# Patient Record
Sex: MALE | Race: WHITE | NOT HISPANIC OR LATINO | Employment: OTHER | URBAN - NONMETROPOLITAN AREA
[De-identification: names, ages, dates, MRNs, and addresses within clinical notes are randomized per-mention and may not be internally consistent; named-entity substitution may affect disease eponyms.]

---

## 2017-01-13 ENCOUNTER — OFFICE VISIT (OUTPATIENT)
Dept: INTERNAL MEDICINE | Facility: CLINIC | Age: 72
End: 2017-01-13

## 2017-01-13 VITALS
DIASTOLIC BLOOD PRESSURE: 70 MMHG | SYSTOLIC BLOOD PRESSURE: 120 MMHG | HEART RATE: 67 BPM | HEIGHT: 69 IN | WEIGHT: 206 LBS | OXYGEN SATURATION: 98 % | BODY MASS INDEX: 30.51 KG/M2

## 2017-01-13 DIAGNOSIS — D84.9 IMMUNODEFICIENCY (HCC): Primary | ICD-10-CM

## 2017-01-13 DIAGNOSIS — N41.1 CHRONIC PROSTATITIS: ICD-10-CM

## 2017-01-13 DIAGNOSIS — G47.00 INSOMNIA, UNSPECIFIED TYPE: ICD-10-CM

## 2017-01-13 DIAGNOSIS — Z79.899 ENCOUNTER FOR LONG-TERM CURRENT USE OF MEDICATION: ICD-10-CM

## 2017-01-13 PROCEDURE — 99213 OFFICE O/P EST LOW 20 MIN: CPT | Performed by: FAMILY MEDICINE

## 2017-01-13 RX ORDER — NALTREXONE HYDROCHLORIDE 50 MG/1
50 TABLET, FILM COATED ORAL DAILY
Qty: 30 TABLET | Refills: 5 | Status: SHIPPED | OUTPATIENT
Start: 2017-01-13 | End: 2017-05-31

## 2017-01-13 RX ORDER — AMITRIPTYLINE HYDROCHLORIDE 25 MG/1
50 TABLET, FILM COATED ORAL NIGHTLY PRN
Qty: 180 TABLET | Refills: 3 | Status: SHIPPED | OUTPATIENT
Start: 2017-01-13 | End: 2017-02-09 | Stop reason: ALTCHOICE

## 2017-01-13 NOTE — MR AVS SNAPSHOT
Saad Meier   1/13/2017 3:30 PM   Office Visit    Provider:  Giles Weldon DO   Department:  [facility] PRIMARY CARE   Dept Phone:  [phone]                Your Full Care Plan              Today's Medication Changes          These changes are accurate as of: 1/13/17  3:52 PM.  If you have any questions, ask your nurse or doctor.               New Medication(s)Ordered:     naltrexone 50 MG tablet   Commonly known as:  DEPADE   Take 1 tablet by mouth Daily.   Started by:  Giles Weldon DO         Medication(s)that have changed:     amitriptyline 25 MG tablet   Commonly known as:  ELAVIL   Take 2 tablets by mouth At Night As Needed for sleep.   What changed:    - how much to take  - additional instructions   Changed by:  Giles Weldon DO         Stop taking medication(s)listed here:     sulfamethoxazole-trimethoprim 800-160 MG per tablet   Commonly known as:  BACTRIM DS,SEPTRA DS   Stopped by:  Giles Weldon DO                Where to Get Your Medications      These medications were sent to [address] - [phone]  - [phone] [address] [address]     Phone:  [phone]     amitriptyline 25 MG tablet    naltrexone 50 MG tablet                  Your Updated Medication List          This list is accurate as of: 1/13/17  3:52 PM.  Always use your most recent med list.                amitriptyline 25 MG tablet   Commonly known as:  ELAVIL   Take 2 tablets by mouth At Night As Needed for sleep.       aspirin 81 MG tablet       finasteride 5 MG tablet   Commonly known as:  PROSCAR   Take 1 tablet by mouth Daily.       melatonin 5 MG tablet tablet       naltrexone 50 MG tablet   Commonly known as:  DEPADE   Take 1 tablet by mouth Daily.       pantoprazole 20 MG EC tablet   Commonly known as:  PROTONIX   1 po in the am 30 minutes before breakfast.       tamsulosin 0.4 MG capsule 24 hr capsule      Commonly known as:  FLOMAX               You Were Diagnosed With        Codes Comments    Immunodeficiency    -  Primary ICD-10-CM: D84.9  ICD-9-CM: 279.3     Chronic prostatitis     ICD-10-CM: N41.1  ICD-9-CM: 601.1     Insomnia, unspecified type     ICD-10-CM: G47.00  ICD-9-CM: 780.52     Encounter for long-term current use of medication     ICD-10-CM: Z79.899  ICD-9-CM: V58.69       Instructions     None    Patient Instructions History      [employer] Signup     Our records indicate that you have an active [employer] account.    You can view your After Visit Summary by going to [employer] and logging in with your [employer] username and password.  If you don't have a [employer] username and password but a parent or guardian has access to your record, the parent or guardian should login with their own [employer] username and password and access your record to view the After Visit Summary.    If you have questions, you can email [email or web address] or call [phone] to talk to our [employer] staff.  Remember, [employer] is NOT to be used for urgent needs.  For medical emergencies, dial 911.               Other Info from Your Visit           Your Appointments     May 15, 2017 11:15 AM EDT   Follow Up with Atilio Neil MD   [facility] CARDIOLOGY (--)    [address]  [facility] [address] [phone]           Arrive 15 minutes prior to appointment.            Jul 03, 2017  1:00 PM EDT   Follow Up with Luis Hollins MD   [facility] UROLOGY (--)    [address]  [facility] [address] [phone]           Arrive 15 minutes prior to appointment.              Allergies     No Known Allergies      Reason for Visit     CHRONIC PROSTATITIS FOLLOW UP     Itching DRY, ITCHY SKIN - HANDS - MAYBE SOAP OR GLOVES - HAS USED OTC HYDROCORTISONE CREAM     Immunodeficiency LDN - DISCUSS REFILL       Vital Signs      "Blood Pressure Pulse Height Weight Oxygen Saturation Body Mass Index    120/70 67 69\" (175.3 cm) 206 lb (93.4 kg) 98% 30.42 kg/m2    Smoking Status                   Former Smoker           Problems and Diagnoses Noted     Inflammatory disease of prostate    Encounter for long-term (current) use of medications    Immunodeficiency    Difficulty falling or staying asleep      "

## 2017-01-13 NOTE — PROGRESS NOTES
Subjective   Saad Meier is a 71 y.o. male.     History of Present Illness   Yoshi had labs in November that were fine.  He would like to get a refill on the naltrexone that he has taken off and on for years.  He states its for his immunodeficiency.  He had 3+ leuk in his urine in November.  He's on avodart and flomax for chronic BPH.  He doesn't sleep too well.  He takes elavil 25mg at night.      The following portions of the patient's history were reviewed and updated as appropriate: allergies, current medications, past social history and problem list.    Review of Systems   Constitutional: Negative for appetite change, chills, fatigue, fever and unexpected weight change.   HENT: Negative for congestion, ear pain, hearing loss, nosebleeds, postnasal drip, rhinorrhea, sore throat, tinnitus and trouble swallowing.    Eyes: Negative for photophobia, discharge and visual disturbance.   Respiratory: Negative for cough, chest tightness, shortness of breath and wheezing.    Cardiovascular: Negative for chest pain, palpitations and leg swelling.   Gastrointestinal: Negative for abdominal distention, abdominal pain, blood in stool, constipation, diarrhea, nausea and vomiting.   Endocrine: Negative for cold intolerance, heat intolerance, polydipsia, polyphagia and polyuria.   Musculoskeletal: Negative for arthralgias, back pain, joint swelling, myalgias, neck pain and neck stiffness.   Skin: Negative for color change, pallor, rash and wound.   Allergic/Immunologic: Negative for environmental allergies, food allergies and immunocompromised state.   Neurological: Negative for dizziness, tremors, seizures, weakness, numbness and headaches.   Hematological: Negative for adenopathy. Does not bruise/bleed easily.   Psychiatric/Behavioral: Negative for agitation, behavioral problems, confusion, hallucinations, self-injury and suicidal ideas. The patient is not nervous/anxious.        Objective   Visit Vitals   • /70   •  "Pulse 67   • Ht 69\" (175.3 cm)   • Wt 206 lb (93.4 kg)   • SpO2 98%   • BMI 30.42 kg/m2     Physical Exam   Constitutional: He is oriented to person, place, and time. He appears well-developed and well-nourished. No distress.   HENT:   Head: Normocephalic and atraumatic.   Right Ear: External ear normal.   Left Ear: External ear normal.   Nose: Nose normal.   Mouth/Throat: Oropharynx is clear and moist.   Eyes: Conjunctivae and EOM are normal. Pupils are equal, round, and reactive to light. Right eye exhibits no discharge. Left eye exhibits no discharge. No scleral icterus.   Neck: Normal range of motion. Neck supple. No JVD present. No tracheal deviation present. No thyromegaly present.   Cardiovascular: Normal rate, regular rhythm, normal heart sounds and intact distal pulses.  Exam reveals no gallop and no friction rub.    No murmur heard.  Pulmonary/Chest: Effort normal and breath sounds normal. No stridor. No respiratory distress. He has no wheezes. He has no rales. He exhibits no tenderness.   Abdominal: Soft. Bowel sounds are normal. He exhibits no distension and no mass. There is no tenderness. There is no rebound and no guarding. No hernia.   Musculoskeletal: Normal range of motion. He exhibits no edema, tenderness or deformity.   Lymphadenopathy:     He has no cervical adenopathy.   Neurological: He is alert and oriented to person, place, and time. He has normal reflexes. He displays normal reflexes. No cranial nerve deficit. He exhibits normal muscle tone.   Skin: Skin is warm and dry. No rash noted. No erythema. No pallor.   Psychiatric: He has a normal mood and affect. His behavior is normal. Judgment normal.       Assessment/Plan   Problem List Items Addressed This Visit        Immune and Lymphatic    Immunodeficiency - Primary    Relevant Medications    naltrexone (DEPADE) 50 MG tablet       Genitourinary    Chronic prostatitis       Other    Insomnia    Relevant Medications    amitriptyline (ELAVIL) " 25 MG tablet    Encounter for long-term current use of medication        incerease the elavil to 50mg and fu in a month.

## 2017-01-23 ENCOUNTER — TELEPHONE (OUTPATIENT)
Dept: INTERNAL MEDICINE | Facility: CLINIC | Age: 72
End: 2017-01-23

## 2017-01-23 DIAGNOSIS — N15.9 KIDNEY INFECTION: Primary | ICD-10-CM

## 2017-01-23 LAB
BILIRUB BLD-MCNC: NEGATIVE MG/DL
CLARITY, POC: CLEAR
COLOR UR: YELLOW
GLUCOSE UR STRIP-MCNC: NEGATIVE MG/DL
KETONES UR QL: NEGATIVE
LEUKOCYTE EST, POC: ABNORMAL
NITRITE UR-MCNC: NEGATIVE MG/ML
PH UR: 5 [PH] (ref 5–8)
PROT UR STRIP-MCNC: NEGATIVE MG/DL
RBC # UR STRIP: NEGATIVE /UL
SP GR UR: 1.01 (ref 1–1.03)
UROBILINOGEN UR QL: NORMAL

## 2017-01-23 PROCEDURE — 81003 URINALYSIS AUTO W/O SCOPE: CPT | Performed by: FAMILY MEDICINE

## 2017-01-23 NOTE — TELEPHONE ENCOUNTER
----- Message from Gerard Coppola sent at 1/23/2017  8:32 AM EST -----  Contact: Patient  Patient called stating that he believes he has a possible kidney infection. He would like something called in or a telephone call about whether or not he should be seen. Stated that it hadn't been long ago when he was seen for the same issue.    Cell # [phone]

## 2017-01-24 DIAGNOSIS — N41.9 PROSTATITIS, UNSPECIFIED PROSTATITIS TYPE: Primary | ICD-10-CM

## 2017-01-24 DIAGNOSIS — N41.9 PROSTATITIS, UNSPECIFIED PROSTATITIS TYPE: ICD-10-CM

## 2017-01-24 RX ORDER — LEVOFLOXACIN 750 MG/1
750 TABLET ORAL DAILY
Qty: 14 TABLET | Refills: 0 | Status: SHIPPED | OUTPATIENT
Start: 2017-01-24 | End: 2017-01-24 | Stop reason: SDUPTHER

## 2017-01-24 RX ORDER — LEVOFLOXACIN 750 MG/1
750 TABLET ORAL DAILY
Qty: 14 TABLET | Refills: 0 | Status: SHIPPED | OUTPATIENT
Start: 2017-01-24 | End: 2017-02-09

## 2017-02-09 ENCOUNTER — OFFICE VISIT (OUTPATIENT)
Dept: INTERNAL MEDICINE | Facility: CLINIC | Age: 72
End: 2017-02-09

## 2017-02-09 VITALS
BODY MASS INDEX: 29.92 KG/M2 | OXYGEN SATURATION: 97 % | HEART RATE: 70 BPM | HEIGHT: 69 IN | SYSTOLIC BLOOD PRESSURE: 140 MMHG | DIASTOLIC BLOOD PRESSURE: 90 MMHG | WEIGHT: 202 LBS

## 2017-02-09 DIAGNOSIS — G47.00 INSOMNIA, UNSPECIFIED TYPE: ICD-10-CM

## 2017-02-09 PROCEDURE — 99213 OFFICE O/P EST LOW 20 MIN: CPT | Performed by: FAMILY MEDICINE

## 2017-02-09 RX ORDER — TRAZODONE HYDROCHLORIDE 50 MG/1
TABLET ORAL
Qty: 60 TABLET | Refills: 5 | Status: SHIPPED | OUTPATIENT
Start: 2017-02-09 | End: 2017-03-08

## 2017-02-09 RX ORDER — AMITRIPTYLINE HYDROCHLORIDE 25 MG/1
50 TABLET, FILM COATED ORAL NIGHTLY PRN
Qty: 180 TABLET | Refills: 3 | Status: CANCELLED | OUTPATIENT
Start: 2017-02-09

## 2017-02-09 NOTE — PROGRESS NOTES
"Subjective   Saad Meier is a 71 y.o. male.     History of Present Illness   Yoshi states that he's getting around 4 hours of sleep now with the 50mg of elavil.  He's never tried trazodone.  He's fine with trying to change to see if he sleeps any better.      The following portions of the patient's history were reviewed and updated as appropriate: allergies, current medications, past social history and problem list.    Review of Systems   Constitutional: Negative for appetite change, chills, fatigue, fever and unexpected weight change.   HENT: Negative for congestion, ear pain, hearing loss, nosebleeds, postnasal drip, rhinorrhea, sore throat, tinnitus and trouble swallowing.    Eyes: Negative for photophobia, discharge and visual disturbance.   Respiratory: Negative for cough, chest tightness, shortness of breath and wheezing.    Cardiovascular: Negative for chest pain, palpitations and leg swelling.   Gastrointestinal: Negative for abdominal distention, abdominal pain, blood in stool, constipation, diarrhea, nausea and vomiting.   Endocrine: Negative for cold intolerance, heat intolerance, polydipsia, polyphagia and polyuria.   Musculoskeletal: Negative for arthralgias, back pain, joint swelling, myalgias, neck pain and neck stiffness.   Skin: Negative for color change, pallor, rash and wound.   Allergic/Immunologic: Negative for environmental allergies, food allergies and immunocompromised state.   Neurological: Negative for dizziness, tremors, seizures, weakness, numbness and headaches.   Hematological: Negative for adenopathy. Does not bruise/bleed easily.   Psychiatric/Behavioral: Negative for agitation, behavioral problems, confusion, hallucinations, self-injury and suicidal ideas. The patient is not nervous/anxious.        Objective   Visit Vitals   • /90   • Pulse 70   • Ht 69\" (175.3 cm)   • Wt 202 lb (91.6 kg)   • SpO2 97%   • BMI 29.83 kg/m2     Physical Exam   Constitutional: He is oriented to " person, place, and time. He appears well-developed and well-nourished. No distress.   HENT:   Head: Normocephalic and atraumatic.   Right Ear: External ear normal.   Left Ear: External ear normal.   Nose: Nose normal.   Mouth/Throat: Oropharynx is clear and moist.   Eyes: Conjunctivae and EOM are normal. Pupils are equal, round, and reactive to light. Right eye exhibits no discharge. Left eye exhibits no discharge. No scleral icterus.   Neck: Normal range of motion. Neck supple. No JVD present. No tracheal deviation present. No thyromegaly present.   Cardiovascular: Normal rate, regular rhythm, normal heart sounds and intact distal pulses.  Exam reveals no gallop and no friction rub.    No murmur heard.  Pulmonary/Chest: Effort normal and breath sounds normal. No stridor. No respiratory distress. He has no wheezes. He has no rales. He exhibits no tenderness.   Abdominal: Soft. Bowel sounds are normal. He exhibits no distension and no mass. There is no tenderness. There is no rebound and no guarding. No hernia.   Musculoskeletal: Normal range of motion. He exhibits no edema, tenderness or deformity.   Lymphadenopathy:     He has no cervical adenopathy.   Neurological: He is alert and oriented to person, place, and time. He has normal reflexes. He displays normal reflexes. No cranial nerve deficit. He exhibits normal muscle tone.   Skin: Skin is warm and dry. No rash noted. No erythema. No pallor.   Psychiatric: He has a normal mood and affect. His behavior is normal. Judgment normal.       Assessment/Plan   Problem List Items Addressed This Visit        Other    Insomnia    Relevant Medications    traZODone (DESYREL) 50 MG tablet           d.c elavil and jeffrey in a month to see how he's sleeping with trazodone.

## 2017-03-06 PROBLEM — I25.10 CAD (CORONARY ARTERY DISEASE): Status: ACTIVE | Noted: 2017-03-06

## 2017-03-06 PROBLEM — I47.1 AV NODAL RE-ENTRY TACHYCARDIA: Status: ACTIVE | Noted: 2017-03-06

## 2017-03-06 PROBLEM — I47.19 AV NODAL RE-ENTRY TACHYCARDIA: Status: ACTIVE | Noted: 2017-03-06

## 2017-03-08 ENCOUNTER — OFFICE VISIT (OUTPATIENT)
Dept: INTERNAL MEDICINE | Facility: CLINIC | Age: 72
End: 2017-03-08

## 2017-03-08 VITALS
SYSTOLIC BLOOD PRESSURE: 120 MMHG | HEART RATE: 62 BPM | BODY MASS INDEX: 29.47 KG/M2 | WEIGHT: 199 LBS | OXYGEN SATURATION: 97 % | HEIGHT: 69 IN | DIASTOLIC BLOOD PRESSURE: 90 MMHG

## 2017-03-08 DIAGNOSIS — G47.00 INSOMNIA, UNSPECIFIED TYPE: Primary | ICD-10-CM

## 2017-03-08 PROCEDURE — 99213 OFFICE O/P EST LOW 20 MIN: CPT | Performed by: FAMILY MEDICINE

## 2017-03-08 RX ORDER — DOXEPIN HYDROCHLORIDE 25 MG/1
25 CAPSULE ORAL NIGHTLY
Qty: 30 CAPSULE | Refills: 11 | Status: SHIPPED | OUTPATIENT
Start: 2017-03-08 | End: 2017-06-07

## 2017-03-08 RX ORDER — AMITRIPTYLINE HYDROCHLORIDE 25 MG/1
50 TABLET, FILM COATED ORAL NIGHTLY
Qty: 60 TABLET | Refills: 2 | Status: SHIPPED | OUTPATIENT
Start: 2017-03-08 | End: 2017-06-07 | Stop reason: SDUPTHER

## 2017-03-08 NOTE — PROGRESS NOTES
Subjective   Saad Meier is a 71 y.o. male.     History of Present Illness   Yoshi was changed from 50 mg of elavil, to 50 mg of trazodone for insomnia.  He is not really seeing any better quality of sleep with that medication.    He tried to take 2 a few times, and didn't really see any help.  He has not tried doxepin.  He is curious if we can try the doxepin, and switch back to elavil if the doxepin doesn't help.         The following portions of the patient's history were reviewed and updated as appropriate: allergies, current medications, past social history and problem list.    Review of Systems   Constitutional: Negative for appetite change, chills, fatigue, fever and unexpected weight change.   HENT: Negative for congestion, ear pain, hearing loss, nosebleeds, postnasal drip, rhinorrhea, sore throat, tinnitus and trouble swallowing.    Eyes: Negative for photophobia, discharge and visual disturbance.   Respiratory: Negative for cough, chest tightness, shortness of breath and wheezing.    Cardiovascular: Negative for chest pain, palpitations and leg swelling.   Gastrointestinal: Negative for abdominal distention, abdominal pain, blood in stool, constipation, diarrhea, nausea and vomiting.   Endocrine: Negative for cold intolerance, heat intolerance, polydipsia, polyphagia and polyuria.   Musculoskeletal: Negative for arthralgias, back pain, joint swelling, myalgias, neck pain and neck stiffness.   Skin: Negative for color change, pallor, rash and wound.   Allergic/Immunologic: Negative for environmental allergies, food allergies and immunocompromised state.   Neurological: Negative for dizziness, tremors, seizures, weakness, numbness and headaches.   Hematological: Negative for adenopathy. Does not bruise/bleed easily.   Psychiatric/Behavioral: Negative for agitation, behavioral problems, confusion, hallucinations, self-injury and suicidal ideas. The patient is not nervous/anxious.        Objective   Visit  "Vitals   • /90   • Pulse 62   • Ht 69\" (175.3 cm)   • Wt 199 lb (90.3 kg)   • SpO2 97%   • BMI 29.39 kg/m2     Physical Exam   Constitutional: He is oriented to person, place, and time. He appears well-developed and well-nourished. No distress.   HENT:   Head: Normocephalic and atraumatic.   Right Ear: External ear normal.   Left Ear: External ear normal.   Nose: Nose normal.   Mouth/Throat: Oropharynx is clear and moist.   Eyes: Conjunctivae and EOM are normal. Pupils are equal, round, and reactive to light. Right eye exhibits no discharge. Left eye exhibits no discharge. No scleral icterus.   Neck: Normal range of motion. Neck supple. No JVD present. No tracheal deviation present. No thyromegaly present.   Cardiovascular: Normal rate, regular rhythm, normal heart sounds and intact distal pulses.  Exam reveals no gallop and no friction rub.    No murmur heard.  Pulmonary/Chest: Effort normal and breath sounds normal. No stridor. No respiratory distress. He has no wheezes. He has no rales. He exhibits no tenderness.   Abdominal: Soft. Bowel sounds are normal. He exhibits no distension and no mass. There is no tenderness. There is no rebound and no guarding. No hernia.   Musculoskeletal: Normal range of motion. He exhibits no edema, tenderness or deformity.   Lymphadenopathy:     He has no cervical adenopathy.   Neurological: He is alert and oriented to person, place, and time. He has normal reflexes. He displays normal reflexes. No cranial nerve deficit. He exhibits normal muscle tone.   Skin: Skin is warm and dry. No rash noted. No erythema. No pallor.   Psychiatric: He has a normal mood and affect. His behavior is normal. Judgment normal.       Assessment/Plan   Problem List Items Addressed This Visit        Other    Insomnia - Primary    Relevant Medications    doxepin (SINEquan) 25 MG capsule    amitriptyline (ELAVIL) 25 MG tablet           doxepin first, only use the elavil if needed if doxepin doesn't " work.  FU in a month.

## 2017-03-13 DIAGNOSIS — N40.1 BPH (BENIGN PROSTATIC HYPERTROPHY) WITH URINARY OBSTRUCTION: Primary | ICD-10-CM

## 2017-03-13 DIAGNOSIS — N13.8 BPH (BENIGN PROSTATIC HYPERTROPHY) WITH URINARY OBSTRUCTION: Primary | ICD-10-CM

## 2017-03-13 RX ORDER — TAMSULOSIN HYDROCHLORIDE 0.4 MG/1
1 CAPSULE ORAL
Qty: 30 CAPSULE | Refills: 11 | Status: SHIPPED | OUTPATIENT
Start: 2017-03-13 | End: 2017-07-03 | Stop reason: SDUPTHER

## 2017-04-12 ENCOUNTER — OFFICE VISIT (OUTPATIENT)
Dept: INTERNAL MEDICINE | Facility: CLINIC | Age: 72
End: 2017-04-12

## 2017-04-12 VITALS
BODY MASS INDEX: 29.47 KG/M2 | TEMPERATURE: 98.5 F | HEIGHT: 69 IN | DIASTOLIC BLOOD PRESSURE: 70 MMHG | SYSTOLIC BLOOD PRESSURE: 110 MMHG | OXYGEN SATURATION: 93 % | WEIGHT: 199 LBS | HEART RATE: 63 BPM

## 2017-04-12 DIAGNOSIS — G47.00 INSOMNIA, UNSPECIFIED TYPE: ICD-10-CM

## 2017-04-12 DIAGNOSIS — R12 HEARTBURN: Primary | ICD-10-CM

## 2017-04-12 DIAGNOSIS — R60.9 PERIPHERAL EDEMA: ICD-10-CM

## 2017-04-12 PROBLEM — R60.0 PERIPHERAL EDEMA: Status: ACTIVE | Noted: 2017-04-12

## 2017-04-12 PROCEDURE — 99213 OFFICE O/P EST LOW 20 MIN: CPT | Performed by: FAMILY MEDICINE

## 2017-04-12 RX ORDER — PANTOPRAZOLE SODIUM 20 MG/1
TABLET, DELAYED RELEASE ORAL
Qty: 90 TABLET | Refills: 3 | Status: SHIPPED | OUTPATIENT
Start: 2017-04-12 | End: 2018-01-31 | Stop reason: SDUPTHER

## 2017-04-12 RX ORDER — ZOLPIDEM TARTRATE 5 MG/1
5 TABLET ORAL NIGHTLY PRN
Qty: 30 TABLET | Refills: 0 | Status: SHIPPED | OUTPATIENT
Start: 2017-04-12 | End: 2017-06-07

## 2017-04-12 RX ORDER — HYDROCHLOROTHIAZIDE 25 MG/1
25 TABLET ORAL DAILY PRN
Qty: 30 TABLET | Refills: 11 | Status: SHIPPED | OUTPATIENT
Start: 2017-04-12 | End: 2017-05-31 | Stop reason: ALTCHOICE

## 2017-04-12 NOTE — PROGRESS NOTES
Subjective   Saad Meier is a 71 y.o. male.     History of Present Illness   Last month, it was noted that Saad had tried Elavil and trazodone past for his insomnia.  He had not tried doxepin.  A prescription for doxepin was written, with backup plans of Elavil given to him for as needed help and sleeping.  He states the elavil helped more and he went back to that here the last week.  VSS. NAD.    He was only getting 2-4 hours of sleep with that doxepin.  He needs a refill of the heartburn medication.    We will try ambien for a month and see how he does on it.  5mg.    The following portions of the patient's history were reviewed and updated as appropriate: allergies, current medications, past social history and problem list.    Review of Systems   Constitutional: Negative for appetite change, chills, fatigue, fever and unexpected weight change.   HENT: Negative for congestion, ear pain, hearing loss, nosebleeds, postnasal drip, rhinorrhea, sore throat, tinnitus and trouble swallowing.    Eyes: Negative for photophobia, discharge and visual disturbance.   Respiratory: Negative for cough, chest tightness, shortness of breath and wheezing.    Cardiovascular: Negative for chest pain, palpitations and leg swelling.   Gastrointestinal: Negative for abdominal distention, abdominal pain, blood in stool, constipation, diarrhea, nausea and vomiting.   Endocrine: Negative for cold intolerance, heat intolerance, polydipsia, polyphagia and polyuria.   Musculoskeletal: Negative for arthralgias, back pain, joint swelling, myalgias, neck pain and neck stiffness.   Skin: Negative for color change, pallor, rash and wound.   Allergic/Immunologic: Negative for environmental allergies, food allergies and immunocompromised state.   Neurological: Negative for dizziness, tremors, seizures, weakness, numbness and headaches.   Hematological: Negative for adenopathy. Does not bruise/bleed easily.   Psychiatric/Behavioral: Negative  "for agitation, behavioral problems, confusion, hallucinations, self-injury and suicidal ideas. The patient is not nervous/anxious.        Objective   /70  Pulse 63  Temp 98.5 °F (36.9 °C)  Ht 69\" (175.3 cm)  Wt 199 lb (90.3 kg)  SpO2 93% Comment: ROOM AIR - CONGESTED  BMI 29.39 kg/m2  Physical Exam   Constitutional: He is oriented to person, place, and time. He appears well-developed and well-nourished. No distress.   HENT:   Head: Normocephalic and atraumatic.   Right Ear: External ear normal.   Left Ear: External ear normal.   Nose: Nose normal.   Mouth/Throat: Oropharynx is clear and moist.   Eyes: Conjunctivae and EOM are normal. Pupils are equal, round, and reactive to light. Right eye exhibits no discharge. Left eye exhibits no discharge. No scleral icterus.   Neck: Normal range of motion. Neck supple. No JVD present. No tracheal deviation present. No thyromegaly present.   Cardiovascular: Normal rate, regular rhythm, normal heart sounds and intact distal pulses.  Exam reveals no gallop and no friction rub.    No murmur heard.  Pulmonary/Chest: Effort normal and breath sounds normal. No stridor. No respiratory distress. He has no wheezes. He has no rales. He exhibits no tenderness.   Abdominal: Soft. Bowel sounds are normal. He exhibits no distension and no mass. There is no tenderness. There is no rebound and no guarding. No hernia.   Musculoskeletal: Normal range of motion. He exhibits edema. He exhibits no tenderness or deformity.   Lymphadenopathy:     He has no cervical adenopathy.   Neurological: He is alert and oriented to person, place, and time. He has normal reflexes. He displays normal reflexes. No cranial nerve deficit. He exhibits normal muscle tone.   Skin: Skin is warm and dry. No rash noted. No erythema. No pallor.   Psychiatric: He has a normal mood and affect. His behavior is normal. Judgment normal.       Assessment/Plan   Problem List Items Addressed This Visit        Digestive "    Heartburn - Primary    Relevant Medications    pantoprazole (PROTONIX) 20 MG EC tablet       Other    Insomnia    Relevant Medications    zolpidem (AMBIEN) 5 MG tablet    Peripheral edema    Relevant Medications    hydrochlorothiazide (HYDRODIURIL) 25 MG tablet           fu in a month to see how hctz and ambien are doing.

## 2017-04-24 ENCOUNTER — OFFICE VISIT (OUTPATIENT)
Dept: INTERNAL MEDICINE | Facility: CLINIC | Age: 72
End: 2017-04-24

## 2017-04-24 VITALS
SYSTOLIC BLOOD PRESSURE: 128 MMHG | DIASTOLIC BLOOD PRESSURE: 80 MMHG | HEART RATE: 60 BPM | WEIGHT: 196 LBS | BODY MASS INDEX: 29.03 KG/M2 | OXYGEN SATURATION: 98 % | TEMPERATURE: 98.3 F | HEIGHT: 69 IN

## 2017-04-24 DIAGNOSIS — R05.8 PRODUCTIVE COUGH: Primary | ICD-10-CM

## 2017-04-24 PROCEDURE — 99213 OFFICE O/P EST LOW 20 MIN: CPT | Performed by: FAMILY MEDICINE

## 2017-04-24 RX ORDER — DOXYCYCLINE HYCLATE 100 MG/1
100 CAPSULE ORAL 2 TIMES DAILY
Qty: 20 CAPSULE | Refills: 0 | Status: SHIPPED | OUTPATIENT
Start: 2017-04-24 | End: 2017-05-31

## 2017-04-26 NOTE — PROGRESS NOTES
Subjective   Saad Meier is a 71 y.o. male.     Cough   This is a new problem. The current episode started 1 to 4 weeks ago. The problem has been unchanged. The problem occurs constantly. The cough is productive of purulent sputum. Associated symptoms include rhinorrhea and shortness of breath (with exertion). Pertinent negatives include no chills or fever. There is no history of asthma or COPD.        The following portions of the patient's history were reviewed and updated as appropriate: allergies, current medications, past family history, past medical history, past social history, past surgical history and problem list.    Review of Systems   Constitutional: Positive for activity change. Negative for chills and fever.   HENT: Positive for rhinorrhea.    Respiratory: Positive for cough and shortness of breath (with exertion).        Objective   Physical Exam   Constitutional: He is oriented to person, place, and time. Vital signs are normal. He appears well-developed and well-nourished. He is active.  Non-toxic appearance. He does not have a sickly appearance. He appears ill.   HENT:   Head: Normocephalic and atraumatic.   Right Ear: Hearing normal.   Left Ear: Hearing normal.   Nose: Rhinorrhea present.   Mouth/Throat: Mucous membranes are not dry.   Eyes: EOM and lids are normal. Pupils are equal, round, and reactive to light.   Cardiovascular: Normal rate, regular rhythm and normal heart sounds.    Pulmonary/Chest: Effort normal and breath sounds normal.   Occasional cough   Neurological: He is alert and oriented to person, place, and time. No cranial nerve deficit. Gait normal.   Skin: Skin is warm. He is not diaphoretic. No cyanosis. Nails show no clubbing.   Psychiatric: He has a normal mood and affect. His speech is normal and behavior is normal. Judgment and thought content normal.   Nursing note and vitals reviewed.      Assessment/Plan   Saad was seen today for cough and nasal  congestion.    Diagnoses and all orders for this visit:    Productive cough  -     doxycycline (VIBRAMYCIN) 100 MG capsule; Take 1 capsule by mouth 2 (Two) Times a Day.    covering for atypical pneumonia, no azithromycin due to some studies showing association with ACS. Follow up later this week if not improving.

## 2017-05-26 ENCOUNTER — APPOINTMENT (OUTPATIENT)
Dept: LAB | Facility: HOSPITAL | Age: 72
End: 2017-05-26

## 2017-05-26 ENCOUNTER — HOSPITAL ENCOUNTER (OUTPATIENT)
Dept: CT IMAGING | Facility: HOSPITAL | Age: 72
Discharge: HOME | End: 2017-05-26
Admitting: INTERNAL MEDICINE

## 2017-05-26 ENCOUNTER — TRANSCRIBE ORDERS (OUTPATIENT)
Dept: ADMINISTRATIVE | Facility: HOSPITAL | Age: 72
End: 2017-05-26

## 2017-05-26 DIAGNOSIS — C18.9 MALIGNANT NEOPLASM OF COLON, UNSPECIFIED SITE: Primary | ICD-10-CM

## 2017-05-26 LAB
CREAT BLD-MCNC: 0.9 MG/DL (ref 0.6–1.3)
GFR SERPL CREATININE-BSD FRML MDRD: 83 ML/MIN/1.73

## 2017-05-26 PROCEDURE — 36415 COLL VENOUS BLD VENIPUNCTURE: CPT | Performed by: INTERNAL MEDICINE

## 2017-05-26 PROCEDURE — 0 DIATRIZOATE MEGLUMINE & SODIUM PER 1 ML: Performed by: INTERNAL MEDICINE

## 2017-05-26 PROCEDURE — 82565 ASSAY OF CREATININE: CPT | Performed by: INTERNAL MEDICINE

## 2017-05-26 PROCEDURE — 74177 CT ABD & PELVIS W/CONTRAST: CPT

## 2017-05-26 PROCEDURE — 71260 CT THORAX DX C+: CPT

## 2017-05-26 PROCEDURE — 0 IOPAMIDOL 61 % SOLUTION: Performed by: INTERNAL MEDICINE

## 2017-05-26 RX ADMIN — DIATRIZOATE MEGLUMINE AND DIATRIZOATE SODIUM 30 ML: 660; 100 LIQUID ORAL; RECTAL at 12:45

## 2017-05-26 RX ADMIN — IOPAMIDOL 100 ML: 612 INJECTION, SOLUTION INTRAVENOUS at 12:45

## 2017-05-31 ENCOUNTER — OFFICE VISIT (OUTPATIENT)
Dept: CARDIOLOGY | Facility: CLINIC | Age: 72
End: 2017-05-31

## 2017-05-31 VITALS
HEIGHT: 69 IN | HEART RATE: 60 BPM | BODY MASS INDEX: 29.03 KG/M2 | SYSTOLIC BLOOD PRESSURE: 100 MMHG | DIASTOLIC BLOOD PRESSURE: 70 MMHG | WEIGHT: 196 LBS

## 2017-05-31 DIAGNOSIS — I49.5 SSS (SICK SINUS SYNDROME) (HCC): ICD-10-CM

## 2017-05-31 DIAGNOSIS — I47.1 AV NODAL RE-ENTRY TACHYCARDIA (HCC): Primary | ICD-10-CM

## 2017-05-31 DIAGNOSIS — R60.0 BILATERAL EDEMA OF LOWER EXTREMITY: ICD-10-CM

## 2017-05-31 PROCEDURE — 93288 INTERROG EVL PM/LDLS PM IP: CPT | Performed by: INTERNAL MEDICINE

## 2017-05-31 PROCEDURE — 99214 OFFICE O/P EST MOD 30 MIN: CPT | Performed by: INTERNAL MEDICINE

## 2017-05-31 RX ORDER — FUROSEMIDE 40 MG/1
40 TABLET ORAL DAILY
Qty: 30 TABLET | Refills: 2 | Status: SHIPPED | OUTPATIENT
Start: 2017-05-31 | End: 2017-05-31

## 2017-05-31 RX ORDER — FUROSEMIDE 40 MG/1
40 TABLET ORAL DAILY
Qty: 30 TABLET | Refills: 11 | Status: SHIPPED | OUTPATIENT
Start: 2017-05-31 | End: 2017-07-19 | Stop reason: SDUPTHER

## 2017-06-05 ENCOUNTER — RESULTS ENCOUNTER (OUTPATIENT)
Dept: CARDIOLOGY | Facility: CLINIC | Age: 72
End: 2017-06-05

## 2017-06-05 DIAGNOSIS — R60.0 BILATERAL EDEMA OF LOWER EXTREMITY: ICD-10-CM

## 2017-06-07 ENCOUNTER — OFFICE VISIT (OUTPATIENT)
Dept: INTERNAL MEDICINE | Facility: CLINIC | Age: 72
End: 2017-06-07

## 2017-06-07 VITALS
WEIGHT: 194.4 LBS | DIASTOLIC BLOOD PRESSURE: 70 MMHG | HEART RATE: 64 BPM | HEIGHT: 69 IN | OXYGEN SATURATION: 94 % | SYSTOLIC BLOOD PRESSURE: 100 MMHG | BODY MASS INDEX: 28.79 KG/M2

## 2017-06-07 DIAGNOSIS — G47.00 INSOMNIA, UNSPECIFIED TYPE: Primary | ICD-10-CM

## 2017-06-07 PROCEDURE — 99213 OFFICE O/P EST LOW 20 MIN: CPT | Performed by: FAMILY MEDICINE

## 2017-06-07 RX ORDER — AMITRIPTYLINE HYDROCHLORIDE 100 MG/1
100 TABLET, FILM COATED ORAL NIGHTLY
Qty: 30 TABLET | Refills: 5 | Status: SHIPPED | OUTPATIENT
Start: 2017-06-07 | End: 2017-06-07 | Stop reason: SDUPTHER

## 2017-06-07 RX ORDER — AMITRIPTYLINE HYDROCHLORIDE 100 MG/1
100 TABLET, FILM COATED ORAL NIGHTLY
Qty: 90 TABLET | Refills: 3 | Status: SHIPPED | OUTPATIENT
Start: 2017-06-07 | End: 2017-06-22 | Stop reason: SDUPTHER

## 2017-06-07 NOTE — PROGRESS NOTES
"Subjective   Saad Meier is a 71 y.o. male.     History of Present Illness   Saad is taking 2 of the 25mg elavil each night, and he's sleeing about 2 hours at night.  He naps sometimes, but mostly just doesn't sleep.  He wasn't sleeping at all with the ambien.      The following portions of the patient's history were reviewed and updated as appropriate: allergies, current medications, past social history and problem list.    Review of Systems   Constitutional: Negative for appetite change, chills, fatigue, fever and unexpected weight change.   HENT: Negative for congestion, ear pain, hearing loss, nosebleeds, postnasal drip, rhinorrhea, sore throat, tinnitus and trouble swallowing.    Eyes: Negative for photophobia, discharge and visual disturbance.   Respiratory: Negative for cough, chest tightness, shortness of breath and wheezing.    Cardiovascular: Negative for chest pain, palpitations and leg swelling.   Gastrointestinal: Negative for abdominal distention, abdominal pain, blood in stool, constipation, diarrhea, nausea and vomiting.   Endocrine: Negative for cold intolerance, heat intolerance, polydipsia, polyphagia and polyuria.   Musculoskeletal: Negative for arthralgias, back pain, joint swelling, myalgias, neck pain and neck stiffness.   Skin: Negative for color change, pallor, rash and wound.   Allergic/Immunologic: Negative for environmental allergies, food allergies and immunocompromised state.   Neurological: Negative for dizziness, tremors, seizures, weakness, numbness and headaches.   Hematological: Negative for adenopathy. Does not bruise/bleed easily.   Psychiatric/Behavioral: Negative for agitation, behavioral problems, confusion, hallucinations, self-injury and suicidal ideas. The patient is not nervous/anxious.        Objective   /70  Pulse 64  Ht 69\" (175.3 cm)  Wt 194 lb 6.4 oz (88.2 kg)  SpO2 94%  BMI 28.71 kg/m2  Physical Exam   Constitutional: He is oriented to person, " place, and time. He appears well-developed and well-nourished. No distress.   HENT:   Head: Normocephalic and atraumatic.   Right Ear: External ear normal.   Left Ear: External ear normal.   Nose: Nose normal.   Mouth/Throat: Oropharynx is clear and moist.   Eyes: Conjunctivae and EOM are normal. Pupils are equal, round, and reactive to light. Right eye exhibits no discharge. Left eye exhibits no discharge. No scleral icterus.   Neck: Normal range of motion. Neck supple. No JVD present. No tracheal deviation present. No thyromegaly present.   Cardiovascular: Normal rate, regular rhythm, normal heart sounds and intact distal pulses.  Exam reveals no gallop and no friction rub.    No murmur heard.  Pulmonary/Chest: Effort normal and breath sounds normal. No stridor. No respiratory distress. He has no wheezes. He has no rales. He exhibits no tenderness.   Abdominal: Soft. Bowel sounds are normal. He exhibits no distension and no mass. There is no tenderness. There is no rebound and no guarding. No hernia.   Musculoskeletal: Normal range of motion. He exhibits no edema, tenderness or deformity.   Lymphadenopathy:     He has no cervical adenopathy.   Neurological: He is alert and oriented to person, place, and time. He has normal reflexes. He displays normal reflexes. No cranial nerve deficit. He exhibits normal muscle tone.   Skin: Skin is warm and dry. No rash noted. No erythema. No pallor.   Psychiatric: He has a normal mood and affect. His behavior is normal. Judgment normal.       Assessment/Plan   Problem List Items Addressed This Visit        Other    Insomnia - Primary    Relevant Medications    amitriptyline (ELAVIL) 100 MG tablet           fu in 6-8 weeks.  Sooner if needed.

## 2017-06-09 LAB
BUN SERPL-MCNC: 22 MG/DL (ref 9–23)
BUN/CREAT SERPL: 24.4 (ref 7–25)
CALCIUM SERPL-MCNC: 10 MG/DL (ref 8.7–10.4)
CHLORIDE SERPL-SCNC: 116 MMOL/L (ref 99–109)
CO2 SERPL-SCNC: 28 MMOL/L (ref 20–31)
CREAT SERPL-MCNC: 0.9 MG/DL (ref 0.6–1.3)
GLUCOSE SERPL-MCNC: 100 MG/DL (ref 70–100)
POTASSIUM SERPL-SCNC: 3.9 MMOL/L (ref 3.5–5.5)
SODIUM SERPL-SCNC: 141 MMOL/L (ref 132–146)

## 2017-06-20 ENCOUNTER — TELEPHONE (OUTPATIENT)
Dept: INTERNAL MEDICINE | Facility: CLINIC | Age: 72
End: 2017-06-20

## 2017-06-20 NOTE — TELEPHONE ENCOUNTER
PATIENT LEFT  STATING THAT HIS AMITRIPTYLINE IS $96.40 FOR 90 DAY SUPPLY FROM [employer]. WANTS TO KNOW IF THERE IS SOMETHING THAT BE DONE.

## 2017-06-22 DIAGNOSIS — G47.00 INSOMNIA, UNSPECIFIED TYPE: ICD-10-CM

## 2017-06-22 RX ORDER — AMITRIPTYLINE HYDROCHLORIDE 50 MG/1
100 TABLET, FILM COATED ORAL NIGHTLY
Qty: 180 TABLET | Refills: 3 | Status: SHIPPED | OUTPATIENT
Start: 2017-06-22 | End: 2017-08-07

## 2017-06-22 NOTE — TELEPHONE ENCOUNTER
I change the prescription from 100 mg tablets 250 mg tablets that he can take 2 of.  That may be cheaper.  We will see.

## 2017-07-03 ENCOUNTER — OFFICE VISIT (OUTPATIENT)
Dept: UROLOGY | Facility: CLINIC | Age: 72
End: 2017-07-03

## 2017-07-03 VITALS
BODY MASS INDEX: 30.04 KG/M2 | TEMPERATURE: 98.2 F | DIASTOLIC BLOOD PRESSURE: 72 MMHG | HEIGHT: 69 IN | OXYGEN SATURATION: 95 % | HEART RATE: 60 BPM | SYSTOLIC BLOOD PRESSURE: 118 MMHG | WEIGHT: 202.8 LBS

## 2017-07-03 DIAGNOSIS — N40.1 BENIGN NON-NODULAR PROSTATIC HYPERPLASIA WITH LOWER URINARY TRACT SYMPTOMS: ICD-10-CM

## 2017-07-03 DIAGNOSIS — N32.0 BLADDER OUTLET OBSTRUCTION: ICD-10-CM

## 2017-07-03 DIAGNOSIS — R60.0 LOCALIZED EDEMA: ICD-10-CM

## 2017-07-03 DIAGNOSIS — N40.0 ENLARGED PROSTATE: Primary | ICD-10-CM

## 2017-07-03 DIAGNOSIS — N40.1 BPH (BENIGN PROSTATIC HYPERTROPHY) WITH URINARY OBSTRUCTION: ICD-10-CM

## 2017-07-03 DIAGNOSIS — N13.8 BPH (BENIGN PROSTATIC HYPERTROPHY) WITH URINARY OBSTRUCTION: ICD-10-CM

## 2017-07-03 DIAGNOSIS — R35.1 NOCTURIA MORE THAN TWICE PER NIGHT: ICD-10-CM

## 2017-07-03 LAB
BILIRUB BLD-MCNC: NEGATIVE MG/DL
CLARITY, POC: CLEAR
COLOR UR: YELLOW
GLUCOSE UR STRIP-MCNC: NEGATIVE MG/DL
KETONES UR QL: NEGATIVE
LEUKOCYTE EST, POC: ABNORMAL
NITRITE UR-MCNC: NEGATIVE MG/ML
PH UR: 6.5 [PH] (ref 5–8)
PROT UR STRIP-MCNC: NEGATIVE MG/DL
PSA SERPL-MCNC: 0.54 NG/ML (ref 0.06–4)
RBC # UR STRIP: NEGATIVE /UL
SP GR UR: 1.02 (ref 1–1.03)
UROBILINOGEN UR QL: NORMAL

## 2017-07-03 PROCEDURE — 99213 OFFICE O/P EST LOW 20 MIN: CPT | Performed by: UROLOGY

## 2017-07-03 PROCEDURE — 81003 URINALYSIS AUTO W/O SCOPE: CPT | Performed by: UROLOGY

## 2017-07-03 RX ORDER — TAMSULOSIN HYDROCHLORIDE 0.4 MG/1
1 CAPSULE ORAL
Qty: 90 CAPSULE | Refills: 3 | Status: SHIPPED | OUTPATIENT
Start: 2017-07-03 | End: 2018-11-26

## 2017-07-03 RX ORDER — FINASTERIDE 5 MG/1
5 TABLET, FILM COATED ORAL
Qty: 90 TABLET | Refills: 3 | Status: SHIPPED | OUTPATIENT
Start: 2017-07-03 | End: 2018-10-09 | Stop reason: SDUPTHER

## 2017-07-03 NOTE — PROGRESS NOTES
Chief Complaint  Follow-up (yearly fup for enlarged prostate and bladder outlet obstruction.)        ELIECER Meier is a 71 y.o. male who returns today for annual checkup with a known enlarged prostate and symptoms of outlet obstruction.  Since taking Proscar and Flomax he has little trouble voiding during the day.  He still has nocturia associated with lower extremity edema and obstructive sleep apnea.  Voided urine has a few white cells and a culture is submitted although last year the urine appeared identical and had no growth on culture.    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   • CAD (coronary artery disease) 3/6/2017   • Cardiac arrhythmia    • Colon polyp    • History of blood transfusion    • History of colonoscopy 2015    Complete   • History of esophagogastroduodenoscopy 2015    Diagnostic   • Iron deficiency        Past Surgical History  Past Surgical History:   Procedure Laterality Date   • CARDIAC ABLATION  03/2012   • CARDIAC CATHETERIZATION  2004   • CARDIAC PACEMAKER PLACEMENT  2004   • COLON SURGERY  2012   • HEMORRHOIDECTOMY  1970   • HERNIA REPAIR      X 5       Medications  has a current medication list which includes the following prescription(s): amitriptyline, aspirin, finasteride, furosemide, pantoprazole, and tamsulosin.      Allergies  No Known Allergies    Social History  Social History     Social History Narrative       Family History  He has no family history of bladder or kidney cancer  He has no family history of kidney stones      AUA Symptom Score:      Review of Systems  Review of Systems    Physical Exam  Physical Exam   Constitutional: He is oriented to person, place, and time. He appears well-developed and well-nourished.   HENT:   Head: Normocephalic and atraumatic.   Neck: Normal range of motion.    Pulmonary/Chest: Effort normal. No respiratory distress.   Abdominal: Soft. He exhibits no distension and no mass. There is no tenderness. A hernia is present. Hernia confirmed positive in the right inguinal area. Hernia confirmed negative in the left inguinal area.   Genitourinary: Rectum normal, prostate normal, testes normal and penis normal.   Musculoskeletal: Normal range of motion.   Lymphadenopathy:     He has no cervical adenopathy. No inguinal adenopathy noted on the right or left side.   Neurological: He is alert and oriented to person, place, and time.   Skin: Skin is warm and dry.   Psychiatric: He has a normal mood and affect. His behavior is normal.   Vitals reviewed.      Labs Recent and today in the office:  Results for orders placed or performed in visit on 07/03/17   POC Urinalysis Dipstick, Automated   Result Value Ref Range    Color Yellow Yellow, Straw, Dark Yellow, Kathy    Clarity, UA Clear Clear    Glucose, UA Negative Negative, 1000 mg/dL (3+) mg/dL    Bilirubin Negative Negative    Ketones, UA Negative Negative    Specific Gravity  1.025 1.005 - 1.030    Blood, UA Negative Negative    pH, Urine 6.5 5.0 - 8.0    Protein, POC Negative Negative mg/dL    Urobilinogen, UA Normal Normal    Leukocytes Small (1+) (A) Negative    Nitrite, UA Negative Negative         Assessment & Plan  He has a benign digital rectal exam so a PSA is submitted and if normal he can return on an annual basis.  He needs to restrict his salt in his diet and check back with his family doctor for the lower extremity edema

## 2017-07-07 ENCOUNTER — TELEPHONE (OUTPATIENT)
Dept: CARDIOLOGY | Facility: CLINIC | Age: 72
End: 2017-07-07

## 2017-07-07 NOTE — TELEPHONE ENCOUNTER
Reviewed results of recent labs with patient.  He states that he still has some swelling in his legs so I advised that he may take one extra lasix today, then return to his normal dose over the weekend.  If this has not helped he will call me back on Monday to let me know.  Understanding verbalized.

## 2017-07-18 ENCOUNTER — TELEPHONE (OUTPATIENT)
Dept: CARDIOLOGY | Facility: CLINIC | Age: 72
End: 2017-07-18

## 2017-07-18 NOTE — TELEPHONE ENCOUNTER
The patient called stating that his edema is worsening and he is short of breath.  I advised that if he is amenable I will refer him to the [facility] as we do not have an appt available immediately in the office.  He agreed and I reviewed the date, time and location with him.  Understanding and agreement verbalized.

## 2017-07-19 ENCOUNTER — OFFICE VISIT (OUTPATIENT)
Dept: CARDIOLOGY | Facility: HOSPITAL | Age: 72
End: 2017-07-19

## 2017-07-19 VITALS
BODY MASS INDEX: 30.66 KG/M2 | RESPIRATION RATE: 20 BRPM | SYSTOLIC BLOOD PRESSURE: 141 MMHG | HEIGHT: 69 IN | TEMPERATURE: 97 F | WEIGHT: 207 LBS | DIASTOLIC BLOOD PRESSURE: 95 MMHG | HEART RATE: 62 BPM | OXYGEN SATURATION: 94 %

## 2017-07-19 DIAGNOSIS — E87.70 HYPERVOLEMIA, UNSPECIFIED HYPERVOLEMIA TYPE: Primary | ICD-10-CM

## 2017-07-19 DIAGNOSIS — I49.5 SSS (SICK SINUS SYNDROME) (HCC): ICD-10-CM

## 2017-07-19 DIAGNOSIS — R60.0 BILATERAL EDEMA OF LOWER EXTREMITY: ICD-10-CM

## 2017-07-19 LAB
ANION GAP SERPL CALCULATED.3IONS-SCNC: -2 MMOL/L (ref 3–11)
BNP SERPL-MCNC: 29 PG/ML (ref 0–100)
BUN BLD-MCNC: 26 MG/DL (ref 9–23)
BUN/CREAT SERPL: 26 (ref 7–25)
CALCIUM SPEC-SCNC: 9.5 MG/DL (ref 8.7–10.4)
CHLORIDE SERPL-SCNC: 109 MMOL/L (ref 99–109)
CO2 SERPL-SCNC: 28 MMOL/L (ref 20–31)
CREAT BLD-MCNC: 1 MG/DL (ref 0.6–1.3)
GFR SERPL CREATININE-BSD FRML MDRD: 73 ML/MIN/1.73
GLUCOSE BLD-MCNC: 100 MG/DL (ref 70–100)
POTASSIUM BLD-SCNC: 4.6 MMOL/L (ref 3.5–5.5)
SODIUM BLD-SCNC: 135 MMOL/L (ref 132–146)

## 2017-07-19 PROCEDURE — 99213 OFFICE O/P EST LOW 20 MIN: CPT | Performed by: NURSE PRACTITIONER

## 2017-07-19 PROCEDURE — 80048 BASIC METABOLIC PNL TOTAL CA: CPT | Performed by: NURSE PRACTITIONER

## 2017-07-19 PROCEDURE — 83880 ASSAY OF NATRIURETIC PEPTIDE: CPT | Performed by: NURSE PRACTITIONER

## 2017-07-19 RX ORDER — METOLAZONE 2.5 MG/1
2.5 TABLET ORAL DAILY
Qty: 3 TABLET | Refills: 0 | Status: SHIPPED | OUTPATIENT
Start: 2017-07-19 | End: 2017-08-02 | Stop reason: SDUPTHER

## 2017-07-19 RX ORDER — POTASSIUM CHLORIDE 20 MEQ/1
20 TABLET, EXTENDED RELEASE ORAL DAILY
Qty: 30 TABLET | Refills: 11 | Status: SHIPPED | OUTPATIENT
Start: 2017-07-19 | End: 2019-04-15 | Stop reason: SDUPTHER

## 2017-07-19 RX ORDER — FUROSEMIDE 40 MG/1
40 TABLET ORAL 2 TIMES DAILY
Qty: 60 TABLET | Refills: 11 | Status: SHIPPED | OUTPATIENT
Start: 2017-07-19 | End: 2018-03-08 | Stop reason: SDUPTHER

## 2017-07-19 NOTE — PATIENT INSTRUCTIONS
Please take booster pill  Today with lasix when you get home  Please take booster tomorrow morning and Friday morning with lasix dose and then take other lasix dose at lunchtime  Potassium every day in the morning for the remainder of this week

## 2017-07-19 NOTE — PROGRESS NOTES
Encounter Date:07/19/2017      Patient ID: Saad Meier is a 71 y.o. male.        Subjective:     Chief Complaint: Establish Care and Edema     History of Present Illness patient presents to the [facility] center today for ongoing evaluation of his edema. Patient notes that he was started on lasix by Dr Wall for significant pedal edema at the end of May and notes slight improvement in his edema. His lasix was recently increased to 40 mg    No problems updated.    Past Surgical History:   Procedure Laterality Date   • CARDIAC ABLATION  03/2012   • CARDIAC CATHETERIZATION  2004   • CARDIAC PACEMAKER PLACEMENT  2004   • COLON SURGERY  2012   • HEMORRHOIDECTOMY  1970   • HERNIA REPAIR      X 5       No Known Allergies      Current Outpatient Prescriptions:   •  amitriptyline (ELAVIL) 50 MG tablet, Take 2 tablets by mouth Every Night., Disp: 180 tablet, Rfl: 3  •  aspirin 81 MG tablet, Take  by mouth Daily., Disp: , Rfl:   •  finasteride (PROSCAR) 5 MG tablet, Take 1 tablet by mouth Daily., Disp: 90 tablet, Rfl: 3  •  furosemide (LASIX) 40 MG tablet, Take 1 tablet by mouth Daily. 40 mg po daily X 5 days and then decrease to prn daily (Patient taking differently: Take 80 mg by mouth Daily. 40 mg po daily X 5 days and then decrease to prn daily), Disp: 30 tablet, Rfl: 11  •  pantoprazole (PROTONIX) 20 MG EC tablet, 1 po in the am 30 minutes before breakfast., Disp: 90 tablet, Rfl: 3  •  tamsulosin (FLOMAX) 0.4 MG capsule 24 hr capsule, Take 1 capsule by mouth Daily., Disp: 90 capsule, Rfl: 3    The following portions of the chart were reviewed and updated as appropriate: Allergies, current medications, past family history, social history, past medical history.     Review of Systems   Constitution: Negative for chills, decreased appetite, diaphoresis, fever, weakness, malaise/fatigue, night sweats, weight gain and weight loss.   HENT: Negative for congestion, headaches, hearing loss, hoarse voice and nosebleeds.   "  Eyes: Negative for blurred vision, visual disturbance and visual halos.   Cardiovascular: Positive for dyspnea on exertion and leg swelling. Negative for chest pain, claudication, cyanosis, irregular heartbeat, near-syncope, orthopnea, palpitations, paroxysmal nocturnal dyspnea and syncope.   Respiratory: Positive for shortness of breath. Negative for cough, hemoptysis, sleep disturbances due to breathing, snoring, sputum production and wheezing.    Hematologic/Lymphatic: Negative for bleeding problem. Bruises/bleeds easily.   Skin: Negative for dry skin, itching and rash.   Musculoskeletal: Negative for arthritis, joint pain, joint swelling and myalgias.   Gastrointestinal: Negative for bloating, abdominal pain, constipation, diarrhea, flatus, heartburn, hematemesis, hematochezia, melena, nausea and vomiting.   Genitourinary: Negative for dysuria, frequency, hematuria, nocturia and urgency.   Neurological: Negative for excessive daytime sleepiness, dizziness, light-headedness and loss of balance.   Psychiatric/Behavioral: Negative for depression. The patient has insomnia. The patient is not nervous/anxious.    Allergic/Immunologic:        Seasonal allergies           Objective:     Vitals:    07/19/17 0953 07/19/17 0954 07/19/17 0955   BP: 126/72 128/79 141/95   BP Location: Right arm Left arm Left arm   Patient Position: Sitting Sitting Standing   Cuff Size: Adult     Pulse: 60  62   Resp: 20     Temp: 97 °F (36.1 °C)     TempSrc: Temporal Artery      SpO2: 94%     Weight: 207 lb (93.9 kg)     Height: 69\" (175.3 cm)           Physical Exam   Constitutional: He is oriented to person, place, and time. He appears well-developed and well-nourished. He is active and cooperative. No distress.   HENT:   Head: Normocephalic and atraumatic.   Mouth/Throat: Oropharynx is clear and moist.   Eyes: Conjunctivae and EOM are normal. Pupils are equal, round, and reactive to light.   Neck: Normal range of motion. Neck supple. No " JVD present. No tracheal deviation present. No thyromegaly present.   Cardiovascular: Normal rate, regular rhythm, normal heart sounds and intact distal pulses.    Pulmonary/Chest: Effort normal. He has rales.   Abdominal: Soft. Bowel sounds are normal. He exhibits no distension. There is no tenderness.   Musculoskeletal: Normal range of motion. He exhibits edema (3+ pitting edema noted bilaterally).   Neurological: He is alert and oriented to person, place, and time.   Skin: Skin is warm, dry and intact.   Psychiatric: He has a normal mood and affect. His behavior is normal.   Nursing note and vitals reviewed.      Lab and Diagnostic Review:      Results for orders placed or performed in visit on 07/19/17   Basic Metabolic Panel   Result Value Ref Range    Glucose 100 70 - 100 mg/dL    BUN 26 (H) 9 - 23 mg/dL    Creatinine 1.00 0.60 - 1.30 mg/dL    Sodium 135 132 - 146 mmol/L    Potassium 4.6 3.5 - 5.5 mmol/L    Chloride 109 99 - 109 mmol/L    CO2 28.0 20.0 - 31.0 mmol/L    Calcium 9.5 8.7 - 10.4 mg/dL    eGFR Non African Amer 73 >60 mL/min/1.73    BUN/Creatinine Ratio 26.0 (H) 7.0 - 25.0    Anion Gap -2.0 (L) 3.0 - 11.0 mmol/L   BNP   Result Value Ref Range    BNP 29.0 0.0 - 100.0 pg/mL       Assessment and Plan:         1. Hypervolemia, unspecified hypervolemia type  Patient to begin metolazone 2.5 mg qd for the next 3 days with lasix 40 mg bid  Begin kcl 20 meq while on metolazone for the next 3 days  - Basic Metabolic Panel  - BNP  Heart failure education today including signs and symptoms, the role of the heart failure center, daily weights, low sodium diet (less than 1500 mg per day), and daily physical activity. Reviewed HF Zones with patient and family.  Patient to continue current medications as previously ordered.   2. SSS (sick sinus syndrome  [facility]    It has been a pleasure to participate in the care of this patient.  Patient was instructed to call the [facility] with any questions,  concerns, or worsening symptoms.  * Please note that portions of this note were completed with a voice recognition program. Efforts were made to edit the dictation but occasionally words are transcribed.

## 2017-07-31 ENCOUNTER — TELEPHONE (OUTPATIENT)
Dept: CARDIOLOGY | Facility: HOSPITAL | Age: 72
End: 2017-07-31

## 2017-07-31 NOTE — TELEPHONE ENCOUNTER
----- Message from Harini Summers MA sent at 7/28/2017  4:00 PM EDT -----  Called Pt and Pt stated that his ankles are still swollen, he is breathing ok, his weight is 203lbs this morning and he is tired easily.      ----- Message -----     From: JUSTA Lyle     Sent: 7/28/2017   2:35 PM       To: Harini Summers MA     K,  Do you mind to call this patient and review signs and symptoms of fluid overload? Please ask about weight loss, shortness of breath, swelling in his feet and ankles. THanks  ----- Message -----     From: JUSTA Lyle     Sent: 7/24/2017       To: JUSTA Lyle    Metolazone 2.5 weds, thurs and Friday  Lasix 40 bid

## 2017-08-02 ENCOUNTER — OFFICE VISIT (OUTPATIENT)
Dept: CARDIOLOGY | Facility: HOSPITAL | Age: 72
End: 2017-08-02

## 2017-08-02 VITALS
DIASTOLIC BLOOD PRESSURE: 75 MMHG | HEIGHT: 69 IN | SYSTOLIC BLOOD PRESSURE: 113 MMHG | TEMPERATURE: 97.2 F | HEART RATE: 62 BPM | WEIGHT: 206 LBS | BODY MASS INDEX: 30.51 KG/M2 | OXYGEN SATURATION: 91 % | RESPIRATION RATE: 22 BRPM

## 2017-08-02 DIAGNOSIS — I25.10 CORONARY ARTERY DISEASE INVOLVING NATIVE CORONARY ARTERY OF NATIVE HEART WITHOUT ANGINA PECTORIS: ICD-10-CM

## 2017-08-02 DIAGNOSIS — E87.79 OTHER HYPERVOLEMIA: Primary | ICD-10-CM

## 2017-08-02 DIAGNOSIS — I49.5 SSS (SICK SINUS SYNDROME) (HCC): ICD-10-CM

## 2017-08-02 LAB
ANION GAP SERPL CALCULATED.3IONS-SCNC: 5 MMOL/L (ref 3–11)
BUN BLD-MCNC: 23 MG/DL (ref 9–23)
BUN/CREAT SERPL: 25.6 (ref 7–25)
CALCIUM SPEC-SCNC: 9.2 MG/DL (ref 8.7–10.4)
CHLORIDE SERPL-SCNC: 108 MMOL/L (ref 99–109)
CO2 SERPL-SCNC: 27 MMOL/L (ref 20–31)
CREAT BLD-MCNC: 0.9 MG/DL (ref 0.6–1.3)
GFR SERPL CREATININE-BSD FRML MDRD: 83 ML/MIN/1.73
GLUCOSE BLD-MCNC: 112 MG/DL (ref 70–100)
POTASSIUM BLD-SCNC: 3.9 MMOL/L (ref 3.5–5.5)
SODIUM BLD-SCNC: 140 MMOL/L (ref 132–146)

## 2017-08-02 PROCEDURE — 99214 OFFICE O/P EST MOD 30 MIN: CPT | Performed by: NURSE PRACTITIONER

## 2017-08-02 PROCEDURE — 80048 BASIC METABOLIC PNL TOTAL CA: CPT | Performed by: NURSE PRACTITIONER

## 2017-08-02 RX ORDER — METOLAZONE 2.5 MG/1
TABLET ORAL
Qty: 7 TABLET | Refills: 3 | Status: SHIPPED | OUTPATIENT
Start: 2017-08-02 | End: 2017-12-07

## 2017-08-02 NOTE — PROGRESS NOTES
Encounter Date:08/02/2017      Patient ID: Saad Meier is a 71 y.o. male.        Subjective:     Chief Complaint: Follow-up (c/o of swelling in legs)     History of Present Illness and presents [facility] today for ongoing evaluation of his pedal edema.  Patient notes that he took metolazone 3 days after last appointment and noted resolution of his pedal edema.  He notes however that his pedal edema has returned since stopping the metolazone.  He notes his ankles are somewhat smaller in size upon awakening plus well very quickly upon arising.  He notes he tries to elevate his feet and ankles when seated but does not always do so.  He also notes dyspnea on exertion that improves with rest.  He notes compliance with his medications.  He denies chest pain, chest pressure, palpitations, tachycardia, presyncope, syncope, orthopnea, PND, abdominal fullness, early satiety, claudication, or cough.    Patient Active Problem List   Diagnosis   • Abdominal pain   • Arthritis   • BPH (benign prostatic hyperplasia)   • Cancer of colon   • CCF (congestive cardiac failure)   • CN (constipation)   • D (diarrhea)   • Acid reflux   • Brash   • Infection of kidney   • Chronic nausea   • Juvenile rheumatic fever   • Hernia, inguinal, right   • Ventral hernia without obstruction or gangrene   • Insomnia   • SSS (sick sinus syndrome)   • Encounter for long-term current use of medication   • Chronic prostatitis   • Immunodeficiency   • CAD (coronary artery disease)   • AV gunnar re-entry tachycardia   • Heartburn   • Peripheral edema   • Productive cough   • Bilateral edema of lower extremity       Past Surgical History:   Procedure Laterality Date   • CARDIAC ABLATION  03/2012   • CARDIAC CATHETERIZATION  2004   • CARDIAC PACEMAKER PLACEMENT  2004   • COLON SURGERY  2012   • HEMORRHOIDECTOMY  1970   • HERNIA REPAIR      X 5       No Known Allergies      Current Outpatient Prescriptions:   •  amitriptyline (ELAVIL)  50 MG tablet, Take 2 tablets by mouth Every Night., Disp: 180 tablet, Rfl: 3  •  aspirin 81 MG tablet, Take  by mouth Daily., Disp: , Rfl:   •  finasteride (PROSCAR) 5 MG tablet, Take 1 tablet by mouth Daily., Disp: 90 tablet, Rfl: 3  •  furosemide (LASIX) 40 MG tablet, Take 1 tablet by mouth 2 (Two) Times a Day. 40 mg po daily X 5 days and then decrease to prn daily, Disp: 60 tablet, Rfl: 11  •  metOLazone (ZAROXOLYN) 2.5 MG tablet, Take one pill x 3days starting 8/2 and then begin once weekly, Disp: 7 tablet, Rfl: 3  •  pantoprazole (PROTONIX) 20 MG EC tablet, 1 po in the am 30 minutes before breakfast., Disp: 90 tablet, Rfl: 3  •  potassium chloride (K-DUR,KLOR-CON) 20 MEQ CR tablet, Take 1 tablet by mouth Daily., Disp: 30 tablet, Rfl: 11  •  tamsulosin (FLOMAX) 0.4 MG capsule 24 hr capsule, Take 1 capsule by mouth Daily., Disp: 90 capsule, Rfl: 3    The following portions of the chart were reviewed and updated as appropriate: Allergies, current medications, past family history, social history, past medical history.     Review of Systems   Constitution: Negative for chills, decreased appetite, diaphoresis, fever, weakness, malaise/fatigue, night sweats, weight gain and weight loss.   HENT: Positive for hearing loss. Negative for congestion, headaches, hoarse voice and nosebleeds.    Eyes: Negative for blurred vision, visual disturbance and visual halos.   Cardiovascular: Positive for dyspnea on exertion and leg swelling. Negative for chest pain, claudication, cyanosis, irregular heartbeat, near-syncope, orthopnea, palpitations, paroxysmal nocturnal dyspnea and syncope.   Respiratory: Positive for shortness of breath. Negative for cough, hemoptysis, sleep disturbances due to breathing, snoring, sputum production and wheezing.    Hematologic/Lymphatic: Negative for bleeding problem. Does not bruise/bleed easily.   Skin: Negative for dry skin, itching and rash.   Musculoskeletal: Positive for muscle cramps. Negative  "for arthritis, joint pain, joint swelling and myalgias.   Gastrointestinal: Negative for bloating, abdominal pain, constipation, diarrhea, flatus, heartburn, hematemesis, hematochezia, melena, nausea and vomiting.   Genitourinary: Negative for dysuria, frequency, hematuria, nocturia and urgency.   Neurological: Positive for excessive daytime sleepiness. Negative for dizziness, light-headedness and loss of balance.   Psychiatric/Behavioral: Negative for depression. The patient has insomnia. The patient is not nervous/anxious.    Allergic/Immunologic:        Seasonal allergies           Objective:     Vitals:    08/02/17 1019 08/02/17 1020 08/02/17 1021   BP: 127/81 126/81 113/75   BP Location: Right arm Left arm Left arm   Patient Position: Sitting Sitting Standing   Cuff Size: Adult     Pulse: 60  62   Resp: 22     Temp: 97.2 °F (36.2 °C)     TempSrc: Temporal Artery      SpO2: 91%     Weight: 206 lb (93.4 kg)     Height: 69\" (175.3 cm)           Physical Exam   Constitutional: He is oriented to person, place, and time. He appears well-developed and well-nourished. He is active and cooperative. No distress.   HENT:   Head: Normocephalic and atraumatic.   Mouth/Throat: Oropharynx is clear and moist.   Eyes: Conjunctivae and EOM are normal. Pupils are equal, round, and reactive to light.   Neck: Normal range of motion. Neck supple. No JVD present. No tracheal deviation present. No thyromegaly present.   Cardiovascular: Normal rate, regular rhythm, normal heart sounds and intact distal pulses.    Pulmonary/Chest: Effort normal and breath sounds normal.   Abdominal: Soft. Bowel sounds are normal. He exhibits no distension. There is no tenderness.   Musculoskeletal: Normal range of motion. He exhibits edema (2+ pitting edema noted bilaterally).   Neurological: He is alert and oriented to person, place, and time.   Skin: Skin is warm, dry and intact.   Psychiatric: He has a normal mood and affect. His behavior is normal. "   Nursing note and vitals reviewed.      Lab and Diagnostic Review:      Results for orders placed or performed in visit on 08/02/17   Basic Metabolic Panel   Result Value Ref Range    Glucose 112 (H) 70 - 100 mg/dL    BUN 23 9 - 23 mg/dL    Creatinine 0.90 0.60 - 1.30 mg/dL    Sodium 140 132 - 146 mmol/L    Potassium 3.9 3.5 - 5.5 mmol/L    Chloride 108 99 - 109 mmol/L    CO2 27.0 20.0 - 31.0 mmol/L    Calcium 9.2 8.7 - 10.4 mg/dL    eGFR Non African Amer 83 >60 mL/min/1.73    BUN/Creatinine Ratio 25.6 (H) 7.0 - 25.0    Anion Gap 5.0 3.0 - 11.0 mmol/L       Assessment and Plan:         1. Other hypervolemia  Patient to restart metolazone 2. 5mg for the next 3 days and then begin once weekly on Monday.  Long discussion with the patient regarding salt intake and fluid intake  Heart failure education today including signs and symptoms, the role of the heart failure center, daily weights, low sodium diet (less than 1500 mg per day), and daily physical activity. Reviewed HF Zones with patient and family.  Patient to continue current medications as previously ordered.   - Basic Metabolic Panel    2. Coronary artery disease involving native coronary artery of native heart without angina pectoris  Without angina  Continue asa    3. SSS (sick sinus syndrome)  [facility], Followed by DR Wall    It has been a pleasure to participate in the care of this patient.  Patient was instructed to call the [facility] with any questions, concerns, or worsening symptoms.      * Please note that portions of this note were completed with a voice recognition program. Efforts were made to edit the dictation but occasionally words are transcribed.

## 2017-08-02 NOTE — PATIENT INSTRUCTIONS
Please restart metolazone ( booster pill) today with afternoon lasix and then take a dose on Thursday and Friday as well.  Please take booster pill on Monday and then take it on Monday every week

## 2017-08-07 ENCOUNTER — OFFICE VISIT (OUTPATIENT)
Dept: INTERNAL MEDICINE | Facility: CLINIC | Age: 72
End: 2017-08-07

## 2017-08-07 VITALS
HEIGHT: 69 IN | DIASTOLIC BLOOD PRESSURE: 70 MMHG | BODY MASS INDEX: 30.07 KG/M2 | OXYGEN SATURATION: 97 % | HEART RATE: 60 BPM | SYSTOLIC BLOOD PRESSURE: 120 MMHG | WEIGHT: 203 LBS

## 2017-08-07 DIAGNOSIS — G47.00 INSOMNIA, UNSPECIFIED TYPE: Primary | ICD-10-CM

## 2017-08-07 PROCEDURE — 99213 OFFICE O/P EST LOW 20 MIN: CPT | Performed by: FAMILY MEDICINE

## 2017-08-07 NOTE — PROGRESS NOTES
"Subjective   Saad Meier is a 71 y.o. male.     History of Present Illness   Yoshi had tried the elavil, and it made his feet swell, and also it stopped working after 4-5 days.  He' stried ambien, doxepin, elavil, trazodone all with no benefit with sleeping.  He is being given trial of belsomra to try today.        The following portions of the patient's history were reviewed and updated as appropriate: allergies, current medications, past social history and problem list.    Review of Systems   Constitutional: Negative for appetite change, chills, fatigue, fever and unexpected weight change.   HENT: Negative for congestion, ear pain, hearing loss, nosebleeds, postnasal drip, rhinorrhea, sore throat, tinnitus and trouble swallowing.    Eyes: Negative for photophobia, discharge and visual disturbance.   Respiratory: Negative for cough, chest tightness, shortness of breath and wheezing.    Cardiovascular: Negative for chest pain, palpitations and leg swelling.   Gastrointestinal: Negative for abdominal distention, abdominal pain, blood in stool, constipation, diarrhea, nausea and vomiting.   Endocrine: Negative for cold intolerance, heat intolerance, polydipsia, polyphagia and polyuria.   Musculoskeletal: Negative for arthralgias, back pain, joint swelling, myalgias, neck pain and neck stiffness.   Skin: Negative for color change, pallor, rash and wound.   Allergic/Immunologic: Negative for environmental allergies, food allergies and immunocompromised state.   Neurological: Negative for dizziness, tremors, seizures, weakness, numbness and headaches.   Hematological: Negative for adenopathy. Does not bruise/bleed easily.   Psychiatric/Behavioral: Negative for agitation, behavioral problems, confusion, hallucinations, self-injury and suicidal ideas. The patient is not nervous/anxious.        Objective   /70  Pulse 60  Ht 69\" (175.3 cm)  Wt 203 lb (92.1 kg)  SpO2 97%  BMI 29.98 kg/m2  Physical Exam "   Constitutional: He is oriented to person, place, and time. He appears well-developed and well-nourished. No distress.   HENT:   Head: Normocephalic and atraumatic.   Right Ear: External ear normal.   Left Ear: External ear normal.   Nose: Nose normal.   Mouth/Throat: Oropharynx is clear and moist.   Eyes: Conjunctivae and EOM are normal. Pupils are equal, round, and reactive to light. Right eye exhibits no discharge. Left eye exhibits no discharge. No scleral icterus.   Neck: Normal range of motion. Neck supple. No JVD present. No tracheal deviation present. No thyromegaly present.   Cardiovascular: Normal rate, regular rhythm, normal heart sounds and intact distal pulses.  Exam reveals no gallop and no friction rub.    No murmur heard.  Pulmonary/Chest: Effort normal and breath sounds normal. No stridor. No respiratory distress. He has no wheezes. He has no rales. He exhibits no tenderness.   Abdominal: Soft. Bowel sounds are normal. He exhibits no distension and no mass. There is no tenderness. There is no rebound and no guarding. No hernia.   Musculoskeletal: Normal range of motion. He exhibits no edema, tenderness or deformity.   Lymphadenopathy:     He has no cervical adenopathy.   Neurological: He is alert and oriented to person, place, and time. He has normal reflexes. He displays normal reflexes. No cranial nerve deficit. He exhibits normal muscle tone.   Skin: Skin is warm and dry. No rash noted. No erythema. No pallor.   Psychiatric: He has a normal mood and affect. His behavior is normal. Judgment normal.       Assessment/Plan   Problem List Items Addressed This Visit        Other    Insomnia - Primary    Relevant Medications    Suvorexant 20 MG tablet           trial of belsomra. Fu in [facility].  Another 3 mo rx was given if it works.

## 2017-08-14 ENCOUNTER — TELEPHONE (OUTPATIENT)
Dept: CARDIOLOGY | Facility: HOSPITAL | Age: 72
End: 2017-08-14

## 2017-08-14 NOTE — TELEPHONE ENCOUNTER
----- Message from Tamara Hollins MA sent at 8/14/2017 10:20 AM EDT -----  Pt returned call.  Reports still has swelling in his lower legs and feet, but it is much better now than the last time he saw you.    He will continue with his current medication instructions and will call if symptoms do not improve.     ----- Message -----     From: JUSTA Lyle     Sent: 8/9/2017  11:30 AM       To: LEÓN Laboy,  Do u mind to call him and ask about swelling?   ----- Message -----     From: JUSTA Lyle     Sent: 8/8/2017       To: JUSTA Lyle

## 2017-08-21 ENCOUNTER — TELEPHONE (OUTPATIENT)
Dept: INTERNAL MEDICINE | Facility: CLINIC | Age: 72
End: 2017-08-21

## 2017-08-21 NOTE — TELEPHONE ENCOUNTER
Patient called stating that he has figured out what was causing him not to sleep. He states that it is soy products. He is sleeping much better now and wants to know If he needs to keep his follow up with Dr. Weldon. He'd like a callback.

## 2017-08-22 NOTE — TELEPHONE ENCOUNTER
PATIENT INFORMED VIA DETAILED VM. I REQUESTED A CALL BACK IF HE WANTS TO PUSH THE APPOINTMENT OUT FURTHER

## 2017-08-23 ENCOUNTER — TELEPHONE (OUTPATIENT)
Dept: CARDIOLOGY | Facility: CLINIC | Age: 72
End: 2017-08-23

## 2017-08-23 NOTE — TELEPHONE ENCOUNTER
Patient states BLE swelling is better; does he need to follow up with Dr. Wall on 9/11/17.    LVM for patient stating that he should keep scheduled appt.  Dr. Wall is not only treating him for swelling.  He should call back with any questions.

## 2017-09-11 ENCOUNTER — OFFICE VISIT (OUTPATIENT)
Dept: CARDIOLOGY | Facility: CLINIC | Age: 72
End: 2017-09-11

## 2017-09-11 VITALS
WEIGHT: 194.8 LBS | HEIGHT: 69 IN | DIASTOLIC BLOOD PRESSURE: 72 MMHG | BODY MASS INDEX: 28.85 KG/M2 | HEART RATE: 69 BPM | SYSTOLIC BLOOD PRESSURE: 116 MMHG

## 2017-09-11 DIAGNOSIS — R06.00 DYSPNEA, UNSPECIFIED TYPE: ICD-10-CM

## 2017-09-11 DIAGNOSIS — I49.5 SSS (SICK SINUS SYNDROME) (HCC): ICD-10-CM

## 2017-09-11 DIAGNOSIS — I47.1 AV NODAL RE-ENTRY TACHYCARDIA (HCC): ICD-10-CM

## 2017-09-11 DIAGNOSIS — I25.10 CORONARY ARTERY DISEASE INVOLVING NATIVE CORONARY ARTERY OF NATIVE HEART WITHOUT ANGINA PECTORIS: Primary | ICD-10-CM

## 2017-09-11 PROCEDURE — 99214 OFFICE O/P EST MOD 30 MIN: CPT | Performed by: INTERNAL MEDICINE

## 2017-09-11 PROCEDURE — 93280 PM DEVICE PROGR EVAL DUAL: CPT | Performed by: INTERNAL MEDICINE

## 2017-09-11 NOTE — PROGRESS NOTES
Encounter Date:05/31/2017    Location: [facility]    Giles Weldon DO    Patient ID: Saad Meier is a 71 y.o. male resident of [place], [place].    1945  Subjective:   Chief Complaint/Reason for visit:    Chief Complaint   Patient presents with   • Coronary Artery Disease   • Sick Sinus Syndrome       Problem List:    1. Sick sinus syndrome, status post [employer] pacemaker, 2004.   2. Minimal coronary artery disease and normal left ventricular function by cardiac catheterization, 2004.   3. Dyspepsia.  4. 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  5. Colon cancer, status post aborted chemotherapy, currently under surveillance.     HPI: Mr. Meier has the above noted medical history who presents for follow up his AV gunnar reentry tachycardia and sick sinus syndrome. He is doing well.  He denies symptoms of chest pain, palpitations, unusual shortness of breath.  He does have some lower extremity edema that is been pretty well managed recently through the heart valve clinic. He goes to the [facility] 3-5 times per week and push mows his yard.     Social History     Social History   • Marital status: Single     Spouse name: N/A   • Number of children: N/A   • Years of education: N/A     Occupational History   • Not on file.     Social History Main Topics   • Smoking status: Former Smoker     Packs/day: 1.00     Years: 11.00     Quit date: 1973   • Smokeless tobacco: Never Used   • Alcohol use No   • Drug use: No   • Sexual activity: Defer     Other Topics Concern   • Not on file     Social History Narrative    Caffeine: 2 servings per day    Patient lives at his home alone       family history includes Cancer in his father; Heart disease in his brother and sister; Heart failure in his brother; Lung cancer in his mother; Osteoporosis in his mother; Thyroid disease in his mother.     has a past medical history of Anxiety; AV gunnar re-entry tachycardia (3/6/2017); CAD (coronary artery disease)  "(3/6/2017); Cardiac arrhythmia; Colon polyp; History of blood transfusion; History of colonoscopy (2015); History of esophagogastroduodenoscopy (2015); and Iron deficiency.    No Known Allergies      Current Outpatient Prescriptions:   •  aspirin 81 MG tablet, Take  by mouth Daily., Disp: , Rfl:   •  finasteride (PROSCAR) 5 MG tablet, Take 1 tablet by mouth Daily., Disp: 90 tablet, Rfl: 3  •  furosemide (LASIX) 40 MG tablet, Take 1 tablet by mouth 2 (Two) Times a Day. 40 mg po daily X 5 days and then decrease to prn daily, Disp: 60 tablet, Rfl: 11  •  metOLazone (ZAROXOLYN) 2.5 MG tablet, Take one pill x 3days starting 8/2 and then begin once weekly, Disp: 7 tablet, Rfl: 3  •  pantoprazole (PROTONIX) 20 MG EC tablet, 1 po in the am 30 minutes before breakfast., Disp: 90 tablet, Rfl: 3  •  potassium chloride (K-DUR,KLOR-CON) 20 MEQ CR tablet, Take 1 tablet by mouth Daily., Disp: 30 tablet, Rfl: 11  •  tamsulosin (FLOMAX) 0.4 MG capsule 24 hr capsule, Take 1 capsule by mouth Daily., Disp: 90 capsule, Rfl: 3  •  Suvorexant 20 MG tablet, Take 20 mg by mouth At Night As Needed (insomnia)., Disp: 30 tablet, Rfl: 2  •  Suvorexant 20 MG tablet, Take 20 mg by mouth At Night As Needed (insomnia)., Disp: 10 tablet, Rfl: 0    ROS  Positive for lower extremity swelling  Negative for dyspnea, chest pain, palpitations, syncope    Objective:   /72 (BP Location: Left arm, Patient Position: Sitting)  Pulse 69  Ht 69\" (175.3 cm)  Wt 194 lb 12.8 oz (88.4 kg)  BMI 28.77 kg/m2    Physical Exam    CONSTITUTIONAL: No acute distress, normal affect  RESPIRATORY: Normal effort. Clear to auscultation bilaterally without wheezing or rales  CARDIOVASCULAR: Regular rate and rhythm with normal S1 and S2. Without murmur, gallop or rub.  PERIPHERAL VASCULAR: Normal radial pulses bilaterally. There is 1-2+ peripheral edema bilaterally.    Data Review:   Procedures    Device Interrogation 9/11/17  69% atrial paced with a P wave of 1.1 mV " threshold 0.5 V at 0.5 ms impedance of 430 ohms and <1% V pacing RV R-wave is 6.4 mV threshold 1.37 V at 0.5 ms impedance of 530 ohms. Battery voltage is 2.92 V which is 7 years of longevity, 73 mode switches which were not episodes of A. fib or SVT.  Seemed more like competitive pacing.  Increased max sensor rate to 125.  Increased A. tach rates to 180.    [facility] 2004 - Minimal coronary artery disease and normal left ventricular function       Assessment:      Diagnosis Plan   1. AV gunnar re-entry tachycardia  Brief episodes, patient asymptomatic   2. SSS (sick sinus syndrome)  Normal device check   3. Bilateral edema of lower extremity  Begin Lasix 40 mg X 5 days and then prn     Plan:   -Continue current diuretics; much improved weight measurement today.  -Repeat TTE to evaluate for structural heart disease/LV dysfunction  -Follow up 3 mos    Atilio Wall MD, MSc, FACC  Interventional Cardiology  [facility]

## 2017-09-20 ENCOUNTER — HOSPITAL ENCOUNTER (OUTPATIENT)
Dept: CARDIOLOGY | Facility: HOSPITAL | Age: 72
Discharge: HOME | End: 2017-09-20
Attending: INTERNAL MEDICINE | Admitting: INTERNAL MEDICINE

## 2017-09-20 DIAGNOSIS — I25.10 CORONARY ARTERY DISEASE INVOLVING NATIVE CORONARY ARTERY OF NATIVE HEART WITHOUT ANGINA PECTORIS: ICD-10-CM

## 2017-09-20 DIAGNOSIS — I47.1 AV NODAL RE-ENTRY TACHYCARDIA (HCC): ICD-10-CM

## 2017-09-20 DIAGNOSIS — I49.5 SSS (SICK SINUS SYNDROME) (HCC): ICD-10-CM

## 2017-09-20 DIAGNOSIS — R06.00 DYSPNEA, UNSPECIFIED TYPE: ICD-10-CM

## 2017-09-20 PROCEDURE — 25010000002 SULFUR HEXAFLUORIDE MICROSPH 60.7-25 MG RECONSTITUTED SUSPENSION: Performed by: INTERNAL MEDICINE

## 2017-09-20 PROCEDURE — 93306 TTE W/DOPPLER COMPLETE: CPT

## 2017-09-20 PROCEDURE — 93306 TTE W/DOPPLER COMPLETE: CPT | Performed by: INTERNAL MEDICINE

## 2017-09-20 RX ADMIN — SULFUR HEXAFLUORIDE 3 ML: KIT at 09:20

## 2017-09-26 LAB
BH CV ECHO MEAS - AI DEC SLOPE: 158 CM/SEC^2
BH CV ECHO MEAS - AI MAX PG: 11.8 MMHG
BH CV ECHO MEAS - AI MAX VEL: 172 CM/SEC
BH CV ECHO MEAS - AI P1/2T: 318.8 MSEC
BH CV ECHO MEAS - AO ROOT AREA (BSA CORRECTED): 1.7
BH CV ECHO MEAS - AO ROOT AREA: 9.6 CM^2
BH CV ECHO MEAS - AO ROOT DIAM: 3.5 CM
BH CV ECHO MEAS - BSA(HAYCOCK): 2.1 M^2
BH CV ECHO MEAS - BSA: 2 M^2
BH CV ECHO MEAS - BZI_BMI: 28.6 KILOGRAMS/M^2
BH CV ECHO MEAS - BZI_METRIC_HEIGHT: 175.3 CM
BH CV ECHO MEAS - BZI_METRIC_WEIGHT: 88 KG
BH CV ECHO MEAS - CONTRAST EF (2CH): 60.9 ML/M^2
BH CV ECHO MEAS - CONTRAST EF 4CH: 59.8 ML/M^2
BH CV ECHO MEAS - EDV(CUBED): 84 ML
BH CV ECHO MEAS - EDV(MOD-SP2): 87 ML
BH CV ECHO MEAS - EDV(MOD-SP4): 117 ML
BH CV ECHO MEAS - EDV(TEICH): 86.8 ML
BH CV ECHO MEAS - EF(CUBED): 63.9 %
BH CV ECHO MEAS - EF(MOD-SP2): 60.9 %
BH CV ECHO MEAS - EF(MOD-SP4): 59.8 %
BH CV ECHO MEAS - EF(TEICH): 55.6 %
BH CV ECHO MEAS - ESV(CUBED): 30.4 ML
BH CV ECHO MEAS - ESV(MOD-SP2): 34 ML
BH CV ECHO MEAS - ESV(MOD-SP4): 47 ML
BH CV ECHO MEAS - ESV(TEICH): 38.5 ML
BH CV ECHO MEAS - FS: 28.8 %
BH CV ECHO MEAS - IVS/LVPW: 1.1
BH CV ECHO MEAS - IVSD: 1 CM
BH CV ECHO MEAS - LA DIMENSION: 3.5 CM
BH CV ECHO MEAS - LA/AO: 1
BH CV ECHO MEAS - LV DIASTOLIC VOL/BSA (35-75): 57.4 ML/M^2
BH CV ECHO MEAS - LV MASS(C)D: 143.7 GRAMS
BH CV ECHO MEAS - LV MASS(C)DI: 70.5 GRAMS/M^2
BH CV ECHO MEAS - LV MAX PG: 4.5 MMHG
BH CV ECHO MEAS - LV MEAN PG: 2 MMHG
BH CV ECHO MEAS - LV SYSTOLIC VOL/BSA (12-30): 23 ML/M^2
BH CV ECHO MEAS - LV V1 MAX: 106 CM/SEC
BH CV ECHO MEAS - LV V1 MEAN: 68.2 CM/SEC
BH CV ECHO MEAS - LV V1 VTI: 22.7 CM
BH CV ECHO MEAS - LVIDD: 4.4 CM
BH CV ECHO MEAS - LVIDS: 3.1 CM
BH CV ECHO MEAS - LVLD AP2: 7.9 CM
BH CV ECHO MEAS - LVLD AP4: 7.6 CM
BH CV ECHO MEAS - LVLS AP2: 5.7 CM
BH CV ECHO MEAS - LVLS AP4: 6.4 CM
BH CV ECHO MEAS - LVOT AREA (M): 3.5 CM^2
BH CV ECHO MEAS - LVOT AREA: 3.5 CM^2
BH CV ECHO MEAS - LVOT DIAM: 2.1 CM
BH CV ECHO MEAS - LVPWD: 0.93 CM
BH CV ECHO MEAS - MV A MAX VEL: 98.2 CM/SEC
BH CV ECHO MEAS - MV E MAX VEL: 77.5 CM/SEC
BH CV ECHO MEAS - MV E/A: 0.79
BH CV ECHO MEAS - PA ACC SLOPE: 2535 CM/SEC^2
BH CV ECHO MEAS - PA ACC TIME: 0.14 SEC
BH CV ECHO MEAS - PA PR(ACCEL): 14.7 MMHG
BH CV ECHO MEAS - RAP SYSTOLE: 8 MMHG
BH CV ECHO MEAS - RVDD: 3.6 CM
BH CV ECHO MEAS - RVSP: 21.5 MMHG
BH CV ECHO MEAS - SI(CUBED): 26.3 ML/M^2
BH CV ECHO MEAS - SI(LVOT): 38.6 ML/M^2
BH CV ECHO MEAS - SI(MOD-SP2): 26 ML/M^2
BH CV ECHO MEAS - SI(MOD-SP4): 34.3 ML/M^2
BH CV ECHO MEAS - SI(TEICH): 23.7 ML/M^2
BH CV ECHO MEAS - SV(CUBED): 53.7 ML
BH CV ECHO MEAS - SV(LVOT): 78.6 ML
BH CV ECHO MEAS - SV(MOD-SP2): 53 ML
BH CV ECHO MEAS - SV(MOD-SP4): 70 ML
BH CV ECHO MEAS - SV(TEICH): 48.2 ML
BH CV ECHO MEAS - TAPSE (>1.6): 1.2 CM2
BH CV ECHO MEAS - TR MAX VEL: 184 CM/SEC
BH CV VAS BP LEFT ARM: NORMAL MMHG
BH CV XLRA - RV BASE: 3.6 CM
BH CV XLRA - RV LENGTH: 5.6 CM
BH CV XLRA - RV MID: 3.5 CM
LEFT ATRIUM VOLUME INDEX: 29 ML/M2
LV EF 2D ECHO EST: 60 %

## 2017-10-04 ENCOUNTER — TELEPHONE (OUTPATIENT)
Dept: CARDIOLOGY | Facility: CLINIC | Age: 72
End: 2017-10-04

## 2017-10-04 NOTE — TELEPHONE ENCOUNTER
Attempted to call the patient regarding results of recent echo.  No answer and  repeatedly cuts me off.  I am unsure if he will get the messages or not.  Will continue to try to call.

## 2017-10-10 ENCOUNTER — TELEPHONE (OUTPATIENT)
Dept: INTERNAL MEDICINE | Facility: CLINIC | Age: 72
End: 2017-10-10

## 2017-10-10 NOTE — TELEPHONE ENCOUNTER
LEFT DETAILED VM FOR PATIENT.    IF HE NEEDS / WANTS THE KNEE BRACE WE NEED TO SEE HIM FOR DOCUMENTATION PURPOSES.    PLEASE SCHEDULE APPOINTMENT

## 2017-11-02 ENCOUNTER — OFFICE VISIT (OUTPATIENT)
Dept: CARDIOLOGY | Facility: HOSPITAL | Age: 72
End: 2017-11-02

## 2017-11-02 VITALS
WEIGHT: 194.6 LBS | OXYGEN SATURATION: 98 % | RESPIRATION RATE: 18 BRPM | BODY MASS INDEX: 28.82 KG/M2 | DIASTOLIC BLOOD PRESSURE: 86 MMHG | TEMPERATURE: 97.4 F | HEIGHT: 69 IN | HEART RATE: 71 BPM | SYSTOLIC BLOOD PRESSURE: 99 MMHG

## 2017-11-02 DIAGNOSIS — I49.5 SSS (SICK SINUS SYNDROME) (HCC): ICD-10-CM

## 2017-11-02 DIAGNOSIS — I50.32 CHRONIC DIASTOLIC CONGESTIVE HEART FAILURE (HCC): Primary | ICD-10-CM

## 2017-11-02 LAB
ANION GAP SERPL CALCULATED.3IONS-SCNC: 7 MMOL/L (ref 3–11)
BASOPHILS # BLD AUTO: 0.12 10*3/MM3 (ref 0–0.2)
BASOPHILS NFR BLD AUTO: 2.2 % (ref 0–1)
BNP SERPL-MCNC: 14 PG/ML (ref 0–100)
BUN BLD-MCNC: 11 MG/DL (ref 9–23)
BUN/CREAT SERPL: 11 (ref 7–25)
CALCIUM SPEC-SCNC: 9.6 MG/DL (ref 8.7–10.4)
CHLORIDE SERPL-SCNC: 109 MMOL/L (ref 99–109)
CO2 SERPL-SCNC: 25 MMOL/L (ref 20–31)
CREAT BLD-MCNC: 1 MG/DL (ref 0.6–1.3)
DEPRECATED RDW RBC AUTO: 54.6 FL (ref 37–54)
EOSINOPHIL # BLD AUTO: 0.72 10*3/MM3 (ref 0–0.3)
EOSINOPHIL NFR BLD AUTO: 13.2 % (ref 0–3)
ERYTHROCYTE [DISTWIDTH] IN BLOOD BY AUTOMATED COUNT: 15.8 % (ref 11.3–14.5)
GFR SERPL CREATININE-BSD FRML MDRD: 73 ML/MIN/1.73
GLUCOSE BLD-MCNC: 120 MG/DL (ref 70–100)
HCT VFR BLD AUTO: 45.5 % (ref 38.9–50.9)
HGB BLD-MCNC: 16 G/DL (ref 13.1–17.5)
IMM GRANULOCYTES # BLD: 0.02 10*3/MM3 (ref 0–0.03)
IMM GRANULOCYTES NFR BLD: 0.4 % (ref 0–0.6)
LYMPHOCYTES # BLD AUTO: 1.56 10*3/MM3 (ref 0.6–4.8)
LYMPHOCYTES NFR BLD AUTO: 28.5 % (ref 24–44)
MAGNESIUM SERPL-MCNC: 2.3 MG/DL (ref 1.3–2.7)
MCH RBC QN AUTO: 32.8 PG (ref 27–31)
MCHC RBC AUTO-ENTMCNC: 35.2 G/DL (ref 32–36)
MCV RBC AUTO: 93.2 FL (ref 80–99)
MONOCYTES # BLD AUTO: 0.67 10*3/MM3 (ref 0–1)
MONOCYTES NFR BLD AUTO: 12.2 % (ref 0–12)
NEUTROPHILS # BLD AUTO: 2.38 10*3/MM3 (ref 1.5–8.3)
NEUTROPHILS NFR BLD AUTO: 43.5 % (ref 41–71)
PLATELET # BLD AUTO: 275 10*3/MM3 (ref 150–450)
PMV BLD AUTO: 11.3 FL (ref 6–12)
POTASSIUM BLD-SCNC: 4.2 MMOL/L (ref 3.5–5.5)
RBC # BLD AUTO: 4.88 10*6/MM3 (ref 4.2–5.76)
SODIUM BLD-SCNC: 141 MMOL/L (ref 132–146)
WBC NRBC COR # BLD: 5.47 10*3/MM3 (ref 3.5–10.8)

## 2017-11-02 PROCEDURE — 80048 BASIC METABOLIC PNL TOTAL CA: CPT | Performed by: NURSE PRACTITIONER

## 2017-11-02 PROCEDURE — 85025 COMPLETE CBC W/AUTO DIFF WBC: CPT | Performed by: NURSE PRACTITIONER

## 2017-11-02 PROCEDURE — 83735 ASSAY OF MAGNESIUM: CPT | Performed by: NURSE PRACTITIONER

## 2017-11-02 PROCEDURE — 83880 ASSAY OF NATRIURETIC PEPTIDE: CPT | Performed by: NURSE PRACTITIONER

## 2017-11-02 PROCEDURE — 99214 OFFICE O/P EST MOD 30 MIN: CPT | Performed by: NURSE PRACTITIONER

## 2017-11-02 NOTE — PROGRESS NOTES
Encounter Date:11/02/2017      Patient ID: Saad Meier is a 72 y.o. male.        Subjective:     Chief Complaint: Follow-up   pedal edema  History of Present Illness   [facility] up on his chronic diastolic heart failure. He is doing well from a cardiac standpoint. Denies any worsening SOB, no chest pain or chest pressure, no palpitations or dizziness. No recent hospitalizations. He stopped his potassium for unknown reason. He goes to the [facility]-4 days a week. He has chronic insomnia treated with Suvorexant. He has tried 3 different sleeping aids without success. Denies orthopnea/PND. He does admit to eating salty foods regularly    Patient Active Problem List   Diagnosis   • Abdominal pain   • Arthritis   • BPH (benign prostatic hyperplasia)   • Cancer of colon   • CCF (congestive cardiac failure)   • CN (constipation)   • D (diarrhea)   • Acid reflux   • Brash   • Infection of kidney   • Chronic nausea   • Juvenile rheumatic fever   • Hernia, inguinal, right   • Ventral hernia without obstruction or gangrene   • Insomnia   • SSS (sick sinus syndrome)   • Encounter for long-term current use of medication   • Chronic prostatitis   • Immunodeficiency   • CAD (coronary artery disease)   • AV gunnar re-entry tachycardia   • Heartburn   • Peripheral edema   • Productive cough   • Bilateral edema of lower extremity         Past Surgical History:   Procedure Laterality Date   • CARDIAC ABLATION  03/2012   • CARDIAC CATHETERIZATION  2004   • CARDIAC PACEMAKER PLACEMENT  2004   • COLON SURGERY  2012   • HEMORRHOIDECTOMY  1970   • HERNIA REPAIR      X 5       No Known Allergies      Current Outpatient Prescriptions:   •  aspirin 81 MG tablet, Take  by mouth Daily., Disp: , Rfl:   •  finasteride (PROSCAR) 5 MG tablet, Take 1 tablet by mouth Daily., Disp: 90 tablet, Rfl: 3  •  furosemide (LASIX) 40 MG tablet, Take 1 tablet by mouth 2 (Two) Times a Day. 40 mg po daily X 5  days and then decrease to prn daily, Disp: 60 tablet, Rfl: 11  •  metOLazone (ZAROXOLYN) 2.5 MG tablet, Take one pill x 3days starting 8/2 and then begin once weekly, Disp: 7 tablet, Rfl: 3  •  pantoprazole (PROTONIX) 20 MG EC tablet, 1 po in the am 30 minutes before breakfast., Disp: 90 tablet, Rfl: 3  •  Suvorexant 20 MG tablet, Take 20 mg by mouth At Night As Needed (insomnia)., Disp: 30 tablet, Rfl: 2  •  tamsulosin (FLOMAX) 0.4 MG capsule 24 hr capsule, Take 1 capsule by mouth Daily., Disp: 90 capsule, Rfl: 3  •  potassium chloride (K-DUR,KLOR-CON) 20 MEQ CR tablet, Take 1 tablet by mouth Daily., Disp: 30 tablet, Rfl: 11    The following portions of the chart were reviewed and updated as appropriate: Allergies, current medications, past family history, social history, past medical history.     Review of Systems   Constitution: Negative for chills, decreased appetite, diaphoresis, fever, weakness, malaise/fatigue, night sweats, weight gain and weight loss.   HENT: Negative for congestion, hearing loss, hoarse voice and nosebleeds.    Eyes: Negative for blurred vision, visual disturbance and visual halos.   Cardiovascular: Negative for chest pain, claudication, cyanosis, dyspnea on exertion, irregular heartbeat, leg swelling, near-syncope, orthopnea, palpitations, paroxysmal nocturnal dyspnea and syncope.   Respiratory: Negative for cough, hemoptysis, shortness of breath, sleep disturbances due to breathing, snoring, sputum production and wheezing.    Hematologic/Lymphatic: Negative for bleeding problem. Does not bruise/bleed easily.   Skin: Negative for dry skin, itching and rash.   Musculoskeletal: Negative for arthritis, joint pain, joint swelling and myalgias.   Gastrointestinal: Negative for bloating, abdominal pain, constipation, diarrhea, flatus, heartburn, hematemesis, hematochezia, melena, nausea and vomiting.   Genitourinary: Negative for dysuria, frequency, hematuria, nocturia and urgency.  "  Neurological: Negative for excessive daytime sleepiness, dizziness, headaches, light-headedness and loss of balance.   Psychiatric/Behavioral: Negative for depression. The patient does not have insomnia and is not nervous/anxious.            Objective:     Vitals:    11/02/17 1010 11/02/17 1013 11/02/17 1014   BP: 125/87 125/89 99/86   BP Location: Right arm Left arm Left arm   Patient Position: Sitting Sitting Standing   Pulse: 60 60 71   Resp: 18     Temp: 97.4 °F (36.3 °C)     TempSrc: Temporal Artery      SpO2: 100%  98%   Weight: 194 lb 9.6 oz (88.3 kg)     Height: 69\" (175.3 cm)           Physical Exam   Constitutional: He is oriented to person, place, and time. He appears well-developed and well-nourished. He is active and cooperative. No distress.   HENT:   Head: Normocephalic and atraumatic.   Mouth/Throat: Oropharynx is clear and moist.   Eyes: Conjunctivae and EOM are normal. Pupils are equal, round, and reactive to light.   Neck: Normal range of motion. Neck supple. No JVD present. No tracheal deviation present. No thyromegaly present.   Cardiovascular: Normal rate, regular rhythm, normal heart sounds and intact distal pulses.    Pulmonary/Chest: Effort normal and breath sounds normal.   Abdominal: Soft. Bowel sounds are normal. He exhibits no distension. There is no tenderness.   Musculoskeletal: Normal range of motion. He exhibits no edema.   Neurological: He is alert and oriented to person, place, and time.   Skin: Skin is warm, dry and intact.   Psychiatric: He has a normal mood and affect. His behavior is normal.   Nursing note and vitals reviewed.      Lab and Diagnostic Review:    Echo 9/26/17  · Left ventricular systolic function is normal. Estimated EF = 60%.  · Left ventricular diastolic dysfunction (grade I a) consistent with impaired relaxation.  · Trace-to-mild aortic valve regurgitation is present  · Mild tricuspid valve regurgitation is present.      Assessment and Plan:         1. " Chronic diastolic congestive heart failure  - Decrease lasix to 40mg daily and stop metolazone  - Heart failure education today including signs and symptoms, the role of the heart failure center, daily weights, low sodium diet (less than 1500 mg per day), and daily physical activity. Reviewed HF Zones with patient and family. Patient to continue current medications as previously ordered.   - Basic Metabolic Panel  - Magnesium  - BNP  - CBC & Differential  - Will call with lab results    2. SSS (sick sinus syndrome)  -s/p SJM PPM    It has been a pleasure to participate in the care of this patient.  Patient was instructed to call the [facility] with any questions, concerns, or worsening symptoms.  Follow [facility] with [facility]. Keep follow up with Dr. Wall  * Please note that portions of this note were completed with a voice recognition program. Efforts were made to edit the dictation but occasionally words are transcribed.

## 2017-12-07 ENCOUNTER — OFFICE VISIT (OUTPATIENT)
Dept: INTERNAL MEDICINE | Facility: CLINIC | Age: 72
End: 2017-12-07

## 2017-12-07 VITALS
HEIGHT: 69 IN | SYSTOLIC BLOOD PRESSURE: 138 MMHG | OXYGEN SATURATION: 96 % | TEMPERATURE: 98.4 F | RESPIRATION RATE: 16 BRPM | WEIGHT: 198.19 LBS | DIASTOLIC BLOOD PRESSURE: 90 MMHG | HEART RATE: 78 BPM | BODY MASS INDEX: 29.36 KG/M2

## 2017-12-07 DIAGNOSIS — R06.09 DYSPNEA ON EXERTION: Primary | ICD-10-CM

## 2017-12-07 PROCEDURE — 99213 OFFICE O/P EST LOW 20 MIN: CPT | Performed by: NURSE PRACTITIONER

## 2017-12-07 RX ORDER — ALBUTEROL SULFATE 90 UG/1
2 AEROSOL, METERED RESPIRATORY (INHALATION) EVERY 4 HOURS PRN
Qty: 1 INHALER | Refills: 4 | Status: SHIPPED | OUTPATIENT
Start: 2017-12-07 | End: 2017-12-18

## 2017-12-07 NOTE — PROGRESS NOTES
Chief Complaint / Reason:      Chief Complaint   Patient presents with   • Shortness of Breath     only with activity, onset about 2 1/2 weeks ago, wondering if it is asthma.   • Nasal Congestion   • Cough     productive       Subjective     Shortness of Breath   This is a new problem. The current episode started 1 to 4 weeks ago. The problem occurs intermittently. The problem has been unchanged. The average episode lasts 45 minutes. Associated symptoms include coryza, rhinorrhea, swollen glands and wheezing. The symptoms are aggravated by exercise.   Cough   Associated symptoms include rhinorrhea, shortness of breath and wheezing.       History taken from: patient    PMH/FH/Social History were reviewed and updated appropriately in the electronic medical record.     Review of Systems:   Review of Systems   HENT: Positive for rhinorrhea.    Respiratory: Positive for cough, shortness of breath and wheezing.      All other systems were reviewed and are negative.  Exceptions are noted in the subjective or above.      Objective     Vital Signs  Vitals:    12/07/17 0938   BP: 138/90   Pulse: 78   Resp: 16   Temp: 98.4 °F (36.9 °C)   SpO2: 96%       Body mass index is 29.25 kg/(m^2).    Physical Exam   Constitutional: He is oriented to person, place, and time. He appears well-developed and well-nourished.   HENT:   Head: Normocephalic.   Right Ear: External ear normal.   Left Ear: External ear normal.   Mouth/Throat: Mucous membranes are dry. Posterior oropharyngeal erythema present.   Cardiovascular: Normal rate, regular rhythm, normal heart sounds and intact distal pulses.    Pulmonary/Chest: Effort normal and breath sounds normal.   Abdominal: Soft. Bowel sounds are normal.   Lymphadenopathy:     He has no cervical adenopathy.   Neurological: He is alert and oriented to person, place, and time.   Skin: Skin is warm and dry.   Psychiatric: He has a normal mood and affect. His behavior is normal. Judgment and thought  content normal.   Nursing note and vitals reviewed.       Results Review:    I reviewed the patient's previous clinical results.       Medication Review:     Current Outpatient Prescriptions:   •  aspirin 81 MG tablet, Take  by mouth Daily., Disp: , Rfl:   •  Cetirizine HCl 10 MG capsule, Take  by mouth., Disp: , Rfl:   •  finasteride (PROSCAR) 5 MG tablet, Take 1 tablet by mouth Daily., Disp: 90 tablet, Rfl: 3  •  furosemide (LASIX) 40 MG tablet, Take 1 tablet by mouth 2 (Two) Times a Day. 40 mg po daily X 5 days and then decrease to prn daily, Disp: 60 tablet, Rfl: 11  •  pantoprazole (PROTONIX) 20 MG EC tablet, 1 po in the am 30 minutes before breakfast., Disp: 90 tablet, Rfl: 3  •  potassium chloride (K-DUR,KLOR-CON) 20 MEQ CR tablet, Take 1 tablet by mouth Daily., Disp: 30 tablet, Rfl: 11  •  tamsulosin (FLOMAX) 0.4 MG capsule 24 hr capsule, Take 1 capsule by mouth Daily., Disp: 90 capsule, Rfl: 3  •  albuterol (PROVENTIL HFA;VENTOLIN HFA) 108 (90 Base) MCG/ACT inhaler, Inhale 2 puffs Every 4 (Four) Hours As Needed for Wheezing or Shortness of Air., Disp: 1 inhaler, Rfl: 4    Assessment/Plan   Saad was seen today for shortness of breath, nasal congestion and cough.    Diagnoses and all orders for this visit:    Dyspnea on exertion  -     albuterol (PROVENTIL HFA;VENTOLIN HFA) 108 (90 Base) MCG/ACT inhaler; Inhale 2 puffs Every 4 (Four) Hours As Needed for Wheezing or Shortness of Air.      Return if symptoms worsen or fail to improve.    Josy Dalton, APRN  12/07/2017

## 2017-12-18 ENCOUNTER — OFFICE VISIT (OUTPATIENT)
Dept: CARDIOLOGY | Facility: CLINIC | Age: 72
End: 2017-12-18

## 2017-12-18 VITALS
WEIGHT: 198.2 LBS | BODY MASS INDEX: 29.36 KG/M2 | DIASTOLIC BLOOD PRESSURE: 70 MMHG | HEART RATE: 64 BPM | HEIGHT: 69 IN | SYSTOLIC BLOOD PRESSURE: 136 MMHG | OXYGEN SATURATION: 96 %

## 2017-12-18 DIAGNOSIS — I49.5 SSS (SICK SINUS SYNDROME) (HCC): ICD-10-CM

## 2017-12-18 DIAGNOSIS — I50.32 CHRONIC DIASTOLIC CONGESTIVE HEART FAILURE (HCC): ICD-10-CM

## 2017-12-18 DIAGNOSIS — I25.10 CORONARY ARTERY DISEASE INVOLVING NATIVE CORONARY ARTERY OF NATIVE HEART WITHOUT ANGINA PECTORIS: Primary | ICD-10-CM

## 2017-12-18 PROCEDURE — 99214 OFFICE O/P EST MOD 30 MIN: CPT | Performed by: INTERNAL MEDICINE

## 2017-12-18 NOTE — PROGRESS NOTES
Encounter Date:05/31/2017    Location: [facility] Alice Muse MD    Patient ID: Saad Meier is a 72 y.o. male resident of [place], [place].    1945  Subjective:   Chief Complaint/Reason for visit:    Chief Complaint   Patient presents with   • Coronary Artery Disease       Problem List:    1. Sick sinus syndrome, status post [employer] [place] pacemaker, 2004.   2. Minimal coronary artery disease and normal left ventricular function by cardiac catheterization, 2004.   3. Dyspepsia.  4. 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  5. Colon cancer, status post aborted chemotherapy, currently under surveillance.  6. Chronic diastolic heart failure     Mr. Meier has the above noted medical history who presents for follow up his AV gunnar reentry tachycardia and sick sinus syndrome. He is doing well.  He denies palpitations to suggest recurrent AV gunnar reentry tachycardia.  He denies symptoms of chest pain, unusual shortness of breathTo suggest angina.  In regard to his chronic diastolic heart failure, he has chronic stable lower extremity edema that improved after a brief period of extra Lasix.  He goes to the [facility] 3-5 times per week and push mows his yard during the spring and summer     Social History     Social History   • Marital status: Single     Spouse name: N/A   • Number of children: N/A   • Years of education: N/A     Occupational History   • Not on file.     Social History Main Topics   • Smoking status: Former Smoker     Packs/day: 1.00     Years: 11.00     Types: Cigarettes     Quit date: 1973   • Smokeless tobacco: Never Used   • Alcohol use No   • Drug use: No   • Sexual activity: Defer     Other Topics Concern   • Not on file     Social History Narrative    Caffeine: 2 servings per day    Patient lives at his home alone       family history includes Cancer in his father; Heart disease in his brother and sister; Heart failure in his brother; Lung cancer in his mother;  "Osteoporosis in his mother; Thyroid disease in his mother.     has a past medical history of Anxiety; AV gunnar re-entry tachycardia (3/6/2017); CAD (coronary artery disease) (3/6/2017); Cardiac arrhythmia; Colon polyp; History of blood transfusion; History of colonoscopy (2015); History of esophagogastroduodenoscopy (2015); and Iron deficiency.    No Known Allergies      Current Outpatient Prescriptions:   •  aspirin 81 MG tablet, Take  by mouth Daily., Disp: , Rfl:   •  finasteride (PROSCAR) 5 MG tablet, Take 1 tablet by mouth Daily., Disp: 90 tablet, Rfl: 3  •  pantoprazole (PROTONIX) 20 MG EC tablet, 1 po in the am 30 minutes before breakfast., Disp: 90 tablet, Rfl: 3  •  potassium chloride (K-DUR,KLOR-CON) 20 MEQ CR tablet, Take 1 tablet by mouth Daily., Disp: 30 tablet, Rfl: 11  •  tamsulosin (FLOMAX) 0.4 MG capsule 24 hr capsule, Take 1 capsule by mouth Daily., Disp: 90 capsule, Rfl: 3  •  Cetirizine HCl 10 MG capsule, Take  by mouth., Disp: , Rfl:   •  furosemide (LASIX) 40 MG tablet, Take 1 tablet by mouth 2 (Two) Times a Day. 40 mg po daily X 5 days and then decrease to prn daily (Patient taking differently: Take 20 mg by mouth Daily. 40 mg po daily X 5 days and then decrease to prn daily), Disp: 60 tablet, Rfl: 11    ROS  Positive for lower extremity swelling  Negative for dyspnea, chest pain, palpitations, syncope    Objective:   /70 (BP Location: Left arm, Patient Position: Sitting)  Pulse 64  Ht 175.3 cm (69\")  Wt 89.9 kg (198 lb 3.2 oz)  SpO2 96%  BMI 29.27 kg/m2    Physical Exam    CONSTITUTIONAL: No acute distress, normal affect  RESPIRATORY: Normal effort. Clear to auscultation bilaterally without wheezing or rales  CARDIOVASCULAR: Regular rate and rhythm with normal S1 and S2. Without murmur, gallop or rub.  PERIPHERAL VASCULAR: Normal radial pulses bilaterally. There is 0-1+, improved peripheral edema bilaterally.    Data Review:   Procedures    Device Interrogation 9/11/17  69% atrial " paced with a P wave of 1.1 mV threshold 0.5 V at 0.5 ms impedance of 430 ohms and <1% V pacing RV R-wave is 6.4 mV threshold 1.37 V at 0.5 ms impedance of 530 ohms. Battery voltage is 2.92 V which is 7 years of longevity, 73 mode switches which were not episodes of A. fib or SVT.  Seemed more like competitive pacing.  Increased max sensor rate to 125.  Increased A. tach rates to 180.    [facility] 2004 - Minimal coronary artery disease and normal left ventricular function     TTE 9/2017  · Left ventricular systolic function is normal. Estimated EF = 60%.  · Left ventricular diastolic dysfunction (grade I a) consistent with impaired relaxation.  · Trace-to-mild aortic valve regurgitation is present  · Mild tricuspid valve regurgitation is present.        Assessment:   Sick sinus syndrome, AV gunnar reentry tachycardia, status post pacemaker  Coronary artery disease, mild  Chronic diastolic heart failure  -Continue current diuretics; Symptoms improved  -Follow up 6 mos    Atilio Wall MD, MSc, FACC  Interventional Cardiology  [facility]

## 2017-12-20 ENCOUNTER — OFFICE VISIT (OUTPATIENT)
Dept: INTERNAL MEDICINE | Facility: CLINIC | Age: 72
End: 2017-12-20

## 2017-12-20 VITALS
OXYGEN SATURATION: 97 % | DIASTOLIC BLOOD PRESSURE: 76 MMHG | WEIGHT: 198 LBS | TEMPERATURE: 97.3 F | HEIGHT: 69 IN | RESPIRATION RATE: 14 BRPM | HEART RATE: 78 BPM | SYSTOLIC BLOOD PRESSURE: 128 MMHG | BODY MASS INDEX: 29.33 KG/M2

## 2017-12-20 DIAGNOSIS — G47.00 INSOMNIA, UNSPECIFIED TYPE: Primary | ICD-10-CM

## 2017-12-20 DIAGNOSIS — N28.89 BILATERAL KIDNEY MASSES: ICD-10-CM

## 2017-12-20 DIAGNOSIS — Z85.038 HISTORY OF COLON CANCER IN ADULTHOOD: Chronic | ICD-10-CM

## 2017-12-20 DIAGNOSIS — R10.9 RIGHT FLANK PAIN: ICD-10-CM

## 2017-12-20 LAB
ALBUMIN SERPL-MCNC: 4.3 G/DL (ref 3.5–5)
ALBUMIN/GLOB SERPL: 1.5 G/DL (ref 1–2)
ALP SERPL-CCNC: 62 U/L (ref 38–126)
ALT SERPL-CCNC: 39 U/L (ref 13–69)
AST SERPL-CCNC: 30 U/L (ref 15–46)
BASOPHILS # BLD AUTO: 0.12 10*3/MM3 (ref 0–0.2)
BASOPHILS NFR BLD AUTO: 1.8 % (ref 0–2.5)
BILIRUB SERPL-MCNC: 0.4 MG/DL (ref 0.2–1.3)
BUN SERPL-MCNC: 12 MG/DL (ref 7–20)
BUN/CREAT SERPL: 12 (ref 6.3–21.9)
CALCIUM SERPL-MCNC: 10.4 MG/DL (ref 8.4–10.2)
CHLORIDE SERPL-SCNC: 104 MMOL/L (ref 98–107)
CO2 SERPL-SCNC: 28 MMOL/L (ref 26–30)
CREAT SERPL-MCNC: 1 MG/DL (ref 0.6–1.3)
EOSINOPHIL # BLD AUTO: 0.58 10*3/MM3 (ref 0–0.7)
EOSINOPHIL NFR BLD AUTO: 8.8 % (ref 0–7)
ERYTHROCYTE [DISTWIDTH] IN BLOOD BY AUTOMATED COUNT: 14 % (ref 11.5–14.5)
GLOBULIN SER CALC-MCNC: 2.8 GM/DL
GLUCOSE SERPL-MCNC: 95 MG/DL (ref 74–98)
HCT VFR BLD AUTO: 45.2 % (ref 42–52)
HGB BLD-MCNC: 15 G/DL (ref 14–18)
IMM GRANULOCYTES # BLD: 0.05 10*3/MM3 (ref 0–0.06)
IMM GRANULOCYTES NFR BLD: 0.8 % (ref 0–0.6)
LYMPHOCYTES # BLD AUTO: 1.64 10*3/MM3 (ref 0.6–3.4)
LYMPHOCYTES NFR BLD AUTO: 24.8 % (ref 10–50)
MCH RBC QN AUTO: 31 PG (ref 27–31)
MCHC RBC AUTO-ENTMCNC: 33.2 G/DL (ref 30–37)
MCV RBC AUTO: 93.4 FL (ref 80–94)
MONOCYTES # BLD AUTO: 1.1 10*3/MM3 (ref 0–0.9)
MONOCYTES NFR BLD AUTO: 16.6 % (ref 0–12)
NEUTROPHILS # BLD AUTO: 3.13 10*3/MM3 (ref 2–6.9)
NEUTROPHILS NFR BLD AUTO: 47.2 % (ref 37–80)
NRBC BLD AUTO-RTO: 0 /100 WBC (ref 0–0)
PLATELET # BLD AUTO: 339 10*3/MM3 (ref 130–400)
POTASSIUM SERPL-SCNC: 5.1 MMOL/L (ref 3.5–5.1)
PROT SERPL-MCNC: 7.1 G/DL (ref 6.3–8.2)
RBC # BLD AUTO: 4.84 10*6/MM3 (ref 4.7–6.1)
SODIUM SERPL-SCNC: 142 MMOL/L (ref 137–145)
WBC # BLD AUTO: 6.62 10*3/MM3 (ref 4.8–10.8)

## 2017-12-20 PROCEDURE — 99214 OFFICE O/P EST MOD 30 MIN: CPT | Performed by: FAMILY MEDICINE

## 2017-12-20 RX ORDER — AMITRIPTYLINE HYDROCHLORIDE 25 MG/1
25 TABLET, FILM COATED ORAL NIGHTLY PRN
Qty: 90 TABLET | Refills: 3 | Status: SHIPPED | OUTPATIENT
Start: 2017-12-20 | End: 2017-12-20 | Stop reason: SDUPTHER

## 2017-12-20 RX ORDER — AMITRIPTYLINE HYDROCHLORIDE 25 MG/1
25 TABLET, FILM COATED ORAL NIGHTLY PRN
Qty: 90 TABLET | Refills: 3 | Status: SHIPPED | OUTPATIENT
Start: 2017-12-20 | End: 2018-02-15 | Stop reason: SDUPTHER

## 2017-12-20 NOTE — PROGRESS NOTES
"Subjective    Saad Meier is a 72 y.o. male here for:  Chief Complaint   Patient presents with   • Establish Care     Transfer care from Dr. Weldon to Dr. Muse   • Sleeping Problem     unable to sleep would like to restart Elavil, took in the past and it helped    • Rib Pain     pain in ribs on right side     History of Present Illness     Has never slept well. Elavil has helped some. He tried 3-4 different medicines and nothing helped. He has trouble falling asleep, usually okay once he gets to sleep. Goes to bed around 10-11 pm. Does not watch TV prior to bed. Sometimes gets up and sits in lounge chair and watches TV if he can't get to sleep. Caffeine only in the mornings. Worsening in recent months. He feels if he does not eat anything after 5 pm and takes elavil he can sleep well, but if he eats he cannot sleep. No side efects from the medicine. Would like to restart the 25 mg dose.    Having pain on right side for a month and a half or so. Pain comes and goes. An ache. Sometimes when he twists it feels like something is there \"that's not supposed to be there.\" He has been treated for colon cancer (2012) so he always worries a bit about that.     The following portions of the patient's history were reviewed and updated as appropriate: allergies, current medications, past family history, past medical history, past social history, past surgical history and problem list.    Review of Systems   Constitutional: Positive for fatigue.   Respiratory: Negative for shortness of breath.    Gastrointestinal: Positive for abdominal pain. Negative for nausea.   Psychiatric/Behavioral: Positive for sleep disturbance.       Vitals:    12/20/17 0926   BP: 128/76   Pulse: 78   Resp: 14   Temp: 97.3 °F (36.3 °C)   SpO2: 97%         Objective   Physical Exam   Constitutional: He is oriented to person, place, and time. Vital signs are normal. He appears well-developed and well-nourished. He is active. He does not have a sickly " appearance. He does not appear ill.   Appears stated age. Well groomed.   HENT:   Head: Normocephalic and atraumatic.   Right Ear: Hearing normal.   Left Ear: Hearing normal.   Nose: Nose normal.   Mouth/Throat: Mucous membranes are not dry.   Eyes: EOM and lids are normal. Pupils are equal, round, and reactive to light. No scleral icterus.   Cardiovascular: Normal rate, regular rhythm and normal heart sounds.  Exam reveals no gallop and no friction rub.    No murmur heard.  Pulmonary/Chest: Effort normal and breath sounds normal.   Abdominal:       Musculoskeletal: He exhibits no deformity.   Neurological: He is alert and oriented to person, place, and time. He displays no tremor. No cranial nerve deficit. Gait normal.   Skin: Skin is warm. He is not diaphoretic. No cyanosis. Nails show no clubbing.   Psychiatric: He has a normal mood and affect. His speech is normal and behavior is normal. Judgment and thought content normal. Cognition and memory are normal.   Nursing note and vitals reviewed.      Per chart review, has been on elavil, doxepin, and Ambien.    Results for orders placed in visit on 05/26/17   CT Abdomen Pelvis With Contrast    Narrative CT SCANS CHEST, ABDOMEN, PELVIS     HISTORY:  malignant neoplasm of colon; C18.9-Malignant neoplasm of  colon, unspecified     COMPARISON: None.     TECHNIQUE: Axial images were obtained from the thoracic inlet through  the symphysis pubis with oral and IV contrast.     FINDINGS CHEST CT:  The lungs are hyperexpanded suggesting COPD. Minimal  apical fibrotic changes. There is calcified residua of granulomatous  disease present. No active airspace disease, edema, or pleural fluid.  Normal heart size. There is no adenopathy identified by CT size  criteria.  The encompassed aorta is without evidence of aneurysmal  dilatation. There is a small hiatal hernia.     FINDINGS ABDOMEN CT:  There are a multitude of hypodense renal lesions  bilaterally, right greater than left.  These likely reflect cysts. The  largest involves the lower pole right kidney and measures 4.8 x 2.4 cm.  Most of the other lesions measure well less than 1 cm and are too small  for detailed assessment. Follow-up recommended. The remaining solid  organs have an unremarkable appearance. There is a midline  supraumbilical ventral hernia containing fat and a small portion of  transverse colon. No associated inflammation. The encompassed aorta is  without evidence of aneurysmal dilatation.     FINDINGS PELVIS CT:  There is a massive amount of fecal material in the  colon compatible with severe constipation. There is no evidence of  obstruction. Appendix unable to be visualized for assessment. There are  calcifications in a nonenlarged prostate gland. There is nonspecific  urinary bladder wall thickening compatible with a nonspecific cystitis.  No free fluid.     IMPRESSION CHEST CT:    1. No active airspace disease or evidence of thoracic metastatic  disease.  2. Small hiatal hernia     IMPRESSION ABDOMEN & PELVIS CT:    1. Numerous hypodense renal lesions, right greater than left. Favor  cysts but follow-up recommended.  2. Small ventral hernia without inflammation.  3. Severe constipation without obstruction.  4. Nonspecific urinary bladder wall thickening         This report was finalized on 5/26/2017 12:33 PM by Greg Johnston MD.           Assessment/Plan       Saad was seen today for establish care, sleeping problem and rib pain.    Diagnoses and all orders for this visit:    Insomnia, unspecified type  -     Discontinue: amitriptyline (ELAVIL) 25 MG tablet; Take 1 tablet by mouth At Night As Needed for Sleep.  -     amitriptyline (ELAVIL) 25 MG tablet; Take 1 tablet by mouth At Night As Needed for Sleep.    Right flank pain  -     CT Abdomen Pelvis With Contrast  -     CBC & Differential  -     Comprehensive Metabolic Panel    Bilateral kidney masses  -     CT Abdomen Pelvis With Contrast  -     CBC &  Differential  -     Comprehensive Metabolic Panel    History of colon cancer in adulthood        · Discussed elavil, not highly recommended due to his age but he has benefited so will prescribed. Sent to [facility] and mail order  · Discussed imaging, which we reviewed together. Indeed he had numerous lesions to the right kidney in May 2017 (at least one lesion in the left kidney), most of which were inferiorly located on the right kidney and larger in this area. He was not aware of these lesions and he's never had follow up which was recommended. Given this is the approximate area of his discomfort, plus his history of cancer, I feel it's imperative to further evaluate      Evelyn Muse MD    Please note that portions of this note were completed with a voice recognition program. Efforts were made to edit dictation, but occasionally words are mistranscribed.

## 2017-12-24 PROBLEM — R12 HEARTBURN: Status: RESOLVED | Noted: 2017-04-12 | Resolved: 2017-12-24

## 2017-12-24 PROBLEM — R60.0 BILATERAL EDEMA OF LOWER EXTREMITY: Status: RESOLVED | Noted: 2017-05-31 | Resolved: 2017-12-24

## 2017-12-28 ENCOUNTER — HOSPITAL ENCOUNTER (OUTPATIENT)
Dept: CT IMAGING | Facility: HOSPITAL | Age: 72
Discharge: HOME | End: 2017-12-28
Attending: FAMILY MEDICINE | Admitting: FAMILY MEDICINE

## 2017-12-28 PROCEDURE — 74177 CT ABD & PELVIS W/CONTRAST: CPT

## 2017-12-28 PROCEDURE — 0 IOPAMIDOL 61 % SOLUTION: Performed by: FAMILY MEDICINE

## 2017-12-28 RX ADMIN — IOPAMIDOL 95 ML: 612 INJECTION, SOLUTION INTRAVENOUS at 09:10

## 2018-01-02 ENCOUNTER — TELEPHONE (OUTPATIENT)
Dept: INTERNAL MEDICINE | Facility: CLINIC | Age: 73
End: 2018-01-02

## 2018-01-02 DIAGNOSIS — N28.89 RIGHT RENAL MASS: Primary | ICD-10-CM

## 2018-01-02 NOTE — TELEPHONE ENCOUNTER
"I sent him a message on my chart, but I also sent you a message on this, asking to contact radiology department. See if they can get an addendum added to his result with the \"bosniak\" classification for renal cyst.  "

## 2018-01-04 PROBLEM — N28.89 RIGHT RENAL MASS: Status: ACTIVE | Noted: 2018-01-04

## 2018-01-04 NOTE — TELEPHONE ENCOUNTER
Just wanted to let you know that this has been completed. Dr. Bailey added an addendum to this yesterday 1-3-18.

## 2018-01-11 ENCOUNTER — OFFICE VISIT (OUTPATIENT)
Dept: UROLOGY | Facility: CLINIC | Age: 73
End: 2018-01-11

## 2018-01-11 VITALS
SYSTOLIC BLOOD PRESSURE: 137 MMHG | HEART RATE: 85 BPM | WEIGHT: 198 LBS | OXYGEN SATURATION: 98 % | DIASTOLIC BLOOD PRESSURE: 86 MMHG | TEMPERATURE: 97 F | BODY MASS INDEX: 29.33 KG/M2 | HEIGHT: 69 IN

## 2018-01-11 DIAGNOSIS — N30.00 ACUTE CYSTITIS WITHOUT HEMATURIA: ICD-10-CM

## 2018-01-11 DIAGNOSIS — N40.1 BPH WITH URINARY OBSTRUCTION: ICD-10-CM

## 2018-01-11 DIAGNOSIS — N28.89 RENAL MASS: Primary | ICD-10-CM

## 2018-01-11 DIAGNOSIS — N13.8 BPH WITH URINARY OBSTRUCTION: ICD-10-CM

## 2018-01-11 LAB
BILIRUB BLD-MCNC: NEGATIVE MG/DL
CLARITY, POC: CLEAR
COLOR UR: YELLOW
GLUCOSE UR STRIP-MCNC: NEGATIVE MG/DL
KETONES UR QL: NEGATIVE
LEUKOCYTE EST, POC: ABNORMAL
NITRITE UR-MCNC: NEGATIVE MG/ML
PH UR: 6 [PH] (ref 5–8)
PROT UR STRIP-MCNC: NEGATIVE MG/DL
RBC # UR STRIP: NEGATIVE /UL
SP GR UR: 1.02 (ref 1–1.03)
UROBILINOGEN UR QL: NORMAL

## 2018-01-11 PROCEDURE — 76857 US EXAM PELVIC LIMITED: CPT | Performed by: UROLOGY

## 2018-01-11 PROCEDURE — 81003 URINALYSIS AUTO W/O SCOPE: CPT | Performed by: UROLOGY

## 2018-01-11 PROCEDURE — 99214 OFFICE O/P EST MOD 30 MIN: CPT | Performed by: UROLOGY

## 2018-01-11 RX ORDER — SULFAMETHOXAZOLE AND TRIMETHOPRIM 800; 160 MG/1; MG/1
1 TABLET ORAL 2 TIMES DAILY
Qty: 14 TABLET | Refills: 0 | Status: SHIPPED | OUTPATIENT
Start: 2018-01-11 | End: 2018-01-31

## 2018-01-11 NOTE — PROGRESS NOTES
[facility]- UROLOGY  [address], [address]  [place], [place] [address]  [phone]      01/11/2018    Saad Meier  1945        Pelvic Ultrasound with PVR    A transabdominal pelvic ultrasound was performed using a 3.5 MHz transducer of a B-K Alford through the suprapubic area.     The purpose of the study was to investigate the patient's urinary tract infection and voiding difficulty.  There was mild bladder wall thickening noted.  There were no intravesical filling defects seen.  The post void residual of 84 ml was noted.  Prostate was heterogeneous and was noted to be 38.3 grams.  There was a protrusion of the prostate into the bladder.  Ultrasound images will be scanned into the chart for reference.       CPT code 35834        Performed by Luis Hollins MD

## 2018-01-11 NOTE — PROGRESS NOTES
Chief Complaint  renal mass (follow up renal mass.)        ELIECER Meier is a 72 y.o. male who returns today for follow-up of multiple urological concerns.  He was noted to have multiple lesions in both kidneys on CT scan 6 months ago and they appear to be benign cyst without enhancement after contrast.  Follow-up was recommended however and he recently had a repeat CT scan showing essentially no change in the lesions.  He has some right sided flank pain that he attributes to the cyst but could be musculoskeletal.  Its explained that normally these are asymptomatic but if his pain progresses we could have radiology aspirate them with a needle to see if it relieves the pain and if it recurs he could have surgery.  He is obviously reluctant to have surgery since he has no multiple trusses today for hernias that he has elected not to have repaired.    He has mild difficulty voiding during the day on Proscar and Flomax and more problems with nocturia although this is associated with lower extremity edema.  He states he's been taking flower pollen over-the-counter that seems to assist his voiding.  He's had no more difficulty the voiding usual although his urine today appears infected.        Vitals:    01/11/18 1406   BP: 137/86   Pulse: 85   Temp: 97 °F (36.1 °C)   SpO2: 98%       Past Medical History  Past Medical History:   Diagnosis Date   • Allergic 25yrs.    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   • BPH (benign prostatic hypertrophy)    • CAD (coronary artery disease) 3/6/2017   • Cancer July 2012    none   • Cardiac arrhythmia    • Chronic diastolic congestive heart failure    • Colon polyp    • Diverticulosis    • GERD (gastroesophageal reflux disease)    • History of blood transfusion    • History of colonoscopy 2015    Complete   • History of esophagogastroduodenoscopy 2015    Diagnostic   • HL  (hearing loss)    • Infection of kidney    • Iron deficiency    • Obesity    • Visual impairment !(97    Reading Glasses       Past Surgical History  Past Surgical History:   Procedure Laterality Date   • CARDIAC ABLATION  03/2012   • CARDIAC CATHETERIZATION  2004   • CARDIAC PACEMAKER PLACEMENT  2004   • COLON SURGERY  2012   • COLONOSCOPY  July 2015   • HEMORRHOIDECTOMY  1970   • HERNIA REPAIR      X 5   • INGUINAL HERNIA REPAIR     • LYMPH NODE BIOPSY  7-2-2012    large intestine lymph nodes       Medications  has a current medication list which includes the following prescription(s): amitriptyline, aspirin, cetirizine hcl, finasteride, furosemide, pantoprazole, potassium chloride, and tamsulosin.      Allergies  No Known Allergies    Social History  Social History     Social History Narrative    Caffeine: 2 servings per day    Patient lives at his home alone       Family History  He has no family history of bladder or kidney cancer  He has no family history of kidney stones      AUA Symptom Score:      Review of Systems  Review of Systems    Physical Exam  Physical Exam   Constitutional: He is oriented to person, place, and time. He appears well-developed and well-nourished.   HENT:   Head: Normocephalic and atraumatic.   Neck: Normal range of motion.   Pulmonary/Chest: Effort normal. No respiratory distress.   Abdominal: Soft. He exhibits no distension and no mass. There is no tenderness. A hernia is present. Hernia confirmed positive in the right inguinal area.       Genitourinary: Rectum normal, prostate normal, testes normal and penis normal.   Musculoskeletal: Normal range of motion. He exhibits edema.   Lymphadenopathy:     He has no cervical adenopathy.   Neurological: He is alert and oriented to person, place, and time.   Skin: Skin is warm and dry.   Psychiatric: He has a normal mood and affect. His behavior is normal.   Vitals reviewed.      Labs Recent and today in the office:  Results for orders  placed or performed in visit on 01/11/18   POC Urinalysis Dipstick, Automated   Result Value Ref Range    Color Yellow Yellow, Straw, Dark Yellow, Kathy    Clarity, UA Clear Clear    Glucose, UA Negative Negative, 1000 mg/dL (3+) mg/dL    Bilirubin Negative Negative    Ketones, UA Negative Negative    Specific Gravity  1.020 1.005 - 1.030    Blood, UA Negative Negative    pH, Urine 6.0 5.0 - 8.0    Protein, POC Negative Negative mg/dL    Urobilinogen, UA Normal Normal    Leukocytes Large (3+) (A) Negative    Nitrite, UA Negative Negative         Assessment & Plan  Renal mass: Follow-up CT scan indicates no change in the lesions that appear to be multiple benign cysts.  If he feels that are symptomatic I would recommend aspiration through CT-guided needle per radiology and avoid surgery unless it recurs and the pain relief can be documented.    Urinary tract infection: Patient is uncircumcised but has no active balanitis.  He has had Pyuria in the past with a negative culture but he's placed on a round of antibiotics until that returns.    BPH: He'll continue his Proscar and Flomax.  Digital rectal exam is benign and PSA 0.54.  He'll return in July for follow-up.His nocturia may be based on his lower extremity edema in part but he does carry a significant postvoid residual.

## 2018-01-13 LAB
BACTERIA UR CULT: NO GROWTH
BACTERIA UR CULT: NORMAL

## 2018-01-31 ENCOUNTER — OFFICE VISIT (OUTPATIENT)
Dept: INTERNAL MEDICINE | Facility: CLINIC | Age: 73
End: 2018-01-31

## 2018-01-31 VITALS
TEMPERATURE: 98.3 F | DIASTOLIC BLOOD PRESSURE: 80 MMHG | RESPIRATION RATE: 14 BRPM | OXYGEN SATURATION: 97 % | BODY MASS INDEX: 29.77 KG/M2 | SYSTOLIC BLOOD PRESSURE: 124 MMHG | HEIGHT: 69 IN | HEART RATE: 76 BPM | WEIGHT: 201 LBS

## 2018-01-31 DIAGNOSIS — N28.89 RIGHT RENAL MASS: Chronic | ICD-10-CM

## 2018-01-31 DIAGNOSIS — F51.01 PRIMARY INSOMNIA: Primary | ICD-10-CM

## 2018-01-31 DIAGNOSIS — Z23 NEED FOR PNEUMOCOCCAL VACCINE: ICD-10-CM

## 2018-01-31 DIAGNOSIS — R12 HEARTBURN: ICD-10-CM

## 2018-01-31 PROBLEM — R05.8 PRODUCTIVE COUGH: Status: RESOLVED | Noted: 2017-04-24 | Resolved: 2018-01-31

## 2018-01-31 PROCEDURE — 99213 OFFICE O/P EST LOW 20 MIN: CPT | Performed by: FAMILY MEDICINE

## 2018-01-31 PROCEDURE — 90732 PPSV23 VACC 2 YRS+ SUBQ/IM: CPT | Performed by: FAMILY MEDICINE

## 2018-01-31 PROCEDURE — G0009 ADMIN PNEUMOCOCCAL VACCINE: HCPCS | Performed by: FAMILY MEDICINE

## 2018-01-31 RX ORDER — PANTOPRAZOLE SODIUM 20 MG/1
TABLET, DELAYED RELEASE ORAL
Qty: 90 TABLET | Refills: 3 | Status: SHIPPED | OUTPATIENT
Start: 2018-01-31 | End: 2019-01-22 | Stop reason: SDUPTHER

## 2018-01-31 RX ORDER — ESZOPICLONE 1 MG/1
1 TABLET, FILM COATED ORAL NIGHTLY
Qty: 45 TABLET | Refills: 2 | Status: SHIPPED | OUTPATIENT
Start: 2018-01-31 | End: 2018-03-14

## 2018-02-01 ENCOUNTER — TELEPHONE (OUTPATIENT)
Dept: INTERNAL MEDICINE | Facility: CLINIC | Age: 73
End: 2018-02-01

## 2018-02-01 NOTE — TELEPHONE ENCOUNTER
Patient is calling concerning PA for Lunesta. He states that he was informed by [employer] that they had faxed paperwork to us and are waiting on a response. Patient would like a call concerning this.

## 2018-03-08 DIAGNOSIS — R60.0 BILATERAL EDEMA OF LOWER EXTREMITY: ICD-10-CM

## 2018-03-08 RX ORDER — FUROSEMIDE 40 MG/1
40 TABLET ORAL DAILY
Qty: 90 TABLET | Refills: 0 | Status: SHIPPED | OUTPATIENT
Start: 2018-03-08 | End: 2019-05-28 | Stop reason: SDUPTHER

## 2018-03-14 ENCOUNTER — OFFICE VISIT (OUTPATIENT)
Dept: INTERNAL MEDICINE | Facility: CLINIC | Age: 73
End: 2018-03-14

## 2018-03-14 VITALS
SYSTOLIC BLOOD PRESSURE: 124 MMHG | HEIGHT: 69 IN | OXYGEN SATURATION: 95 % | TEMPERATURE: 97.8 F | HEART RATE: 64 BPM | BODY MASS INDEX: 29.77 KG/M2 | DIASTOLIC BLOOD PRESSURE: 82 MMHG | WEIGHT: 201 LBS

## 2018-03-14 DIAGNOSIS — R79.89 ABNORMAL CBC: ICD-10-CM

## 2018-03-14 DIAGNOSIS — K21.00 GASTROESOPHAGEAL REFLUX DISEASE WITH ESOPHAGITIS: ICD-10-CM

## 2018-03-14 DIAGNOSIS — I47.1 AV NODAL RE-ENTRY TACHYCARDIA (HCC): ICD-10-CM

## 2018-03-14 DIAGNOSIS — R35.1 BENIGN PROSTATIC HYPERPLASIA WITH NOCTURIA: Chronic | ICD-10-CM

## 2018-03-14 DIAGNOSIS — F51.01 PRIMARY INSOMNIA: ICD-10-CM

## 2018-03-14 DIAGNOSIS — K40.90 HERNIA, INGUINAL, RIGHT: ICD-10-CM

## 2018-03-14 DIAGNOSIS — I50.32 CHRONIC DIASTOLIC CONGESTIVE HEART FAILURE (HCC): ICD-10-CM

## 2018-03-14 DIAGNOSIS — K42.9 UMBILICAL HERNIA WITHOUT OBSTRUCTION OR GANGRENE: ICD-10-CM

## 2018-03-14 DIAGNOSIS — N28.89 BILATERAL RENAL MASSES: Chronic | ICD-10-CM

## 2018-03-14 DIAGNOSIS — Z00.00 MEDICARE ANNUAL WELLNESS VISIT, SUBSEQUENT: Primary | ICD-10-CM

## 2018-03-14 DIAGNOSIS — Z85.038 HISTORY OF COLON CANCER IN ADULTHOOD: Chronic | ICD-10-CM

## 2018-03-14 DIAGNOSIS — N40.1 BENIGN PROSTATIC HYPERPLASIA WITH NOCTURIA: Chronic | ICD-10-CM

## 2018-03-14 DIAGNOSIS — Z23 NEED FOR SHINGLES VACCINE: ICD-10-CM

## 2018-03-14 DIAGNOSIS — E66.3 OVERWEIGHT (BMI 25.0-29.9): ICD-10-CM

## 2018-03-14 DIAGNOSIS — K43.9 VENTRAL HERNIA WITHOUT OBSTRUCTION OR GANGRENE: ICD-10-CM

## 2018-03-14 DIAGNOSIS — I10 ESSENTIAL HYPERTENSION: Chronic | ICD-10-CM

## 2018-03-14 DIAGNOSIS — I25.10 CORONARY ARTERY DISEASE INVOLVING NATIVE CORONARY ARTERY OF NATIVE HEART WITHOUT ANGINA PECTORIS: ICD-10-CM

## 2018-03-14 PROCEDURE — 99397 PER PM REEVAL EST PAT 65+ YR: CPT | Performed by: FAMILY MEDICINE

## 2018-03-14 PROCEDURE — G0439 PPPS, SUBSEQ VISIT: HCPCS | Performed by: FAMILY MEDICINE

## 2018-03-14 PROCEDURE — 96160 PT-FOCUSED HLTH RISK ASSMT: CPT | Performed by: FAMILY MEDICINE

## 2018-03-14 RX ORDER — LOSARTAN POTASSIUM 25 MG/1
25 TABLET ORAL DAILY
Qty: 90 TABLET | Refills: 3 | Status: SHIPPED | OUTPATIENT
Start: 2018-03-14 | End: 2018-11-26 | Stop reason: SINTOL

## 2018-03-14 NOTE — PROGRESS NOTES
QUICK REFERENCE INFORMATION:  The ABCs of the Annual Wellness Visit    Subsequent Medicare Wellness Visit    HEALTH RISK ASSESSMENT    1945    Recent Hospitalizations:  No hospitalization(s) within the last year..        Current Medical Providers:  Patient Care Team:  Evelyn Muse MD as PCP - General (Family Medicine)  Atilio Wall MD as Consulting Physician (Cardiology)  Luis Hollins MD as Consulting Physician (Urology)  Susan E. Liddle, MD as Consulting Physician (Hematology and Oncology)  Gloria Garcia MD as Consulting Physician (General Surgery)        Smoking Status:  History   Smoking Status   • Former Smoker   • Packs/day: 1.00   • Years: 10.00   • Types: Cigarettes   • Start date: 6/6/1963   • Quit date: 1973   Smokeless Tobacco   • Never Used       Alcohol Consumption:  History   Alcohol Use No       Depression Screen:   PHQ-2/PHQ-9 Depression Screening 3/14/2018   Little interest or pleasure in doing things 0   Feeling down, depressed, or hopeless 0   Trouble falling or staying asleep, or sleeping too much -   Feeling tired or having little energy -   Poor appetite or overeating -   Feeling bad about yourself - or that you are a failure or have let yourself or your family down -   Trouble concentrating on things, such as reading the newspaper or watching television -   Moving or speaking so slowly that other people could have noticed. Or the opposite - being so fidgety or restless that you have been moving around a lot more than usual -   Thoughts that you would be better off dead, or of hurting yourself in some way -   Total Score 0   If you checked off any problems, how difficult have these problems made it for you to do your work, take care of things at home, or get along with other people? -       Health Habits and Functional and Cognitive Screening:  Functional & Cognitive Status 3/14/2018   Do you have difficulty preparing food and eating? No   Do you have difficulty bathing  yourself, getting dressed or grooming yourself? No   Do you have difficulty using the toilet? No   Do you have difficulty moving around from place to place? No   Do you have trouble with steps or getting out of a bed or a chair? No   In the past year have you fallen or experienced a near fall? No   Current Diet Limited Junk Food   Dental Exam Not up to date   Eye Exam Up to date   Exercise (times per week) 3 times per week   Current Exercise Activities Include Weightlifting   Do you need help using the phone?  Yes   Are you deaf or do you have serious difficulty hearing?  Yes   Do you need help with transportation? No   Do you need help shopping? No   Do you need help preparing meals?  No   Do you need help with housework?  No   Do you need help with laundry? No   Do you need help taking your medications? No   Do you need help managing money? No   Have you felt unusual stress, anger or loneliness in the last month? No   Who do you live with? Alone   If you need help, do you have trouble finding someone available to you? No   Do you have difficulty concentrating, remembering or making decisions? No           Does the patient have evidence of cognitive impairment? No    Aspirin use counseling: Taking ASA appropriately as indicated      Recent Lab Results:  CMP:  Lab Results   Component Value Date     (H) 03/14/2018    BUN 14 03/14/2018    CREATININE 1.10 03/14/2018    EGFRIFNONA 65 03/14/2018    EGFRIFAFRI 79 03/14/2018    BCR 12.7 03/14/2018     03/14/2018    K 4.3 03/14/2018    CO2 29.0 03/14/2018    CALCIUM 10.1 03/14/2018    PROTENTOTREF 7.1 03/14/2018    ALBUMIN 4.50 03/14/2018    LABGLOBREF 2.6 03/14/2018    LABIL2 1.7 03/14/2018    BILITOT 0.7 03/14/2018    ALKPHOS 64 03/14/2018    AST 29 03/14/2018    ALT 43 03/14/2018     Lipid Panel:  Lab Results   Component Value Date    TRIG 121 03/14/2018    HDL 46 03/14/2018    VLDL 24.2 03/14/2018     HbA1c:       Visual Acuity:  No exam data  present    Age-appropriate Screening Schedule:  Refer to the list below for future screening recommendations based on patient's age, sex and/or medical conditions. Orders for these recommended tests are listed in the plan section. The patient has been provided with a written plan.    Health Maintenance   Topic Date Due   • ZOSTER VACCINE  04/30/2018 (Originally 5/31/2016)   • TDAP/TD VACCINES (1 - Tdap) 03/14/2019 (Originally 9/28/1964)   • COLONOSCOPY  07/20/2025   • INFLUENZA VACCINE  Completed   • PNEUMOCOCCAL VACCINES (65+ LOW/MEDIUM RISK)  Completed        Subjective   History of Present Illness    Saad Meier is a 72 y.o. male who presents for an Subsequent Wellness Visit. He also has the following chronic medical issues:    1. BPH: Followed by Dr. Hollins. On proscar and Flomax. Has had benign LORIE and PSA was 0.54 last check. Returns to see Dr. Hollins in July    2. Renal mass: multiple lesions on CT bilaterally that have not changed. Discussed with Dr. Hollins, those will be monitored as likely benign.    3. Insomnia: He has tried several medicines, most recently Lunesta, but none seem to work as well as elavil. I have previously discussed with him that this medicine is generally not recommended after age 65. He has also failed Ambien and Belsomra.     4. Chronic diastolic CHF: Followed by cardiology. Prescribed lasix.    5. Sick Sinus Syndrome: Followed by cardiology. Status post S. Jona pacemaker in 2004.    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    7. Mild coronary artery disease: cardiac catheterization performed in 2004. Followed by cardiology. On aspirin therapy.    8. Colon Cancer: Followed by oncology. Was stage III diagnosed July 2012. S/p right colectomy and chemotherapy, the latter discontinued due to toxicity after five rounds. CEA has fluctuated. Under surveillance.    9. Ventral and umbilical  hernia: related to previous colon surgery.  Bothersome to patient. He wears a binder to help with it. He would like to see surgery about it.    10. Inguinal hernia on right: Per urology notes.     11. GERD: Managed with PPI.      The following portions of the patient's history were reviewed and updated as appropriate: allergies, current medications, past family history, past medical history, past social history, past surgical history and problem list.    Outpatient Medications Prior to Visit   Medication Sig Dispense Refill   • amitriptyline (ELAVIL) 25 MG tablet Take 1 tablet by mouth At Night As Needed for Sleep. 30 tablet 0   • aspirin 81 MG tablet Take  by mouth Daily.     • Cetirizine HCl 10 MG capsule Take  by mouth.     • finasteride (PROSCAR) 5 MG tablet Take 1 tablet by mouth Daily. 90 tablet 3   • furosemide (LASIX) 40 MG tablet Take 1 tablet by mouth Daily. 40 mg po daily X 5 days and then decrease to prn daily 90 tablet 0   • pantoprazole (PROTONIX) 20 MG EC tablet 1 po in the am 30 minutes before breakfast. 90 tablet 3   • potassium chloride (K-DUR,KLOR-CON) 20 MEQ CR tablet Take 1 tablet by mouth Daily. 30 tablet 11   • tamsulosin (FLOMAX) 0.4 MG capsule 24 hr capsule Take 1 capsule by mouth Daily. 90 capsule 3   • eszopiclone (LUNESTA) 1 MG tablet Take 1 tablet by mouth Every Night. Take immediately before bedtime. Can increase to 2 mg if 1 mg not effective 45 tablet 2     No facility-administered medications prior to visit.        Patient Active Problem List   Diagnosis   • Arthritis   • BPH (benign prostatic hyperplasia)   • History of colon cancer in adulthood   • Chronic diastolic congestive heart failure   • Acid reflux   • Chronic nausea   • Juvenile rheumatic fever   • Hernia, inguinal, right   • Ventral hernia without obstruction or gangrene   • Insomnia   • SSS (sick sinus syndrome)   • Chronic prostatitis   • Immunodeficiency   • Coronary artery disease involving native coronary artery of native heart without angina pectoris   • AV  gunnar re-entry tachycardia   • Bilateral renal masses   • Essential hypertension   • Umbilical hernia without obstruction or gangrene       Advance Care Planning:  has an advance directive - a copy HAS NOT been provided. Have asked the patient to send this to us to add to record.    Identification of Risk Factors:  Risk factors include: cardiovascular risk and hearing limitations.    Review of Systems   Constitutional: Negative for activity change.   Respiratory: Negative for shortness of breath.    Cardiovascular: Negative for chest pain.   Gastrointestinal: Negative for abdominal pain and blood in stool.   Genitourinary: Negative for flank pain.   Musculoskeletal: Positive for arthralgias.   Psychiatric/Behavioral: Positive for sleep disturbance.   All other systems reviewed and are negative.      Compared to one year ago, the patient feels his physical health is the same.  Compared to one year ago, the patient feels his mental health is the same.    Objective     Physical Exam   Constitutional: He is oriented to person, place, and time. Vital signs are normal. He appears well-developed and well-nourished. He is active.  Non-toxic appearance. No distress.   HENT:   Head: Normocephalic and atraumatic. 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   Right Ear: External ear and ear canal normal. Tympanic membrane is scarred. Decreased hearing is noted.   Left Ear: External ear and ear canal normal. Tympanic membrane is scarred. Decreased hearing is noted.   Nose: Nose normal.   Mouth/Throat: Uvula is midline, oropharynx is clear and moist and mucous membranes are normal.   Hearing aid on right.   Eyes: Conjunctivae, EOM and lids are normal. Pupils are equal, round, and reactive to light. Right eye exhibits no discharge. Left eye exhibits no discharge. No scleral icterus.   Neck: Neck supple. No thyromegaly present.   Cardiovascular: Normal rate, regular rhythm and normal heart sounds.    No murmur heard.  Pulses:        "Radial pulses are 2+ on the right side, and 2+ on the left side.   Pulmonary/Chest: Effort normal and breath sounds normal.   Abdominal: Soft. Bowel sounds are normal. He exhibits no distension. There is no tenderness. There is no rigidity, no rebound and no guarding.       Musculoskeletal: He exhibits no edema or deformity.   Lymphadenopathy:     He has no cervical adenopathy.   Neurological: He is alert and oriented to person, place, and time. He has normal strength. He displays no tremor. No cranial nerve deficit. Gait normal.   Skin: Skin is warm and dry. No rash noted. He is not diaphoretic. No cyanosis. No pallor. Nails show no clubbing.   Skin is a bit dry to hands. No worrisome lesions to head/scalp appreciated.   Psychiatric: He has a normal mood and affect. His speech is normal and behavior is normal. Judgment and thought content normal. Cognition and memory are normal.   Nursing note and vitals reviewed.      Vitals:    03/14/18 0901   BP: 124/82   Pulse: 64   Temp: 97.8 °F (36.6 °C)   SpO2: 95%   Weight: 91.2 kg (201 lb)   Height: 175.3 cm (69\")   PainSc: 0-No pain       Body mass index is 29.68 kg/m².  Discussed the patient's BMI with him. BMI is above normal parameters. Follow-up plan includes:  exercise counseling and nutrition counseling.    Assessment/Plan   Patient Self-Management and Personalized Health Advice  The patient has been provided with information about: diet, exercise and designing advance directives and preventive services including:   · Exercise counseling provided, [employer].    Visit Diagnoses:    ICD-10-CM ICD-9-CM   1. Medicare annual wellness visit, subsequent Z00.00 V70.0   2. Essential hypertension I10 401.9   3. Umbilical hernia without obstruction or gangrene K42.9 553.1   4. Ventral hernia without obstruction or gangrene K43.9 553.20   5. Hernia, inguinal, right K40.90 550.90   6. Primary insomnia F51.01 307.42   7. Gastroesophageal reflux disease with esophagitis K21.0 " 530.11   8. Bilateral renal masses N28.89 593.9   9. Abnormal CBC R79.89 790.6   10. Coronary artery disease involving native coronary artery of native heart without angina pectoris I25.10 414.01   11. AV gunnar re-entry tachycardia I47.1 427.89   12. Chronic diastolic congestive heart failure I50.32 428.32     428.0   13. Benign prostatic hyperplasia with nocturia N40.1 600.01    R35.1 788.43   14. Overweight (BMI 25.0-29.9) E66.3 278.02   15. History of colon cancer in adulthood Z85.038 V10.05   16. Need for shingles vaccine Z23 V04.89       Orders Placed This Encounter   Procedures   • Lipid Panel     Order Specific Question:   [facility] Has the patient fasted?     Answer:   Yes   • Comprehensive Metabolic Panel     Order Specific Question:   [facility] Has the patient fasted?     Answer:   Yes   • Ambulatory Referral to General Surgery     Referral Priority:   Routine     Referral Type:   Consultation     Referral Reason:   Specialty Services Required     Requested Specialty:   General Surgery     Number of Visits Requested:   1       Outpatient Encounter Prescriptions as of 3/14/2018   Medication Sig Dispense Refill   • amitriptyline (ELAVIL) 25 MG tablet Take 1 tablet by mouth At Night As Needed for Sleep. 30 tablet 0   • aspirin 81 MG tablet Take  by mouth Daily.     • Cetirizine HCl 10 MG capsule Take  by mouth.     • finasteride (PROSCAR) 5 MG tablet Take 1 tablet by mouth Daily. 90 tablet 3   • furosemide (LASIX) 40 MG tablet Take 1 tablet by mouth Daily. 40 mg po daily X 5 days and then decrease to prn daily 90 tablet 0   • losartan (COZAAR) 25 MG tablet Take 1 tablet by mouth Daily. 90 tablet 3   • pantoprazole (PROTONIX) 20 MG EC tablet 1 po in the am 30 minutes before breakfast. 90 tablet 3   • potassium chloride (K-DUR,KLOR-CON) 20 MEQ CR tablet Take 1 tablet by mouth Daily. 30 tablet 11   • tamsulosin (FLOMAX) 0.4 MG capsule 24 hr capsule Take 1 capsule by mouth Daily. 90 capsule 3   • [] Zoster Vac  Recomb Adjuvanted 50 MCG reconstituted suspension Inject 1 each into the shoulder, thigh, or buttocks 1 (One) Time for 1 dose. 1 each 1   • [DISCONTINUED] eszopiclone (LUNESTA) 1 MG tablet Take 1 tablet by mouth Every Night. Take immediately before bedtime. Can increase to 2 mg if 1 mg not effective 45 tablet 2     No facility-administered encounter medications on file as of 3/14/2018.        Reviewed use of high risk medication in the elderly: yes  Reviewed for potential of harmful drug interactions in the elderly: yes    Follow Up:  Return in about 6 weeks (around 4/25/2018) for Blood Pressure follow up; bring log of blood pressures. Wellness visit in one year.     An After Visit Summary and PPPS with all of these plans were given to the patient.    Saad was seen today for annual exam.    Diagnoses and all orders for this visit:    Medicare annual wellness visit, subsequent    Essential hypertension  -     losartan (COZAAR) 25 MG tablet; Take 1 tablet by mouth Daily.  -     Comprehensive Metabolic Panel    Umbilical hernia without obstruction or gangrene  -     Ambulatory Referral to General Surgery    Ventral hernia without obstruction or gangrene  -     Ambulatory Referral to General Surgery    Hernia, inguinal, right  -     Ambulatory Referral to General Surgery    Primary insomnia    Gastroesophageal reflux disease with esophagitis    Bilateral renal masses    Abnormal CBC  -     Peripheral Blood Smear    Coronary artery disease involving native coronary artery of native heart without angina pectoris  -     Lipid Panel  -     Comprehensive Metabolic Panel    AV gunnar re-entry tachycardia    Chronic diastolic congestive heart failure    Benign prostatic hyperplasia with nocturia    Overweight (BMI 25.0-29.9)    History of colon cancer in adulthood    Need for shingles vaccine  -     Zoster Vac Recomb Adjuvanted 50 MCG reconstituted suspension; Inject 1 each into the shoulder, thigh, or buttocks 1 (One) Time  for 1 dose.        SSS (sick sinus syndrome)  · Managed by cardiology  · S/p St. Jona pacemaker    Bilateral renal masses  · Appear benign, stable on CT  · Follow up with urology    BPH (benign prostatic hyperplasia)  · Follow up with urology  · Continue current medicines    AV gunnar re-entry tachycardia  · S/p catheter ablation  · Follow up with cardiology    Acid reflux  · Stable  · Continue PPI therapy    Insomnia  · Has failed Ambien, Lunesta, Belsomra  · Continue Elavil which patient feels is most helpful    History of colon cancer in adulthood  · Follow up with oncology as scheduled  · Under surveillance    Essential hypertension  Hypertension is improving with treatment.  Continue current treatment regimen.  Blood pressure will be reassessed at the next regular appointment.    Coronary artery disease involving native coronary artery of native heart without angina pectoris  Coronary artery disease is unchanged.  Continue current treatment regimen.  Cardiac status will be reassessed by cardiology at scheduled follow up..    Chronic diastolic congestive heart failure  Congestive heart failure due to coronary artery disease (CAD), heart valve disease and hypertension.  Heart failure is unchanged.   Continue current treatment regimen.  Dietary sodium restriction.  Heart failure will be reassessed by cardiology..

## 2018-03-14 NOTE — PATIENT INSTRUCTIONS
Medicare Wellness  Personal Prevention Plan of Service     Date of Office Visit:  2018  Encounter Provider:  Evelyn Muse MD  Place of Service:  [facility]  Patient Name: Saad Meier  :  1945    As part of the Medicare Wellness portion of your visit today, we are providing you with this personalized preventive plan of services (PPPS). This plan is based upon recommendations of the [place] Preventive Services Task Force (USPSTF) and the Advisory Committee on Immunization Practices (ACIP).    This lists the preventive care services that should be considered, and provides dates of when you are due. Items listed as completed are up-to-date and do not require any further intervention.    Health Maintenance   Topic Date Due   • ZOSTER VACCINE  2018 (Originally 2016)   • TDAP/TD VACCINES (1 - Tdap) 2019 (Originally 1962)   • MEDICARE ANNUAL WELLNESS  2019   • COLONOSCOPY  2025   • INFLUENZA VACCINE  Completed   • PNEUMOCOCCAL VACCINES (65+ LOW/MEDIUM RISK)  Completed   • AAA SCREEN (ONE-TIME)  Completed       Orders Placed This Encounter   Procedures   • Lipid Panel   • Comprehensive Metabolic Panel       Return in about 6 weeks (around 2018) for Blood Pressure follow up; bring log of blood pressures. Wellness visit in one year.

## 2018-03-15 PROBLEM — N28.89 BILATERAL RENAL MASSES: Chronic | Status: ACTIVE | Noted: 2018-01-04

## 2018-03-15 PROBLEM — I25.10 CORONARY ARTERY DISEASE INVOLVING NATIVE CORONARY ARTERY OF NATIVE HEART WITHOUT ANGINA PECTORIS: Chronic | Status: ACTIVE | Noted: 2017-03-06

## 2018-03-16 ENCOUNTER — DOCUMENTATION (OUTPATIENT)
Dept: CARDIOLOGY | Facility: HOSPITAL | Age: 73
End: 2018-03-16

## 2018-03-16 NOTE — PROGRESS NOTES
Reviewed labs from 3/14/18 due to ongoing use of lasix and KCL. Creatinine is 1.1 and K is 4.3. Patient ok to continue lasix 40 mg once daily with KCL 20 meq daily.

## 2018-03-16 NOTE — ASSESSMENT & PLAN NOTE
Coronary artery disease is unchanged.  Continue current treatment regimen.  Cardiac status will be reassessed by cardiology at scheduled follow up..

## 2018-03-16 NOTE — ASSESSMENT & PLAN NOTE
Congestive heart failure due to coronary artery disease (CAD), heart valve disease and hypertension.  Heart failure is unchanged.   Continue current treatment regimen.  Dietary sodium restriction.  Heart failure will be reassessed by cardiology..

## 2018-03-17 LAB
ALBUMIN SERPL-MCNC: 4.5 G/DL (ref 3.5–5)
ALBUMIN/GLOB SERPL: 1.7 G/DL (ref 1–2)
ALP SERPL-CCNC: 64 U/L (ref 38–126)
ALT SERPL-CCNC: 43 U/L (ref 13–69)
AST SERPL-CCNC: 29 U/L (ref 15–46)
BASOPHILS # BLD AUTO: 0.1 X10E3/UL (ref 0–0.2)
BASOPHILS NFR BLD AUTO: 1 %
BILIRUB SERPL-MCNC: 0.7 MG/DL (ref 0.2–1.3)
BUN SERPL-MCNC: 14 MG/DL (ref 7–20)
BUN/CREAT SERPL: 12.7 (ref 6.3–21.9)
CALCIUM SERPL-MCNC: 10.1 MG/DL (ref 8.4–10.2)
CHLORIDE SERPL-SCNC: 103 MMOL/L (ref 98–107)
CHOLEST SERPL-MCNC: 211 MG/DL (ref 0–199)
CO2 SERPL-SCNC: 29 MMOL/L (ref 26–30)
CREAT SERPL-MCNC: 1.1 MG/DL (ref 0.6–1.3)
EOSINOPHIL # BLD AUTO: 0.4 X10E3/UL (ref 0–0.4)
EOSINOPHIL NFR BLD AUTO: 8 %
ERYTHROCYTE [DISTWIDTH] IN BLOOD BY AUTOMATED COUNT: 14.7 % (ref 12.3–15.4)
GFR SERPLBLD CREATININE-BSD FMLA CKD-EPI: 65 ML/MIN/1.73
GFR SERPLBLD CREATININE-BSD FMLA CKD-EPI: 79 ML/MIN/1.73
GLOBULIN SER CALC-MCNC: 2.6 GM/DL
GLUCOSE SERPL-MCNC: 107 MG/DL (ref 74–98)
HCT VFR BLD AUTO: 40.8 % (ref 37.5–51)
HDLC SERPL-MCNC: 46 MG/DL (ref 40–60)
HGB BLD-MCNC: 13.6 G/DL (ref 13–17.7)
IMM GRANULOCYTES # BLD: 0 X10E3/UL (ref 0–0.1)
IMM GRANULOCYTES NFR BLD: 0 %
LDLC SERPL CALC-MCNC: 141 MG/DL (ref 0–99)
LYMPHOCYTES # BLD AUTO: 1.6 X10E3/UL (ref 0.7–3.1)
LYMPHOCYTES NFR BLD AUTO: 29 %
MCH RBC QN AUTO: 30.4 PG (ref 26.6–33)
MCHC RBC AUTO-ENTMCNC: 33.3 G/DL (ref 31.5–35.7)
MCV RBC AUTO: 91 FL (ref 79–97)
MONOCYTES # BLD AUTO: 0.8 X10E3/UL (ref 0.1–0.9)
MONOCYTES NFR BLD AUTO: 15 %
NEUTROPHILS # BLD AUTO: 2.5 X10E3/UL (ref 1.4–7)
NEUTROPHILS NFR BLD AUTO: 47 %
PATH INTERP BLD-IMP: NORMAL
PATH REV BLD -IMP: NORMAL
PATH REV BLD -IMP: NORMAL
PATHOLOGIST NAME: NORMAL
PLATELET # BLD AUTO: 286 X10E3/UL (ref 150–379)
POTASSIUM SERPL-SCNC: 4.3 MMOL/L (ref 3.5–5.1)
PROT SERPL-MCNC: 7.1 G/DL (ref 6.3–8.2)
RBC # BLD AUTO: 4.48 X10E6/UL (ref 4.14–5.8)
SODIUM SERPL-SCNC: 145 MMOL/L (ref 137–145)
TRIGL SERPL-MCNC: 121 MG/DL
VLDLC SERPL CALC-MCNC: 24.2 MG/DL
WBC # BLD AUTO: 5.4 X10E3/UL (ref 3.4–10.8)

## 2018-03-21 ENCOUNTER — OFFICE VISIT (OUTPATIENT)
Dept: SURGERY | Facility: CLINIC | Age: 73
End: 2018-03-21

## 2018-03-21 VITALS
HEIGHT: 69 IN | HEART RATE: 70 BPM | WEIGHT: 200.4 LBS | TEMPERATURE: 98 F | BODY MASS INDEX: 29.68 KG/M2 | OXYGEN SATURATION: 98 % | DIASTOLIC BLOOD PRESSURE: 78 MMHG | SYSTOLIC BLOOD PRESSURE: 110 MMHG

## 2018-03-21 DIAGNOSIS — K43.2 INCISIONAL HERNIA, WITHOUT OBSTRUCTION OR GANGRENE: Primary | ICD-10-CM

## 2018-03-21 PROCEDURE — 99203 OFFICE O/P NEW LOW 30 MIN: CPT | Performed by: SURGERY

## 2018-03-21 NOTE — PROGRESS NOTES
Patient: Saad Meier    YOB: 1945    Date: 03/21/2018    Primary Care Provider: Evelyn Muse MD    Reason for Consultation: Hernia    Chief Complaint   Patient presents with   • Mass     Abdominal mass       Subjective .     History of present illness:  I saw the patient in the office today as a consultation for evaluation and treatment of a mass on his abdomen.  He says the bulge is to the right of his incision from colon cancer.  He says it's been there at least 3 years.  He says it does cause pain.  He wears a compression band around his abdomen.  He says it popped up after her strained having a bowel movement.No change in bowel habits, patient had previous colonoscopy that was unremarkable.  No evidence of recurrent colon cancer.    The following portions of the patient's history were reviewed and updated as appropriate: allergies, current medications, past family history, past medical history, past social history, past surgical history and problem list.    Review of Systems   Constitutional: Negative for chills, fever and unexpected weight change.   HENT: Negative for trouble swallowing and voice change.    Eyes: Negative for visual disturbance.   Respiratory: Negative for apnea, cough, chest tightness, shortness of breath and wheezing.    Cardiovascular: Negative for chest pain, palpitations and leg swelling.   Gastrointestinal: Positive for abdominal distention and abdominal pain. Negative for anal bleeding, blood in stool, constipation, diarrhea, nausea, rectal pain and vomiting.   Endocrine: Negative for cold intolerance and heat intolerance.   Genitourinary: Negative for difficulty urinating, dysuria, flank pain, scrotal swelling and testicular pain.   Musculoskeletal: Negative for back pain, gait problem and joint swelling.   Skin: Negative for color change, rash and wound.   Neurological: Negative for dizziness, syncope, speech difficulty, weakness, numbness and headaches.    Hematological: Negative for adenopathy. Does not bruise/bleed easily.   Psychiatric/Behavioral: Negative for confusion. The patient is not nervous/anxious.        History:  Past Medical History:   Diagnosis Date   • Allergic 25yrs.    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   • BPH (benign prostatic hypertrophy)    • CAD (coronary artery disease) 3/6/2017   • Cancer 2012    none   • Cardiac arrhythmia    • Chronic diastolic congestive heart failure    • Colon polyp    • Diverticulosis    • Essential hypertension 3/14/2018   • GERD (gastroesophageal reflux disease)    • History of blood transfusion    • History of colonoscopy     Complete   • History of esophagogastroduodenoscopy     Diagnostic   • HL (hearing loss)    • Infection of kidney    • Iron deficiency    • Obesity    • Visual impairment !(97    Reading Glasses       Past Surgical History:   Procedure Laterality Date   • CARDIAC ABLATION  2012   • CARDIAC CATHETERIZATION     • CARDIAC PACEMAKER PLACEMENT     • COLON SURGERY     • COLONOSCOPY  2015   • HEMORRHOIDECTOMY  1970   • HERNIA REPAIR      X 5   • INGUINAL HERNIA REPAIR     • LYMPH NODE BIOPSY  2012    large intestine lymph nodes       Family History   Problem Relation Age of Onset   • Lung cancer Mother    • Osteoporosis Mother    • Thyroid disease Mother      mother   • Cancer Mother    • Early death Mother      46 yrs.   • Hearing loss Mother      mother   • Vision loss Mother      mother   • Heart disease Mother    • Cancer Father    • Heart disease Father      Pacemaker   • Vision loss Father    • Heart disease Sister    • Heart failure Brother    • Heart disease Brother    • Cancer Sister      Breast Cancer   • Vision loss Brother    • Heart disease Brother    • Early death Sister       Around 40yrs.   • Early death Maternal Grandmother       Took Mother off.       Social History   Substance Use Topics   • Smoking status: Former Smoker     Packs/day: 1.00     Years: 10.00     Types: Cigarettes     Start date: 6/6/1963     Quit date: 1973   • Smokeless tobacco: Never Used   • Alcohol use No       Allergies:  No Known Allergies    Medications:    Current Outpatient Prescriptions:   •  amitriptyline (ELAVIL) 25 MG tablet, Take 1 tablet by mouth At Night As Needed for Sleep., Disp: 30 tablet, Rfl: 0  •  aspirin 81 MG tablet, Take  by mouth Daily., Disp: , Rfl:   •  Cetirizine HCl 10 MG capsule, Take  by mouth., Disp: , Rfl:   •  finasteride (PROSCAR) 5 MG tablet, Take 1 tablet by mouth Daily., Disp: 90 tablet, Rfl: 3  •  furosemide (LASIX) 40 MG tablet, Take 1 tablet by mouth Daily. 40 mg po daily X 5 days and then decrease to prn daily, Disp: 90 tablet, Rfl: 0  •  losartan (COZAAR) 25 MG tablet, Take 1 tablet by mouth Daily., Disp: 90 tablet, Rfl: 3  •  pantoprazole (PROTONIX) 20 MG EC tablet, 1 po in the am 30 minutes before breakfast., Disp: 90 tablet, Rfl: 3  •  potassium chloride (K-DUR,KLOR-CON) 20 MEQ CR tablet, Take 1 tablet by mouth Daily., Disp: 30 tablet, Rfl: 11  •  tamsulosin (FLOMAX) 0.4 MG capsule 24 hr capsule, Take 1 capsule by mouth Daily., Disp: 90 capsule, Rfl: 3    Objective     Vital Signs:   Temp:  [98 °F (36.7 °C)] 98 °F (36.7 °C)  Heart Rate:  [70] 70  BP: (110)/(78) 110/78    Physical Exam:   General Appearance:    Alert, cooperative, in no acute distress   Head:    Normocephalic, without obvious abnormality, atraumatic   Eyes:            Lids and lashes normal, conjunctivae and sclerae normal, no   icterus, no pallor, corneas clear, PERRLA   Ears:    Ears appear intact with no abnormalities noted   Throat:   No oral lesions, no thrush, oral mucosa moist   Neck:   No adenopathy, supple, trachea midline, no thyromegaly, no   carotid bruit, no JVD   Lungs:     Clear to auscultation,respirations regular, even and                   unlabored    Heart:    Regular rhythm and normal rate, normal S1 and S2, no            murmur   Abdomen:     no masses, no organomegaly, soft non-tender, non-distended, no guarding, there is evidence of a Large incisional hernia from the xiphoid down to the umbilicus.  Multiple scars from past surgery.     Extremities:   Moves all extremities well, no edema, no cyanosis, no             redness   Pulses:   Pulses palpable and equal bilaterally   Skin:   No bleeding, bruising or rash   Lymph nodes:   No palpable adenopathy   Neurologic:   Cranial nerves 2 - 12 grossly intact, sensation intact        Results Review:   I reviewed the patient's new clinical results.    Review of Systems was reviewed and confirmed as accurate today.    Assessment/Plan :    1. Incisional hernia, without obstruction or gangrene        After a long discussion with patient, he appears to have a complex incisional hernia.  I am referring him to Dr. David Robertson at  for repair and evaluation.  He understands and is agreeable.     I discussed the patients findings and my recommendations with patient.    Electronically signed by Gloria Garcia MD  03/21/18    Scribed for Gloria Garcia MD by Michelle Milligan. 3/21/2018  11:03 AM

## 2018-04-10 ENCOUNTER — OFFICE VISIT (OUTPATIENT)
Dept: SURGERY | Facility: CLINIC | Age: 73
End: 2018-04-10

## 2018-04-10 VITALS
WEIGHT: 200 LBS | HEIGHT: 69 IN | HEART RATE: 78 BPM | TEMPERATURE: 98.5 F | BODY MASS INDEX: 29.62 KG/M2 | SYSTOLIC BLOOD PRESSURE: 120 MMHG | DIASTOLIC BLOOD PRESSURE: 78 MMHG | OXYGEN SATURATION: 99 %

## 2018-04-10 DIAGNOSIS — K43.2 INCISIONAL HERNIA, WITHOUT OBSTRUCTION OR GANGRENE: Primary | ICD-10-CM

## 2018-04-10 PROCEDURE — 99214 OFFICE O/P EST MOD 30 MIN: CPT | Performed by: SURGERY

## 2018-04-10 NOTE — PROGRESS NOTES
Subjective   Saad Meier is a 72 y.o. male.   Chief Complaint   Patient presents with   • Follow-up     incisional hernia       History of Present Illness   Mr. Meier is a 72-year-old man referred to me by my partner, Dr. Gloria Garcia, for further evaluation for possible incisional hernia repair.  He has a personal history of colon cancer treated in 2012.  For at least the last 3 years, he has had an increasingly large bulge to the right of his prior abdominal incision.  He reports that he first noted this after straining to have a bowel movement and felt a pop.  He has some occasional discomfort but does not have significant pain.  He does not have obstructive symptoms.  His colonoscopy was last performed in 2015.  He has never had a repair of this hernia, however, he has had at least 5 prior hernia repairs involving the umbilicus and the groins.  His most recent CT scan and pelvis demonstrated a moderate to large rectus diastases with a fairly large ventral hernia containing herniated mesenteric fat and unobstructed colon.    Patient Active Problem List   Diagnosis   • Arthritis   • BPH (benign prostatic hyperplasia)   • History of colon cancer in adulthood   • Chronic diastolic congestive heart failure   • Acid reflux   • Chronic nausea   • Juvenile rheumatic fever   • Hernia, inguinal, right   • Ventral hernia without obstruction or gangrene   • Insomnia   • SSS (sick sinus syndrome)   • Chronic prostatitis   • Immunodeficiency   • Coronary artery disease involving native coronary artery of native heart without angina pectoris   • AV gunnar re-entry tachycardia   • Bilateral renal masses   • Essential hypertension   • Umbilical hernia without obstruction or gangrene       Past Medical History:   Diagnosis Date   • Allergic 25yrs.    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   • BPH (benign  prostatic hypertrophy)    • CAD (coronary artery disease) 3/6/2017   • Cancer July 2012    none   • Cardiac arrhythmia    • Chronic diastolic congestive heart failure    • Colon polyp    • Diverticulosis    • Essential hypertension 3/14/2018   • GERD (gastroesophageal reflux disease)    • History of blood transfusion    • History of colonoscopy 2015    Complete   • History of esophagogastroduodenoscopy 2015    Diagnostic   • HL (hearing loss)    • Infection of kidney    • Iron deficiency    • Obesity    • Visual impairment !(97    Reading Glasses       Past Surgical History:   Procedure Laterality Date   • CARDIAC ABLATION  03/2012   • CARDIAC CATHETERIZATION  2004   • CARDIAC PACEMAKER PLACEMENT  2004   • COLON SURGERY  2012   • COLONOSCOPY  July 2015   • HEMORRHOIDECTOMY  1970   • HERNIA REPAIR      X 5   • INGUINAL HERNIA REPAIR     • LYMPH NODE BIOPSY  7-2-2012    large intestine lymph nodes       Medications:     Current Outpatient Prescriptions:   •  amitriptyline (ELAVIL) 25 MG tablet, Take 1 tablet by mouth At Night As Needed for Sleep., Disp: 30 tablet, Rfl: 0  •  aspirin 81 MG tablet, Take  by mouth Daily., Disp: , Rfl:   •  Cetirizine HCl 10 MG capsule, Take  by mouth., Disp: , Rfl:   •  finasteride (PROSCAR) 5 MG tablet, Take 1 tablet by mouth Daily., Disp: 90 tablet, Rfl: 3  •  furosemide (LASIX) 40 MG tablet, Take 1 tablet by mouth Daily. 40 mg po daily X 5 days and then decrease to prn daily, Disp: 90 tablet, Rfl: 0  •  losartan (COZAAR) 25 MG tablet, Take 1 tablet by mouth Daily., Disp: 90 tablet, Rfl: 3  •  pantoprazole (PROTONIX) 20 MG EC tablet, 1 po in the am 30 minutes before breakfast., Disp: 90 tablet, Rfl: 3  •  potassium chloride (K-DUR,KLOR-CON) 20 MEQ CR tablet, Take 1 tablet by mouth Daily., Disp: 30 tablet, Rfl: 11  •  tamsulosin (FLOMAX) 0.4 MG capsule 24 hr capsule, Take 1 capsule by mouth Daily., Disp: 90 capsule, Rfl: 3    Allergies:   No Known Allergies      Family History   Problem  "Relation Age of Onset   • Lung cancer Mother    • Osteoporosis Mother    • Thyroid disease Mother      mother   • Cancer Mother    • Early death Mother      46 yrs.   • Hearing loss Mother      mother   • Vision loss Mother      mother   • Heart disease Mother    • Cancer Father    • Heart disease Father      Pacemaker   • Vision loss Father    • Heart disease Sister    • Heart failure Brother    • Heart disease Brother    • Cancer Sister      Breast Cancer   • Vision loss Brother    • Heart disease Brother    • Early death Sister       Around 40yrs.   • Early death Maternal Grandmother      Took Mother off.       Social History     Social History   • Marital status: Single     Social History Main Topics   • Smoking status: Former Smoker     Packs/day: 1.00     Years: 10.00     Types: Cigarettes     Start date: 1963     Quit date:    • Smokeless tobacco: Never Used   • Alcohol use No   • Drug use: No   • Sexual activity: Not Currently     Partners: Female     Other Topics Concern   • Not on file     Social History Narrative    Caffeine: 2 servings per day    Patient lives at his home alone         Review of Systems   Constitutional: Negative for chills, fatigue and fever.   HENT: Negative.    Eyes: Negative.    Respiratory: Negative for cough.    Cardiovascular: Negative for chest pain.   Gastrointestinal: Negative for abdominal pain, diarrhea, nausea and vomiting.   Endocrine: Negative.    Genitourinary: Negative.    Musculoskeletal: Negative.    Skin: Negative.    Allergic/Immunologic: Negative.    Neurological: Negative.    Hematological: Negative.    Psychiatric/Behavioral: Negative.        Objective    /78   Pulse 78   Temp 98.5 °F (36.9 °C) (Temporal Artery )   Ht 175.3 cm (69\")   Wt 90.7 kg (200 lb)   SpO2 99%   BMI 29.53 kg/m²     Physical Exam   Constitutional: He is oriented to person, place, and time. He appears well-developed and well-nourished.   HENT:   Head: Normocephalic and " "atraumatic.   Eyes: EOM are normal. Pupils are equal, round, and reactive to light. No scleral icterus.   Neck: Neck supple.   Cardiovascular: Regular rhythm.    Pulmonary/Chest: Effort normal.   Abdominal: Soft. He exhibits no distension. There is no tenderness.   Moderate  To large rectus diastasis, reducible incisional hernia.  Defect about 8 cm.     Neurological: He is alert and oriented to person, place, and time.   Skin: Skin is warm and dry.   Psychiatric: He has a normal mood and affect. His behavior is normal.     Ct Abdomen Pelvis With Contrast - personally reviewed    Addendum Date: 1/3/2018    Addendum: Impression should read additionally involving the number 1. \"Bosniak classification 2F\" with recommended follow-up at minimum within 6 months to evaluate for stability.  This report was finalized on 1/3/2018 1:37 PM by Dr. Lan Bailey.      Result Date: 1/3/2018  1. Bilateral renal cystic lesions greatest within the right inferior pole where there is a bilobed largely exophytic cyst without significant soft tissue nodularity or septations however marginal calcifications are present. 2. Rectus diastases with ventral abdominal hernia containing mesenteric fat and nondilated bowel. 3. Hepatic steatosis.  D:  12/29/2017 E:  12/29/2017   This report was finalized on 12/29/2017 11:03 AM by Dr. Lan Bailey.      Assessment/Plan   Saad was seen today for follow-up.    Diagnoses and all orders for this visit:    Incisional hernia, without obstruction or gangrene          I had a long discussion with Mr. Meier in the office today regarding the options for hernia repair.  The ideal definitive procedure for him would be an open repair of with some type of component separation, likely a transversus abdominis release, to facilitate reapproximation of the midline and correction of his diastases.  This would include a retro-muscular mesh prosthesis as well.  I did explain to him that this is a procedure that " require several hours in the operating room under general anesthesia and typically requires 4-7 days of hospitalization postoperatively.  Obviously, this is an extensive surgical procedure to reconstruct the abdominal wall.  Given his age and additional comorbidities, the alternatives of a less complex repair versus a laparoscopic repair were also discussed.  The size of his defect would preclude closure of the defect in the midline but would be a potential option to simply prevent complications from the hernia itself.  If this were to be elected, it would be with the understanding that this was a suboptimal approach meant only to prevent the complications of incarceration/granulation, and given that the midline would not be close, he was still have a visible bulge of the abdominal wall.  We also discussed an open repair with a retrorectus mesh without significant component separation and excepting the ongoing presence of a rectus diastases.  All of his questions were answered.  Did discuss the usual risks, benefits, and alternatives.  Quite frankly, he is minimally symptomatic from this hernia.  He is at some increased risk because of his underlying medical comorbidities and his age.  He would require cardiac clearance before proceeding.  He does understand everything that was discussed in the office today.  He does not wish to undergo surgery at this time, and states to me that he may begin wearing an abdominal support garment area did discuss with him that this was a acceptable course of action.  We did discuss the signs and symptoms of incarceration/strangulation and he verbalized understanding.  He was advised to follow up with me in 6 months for reevaluation, or sooner if his abdomen worsened.

## 2018-04-25 ENCOUNTER — OFFICE VISIT (OUTPATIENT)
Dept: INTERNAL MEDICINE | Facility: CLINIC | Age: 73
End: 2018-04-25

## 2018-04-25 VITALS
DIASTOLIC BLOOD PRESSURE: 70 MMHG | HEIGHT: 69 IN | RESPIRATION RATE: 14 BRPM | SYSTOLIC BLOOD PRESSURE: 120 MMHG | TEMPERATURE: 98.1 F | OXYGEN SATURATION: 96 % | WEIGHT: 201 LBS | BODY MASS INDEX: 29.77 KG/M2 | HEART RATE: 62 BPM

## 2018-04-25 DIAGNOSIS — K42.9 UMBILICAL HERNIA WITHOUT OBSTRUCTION OR GANGRENE: ICD-10-CM

## 2018-04-25 DIAGNOSIS — I10 ESSENTIAL HYPERTENSION: Primary | Chronic | ICD-10-CM

## 2018-04-25 DIAGNOSIS — F51.01 PRIMARY INSOMNIA: Chronic | ICD-10-CM

## 2018-04-25 DIAGNOSIS — K43.9 VENTRAL HERNIA WITHOUT OBSTRUCTION OR GANGRENE: ICD-10-CM

## 2018-04-25 PROCEDURE — 99214 OFFICE O/P EST MOD 30 MIN: CPT | Performed by: FAMILY MEDICINE

## 2018-04-25 RX ORDER — IBUPROFEN/PSEUDOEPHEDRINE HCL 200MG-30MG
3 TABLET ORAL NIGHTLY
COMMUNITY
End: 2019-01-02 | Stop reason: HOSPADM

## 2018-04-25 NOTE — ASSESSMENT & PLAN NOTE
· Continue elavil. Not recommended after age 65 but he's not had benefit from other medicines  · May go up to 10 mg on melatonin to help further with sleep

## 2018-04-25 NOTE — PROGRESS NOTES
Subjective    Saad Meier is a 72 y.o. male here for:  Chief Complaint   Patient presents with   • Hypertension     6 week follow up on blood pressure     Has been seen by surgery to discuss abdominal wall hernia repair. He's considering his options, is not likely going to do surgery. Would require several day stay in hospital, not simple surgery. He is not having symptoms from it at this time. Follow up with surgery scheduled in July.      Hypertension   This is a chronic problem. The current episode started more than 1 year ago. The problem is controlled. Pertinent negatives include no chest pain or shortness of breath. There are no associated agents to hypertension. Risk factors for coronary artery disease include male gender. Current antihypertension treatment includes diuretics and angiotensin blockers. The current treatment provides significant improvement. Hypertensive end-organ damage includes CAD/MI. There is no history of CVA.   Insomnia   This is a chronic problem. The current episode started more than 1 year ago. The problem occurs daily. The problem has been unchanged. Pertinent negatives include no chest pain. Associated symptoms comments: Trouble falling asleep.. Treatments tried: Belsomra, Ambien--Failures. Amitriptyline has worked the best upon repeated trials. Taking melatonin 3 mg qhs. Improvement on treatment: Elavil has continued to help some but he still has difficulty some nights falling asleep.          The following portions of the patient's history were reviewed and updated as appropriate: allergies, current medications, past family history, past medical history, past social history, past surgical history and problem list.    Review of Systems   Respiratory: Negative for shortness of breath.    Cardiovascular: Negative for chest pain.   Psychiatric/Behavioral: The patient has insomnia.        Vitals:    04/25/18 0930   BP: 120/70   Pulse: 62   Resp: 14   Temp: 98.1 °F (36.7 °C)   SpO2:  "96%   Weight: 91.2 kg (201 lb)   Height: 175.3 cm (69\")         Objective   Physical Exam   Constitutional: He is oriented to person, place, and time. Vital signs are normal. He appears well-developed and well-nourished. He is active.  Non-toxic appearance. He does not have a sickly appearance. He does not appear ill. No distress. He appears overweight.   HENT:   Head: Normocephalic and atraumatic.   Right Ear: Decreased hearing is noted.   Left Ear: Decreased hearing is noted.   Mouth/Throat: Mucous membranes are not dry.   Hearing aid right   Eyes: EOM are normal. No scleral icterus.   Neck: Phonation normal. Neck supple.   Cardiovascular: Normal rate, regular rhythm and normal heart sounds.  Exam reveals no gallop and no friction rub.    No murmur heard.  Pulmonary/Chest: Effort normal and breath sounds normal.   Neurological: He is alert and oriented to person, place, and time. He displays no tremor. No cranial nerve deficit.   Skin: Skin is warm. He is not diaphoretic. No cyanosis. No pallor.   Psychiatric: He has a normal mood and affect. His speech is normal and behavior is normal. Judgment and thought content normal. Cognition and memory are normal.   Nursing note and vitals reviewed.        Assessment/Plan     Problem List Items Addressed This Visit        Cardiovascular and Mediastinum    Essential hypertension - Primary (Chronic)    Current Assessment & Plan     Hypertension is improving with treatment.  Continue current treatment regimen.  Blood pressure will be reassessed at the next regular appointment.            Other    Insomnia (Chronic)    Current Assessment & Plan     · Continue elavil. Not recommended after age 65 but he's not had benefit from other medicines  · May go up to 10 mg on melatonin to help further with sleep         Ventral hernia without obstruction or gangrene    Overview     Impression: 07/13/2015 - Upper abdominal ventral hernia.;          Umbilical hernia without obstruction or " gangrene      Other Visit Diagnoses    None.         · Reviewed general surgery note from 4/22/18. Discussed risks vs benefits with patient. He's going to consider and follow up with surgery as scheduled.    Return in about 6 months (around 10/25/2018) for Blood Pressure follow up.    Evelyn Muse MD    Please note that portions of this note may have been completed with a voice recognition program. Efforts were made to edit dictation, but occasionally words are mistranscribed.

## 2018-05-01 ENCOUNTER — TELEPHONE (OUTPATIENT)
Dept: SURGERY | Facility: CLINIC | Age: 73
End: 2018-05-01

## 2018-05-01 NOTE — TELEPHONE ENCOUNTER
Patient called and canceled his appointment with Dr Alvarez. Stated he didn't want surgery so cancel his follow up appointment.

## 2018-07-03 ENCOUNTER — OFFICE VISIT (OUTPATIENT)
Dept: UROLOGY | Facility: CLINIC | Age: 73
End: 2018-07-03

## 2018-07-03 VITALS
BODY MASS INDEX: 27.92 KG/M2 | HEIGHT: 70 IN | WEIGHT: 195 LBS | TEMPERATURE: 98.1 F | HEART RATE: 96 BPM | OXYGEN SATURATION: 96 %

## 2018-07-03 DIAGNOSIS — N28.1 RENAL CYST: ICD-10-CM

## 2018-07-03 DIAGNOSIS — N40.1 BPH WITH URINARY OBSTRUCTION: ICD-10-CM

## 2018-07-03 DIAGNOSIS — R35.1 NOCTURIA MORE THAN TWICE PER NIGHT: Primary | ICD-10-CM

## 2018-07-03 DIAGNOSIS — N13.8 BPH WITH URINARY OBSTRUCTION: ICD-10-CM

## 2018-07-03 LAB
BILIRUB BLD-MCNC: NEGATIVE MG/DL
CLARITY, POC: CLEAR
COLOR UR: YELLOW
GLUCOSE UR STRIP-MCNC: NEGATIVE MG/DL
KETONES UR QL: NEGATIVE
LEUKOCYTE EST, POC: ABNORMAL
NITRITE UR-MCNC: NEGATIVE MG/ML
PH UR: 6.5 [PH] (ref 5–8)
PROT UR STRIP-MCNC: NEGATIVE MG/DL
RBC # UR STRIP: NEGATIVE /UL
SP GR UR: 1.01 (ref 1–1.03)
UROBILINOGEN UR QL: NORMAL

## 2018-07-03 PROCEDURE — 81003 URINALYSIS AUTO W/O SCOPE: CPT | Performed by: UROLOGY

## 2018-07-03 PROCEDURE — 99213 OFFICE O/P EST LOW 20 MIN: CPT | Performed by: UROLOGY

## 2018-07-03 NOTE — PROGRESS NOTES
Chief Complaint  Annual Exam (Patient is here for a yearly follow up)        ELIECER Meier is a 72 y.o. male who returns today for annual checkup with multiple urological concerns.  He frequently has a few white cells in the urine associated with some balanitis but his urine culture was no growth on last visit.  He continues his Proscar and Flomax and denies any difficulty voiding during the day.  He has nocturia ×2-3 but this is associated with grade 2-3+ pitting edema.  He admits to eating a significant amount of salt.    Vitals:    07/03/18 1008   Pulse: 96   Temp: 98.1 °F (36.7 °C)   SpO2: 96%       Past Medical History  Past Medical History:   Diagnosis Date   • Allergic 25yrs.    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   • BPH (benign prostatic hypertrophy)    • CAD (coronary artery disease) 3/6/2017   • Cancer (CMS/HCC) July 2012    none   • Cardiac arrhythmia    • Chronic diastolic congestive heart failure (CMS/HCC)    • Colon polyp    • Diverticulosis    • Essential hypertension 3/14/2018   • GERD (gastroesophageal reflux disease)    • History of blood transfusion    • History of colonoscopy 2015    Complete   • History of esophagogastroduodenoscopy 2015    Diagnostic   • HL (hearing loss)    • Infection of kidney    • Iron deficiency    • Obesity    • Visual impairment !(97    Reading Glasses       Past Surgical History  Past Surgical History:   Procedure Laterality Date   • CARDIAC ABLATION  03/2012   • CARDIAC CATHETERIZATION  2004   • CARDIAC PACEMAKER PLACEMENT  2004   • COLON SURGERY  2012   • COLONOSCOPY  July 2015   • HEMORRHOIDECTOMY  1970   • HERNIA REPAIR      X 5   • INGUINAL HERNIA REPAIR     • LYMPH NODE BIOPSY  7-2-2012    large intestine lymph nodes       Medications  has a current medication list which includes the following prescription(s): amitriptyline, aspirin,  finasteride, furosemide, losartan, melatonin, pantoprazole, potassium chloride, and tamsulosin.      Allergies  No Known Allergies    Social History  Social History     Social History Narrative    Caffeine: 2 servings per day    Patient lives at his home alone       Family History  He has no family history of bladder or kidney cancer  He has no family history of kidney stones      AUA Symptom Score:      Review of Systems  Review of Systems    Physical Exam  Physical Exam   Constitutional: He is oriented to person, place, and time. He appears well-developed and well-nourished.   HENT:   Head: Normocephalic and atraumatic.   Neck: Normal range of motion.   Pulmonary/Chest: Effort normal. No respiratory distress.   Abdominal: Soft. He exhibits no distension and no mass. There is no tenderness. No hernia.   Genitourinary: Rectum normal, prostate normal and penis normal.   Musculoskeletal: Normal range of motion.   Lymphadenopathy:     He has no cervical adenopathy.   Neurological: He is alert and oriented to person, place, and time.   Skin: Skin is warm and dry.   Psychiatric: He has a normal mood and affect. His behavior is normal.   Vitals reviewed.      Labs Recent and today in the office:  Results for orders placed or performed in visit on 07/03/18   POC Urinalysis Dipstick, Automated   Result Value Ref Range    Color Yellow Yellow, Straw, Dark Yellow, Kathy    Clarity, UA Clear Clear    Specific Gravity  1.015 1.005 - 1.030    pH, Urine 6.5 5.0 - 8.0    Leukocytes Trace (A) Negative    Nitrite, UA Negative Negative    Protein, POC Negative Negative mg/dL    Glucose, UA Negative Negative, 1000 mg/dL (3+) mg/dL    Ketones, UA Negative Negative    Urobilinogen, UA Normal Normal    Bilirubin Negative Negative    Blood, UA Negative Negative         Assessment & Plan  #1 BPH with outlet obstruction: Currently he is voiding without difficulty during the day on Proscar and Flomax.    #2 nocturia/lower extremity edema He's  already taking a diuretic but early in the morning.  I suggested he take this later in the day to be more effective.  He should also restrict his intake of salt and keep his feet propped up in the evenings.  Digital rectal exam today is benign and recent PSA looked great.  He can return on an annual basis.    #3 renal lesions: He has nonenhancing cystic structures in the kidneys that require no follow-up.

## 2018-07-13 NOTE — PROGRESS NOTES
[facility] AT [address], [address]  [place], [place], [address]    [phone]  [email or web address]           OUTPATIENT CLINIC FOLLOW UP NOTE    Patient Care Team:  Patient Care Team:  Evelyn Muse MD as PCP - General (Family Medicine)  Atilio Wall MD as Consulting Physician (Cardiology)  Luis Hollins MD as Consulting Physician (Urology)  Susan E. Liddle, MD as Consulting Physician (Hematology and Oncology)  Gloria Garcia MD as Consulting Physician (General Surgery)  Elvie Alvarez MD as Surgeon (General Surgery)    Subjective:      Chief Complaint   Patient presents with   • Coronary Artery Disease     HPI:    Saad Meier is a 72 y.o. male.  History of Present Illness  The patient has a history of sick sinus syndrome S/P [facility]ona PPM 2004, minimal CAD and normal LVEF by cardiac catheterization 2004, dyspepsia, AV gunnar reentry S/P ablation in March 2012, and colon cancer.  The patient presents today for follow-up.    The patient presents for follow-up.  Since his last visit he denies chest pain, dyspnea with exertion, palpitations.  He does have worsening lower extremity edema over the last couple weeks.  Improves only partially with Lasix.  No associated symptoms.  Denies PND or orthopnea as well.    Review of Systems:  Positive for lower extremity edema  Negative for exertional chest pain, dyspnea with exertion, orthopnea, PND, palpitations, lightheadedness, syncope.    PFSH:  Patient Active Problem List   Diagnosis   • Arthritis   • BPH (benign prostatic hyperplasia)   • History of colon cancer in adulthood   • Chronic diastolic congestive heart failure (CMS/HCC)   • Acid reflux   • Chronic nausea   • Juvenile rheumatic fever   • Hernia, inguinal, right   • Ventral hernia without obstruction or gangrene   • Insomnia   • SSS (sick sinus syndrome) (CMS/HCC)   • Chronic prostatitis   • Immunodeficiency (CMS/HCC)   • Coronary artery disease  involving native coronary artery of native heart without angina pectoris   • AV gunnar re-entry tachycardia (CMS/HCC)   • Bilateral renal masses   • Essential hypertension   • Umbilical hernia without obstruction or gangrene         Current Outpatient Prescriptions:   •  amitriptyline (ELAVIL) 25 MG tablet, Take 1 tablet by mouth At Night As Needed for Sleep., Disp: 30 tablet, Rfl: 0  •  aspirin 81 MG tablet, Take  by mouth Daily., Disp: , Rfl:   •  finasteride (PROSCAR) 5 MG tablet, Take 1 tablet by mouth Daily., Disp: 90 tablet, Rfl: 3  •  furosemide (LASIX) 40 MG tablet, Take 1 tablet by mouth Daily. 40 mg po daily X 5 days and then decrease to prn daily, Disp: 90 tablet, Rfl: 0  •  losartan (COZAAR) 25 MG tablet, Take 1 tablet by mouth Daily., Disp: 90 tablet, Rfl: 3  •  Melatonin 3 MG tablet dispersible, Take  by mouth., Disp: , Rfl:   •  pantoprazole (PROTONIX) 20 MG EC tablet, 1 po in the am 30 minutes before breakfast., Disp: 90 tablet, Rfl: 3  •  potassium chloride (K-DUR,KLOR-CON) 20 MEQ CR tablet, Take 1 tablet by mouth Daily., Disp: 30 tablet, Rfl: 11  •  tamsulosin (FLOMAX) 0.4 MG capsule 24 hr capsule, Take 1 capsule by mouth Daily., Disp: 90 capsule, Rfl: 3    No Known Allergies     reports that he quit smoking about 45 years ago. His smoking use included Cigarettes. He started smoking about 55 years ago. He has a 10.00 pack-year smoking history. He has never used smokeless tobacco.    Family History   Problem Relation Age of Onset   • Lung cancer Mother    • Osteoporosis Mother    • Thyroid disease Mother         mother   • Cancer Mother         Lung Cancer   • Early death Mother         46 yrs.   • Hearing loss Mother         mother   • Vision loss Mother         mother   • Heart disease Mother    • Cancer Father         Prostate Cancer   • Heart disease Father         Pacemaker   • Vision loss Father    • Heart disease Sister    • Heart failure Brother    • Heart disease Brother    • Cancer Sister   "       Breast Cancer   • Vision loss Brother    • Heart disease Brother    • Early death Sister          Around 40yrs.   • Early death Maternal Grandmother         Took Mother off.   • Heart disease Maternal Grandmother          Objective:   Blood pressure 90/60, pulse 60, height 176.5 cm (69.5\"), weight 93 kg (205 lb).  CONSTITUTIONAL: No acute distress, normal affect  RESPIRATORY: Normal effort. Clear to auscultation bilaterally without wheezing or rales  CARDIOVASCULAR: Carotids with normal upstrokes without bruits.  Regular rate and rhythm with normal S1 and S2. Without murmur, gallop or rub.  PERIPHERAL VASCULAR: Normal radial pulses bilaterally. There is lower extremity edema bilaterally.    Labs:    Glucose   Date Value Ref Range Status   2017 120 (H) 70 - 100 mg/dL Final     BUN   Date Value Ref Range Status   2018 14 7 - 20 mg/dL Final   2017 11 9 - 23 mg/dL Final     Creatinine   Date Value Ref Range Status   2018 1.10 0.60 - 1.30 mg/dL Final   2017 1.00 0.60 - 1.30 mg/dL Final     Sodium   Date Value Ref Range Status   2018 145 137 - 145 mmol/L Final   2017 141 132 - 146 mmol/L Final     Potassium   Date Value Ref Range Status   2018 4.3 3.5 - 5.1 mmol/L Final   2017 4.2 3.5 - 5.5 mmol/L Final     Chloride   Date Value Ref Range Status   2018 103 98 - 107 mmol/L Final   2017 109 99 - 109 mmol/L Final     CO2   Date Value Ref Range Status   2017 25.0 20.0 - 31.0 mmol/L Final     Total CO2   Date Value Ref Range Status   2018 29.0 26.0 - 30.0 mmol/L Final     Calcium   Date Value Ref Range Status   2018 10.1 8.4 - 10.2 mg/dL Final   2017 9.6 8.7 - 10.4 mg/dL Final     Total Protein   Date Value Ref Range Status   10/30/2015 7.2 5.7 - 8.2 g/dL Final     Albumin   Date Value Ref Range Status   2018 4.50 3.50 - 5.00 g/dL Final     ALT (SGPT)   Date Value Ref Range Status   2018 43 13 - 69 U/L Final     AST " (SGOT)   Date Value Ref Range Status   03/14/2018 29 15 - 46 U/L Final     Alkaline Phosphatase   Date Value Ref Range Status   03/14/2018 64 38 - 126 U/L Final     Total Bilirubin   Date Value Ref Range Status   03/14/2018 0.7 0.2 - 1.3 mg/dL Final     eGFR Non  Amer   Date Value Ref Range Status   11/02/2017 73 >60 mL/min/1.73 Final     eGFR Non  Am   Date Value Ref Range Status   03/14/2018 65 >60 mL/min/1.73 Final     Comment:     The MDRD GFR formula is only valid for adults with stable  renal function between ages 18 and 70.       A/G Ratio   Date Value Ref Range Status   03/14/2018 1.7 1.0 - 2.0 g/dL Final     BUN/Creatinine Ratio   Date Value Ref Range Status   03/14/2018 12.7 6.3 - 21.9 Final   11/02/2017 11.0 7.0 - 25.0 Final     Anion Gap   Date Value Ref Range Status   11/02/2017 7.0 3.0 - 11.0 mmol/L Final       No results found for: CHOL  Lab Results   Component Value Date    TRIG 121 03/14/2018    TRIG 59 11/10/2016    TRIG 69 06/02/2016     Lab Results   Component Value Date    HDL 46 03/14/2018    HDL 47 11/10/2016    HDL 42 06/02/2016     No components found for: LDLCALC  Lab Results   Component Value Date    VLDL 24.2 03/14/2018    VLDL 11.8 11/10/2016    VLDL 14 06/02/2016     No results found for: LDLHDL    Diagnostic Data:    Procedures    Device Interrogation 7/16/18, [facility] PM  77% atrial paced with a P wave of 1.5 mV threshold 0.75 V at 0.5 ms impedance of 430 ohms and <1% V pacing RV R-wave is 6.0 mV threshold 1.25 V at 0.5 ms impedance of 610 ohms. Battery voltage is 2.89 V 5.9 years. <1% mode switches, longest 50min, 7 HVR lasting seconds     Device Interrogation 9/11/17  69% atrial paced with a P wave of 1.1 mV threshold 0.5 V at 0.5 ms impedance of 430 ohms and <1% V pacing RV R-wave is 6.4 mV threshold 1.37 V at 0.5 ms impedance of 530 ohms. Battery voltage is 2.92 V which is 7 years of longevity, 73 mode switches which were not episodes of A. fib or SVT.  Seemed more  like competitive pacing.  Increased max sensor rate to 125.  Increased A. tach rates to 180.     [facility] 2004  - Minimal coronary artery disease and normal left ventricular function     TTE 9/2017  · Left ventricular systolic function is normal. Estimated EF = 60%.  · Left ventricular diastolic dysfunction (grade I a) consistent with impaired relaxation.  · Trace-to-mild aortic valve regurgitation is present  · Mild tricuspid valve regurgitation is present    Assessment and Plan:   Saad was seen today for coronary artery disease.    Diagnoses and all orders for this visit:    AV gunnar re-entry tachycardia (CMS/HCC)  SSS (sick sinus syndrome) (CMS/HCC)  -Stable pacemaker  -Repeat interrogation 6 months    Coronary artery disease involving native coronary artery of native heart without angina pectoris, dyslipidemia  -No anginal symptoms  -Initiate atorvastatin 40 mg nightly  -Repeat labs in 3 months     Chronic diastolic congestive heart failure (CMS/HCC)  -Lasix 80mg x 3 daysThen decreased back to 40 mg daily    Essential hypertension  -Hypotensive today.  Asymptomatic, stable blood pressure otherwise    Muscle cramps  -OTC H[place]ands Leg cramp pills    - Return in about 6 months (around 1/16/2019) for [facility] PPM.     Atilio Wall MD, MSc, FACC  Interventional Cardiology  [facility]

## 2018-07-16 ENCOUNTER — OFFICE VISIT (OUTPATIENT)
Dept: CARDIOLOGY | Facility: CLINIC | Age: 73
End: 2018-07-16

## 2018-07-16 VITALS
HEART RATE: 60 BPM | HEIGHT: 70 IN | SYSTOLIC BLOOD PRESSURE: 90 MMHG | BODY MASS INDEX: 29.35 KG/M2 | WEIGHT: 205 LBS | DIASTOLIC BLOOD PRESSURE: 60 MMHG

## 2018-07-16 DIAGNOSIS — I25.10 CORONARY ARTERY DISEASE INVOLVING NATIVE CORONARY ARTERY OF NATIVE HEART WITHOUT ANGINA PECTORIS: Chronic | ICD-10-CM

## 2018-07-16 DIAGNOSIS — I50.32 CHRONIC DIASTOLIC CONGESTIVE HEART FAILURE (HCC): ICD-10-CM

## 2018-07-16 DIAGNOSIS — I10 ESSENTIAL HYPERTENSION: Chronic | ICD-10-CM

## 2018-07-16 DIAGNOSIS — E78.5 DYSLIPIDEMIA: ICD-10-CM

## 2018-07-16 DIAGNOSIS — I47.1 AV NODAL RE-ENTRY TACHYCARDIA (HCC): Primary | ICD-10-CM

## 2018-07-16 DIAGNOSIS — I49.5 SSS (SICK SINUS SYNDROME) (HCC): ICD-10-CM

## 2018-07-16 PROCEDURE — 93280 PM DEVICE PROGR EVAL DUAL: CPT | Performed by: INTERNAL MEDICINE

## 2018-07-16 PROCEDURE — 99214 OFFICE O/P EST MOD 30 MIN: CPT | Performed by: INTERNAL MEDICINE

## 2018-07-16 RX ORDER — POTASSIUM CHLORIDE 1500 MG/1
20 TABLET, FILM COATED, EXTENDED RELEASE ORAL DAILY
COMMUNITY
Start: 2018-06-18 | End: 2018-07-16

## 2018-07-17 RX ORDER — ATORVASTATIN CALCIUM 40 MG/1
40 TABLET, FILM COATED ORAL DAILY
Qty: 30 TABLET | Refills: 11 | Status: SHIPPED | OUTPATIENT
Start: 2018-07-17 | End: 2018-08-23

## 2018-07-28 ENCOUNTER — HOSPITAL ENCOUNTER (EMERGENCY)
Facility: HOSPITAL | Age: 73
Discharge: HOME | End: 2018-07-29
Attending: EMERGENCY MEDICINE | Admitting: EMERGENCY MEDICINE

## 2018-07-28 DIAGNOSIS — R33.8 ACUTE URINARY RETENTION: Primary | ICD-10-CM

## 2018-07-28 LAB
BILIRUB UR QL STRIP: NEGATIVE
CLARITY UR: CLEAR
COLOR UR: ABNORMAL
GLUCOSE UR STRIP-MCNC: NEGATIVE MG/DL
HGB UR QL STRIP.AUTO: NEGATIVE
KETONES UR QL STRIP: NEGATIVE
LEUKOCYTE ESTERASE UR QL STRIP.AUTO: ABNORMAL
NITRITE UR QL STRIP: NEGATIVE
PH UR STRIP.AUTO: 7 [PH] (ref 5–8)
PROT UR QL STRIP: NEGATIVE
SP GR UR STRIP: 1.01 (ref 1–1.03)
UROBILINOGEN UR QL STRIP: ABNORMAL

## 2018-07-28 PROCEDURE — 99284 EMERGENCY DEPT VISIT MOD MDM: CPT

## 2018-07-28 PROCEDURE — 51702 INSERT TEMP BLADDER CATH: CPT

## 2018-07-28 PROCEDURE — 81003 URINALYSIS AUTO W/O SCOPE: CPT | Performed by: EMERGENCY MEDICINE

## 2018-07-28 PROCEDURE — 51798 US URINE CAPACITY MEASURE: CPT

## 2018-07-28 RX ORDER — SODIUM CHLORIDE 0.9 % (FLUSH) 0.9 %
10 SYRINGE (ML) INJECTION AS NEEDED
Status: DISCONTINUED | OUTPATIENT
Start: 2018-07-28 | End: 2018-07-29 | Stop reason: HOSPADM

## 2018-07-29 VITALS
TEMPERATURE: 97.7 F | RESPIRATION RATE: 16 BRPM | DIASTOLIC BLOOD PRESSURE: 72 MMHG | BODY MASS INDEX: 28.63 KG/M2 | OXYGEN SATURATION: 97 % | SYSTOLIC BLOOD PRESSURE: 117 MMHG | HEART RATE: 60 BPM | HEIGHT: 70 IN | WEIGHT: 200 LBS

## 2018-07-29 LAB
ALBUMIN SERPL-MCNC: 4.5 G/DL (ref 3.5–5)
ALBUMIN/GLOB SERPL: 1.7 G/DL (ref 1–2)
ALP SERPL-CCNC: 66 U/L (ref 38–126)
ALT SERPL W P-5'-P-CCNC: 39 U/L (ref 13–69)
ANION GAP SERPL CALCULATED.3IONS-SCNC: 15.4 MMOL/L (ref 10–20)
AST SERPL-CCNC: 43 U/L (ref 15–46)
BACTERIA UR QL AUTO: NORMAL /HPF
BASOPHILS # BLD AUTO: 0.07 10*3/MM3 (ref 0–0.2)
BASOPHILS NFR BLD AUTO: 0.9 % (ref 0–2.5)
BILIRUB SERPL-MCNC: 0.7 MG/DL (ref 0.2–1.3)
BILIRUB UR QL STRIP: NEGATIVE
BUN BLD-MCNC: 16 MG/DL (ref 7–20)
BUN/CREAT SERPL: 16 (ref 6.3–21.9)
CALCIUM SPEC-SCNC: 9.8 MG/DL (ref 8.4–10.2)
CHLORIDE SERPL-SCNC: 105 MMOL/L (ref 98–107)
CLARITY UR: CLEAR
CO2 SERPL-SCNC: 24 MMOL/L (ref 26–30)
COLOR UR: ABNORMAL
CREAT BLD-MCNC: 1 MG/DL (ref 0.6–1.3)
D-LACTATE SERPL-SCNC: 2 MMOL/L (ref 0.5–2)
DEPRECATED RDW RBC AUTO: 47.3 FL (ref 37–54)
EOSINOPHIL # BLD AUTO: 0.38 10*3/MM3 (ref 0–0.7)
EOSINOPHIL NFR BLD AUTO: 4.6 % (ref 0–7)
ERYTHROCYTE [DISTWIDTH] IN BLOOD BY AUTOMATED COUNT: 13.6 % (ref 11.5–14.5)
GFR SERPL CREATININE-BSD FRML MDRD: 73 ML/MIN/1.73
GLOBULIN UR ELPH-MCNC: 2.7 GM/DL
GLUCOSE BLD-MCNC: 108 MG/DL (ref 74–98)
GLUCOSE UR STRIP-MCNC: NEGATIVE MG/DL
HCT VFR BLD AUTO: 41.4 % (ref 42–52)
HGB BLD-MCNC: 13.7 G/DL (ref 14–18)
HGB UR QL STRIP.AUTO: ABNORMAL
HYALINE CASTS UR QL AUTO: NORMAL /LPF
IMM GRANULOCYTES # BLD: 0.06 10*3/MM3 (ref 0–0.06)
IMM GRANULOCYTES NFR BLD: 0.7 % (ref 0–0.6)
KETONES UR QL STRIP: NEGATIVE
LEUKOCYTE ESTERASE UR QL STRIP.AUTO: NEGATIVE
LYMPHOCYTES # BLD AUTO: 1.92 10*3/MM3 (ref 0.6–3.4)
LYMPHOCYTES NFR BLD AUTO: 23.4 % (ref 10–50)
MCH RBC QN AUTO: 31.5 PG (ref 27–31)
MCHC RBC AUTO-ENTMCNC: 33.1 G/DL (ref 30–37)
MCV RBC AUTO: 95.2 FL (ref 80–94)
MONOCYTES # BLD AUTO: 1.33 10*3/MM3 (ref 0–0.9)
MONOCYTES NFR BLD AUTO: 16.2 % (ref 0–12)
NEUTROPHILS # BLD AUTO: 4.45 10*3/MM3 (ref 2–6.9)
NEUTROPHILS NFR BLD AUTO: 54.2 % (ref 37–80)
NITRITE UR QL STRIP: NEGATIVE
NRBC BLD MANUAL-RTO: 0 /100 WBC (ref 0–0)
PH UR STRIP.AUTO: 6.5 [PH] (ref 5–8)
PLATELET # BLD AUTO: 251 10*3/MM3 (ref 130–400)
PMV BLD AUTO: 10.5 FL (ref 6–12)
POTASSIUM BLD-SCNC: 3.4 MMOL/L (ref 3.5–5.1)
PROT SERPL-MCNC: 7.2 G/DL (ref 6.3–8.2)
PROT UR QL STRIP: NEGATIVE
RBC # BLD AUTO: 4.35 10*6/MM3 (ref 4.7–6.1)
RBC # UR: NORMAL /HPF
REF LAB TEST METHOD: NORMAL
SODIUM BLD-SCNC: 141 MMOL/L (ref 137–145)
SP GR UR STRIP: 1.01 (ref 1–1.03)
SQUAMOUS #/AREA URNS HPF: NORMAL /HPF
UROBILINOGEN UR QL STRIP: ABNORMAL
WBC NRBC COR # BLD: 8.21 10*3/MM3 (ref 4.8–10.8)
WBC UR QL AUTO: NORMAL /HPF

## 2018-07-29 PROCEDURE — 80053 COMPREHEN METABOLIC PANEL: CPT | Performed by: PHYSICIAN ASSISTANT

## 2018-07-29 PROCEDURE — 83605 ASSAY OF LACTIC ACID: CPT | Performed by: PHYSICIAN ASSISTANT

## 2018-07-29 PROCEDURE — 81001 URINALYSIS AUTO W/SCOPE: CPT | Performed by: PHYSICIAN ASSISTANT

## 2018-07-29 PROCEDURE — 85025 COMPLETE CBC W/AUTO DIFF WBC: CPT | Performed by: PHYSICIAN ASSISTANT

## 2018-07-31 ENCOUNTER — OFFICE VISIT (OUTPATIENT)
Dept: UROLOGY | Facility: CLINIC | Age: 73
End: 2018-07-31

## 2018-07-31 VITALS
OXYGEN SATURATION: 96 % | TEMPERATURE: 99.5 F | SYSTOLIC BLOOD PRESSURE: 138 MMHG | DIASTOLIC BLOOD PRESSURE: 81 MMHG | HEART RATE: 88 BPM

## 2018-07-31 DIAGNOSIS — R33.8 ACUTE URINARY RETENTION: Primary | ICD-10-CM

## 2018-07-31 PROCEDURE — 99213 OFFICE O/P EST LOW 20 MIN: CPT | Performed by: UROLOGY

## 2018-07-31 NOTE — PROGRESS NOTES
Chief Complaint  Cath problem (Patient called and stated his cath was clogged up and was not draining, its currently draining while he is here in the office.)        ELIECER Meier is a 72 y.o. male who comes in today after having called our office stating his catheter had become occluded and was not draining.  He states he started draining on the way in and his bag is full mass.  He had over 1000 cc of urine drained when the Sauer was inserted to the emergency room side encouraged him to leave it in for 1-2 weeks to allow a bladder to recover its tone.  His urine was clear with a per the catheter in and is now blood-tinged which is not surprising since his bladder was grossly overdistended.    Vitals:    07/31/18 1557   BP: 138/81   Pulse: 88   Temp: 99.5 °F (37.5 °C)   SpO2: 96%       Past Medical History  Past Medical History:   Diagnosis Date   • Allergic 25yrs.    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   • BPH (benign prostatic hypertrophy)    • CAD (coronary artery disease) 3/6/2017   • Cancer (CMS/HCC) July 2012    none   • Cardiac arrhythmia    • Chronic diastolic congestive heart failure (CMS/HCC)    • Colon polyp    • Diverticulosis    • Essential hypertension 3/14/2018   • GERD (gastroesophageal reflux disease)    • History of blood transfusion    • History of colonoscopy 2015    Complete   • History of esophagogastroduodenoscopy 2015    Diagnostic   • HL (hearing loss)    • Infection of kidney    • Iron deficiency    • Obesity    • Visual impairment !(97    Reading Glasses       Past Surgical History  Past Surgical History:   Procedure Laterality Date   • CARDIAC ABLATION  03/2012   • CARDIAC CATHETERIZATION  2004   • CARDIAC PACEMAKER PLACEMENT  2004   • COLON SURGERY  2012   • COLONOSCOPY  July 2015   • HEMORRHOIDECTOMY  1970   • HERNIA REPAIR      X 5   • INGUINAL HERNIA REPAIR      • LYMPH NODE BIOPSY  7-2-2012    large intestine lymph nodes       Medications  has a current medication list which includes the following prescription(s): amitriptyline, aspirin, atorvastatin, finasteride, furosemide, losartan, melatonin, pantoprazole, potassium chloride, and tamsulosin.      Allergies  No Known Allergies    Social History  Social History     Social History Narrative    Caffeine: 2 servings per day    Patient lives at his home alone       Family History  He has no family history of bladder or kidney cancer  He has no family history of kidney stones      AUA Symptom Score:      Review of Systems  Review of Systems   Constitutional: Negative.    Genitourinary: Positive for difficulty urinating.   All other systems reviewed and are negative.      Physical Exam  Physical Exam    Labs Recent and today in the office:  Results for orders placed or performed during the hospital encounter of 07/28/18   Urinalysis With Culture If Indicated - Urine, Clean Catch   Result Value Ref Range    Color, UA Straw Yellow, Straw    Appearance, UA Clear Clear    pH, UA 7.0 5.0 - 8.0    Specific Gravity, UA 1.010 1.005 - 1.030    Glucose, UA Negative Negative    Ketones, UA Negative Negative    Bilirubin, UA Negative Negative    Blood, UA Negative Negative    Protein, UA Negative Negative    Leuk Esterase, UA Moderate (2+) (A) Negative    Nitrite, UA Negative Negative    Urobilinogen, UA 0.2 E.U./dL 0.2 - 1.0 E.U./dL   Urinalysis With Culture If Indicated - Urine, Catheter   Result Value Ref Range    Color, UA Straw Yellow, Straw    Appearance, UA Clear Clear    pH, UA 6.5 5.0 - 8.0    Specific Gravity, UA 1.010 1.005 - 1.030    Glucose, UA Negative Negative    Ketones, UA Negative Negative    Bilirubin, UA Negative Negative    Blood, UA Small (1+) (A) Negative    Protein, UA Negative Negative    Leuk Esterase, UA Negative Negative    Nitrite, UA Negative Negative    Urobilinogen, UA 0.2 E.U./dL 0.2 - 1.0 E.U./dL    Comprehensive Metabolic Panel   Result Value Ref Range    Glucose 108 (H) 74 - 98 mg/dL    BUN 16 7 - 20 mg/dL    Creatinine 1.00 0.60 - 1.30 mg/dL    Sodium 141 137 - 145 mmol/L    Potassium 3.4 (L) 3.5 - 5.1 mmol/L    Chloride 105 98 - 107 mmol/L    CO2 24.0 (L) 26.0 - 30.0 mmol/L    Calcium 9.8 8.4 - 10.2 mg/dL    Total Protein 7.2 6.3 - 8.2 g/dL    Albumin 4.50 3.50 - 5.00 g/dL    ALT (SGPT) 39 13 - 69 U/L    AST (SGOT) 43 15 - 46 U/L    Alkaline Phosphatase 66 38 - 126 U/L    Total Bilirubin 0.7 0.2 - 1.3 mg/dL    eGFR Non African Amer 73 >60 mL/min/1.73    Globulin 2.7 gm/dL    A/G Ratio 1.7 1.0 - 2.0 g/dL    BUN/Creatinine Ratio 16.0 6.3 - 21.9    Anion Gap 15.4 10.0 - 20.0 mmol/L   Lactic Acid, Plasma   Result Value Ref Range    Lactate 2.0 0.5 - 2.0 mmol/L   CBC Auto Differential   Result Value Ref Range    WBC 8.21 4.80 - 10.80 10*3/mm3    RBC 4.35 (L) 4.70 - 6.10 10*6/mm3    Hemoglobin 13.7 (L) 14.0 - 18.0 g/dL    Hematocrit 41.4 (L) 42.0 - 52.0 %    MCV 95.2 (H) 80.0 - 94.0 fL    MCH 31.5 (H) 27.0 - 31.0 pg    MCHC 33.1 30.0 - 37.0 g/dL    RDW 13.6 11.5 - 14.5 %    RDW-SD 47.3 37.0 - 54.0 fl    MPV 10.5 6.0 - 12.0 fL    Platelets 251 130 - 400 10*3/mm3    Neutrophil % 54.2 37.0 - 80.0 %    Lymphocyte % 23.4 10.0 - 50.0 %    Monocyte % 16.2 (H) 0.0 - 12.0 %    Eosinophil % 4.6 0.0 - 7.0 %    Basophil % 0.9 0.0 - 2.5 %    Immature Grans % 0.7 (H) 0.0 - 0.6 %    Neutrophils, Absolute 4.45 2.00 - 6.90 10*3/mm3    Lymphocytes, Absolute 1.92 0.60 - 3.40 10*3/mm3    Monocytes, Absolute 1.33 (H) 0.00 - 0.90 10*3/mm3    Eosinophils, Absolute 0.38 0.00 - 0.70 10*3/mm3    Basophils, Absolute 0.07 0.00 - 0.20 10*3/mm3    Immature Grans, Absolute 0.06 0.00 - 0.06 10*3/mm3    nRBC 0.0 0.0 - 0.0 /100 WBC   Urinalysis, Microscopic Only - Urine, Clean Catch   Result Value Ref Range    RBC, UA None Seen None Seen /HPF    WBC, UA None Seen None Seen /HPF    Bacteria, UA None Seen None Seen /HPF    Squamous Epithelial  Cells, UA None Seen None Seen, 0-2 /HPF    Hyaline Casts, UA None Seen None Seen /LPF    Methodology Manual Light Microscopy          Assessment & Plan  Acute urinary retention: Sauer catheter is currently draining fine.  He'll return in 2 weeks for catheter removal and a voiding trial.  He is encouraged to continue his Proscar and Flomax.

## 2018-08-01 ENCOUNTER — HOSPITAL ENCOUNTER (EMERGENCY)
Facility: HOSPITAL | Age: 73
Discharge: HOME | End: 2018-08-01
Attending: EMERGENCY MEDICINE | Admitting: EMERGENCY MEDICINE

## 2018-08-01 VITALS
HEART RATE: 80 BPM | BODY MASS INDEX: 27.52 KG/M2 | HEIGHT: 70 IN | OXYGEN SATURATION: 96 % | DIASTOLIC BLOOD PRESSURE: 79 MMHG | WEIGHT: 192.2 LBS | TEMPERATURE: 97.9 F | RESPIRATION RATE: 18 BRPM | SYSTOLIC BLOOD PRESSURE: 140 MMHG

## 2018-08-01 DIAGNOSIS — N39.0 URINARY TRACT INFECTION WITHOUT HEMATURIA, SITE UNSPECIFIED: Primary | ICD-10-CM

## 2018-08-01 DIAGNOSIS — R33.9 URINARY RETENTION: ICD-10-CM

## 2018-08-01 LAB
BACTERIA UR QL AUTO: ABNORMAL /HPF
BILIRUB UR QL STRIP: NEGATIVE
CLARITY UR: ABNORMAL
COLOR UR: ABNORMAL
GLUCOSE UR STRIP-MCNC: NEGATIVE MG/DL
HGB UR QL STRIP.AUTO: ABNORMAL
HYALINE CASTS UR QL AUTO: ABNORMAL /LPF
KETONES UR QL STRIP: ABNORMAL
LEUKOCYTE ESTERASE UR QL STRIP.AUTO: ABNORMAL
NITRITE UR QL STRIP: NEGATIVE
PH UR STRIP.AUTO: 5.5 [PH] (ref 5–8)
PROT UR QL STRIP: ABNORMAL
RBC # UR: ABNORMAL /HPF
REF LAB TEST METHOD: ABNORMAL
SP GR UR STRIP: >=1.03 (ref 1–1.03)
SQUAMOUS #/AREA URNS HPF: ABNORMAL /HPF
UROBILINOGEN UR QL STRIP: ABNORMAL
WBC CLUMPS # UR AUTO: ABNORMAL /HPF
WBC UR QL AUTO: ABNORMAL /HPF

## 2018-08-01 PROCEDURE — 51702 INSERT TEMP BLADDER CATH: CPT

## 2018-08-01 PROCEDURE — 99283 EMERGENCY DEPT VISIT LOW MDM: CPT

## 2018-08-01 PROCEDURE — 81001 URINALYSIS AUTO W/SCOPE: CPT | Performed by: EMERGENCY MEDICINE

## 2018-08-01 PROCEDURE — 87086 URINE CULTURE/COLONY COUNT: CPT | Performed by: EMERGENCY MEDICINE

## 2018-08-01 RX ORDER — CEPHALEXIN 250 MG/1
500 CAPSULE ORAL ONCE
Status: COMPLETED | OUTPATIENT
Start: 2018-08-01 | End: 2018-08-01

## 2018-08-01 RX ORDER — CEPHALEXIN 500 MG/1
500 CAPSULE ORAL 2 TIMES DAILY
Qty: 14 CAPSULE | Refills: 0 | Status: SHIPPED | OUTPATIENT
Start: 2018-08-01 | End: 2018-08-23

## 2018-08-01 RX ADMIN — CEPHALEXIN 500 MG: 250 CAPSULE ORAL at 07:33

## 2018-08-01 NOTE — ED PROVIDER NOTES
TRIAGE CHIEF COMPLAINT:     Nursing and triage notes reviewed    Chief Complaint   Patient presents with   • Urinary Retention      HPI: Saad Meier is a 72 y.o. male who presents to the emergency department complaining of Urinary retention.  Patient states that he was in the emergency department several days ago for urinary retention where a urinary catheter had to be placed.  He states he had a small amount of edema in his scrotal area and so removed the catheter himself because he thought that might of been the cause.  He states since that happened he is only been able to have very small amount of urine output at a time.  He denies dysuria.  He states he is concerned it might of caused an infection.  He states he was going to go see the urologist today.  He denies fevers or chills.  Denies abdominal pain.  Denies nausea or vomiting.  No other complaints currently.     REVIEW OF SYSTEMS: All other systems reviewed and are negative     PAST MEDICAL HISTORY:   Past Medical History:   Diagnosis Date   • Allergic 25yrs.    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   • BPH (benign prostatic hypertrophy)    • CAD (coronary artery disease) 3/6/2017   • Cancer (CMS/HCC) July 2012    none   • Cardiac arrhythmia    • Chronic diastolic congestive heart failure (CMS/HCC)    • Colon polyp    • Diverticulosis    • Essential hypertension 3/14/2018   • GERD (gastroesophageal reflux disease)    • History of blood transfusion    • History of colonoscopy 2015    Complete   • History of esophagogastroduodenoscopy 2015    Diagnostic   • HL (hearing loss)    • Infection of kidney    • Iron deficiency    • Obesity    • Visual impairment !(97    Reading Glasses        FAMILY HISTORY:   Family History   Problem Relation Age of Onset   • Lung cancer Mother    • Osteoporosis Mother    • Thyroid disease Mother          mother   • Cancer Mother         Lung Cancer   • Early death Mother         46 yrs.   • Hearing loss Mother         mother   • Vision loss Mother         mother   • Heart disease Mother    • Cancer Father         Prostate Cancer   • Heart disease Father         Pacemaker   • Vision loss Father    • Heart disease Sister    • Heart failure Brother    • Heart disease Brother    • Cancer Sister         Breast Cancer   • Vision loss Brother    • Heart disease Brother    • Arrhythmia Brother    • Heart failure Brother    • Early death Sister          Around 40yrs.   • Early death Maternal Grandmother         Took Mother off.   • Heart disease Maternal Grandmother         SOCIAL HISTORY:   Social History     Social History   • Marital status: Single     Spouse name: N/A   • Number of children: N/A   • Years of education: N/A     Occupational History   • Not on file.     Social History Main Topics   • Smoking status: Former Smoker     Packs/day: 1.00     Years: 10.00     Types: Cigarettes     Start date: 1963     Quit date:    • Smokeless tobacco: Never Used   • Alcohol use No   • Drug use: No   • Sexual activity: Not Currently     Partners: Female     Other Topics Concern   • Not on file     Social History Narrative    Caffeine: 2 servings per day    Patient lives at his home alone        SURGICAL HISTORY:   Past Surgical History:   Procedure Laterality Date   • CARDIAC ABLATION  2012   • CARDIAC CATHETERIZATION     • CARDIAC PACEMAKER PLACEMENT     • COLON SURGERY     • COLONOSCOPY  2015   • HEMORRHOIDECTOMY  1970   • HERNIA REPAIR      X 5   • INGUINAL HERNIA REPAIR     • LYMPH NODE BIOPSY  2012    large intestine lymph nodes        CURRENT MEDICATIONS:      Medication List      ASK your doctor about these medications    amitriptyline 25 MG tablet  Commonly known as:  ELAVIL  Take 1 tablet by mouth At Night As Needed for Sleep.     aspirin 81 MG tablet     atorvastatin 40 MG  tablet  Commonly known as:  LIPITOR  Take 1 tablet by mouth Daily.     finasteride 5 MG tablet  Commonly known as:  PROSCAR  Take 1 tablet by mouth Daily.     furosemide 40 MG tablet  Commonly known as:  LASIX  Take 1 tablet by mouth Daily. 40 mg po daily X 5 days and then decrease to   prn daily     losartan 25 MG tablet  Commonly known as:  COZAAR  Take 1 tablet by mouth Daily.     Melatonin 3 MG tablet dispersible     pantoprazole 20 MG EC tablet  Commonly known as:  PROTONIX  1 po in the am 30 minutes before breakfast.     potassium chloride 20 MEQ CR tablet  Commonly known as:  K-DUR,KLOR-CON  Take 1 tablet by mouth Daily.     tamsulosin 0.4 MG capsule 24 hr capsule  Commonly known as:  FLOMAX  Take 1 capsule by mouth Daily.           ALLERGIES: Patient has no known allergies.     PHYSICAL EXAM:   VITAL SIGNS:   Vitals:    08/01/18 0559   BP: 140/79   Pulse: 80   Resp: 18   Temp: 97.9 °F (36.6 °C)   SpO2: 96%      CONSTITUTIONAL: Awake, oriented, appears non-toxic   HENT: Atraumatic, normocephalic, oral mucosa pink and moist, airway patent.   EYES: Conjunctiva clear   NECK: Trachea midline   CARDIOVASCULAR: Normal heart rate, Normal rhythm, No murmurs, rubs, gallops   PULMONARY/CHEST: Clear to auscultation, no rhonchi, wheezes, or rales. Symmetrical breath sounds   ABDOMINAL: Non-distended, soft, non-tender - no rebound or guarding.  NEUROLOGIC: Non-focal, moving all four extremities, no gross sensory or motor deficits.   EXTREMITIES: No clubbing, cyanosis, or edema   SKIN: Warm, Dry, No erythema, No rash     ED COURSE / MEDICAL DECISION MAKING:   Saad Meier is a 72 y.o. male who presents to the emergency department for evaluation of Urinary retention.  We will have the nursing staff place a Sauer catheter.  We will recheck a urinalysis.  Patient had labs previously and I don't feel these need to be repeated.  Urinalysis did reveal some signs of a mild infection.  We'll leave catheter anchored, treat  infection, and refer to urology.    DECISION TO DISCHARGE/ADMIT: see ED care timeline     FINAL IMPRESSION:   1 -- urinary retention   2 -- UTI  3 --     Electronically signed by: Shana Hanna MD, 8/1/2018 6:51 AM       Shana Hanna MD  08/01/18 0714

## 2018-08-03 ENCOUNTER — TELEPHONE (OUTPATIENT)
Dept: INTERNAL MEDICINE | Facility: CLINIC | Age: 73
End: 2018-08-03

## 2018-08-03 LAB — BACTERIA SPEC AEROBE CULT: NO GROWTH

## 2018-08-03 NOTE — TELEPHONE ENCOUNTER
Attempted to contact patient. There was no answer, left message for patient to contact the clinic.

## 2018-08-09 ENCOUNTER — TELEPHONE (OUTPATIENT)
Dept: CARDIOLOGY | Facility: CLINIC | Age: 73
End: 2018-08-09

## 2018-08-09 NOTE — TELEPHONE ENCOUNTER
The patient called stating he has had severe leg cramps on atorvastatin, which he has stopped taking on his own.  He does not wish to take anything in place of it.

## 2018-08-16 ENCOUNTER — HOSPITAL ENCOUNTER (EMERGENCY)
Facility: HOSPITAL | Age: 73
Discharge: HOME | End: 2018-08-16
Attending: EMERGENCY MEDICINE | Admitting: STUDENT IN AN ORGANIZED HEALTH CARE EDUCATION/TRAINING PROGRAM

## 2018-08-16 ENCOUNTER — OFFICE VISIT (OUTPATIENT)
Dept: UROLOGY | Facility: CLINIC | Age: 73
End: 2018-08-16

## 2018-08-16 VITALS
DIASTOLIC BLOOD PRESSURE: 85 MMHG | HEART RATE: 68 BPM | HEIGHT: 70 IN | RESPIRATION RATE: 18 BRPM | BODY MASS INDEX: 29.15 KG/M2 | WEIGHT: 203.6 LBS | OXYGEN SATURATION: 96 % | SYSTOLIC BLOOD PRESSURE: 135 MMHG | TEMPERATURE: 98.3 F

## 2018-08-16 VITALS
HEART RATE: 89 BPM | HEIGHT: 70 IN | WEIGHT: 192 LBS | BODY MASS INDEX: 27.49 KG/M2 | RESPIRATION RATE: 16 BRPM | OXYGEN SATURATION: 95 %

## 2018-08-16 DIAGNOSIS — N30.00 ACUTE CYSTITIS WITHOUT HEMATURIA: ICD-10-CM

## 2018-08-16 DIAGNOSIS — R33.8 ACUTE URINARY RETENTION: Primary | ICD-10-CM

## 2018-08-16 DIAGNOSIS — N13.8 BPH WITH OBSTRUCTION/LOWER URINARY TRACT SYMPTOMS: ICD-10-CM

## 2018-08-16 DIAGNOSIS — N40.1 BPH WITH OBSTRUCTION/LOWER URINARY TRACT SYMPTOMS: ICD-10-CM

## 2018-08-16 DIAGNOSIS — N40.0 ENLARGED PROSTATE: ICD-10-CM

## 2018-08-16 LAB
ALBUMIN SERPL-MCNC: 4.4 G/DL (ref 3.5–5)
ALBUMIN/GLOB SERPL: 1.6 G/DL (ref 1–2)
ALP SERPL-CCNC: 53 U/L (ref 38–126)
ALT SERPL W P-5'-P-CCNC: 22 U/L (ref 13–69)
ANION GAP SERPL CALCULATED.3IONS-SCNC: 14 MMOL/L (ref 10–20)
AST SERPL-CCNC: 31 U/L (ref 15–46)
BACTERIA UR QL AUTO: ABNORMAL /HPF
BASOPHILS # BLD AUTO: 0.08 10*3/MM3 (ref 0–0.2)
BASOPHILS NFR BLD AUTO: 0.9 % (ref 0–2.5)
BILIRUB SERPL-MCNC: 0.3 MG/DL (ref 0.2–1.3)
BILIRUB UR QL STRIP: NEGATIVE
BUN BLD-MCNC: 16 MG/DL (ref 7–20)
BUN/CREAT SERPL: 17.8 (ref 6.3–21.9)
CALCIUM SPEC-SCNC: 9.3 MG/DL (ref 8.4–10.2)
CHLORIDE SERPL-SCNC: 104 MMOL/L (ref 98–107)
CLARITY UR: CLEAR
CO2 SERPL-SCNC: 24 MMOL/L (ref 26–30)
COLOR UR: ABNORMAL
CREAT BLD-MCNC: 0.9 MG/DL (ref 0.6–1.3)
DEPRECATED RDW RBC AUTO: 49.1 FL (ref 37–54)
EOSINOPHIL # BLD AUTO: 0.41 10*3/MM3 (ref 0–0.7)
EOSINOPHIL NFR BLD AUTO: 4.8 % (ref 0–7)
ERYTHROCYTE [DISTWIDTH] IN BLOOD BY AUTOMATED COUNT: 13.8 % (ref 11.5–14.5)
GFR SERPL CREATININE-BSD FRML MDRD: 82 ML/MIN/1.73
GLOBULIN UR ELPH-MCNC: 2.8 GM/DL
GLUCOSE BLD-MCNC: 102 MG/DL (ref 74–98)
GLUCOSE UR STRIP-MCNC: NEGATIVE MG/DL
HCT VFR BLD AUTO: 41.2 % (ref 42–52)
HGB BLD-MCNC: 13.6 G/DL (ref 14–18)
HGB UR QL STRIP.AUTO: ABNORMAL
HYALINE CASTS UR QL AUTO: ABNORMAL /LPF
IMM GRANULOCYTES # BLD: 0.06 10*3/MM3 (ref 0–0.06)
IMM GRANULOCYTES NFR BLD: 0.7 % (ref 0–0.6)
KETONES UR QL STRIP: NEGATIVE
LEUKOCYTE ESTERASE UR QL STRIP.AUTO: ABNORMAL
LYMPHOCYTES # BLD AUTO: 1.77 10*3/MM3 (ref 0.6–3.4)
LYMPHOCYTES NFR BLD AUTO: 20.9 % (ref 10–50)
MCH RBC QN AUTO: 31.9 PG (ref 27–31)
MCHC RBC AUTO-ENTMCNC: 33 G/DL (ref 30–37)
MCV RBC AUTO: 96.5 FL (ref 80–94)
MONOCYTES # BLD AUTO: 1.35 10*3/MM3 (ref 0–0.9)
MONOCYTES NFR BLD AUTO: 16 % (ref 0–12)
NEUTROPHILS # BLD AUTO: 4.79 10*3/MM3 (ref 2–6.9)
NEUTROPHILS NFR BLD AUTO: 56.7 % (ref 37–80)
NITRITE UR QL STRIP: NEGATIVE
NRBC BLD MANUAL-RTO: 0 /100 WBC (ref 0–0)
PH UR STRIP.AUTO: 5.5 [PH] (ref 5–8)
PLATELET # BLD AUTO: 245 10*3/MM3 (ref 130–400)
PMV BLD AUTO: 10.4 FL (ref 6–12)
POTASSIUM BLD-SCNC: 4 MMOL/L (ref 3.5–5.1)
PROT SERPL-MCNC: 7.2 G/DL (ref 6.3–8.2)
PROT UR QL STRIP: NEGATIVE
RBC # BLD AUTO: 4.27 10*6/MM3 (ref 4.7–6.1)
RBC # UR: ABNORMAL /HPF
REF LAB TEST METHOD: ABNORMAL
SODIUM BLD-SCNC: 138 MMOL/L (ref 137–145)
SP GR UR STRIP: <=1.005 (ref 1–1.03)
SQUAMOUS #/AREA URNS HPF: ABNORMAL /HPF
UROBILINOGEN UR QL STRIP: ABNORMAL
WBC NRBC COR # BLD: 8.46 10*3/MM3 (ref 4.8–10.8)
WBC UR QL AUTO: ABNORMAL /HPF

## 2018-08-16 PROCEDURE — 81001 URINALYSIS AUTO W/SCOPE: CPT | Performed by: PHYSICIAN ASSISTANT

## 2018-08-16 PROCEDURE — 99283 EMERGENCY DEPT VISIT LOW MDM: CPT

## 2018-08-16 PROCEDURE — 80053 COMPREHEN METABOLIC PANEL: CPT | Performed by: PHYSICIAN ASSISTANT

## 2018-08-16 PROCEDURE — 51702 INSERT TEMP BLADDER CATH: CPT

## 2018-08-16 PROCEDURE — 99213 OFFICE O/P EST LOW 20 MIN: CPT | Performed by: UROLOGY

## 2018-08-16 PROCEDURE — 85025 COMPLETE CBC W/AUTO DIFF WBC: CPT | Performed by: PHYSICIAN ASSISTANT

## 2018-08-16 RX ORDER — NITROFURANTOIN 25; 75 MG/1; MG/1
100 CAPSULE ORAL 2 TIMES DAILY
Qty: 14 CAPSULE | Refills: 0 | Status: SHIPPED | OUTPATIENT
Start: 2018-08-16 | End: 2018-09-26 | Stop reason: HOSPADM

## 2018-08-16 RX ORDER — SODIUM CHLORIDE 0.9 % (FLUSH) 0.9 %
10 SYRINGE (ML) INJECTION AS NEEDED
Status: DISCONTINUED | OUTPATIENT
Start: 2018-08-16 | End: 2018-08-17 | Stop reason: HOSPADM

## 2018-08-16 NOTE — PROGRESS NOTES
Chief Complaint  Urinary Retention (2 week fup cath removal.) and enlarged prostate        HPI  Hardeep is a 72 y.o. male who returns today for follow-up of urinary retention after his Sauer catheter has been indwelling for several weeks.  He continues his Proscar and Flomax and the Sauer is removed today.    Vitals:    08/16/18 1103   Pulse: 89   Resp: 16   SpO2: 95%       Past Medical History  Past Medical History:   Diagnosis Date   • Allergic 25yrs.    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   • BPH (benign prostatic hypertrophy)    • CAD (coronary artery disease) 3/6/2017   • Cancer (CMS/HCC) July 2012    none   • Cardiac arrhythmia    • Chronic diastolic congestive heart failure (CMS/HCC)    • Colon polyp    • Diverticulosis    • Essential hypertension 3/14/2018   • GERD (gastroesophageal reflux disease)    • History of blood transfusion    • History of colonoscopy 2015    Complete   • History of esophagogastroduodenoscopy 2015    Diagnostic   • HL (hearing loss)    • Infection of kidney    • Iron deficiency    • Obesity    • Visual impairment !(97    Reading Glasses       Past Surgical History  Past Surgical History:   Procedure Laterality Date   • CARDIAC ABLATION  03/2012   • CARDIAC CATHETERIZATION  2004   • CARDIAC PACEMAKER PLACEMENT  2004   • COLON SURGERY  2012   • COLONOSCOPY  July 2015   • HEMORRHOIDECTOMY  1970   • HERNIA REPAIR      X 5   • INGUINAL HERNIA REPAIR     • LYMPH NODE BIOPSY  7-2-2012    large intestine lymph nodes       Medications  has a current medication list which includes the following prescription(s): amitriptyline, aspirin, atorvastatin, cephalexin, finasteride, furosemide, losartan, melatonin, pantoprazole, potassium chloride, and tamsulosin.      Allergies  No Known Allergies    Social History  Social History     Social History Narrative    Caffeine: 2  servings per day    Patient lives at his home alone       Family History  He has no family history of bladder or kidney cancer  He has no family history of kidney stones      AUA Symptom Score:      Review of Systems  Review of Systems    Physical Exam  Physical Exam   Constitutional: He is oriented to person, place, and time. He appears well-developed and well-nourished.   HENT:   Head: Normocephalic and atraumatic.   Neck: Normal range of motion.   Pulmonary/Chest: Effort normal. No respiratory distress.   Abdominal: Soft. He exhibits no distension and no mass. There is no tenderness. No hernia.   Musculoskeletal: Normal range of motion.   Lymphadenopathy:     He has no cervical adenopathy.   Neurological: He is alert and oriented to person, place, and time.   Skin: Skin is warm and dry.   Psychiatric: He has a normal mood and affect. His behavior is normal.   Vitals reviewed.      Labs Recent and today in the office:  Results for orders placed or performed during the hospital encounter of 08/01/18   Urine Culture - Urine,   Result Value Ref Range    Urine Culture No growth    Urinalysis With Culture If Indicated - Urine, Clean Catch   Result Value Ref Range    Color, UA Dark Yellow (A) Yellow, Straw    Appearance, UA Cloudy (A) Clear    pH, UA 5.5 5.0 - 8.0    Specific Gravity, UA >=1.030 1.005 - 1.030    Glucose, UA Negative Negative    Ketones, UA >=160 mg/dL (4+) (A) Negative    Bilirubin, UA Negative Negative    Blood, UA Small (1+) (A) Negative    Protein,  mg/dL (2+) (A) Negative    Leuk Esterase, UA Trace (A) Negative    Nitrite, UA Negative Negative    Urobilinogen, UA 0.2 E.U./dL 0.2 - 1.0 E.U./dL   Urinalysis, Microscopic Only - Urine, Clean Catch   Result Value Ref Range    RBC, UA 13-20 (A) None Seen /HPF    WBC, UA 31-50 (A) None Seen /HPF    Bacteria, UA 2+ (A) None Seen /HPF    Squamous Epithelial Cells, UA 0-2 None Seen, 0-2 /HPF    Hyaline Casts, UA 3-6 None Seen /LPF    WBC Clumps, UA  Small/1+ None Seen /HPF    Methodology Manual Light Microscopy          Assessment & Plan  #1 acute urinary retention: His Sauer catheter is removed today and he is instructed to continue the Proscar and Flomax.  Hopefully he'll be able to void adequately but if not he'll need catheter reinserted and and consideration given to TURP.    #2 urinary tract infection: Culture on August 1 was no growth but he's had catheter in for 2 weeks so is started on Macrobid.  He'll return in 1 month for urine check.

## 2018-08-17 NOTE — ED PROVIDER NOTES
"Subjective   71 y/o male that comes in to the ER with complaint of \"inability to urinate\" for the past few hours.  He says he has the urge to go but can only pass a few drops. Patient does state that he was seen at this facility several weeks ago for similar issues where he had a catheter placed. He does state that he followed up with urologist this a.m. and was given abx and had catheter removed. He does complain of suprapubic pain-discomfort.  No back pain.  He denies any fever chills or rash. Does have history of CAD, BPH, HTN, GERD .         History provided by:  Patient   used: No    Male  Problem   Presenting symptoms comment:  Urinary retention    Context: not after injury    Relieved by:  Nothing  Worsened by:  Nothing  Ineffective treatments:  None tried  Associated symptoms: urinary retention    Associated symptoms: no abdominal pain, no diarrhea, no fever, no flank pain, no nausea, no penile redness, no penile swelling, no urinary frequency and no urinary hesitation        Review of Systems   Constitutional: Negative for fever.   Gastrointestinal: Negative for abdominal pain, diarrhea and nausea.   Genitourinary: Positive for difficulty urinating. Negative for decreased urine volume, flank pain, frequency, hesitancy, penile swelling and urgency.   All other systems reviewed and are negative.      Past Medical History:   Diagnosis Date   • Allergic 25yrs.    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   • BPH (benign prostatic hypertrophy)    • CAD (coronary artery disease) 3/6/2017   • Cancer (CMS/HCC) July 2012    none   • Cardiac arrhythmia    • Chronic diastolic congestive heart failure (CMS/HCC)    • Colon polyp    • Diverticulosis    • Essential hypertension 3/14/2018   • GERD (gastroesophageal reflux disease)    • History of blood transfusion    • History of " colonoscopy     Complete   • History of esophagogastroduodenoscopy 2015    Diagnostic   • HL (hearing loss)    • Infection of kidney    • Iron deficiency    • Obesity    • Visual impairment !(97    Reading Glasses       No Known Allergies    Past Surgical History:   Procedure Laterality Date   • CARDIAC ABLATION  2012   • CARDIAC CATHETERIZATION     • CARDIAC PACEMAKER PLACEMENT     • COLON SURGERY     • COLONOSCOPY  2015   • HEMORRHOIDECTOMY  1970   • HERNIA REPAIR      X 5   • INGUINAL HERNIA REPAIR     • LYMPH NODE BIOPSY  2012    large intestine lymph nodes       Family History   Problem Relation Age of Onset   • Lung cancer Mother    • Osteoporosis Mother    • Thyroid disease Mother         mother   • Cancer Mother         Lung Cancer   • Early death Mother         46 yrs.   • Hearing loss Mother         mother   • Vision loss Mother         mother   • Heart disease Mother    • Cancer Father         Prostate Cancer   • Heart disease Father         Pacemaker   • Vision loss Father    • Heart disease Sister    • Heart failure Brother    • Heart disease Brother    • Cancer Sister         Breast Cancer   • Vision loss Brother    • Heart disease Brother    • Arrhythmia Brother    • Heart failure Brother    • Early death Sister          Around 40yrs.   • Early death Maternal Grandmother         Took Mother off.   • Heart disease Maternal Grandmother        Social History     Social History   • Marital status: Single     Social History Main Topics   • Smoking status: Former Smoker     Packs/day: 1.00     Years: 10.00     Types: Cigarettes     Start date: 1963     Quit date:    • Smokeless tobacco: Never Used   • Alcohol use No   • Drug use: No   • Sexual activity: Not Currently     Partners: Female     Other Topics Concern   • Not on file     Social History Narrative    Caffeine: 2 servings per day    Patient lives at his home alone           Objective   Physical Exam    Constitutional: He is oriented to person, place, and time. He appears well-developed and well-nourished.   HENT:   Head: Normocephalic and atraumatic.   Right Ear: External ear normal.   Left Ear: External ear normal.   Nose: Nose normal.   Mouth/Throat: Oropharynx is clear and moist.   Eyes: Pupils are equal, round, and reactive to light. Conjunctivae and EOM are normal. Right eye exhibits no discharge. Left eye exhibits no discharge. No scleral icterus.   Neck: Normal range of motion. Neck supple. No JVD present. No tracheal deviation present. No thyromegaly present.   Cardiovascular: Normal rate, regular rhythm, normal heart sounds and intact distal pulses.  Exam reveals no gallop and no friction rub.    No murmur heard.  Pulmonary/Chest: Effort normal and breath sounds normal. No stridor. No respiratory distress. He has no wheezes. He has no rales.   Abdominal: Soft. Normal appearance and bowel sounds are normal. He exhibits distension. There is tenderness in the suprapubic area.   Musculoskeletal: Normal range of motion. He exhibits no edema or tenderness.   Lymphadenopathy:     He has no cervical adenopathy.   Neurological: He is alert and oriented to person, place, and time. He displays normal reflexes. No cranial nerve deficit. He exhibits normal muscle tone. Coordination normal.   Skin: Skin is warm and dry. Capillary refill takes less than 2 seconds. No rash noted. No erythema. No pallor.   Psychiatric: He has a normal mood and affect. His behavior is normal. Judgment and thought content normal.   Nursing note and vitals reviewed.      Procedures           ED Course  ED Course as of Aug 16 2228   Thu Aug 16, 2018   2217 Patient did have over 900 cc on bladder scan. Urinary catheter placed. Patient does state that he feels better. Will be discharged. Advised to follow-up with urology in next 1-2 days.   [BH]   2219 Patient will be discharged with avina catheter in place.   [BH]      ED Course User  Index  [BH] Vlad Santa PA-C                  [facility]      Final diagnoses:   Acute urinary retention   BPH with obstruction/lower urinary tract symptoms            Vlad Santa PA-C  08/16/18 4888

## 2018-08-23 ENCOUNTER — TELEPHONE (OUTPATIENT)
Dept: INTERNAL MEDICINE | Facility: CLINIC | Age: 73
End: 2018-08-23

## 2018-08-23 NOTE — TELEPHONE ENCOUNTER
Patient called requesting a refill on Oxybutin. This medication is no listed in his chart, but he states that he gets this from Dr. Hollins.    Please advise.

## 2018-08-23 NOTE — TELEPHONE ENCOUNTER
I don't see it listed, but I do see where he's been working with issues with Dr. Hollins. I suggest he call their office.

## 2018-09-10 ENCOUNTER — TELEPHONE (OUTPATIENT)
Dept: INTERNAL MEDICINE | Facility: CLINIC | Age: 73
End: 2018-09-10

## 2018-09-10 NOTE — TELEPHONE ENCOUNTER
Patient is completely out of medication.    Needs refill on    Eszopiclone 1 MG    Please send to [facility]

## 2018-09-11 ENCOUNTER — TELEPHONE (OUTPATIENT)
Dept: INTERNAL MEDICINE | Facility: CLINIC | Age: 73
End: 2018-09-11

## 2018-09-11 DIAGNOSIS — F51.01 PRIMARY INSOMNIA: ICD-10-CM

## 2018-09-11 NOTE — TELEPHONE ENCOUNTER
I do not see this medication on the patients list. Would you be ok with sending this medication in?

## 2018-09-12 ENCOUNTER — TELEPHONE (OUTPATIENT)
Dept: INTERNAL MEDICINE | Facility: CLINIC | Age: 73
End: 2018-09-12

## 2018-09-12 NOTE — TELEPHONE ENCOUNTER
Patient came in to request a refill on Lunesta 1mg.   Would like it sent to [facility] in [place].   Please advise patient   Patient also scheduled appointment to be seen

## 2018-09-13 RX ORDER — ESZOPICLONE 1 MG/1
1 TABLET, FILM COATED ORAL NIGHTLY
Qty: 45 TABLET | Refills: 2 | Status: SHIPPED | OUTPATIENT
Start: 2018-09-13 | End: 2018-12-12 | Stop reason: CLARIF

## 2018-09-13 NOTE — TELEPHONE ENCOUNTER
Okay thanks I tried calling him bc Dr. Muse said she would send in the lunesta. We may need to send the refill to her to take care of

## 2018-09-13 NOTE — TELEPHONE ENCOUNTER
I had sent somebody a message about this refill request but I don't recall getting a message back. Patient called wanting refill but previously had stated per my notes the medicine did not help. Has he tried again and it did?

## 2018-09-13 NOTE — TELEPHONE ENCOUNTER
Katherine refilled. Stephanie tried calling him three times just now to see if he still needed to come in today but she was not able to reach him.

## 2018-09-19 ENCOUNTER — OFFICE VISIT (OUTPATIENT)
Dept: UROLOGY | Facility: CLINIC | Age: 73
End: 2018-09-19

## 2018-09-19 ENCOUNTER — HOSPITAL ENCOUNTER (EMERGENCY)
Facility: HOSPITAL | Age: 73
Discharge: HOME | End: 2018-09-19
Attending: EMERGENCY MEDICINE | Admitting: EMERGENCY MEDICINE

## 2018-09-19 VITALS
HEIGHT: 70 IN | SYSTOLIC BLOOD PRESSURE: 164 MMHG | TEMPERATURE: 98 F | OXYGEN SATURATION: 98 % | WEIGHT: 201.6 LBS | BODY MASS INDEX: 28.86 KG/M2 | DIASTOLIC BLOOD PRESSURE: 95 MMHG | HEART RATE: 66 BPM | RESPIRATION RATE: 19 BRPM

## 2018-09-19 VITALS
DIASTOLIC BLOOD PRESSURE: 85 MMHG | RESPIRATION RATE: 16 BRPM | OXYGEN SATURATION: 99 % | SYSTOLIC BLOOD PRESSURE: 126 MMHG | TEMPERATURE: 98.3 F | HEART RATE: 81 BPM

## 2018-09-19 DIAGNOSIS — N30.00 ACUTE CYSTITIS WITHOUT HEMATURIA: Primary | ICD-10-CM

## 2018-09-19 DIAGNOSIS — R33.8 ACUTE URINARY RETENTION: Primary | ICD-10-CM

## 2018-09-19 DIAGNOSIS — N40.1 BENIGN PROSTATIC HYPERPLASIA WITH URINARY OBSTRUCTION: Chronic | ICD-10-CM

## 2018-09-19 DIAGNOSIS — R33.9 URINARY RETENTION: Primary | ICD-10-CM

## 2018-09-19 DIAGNOSIS — N13.8 BENIGN PROSTATIC HYPERPLASIA WITH URINARY OBSTRUCTION: Chronic | ICD-10-CM

## 2018-09-19 PROCEDURE — 99213 OFFICE O/P EST LOW 20 MIN: CPT | Performed by: UROLOGY

## 2018-09-19 PROCEDURE — 99283 EMERGENCY DEPT VISIT LOW MDM: CPT

## 2018-09-19 PROCEDURE — 51798 US URINE CAPACITY MEASURE: CPT | Performed by: UROLOGY

## 2018-09-19 RX ORDER — SULFAMETHOXAZOLE AND TRIMETHOPRIM 400; 80 MG/1; MG/1
1 TABLET ORAL 2 TIMES DAILY
Qty: 20 TABLET | Refills: 0 | Status: SHIPPED | OUTPATIENT
Start: 2018-09-19 | End: 2018-09-26 | Stop reason: HOSPADM

## 2018-09-19 NOTE — PROGRESS NOTES
Patient came in for cath removal only, patient will return at 3pm today if he is unabe to urinate and needs avina put back in.

## 2018-09-19 NOTE — PROGRESS NOTES
Chief Complaint  PVR (BLADDER SCAN)        ELIECER Meier is a 72 y.o. male who returns today for follow-up of urinary retention.  His catheter was removed this morning and he states he has been voiding frequently but in small amounts.  Fortunately bladder scan today reveals only 2 ounces of postvoid residual.    There were no vitals filed for this visit.    Past Medical History  Past Medical History:   Diagnosis Date   • Allergic 25yrs.    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   • BPH (benign prostatic hypertrophy)    • CAD (coronary artery disease) 3/6/2017   • Cancer (CMS/HCC) July 2012    none   • Cardiac arrhythmia    • Chronic diastolic congestive heart failure (CMS/HCC)    • Colon polyp    • Diverticulosis    • Essential hypertension 3/14/2018   • GERD (gastroesophageal reflux disease)    • History of blood transfusion    • History of colonoscopy 2015    Complete   • History of esophagogastroduodenoscopy 2015    Diagnostic   • HL (hearing loss)    • Infection of kidney    • Iron deficiency    • Obesity    • Visual impairment !(97    Reading Glasses       Past Surgical History  Past Surgical History:   Procedure Laterality Date   • CARDIAC ABLATION  03/2012   • CARDIAC CATHETERIZATION  2004   • CARDIAC PACEMAKER PLACEMENT  2004   • COLON SURGERY  2012   • COLONOSCOPY  July 2015   • HEMORRHOIDECTOMY  1970   • HERNIA REPAIR      X 5   • INGUINAL HERNIA REPAIR     • LYMPH NODE BIOPSY  7-2-2012    large intestine lymph nodes       Medications  has a current medication list which includes the following prescription(s): aspirin, eszopiclone, finasteride, furosemide, losartan, melatonin, nitrofurantoin (macrocrystal-monohydrate), pantoprazole, potassium chloride, and tamsulosin.      Allergies  No Known Allergies    Social History  Social History     Social History Narrative    Caffeine: 2  servings per day    Patient lives at his home alone       Family History  He has no family history of bladder or kidney cancer  He has no family history of kidney stones      AUA Symptom Score:      Review of Systems  Review of Systems    Physical Exam  Physical Exam    Labs Recent and today in the office:  Results for orders placed or performed during the hospital encounter of 08/16/18   Comprehensive Metabolic Panel   Result Value Ref Range    Glucose 102 (H) 74 - 98 mg/dL    BUN 16 7 - 20 mg/dL    Creatinine 0.90 0.60 - 1.30 mg/dL    Sodium 138 137 - 145 mmol/L    Potassium 4.0 3.5 - 5.1 mmol/L    Chloride 104 98 - 107 mmol/L    CO2 24.0 (L) 26.0 - 30.0 mmol/L    Calcium 9.3 8.4 - 10.2 mg/dL    Total Protein 7.2 6.3 - 8.2 g/dL    Albumin 4.40 3.50 - 5.00 g/dL    ALT (SGPT) 22 13 - 69 U/L    AST (SGOT) 31 15 - 46 U/L    Alkaline Phosphatase 53 38 - 126 U/L    Total Bilirubin 0.3 0.2 - 1.3 mg/dL    eGFR Non African Amer 82 >60 mL/min/1.73    Globulin 2.8 gm/dL    A/G Ratio 1.6 1.0 - 2.0 g/dL    BUN/Creatinine Ratio 17.8 6.3 - 21.9    Anion Gap 14.0 10.0 - 20.0 mmol/L   Urinalysis With Microscopic If Indicated (No Culture) - Urine, Clean Catch   Result Value Ref Range    Color, UA Straw Yellow, Straw    Appearance, UA Clear Clear    pH, UA 5.5 5.0 - 8.0    Specific Gravity, UA <=1.005 1.005 - 1.030    Glucose, UA Negative Negative    Ketones, UA Negative Negative    Bilirubin, UA Negative Negative    Blood, UA Moderate (2+) (A) Negative    Protein, UA Negative Negative    Leuk Esterase, UA Small (1+) (A) Negative    Nitrite, UA Negative Negative    Urobilinogen, UA 0.2 E.U./dL 0.2 - 1.0 E.U./dL   CBC Auto Differential   Result Value Ref Range    WBC 8.46 4.80 - 10.80 10*3/mm3    RBC 4.27 (L) 4.70 - 6.10 10*6/mm3    Hemoglobin 13.6 (L) 14.0 - 18.0 g/dL    Hematocrit 41.2 (L) 42.0 - 52.0 %    MCV 96.5 (H) 80.0 - 94.0 fL    MCH 31.9 (H) 27.0 - 31.0 pg    MCHC 33.0 30.0 - 37.0 g/dL    RDW 13.8 11.5 - 14.5 %    RDW-SD  49.1 37.0 - 54.0 fl    MPV 10.4 6.0 - 12.0 fL    Platelets 245 130 - 400 10*3/mm3    Neutrophil % 56.7 37.0 - 80.0 %    Lymphocyte % 20.9 10.0 - 50.0 %    Monocyte % 16.0 (H) 0.0 - 12.0 %    Eosinophil % 4.8 0.0 - 7.0 %    Basophil % 0.9 0.0 - 2.5 %    Immature Grans % 0.7 (H) 0.0 - 0.6 %    Neutrophils, Absolute 4.79 2.00 - 6.90 10*3/mm3    Lymphocytes, Absolute 1.77 0.60 - 3.40 10*3/mm3    Monocytes, Absolute 1.35 (H) 0.00 - 0.90 10*3/mm3    Eosinophils, Absolute 0.41 0.00 - 0.70 10*3/mm3    Basophils, Absolute 0.08 0.00 - 0.20 10*3/mm3    Immature Grans, Absolute 0.06 0.00 - 0.06 10*3/mm3    nRBC 0.0 0.0 - 0.0 /100 WBC   Urinalysis, Microscopic Only - Urine, Clean Catch   Result Value Ref Range    RBC, UA 0-2 (A) None Seen /HPF    WBC, UA 6-12 (A) None Seen /HPF    Bacteria, UA None Seen None Seen /HPF    Squamous Epithelial Cells, UA None Seen None Seen, 0-2 /HPF    Hyaline Casts, UA None Seen None Seen /LPF    Methodology Manual Light Microscopy      Bladder scan: Postvoid residual is 69 mL    Assessment & Plan  Urinary retention/BPH with outlet obstruction: This point I would recommend we discontinue the Proscar and Flomax.  He is asking about taking 2 Flomax per day but I warned him about the postural changes.  He has an appointment to return in a few weeks but can come in immediately if he develops urinary retention    Urinary tract infection and antibiotic will be called in since were leaving his catheter out.

## 2018-09-20 NOTE — DISCHARGE INSTRUCTIONS
antibiotic Bactrim from pharmacy that was called in today by Dr. Hollins.  Call Dr. Hollins's office first thing in the morning to let them know about the urinary retention and new Sauer catheter placement.    Please print prescan out of Media Tab and then complete and sign.   Please complete all marked areas.   Once form is completed and signed, please fax to [phone] or [phone].    Please direct any questions to [phone] Option 3.   Thanks,  Forms Completion Team.

## 2018-09-20 NOTE — ED PROVIDER NOTES
Subjective   Patient is a 72-year-old male with a history of urinary retention, BPH, kidney infections, and CAD that presents to the ED for evaluation of urinary retention.  Per patient and urology note from today 09/19/18, avina catheter was removed during clinic visit and patient was able to spontaneously void with some dysuria.  Urologist also called in a prescription for Bactrim to patient's pharmacy today and also instructed patient to call clinic ASAP if urinary retention recurred.  Patient states he currently has abdominal pain and pressure. He states he has had a few episodes of nonbloody loose stool and intermittent nausea over the past few weeks. He denies any fever, chills, dysuria, hematuria, chest pain, dyspnea, or any other symptoms.         History provided by:  Patient      Review of Systems   Constitutional: Negative for chills and fever.   HENT: Negative.    Eyes: Negative.    Respiratory: Negative for chest tightness and shortness of breath.    Cardiovascular: Negative for chest pain and leg swelling.   Gastrointestinal: Positive for abdominal pain. Negative for blood in stool, diarrhea, nausea and vomiting.   Genitourinary: Positive for difficulty urinating. Negative for flank pain and hematuria.   Musculoskeletal: Negative.    Skin: Negative.    Neurological: Negative for syncope.   Psychiatric/Behavioral: Negative.        Past Medical History:   Diagnosis Date   • Allergic 25yrs.    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   • BPH (benign prostatic hypertrophy)    • CAD (coronary artery disease) 3/6/2017   • Cancer (CMS/HCC) July 2012    none   • Cardiac arrhythmia    • Chronic diastolic congestive heart failure (CMS/HCC)    • Colon polyp    • Diverticulosis    • Essential hypertension 3/14/2018   • GERD (gastroesophageal reflux disease)    • History of blood transfusion     • History of colonoscopy     Complete   • History of esophagogastroduodenoscopy 2015    Diagnostic   • HL (hearing loss)    • Infection of kidney    • Iron deficiency    • Obesity    • Visual impairment !(97    Reading Glasses       No Known Allergies    Past Surgical History:   Procedure Laterality Date   • CARDIAC ABLATION  2012   • CARDIAC CATHETERIZATION     • CARDIAC PACEMAKER PLACEMENT     • COLON SURGERY     • COLONOSCOPY  2015   • HEMORRHOIDECTOMY  1970   • HERNIA REPAIR      X 5   • INGUINAL HERNIA REPAIR     • LYMPH NODE BIOPSY  2012    large intestine lymph nodes       Family History   Problem Relation Age of Onset   • Lung cancer Mother    • Osteoporosis Mother    • Thyroid disease Mother         mother   • Cancer Mother         Lung Cancer   • Early death Mother         46 yrs.   • Hearing loss Mother         mother   • Vision loss Mother         mother   • Heart disease Mother    • Cancer Father         Prostate Cancer   • Heart disease Father         Pacemaker   • Vision loss Father    • Heart disease Sister    • Heart failure Brother    • Heart disease Brother    • Cancer Sister         Breast Cancer   • Vision loss Brother    • Heart disease Brother    • Arrhythmia Brother    • Heart failure Brother    • Early death Sister          Around 40yrs.   • Early death Maternal Grandmother         Took Mother off.   • Heart disease Maternal Grandmother        Social History     Social History   • Marital status: Single     Social History Main Topics   • Smoking status: Former Smoker     Packs/day: 1.00     Years: 10.00     Types: Cigarettes     Start date: 1963     Quit date:    • Smokeless tobacco: Never Used   • Alcohol use No   • Drug use: No   • Sexual activity: Not Currently     Partners: Female     Other Topics Concern   • Not on file     Social History Narrative    Caffeine: 2 servings per day    Patient lives at his home alone           Objective   Physical  Exam   Constitutional: He is oriented to person, place, and time. He appears well-developed. No distress.   Patient is pacing around the room, appears uncomfortable.  He is awake, answering questions appropriately.   HENT:   Head: Normocephalic and atraumatic.   Eyes: EOM are normal.   Neck: Normal range of motion.   Cardiovascular: Normal rate, regular rhythm, normal heart sounds and intact distal pulses.    Pulmonary/Chest: Effort normal and breath sounds normal. He has no wheezes. He has no rales.   Abdominal: Soft. Bowel sounds are normal. He exhibits distension. There is no tenderness. There is no rebound and no guarding.       Musculoskeletal: Normal range of motion.   Neurological: He is alert and oriented to person, place, and time.   Skin: Skin is warm and dry.   Psychiatric: He has a normal mood and affect. His behavior is normal.   Vitals reviewed.      Procedures           ED Course  ED Course as of Sep 19 2328   Wed Sep 19, 2018   2137 On reassessment, patient is lying comfortably in the stretcher. He states that he feels much better. Denies any abdominal pain. Catheter has had 1175 cc of output.  [LA]      ED Course User Index  [LA] Debbi Milner PA-C                  MDM  Number of Diagnoses or Management Options  Urinary retention:   Diagnosis management comments: Patient is a 72-year-old male with history of BPH and urinary retention that presents the ED for evaluation of urinary retention since removal of avina catheter this morning at urology clinic. On arrival, patient appears uncomfortable, is pacing around the room, answering questions appropriately, non-toxic appearing, no respiratory distress.  RN placed a Avina catheter which drained over 1175 ml of urine and is still draining; will anchor this avina.  On reassessment, patient appears much more comfortable and denies any abdominal pain.  Event that patient was already prescribed Bactrim for a urinary tract infection by urology, urine  studies deferred at this time.  Instructed patient to  his prescription at his pharmacy and to call his urology clinic first thing in the morning.  Patient verbalized understanding and is agreeable to plan of care.  He was discharged home in stable condition.    Risk of Complications, Morbidity, and/or Mortality  Presenting problems: moderate  Diagnostic procedures: low  Management options: moderate    Patient Progress  Patient progress: improved        Final diagnoses:   None            Debbi Milner PA-C  09/19/18 1918

## 2018-09-21 ENCOUNTER — TELEPHONE (OUTPATIENT)
Dept: INTERNAL MEDICINE | Facility: CLINIC | Age: 73
End: 2018-09-21

## 2018-09-21 DIAGNOSIS — N13.8 BENIGN PROSTATIC HYPERPLASIA WITH URINARY OBSTRUCTION: Primary | Chronic | ICD-10-CM

## 2018-09-21 DIAGNOSIS — N40.1 BENIGN PROSTATIC HYPERPLASIA WITH URINARY OBSTRUCTION: Primary | Chronic | ICD-10-CM

## 2018-09-21 NOTE — TELEPHONE ENCOUNTER
Patient called requesting a referral to a different urologist. He states that he is very unhappy with Dr. Hollins.    Please advise

## 2018-09-22 ENCOUNTER — HOSPITAL ENCOUNTER (EMERGENCY)
Facility: HOSPITAL | Age: 73
Discharge: HOME | End: 2018-09-22
Attending: EMERGENCY MEDICINE | Admitting: EMERGENCY MEDICINE

## 2018-09-22 VITALS
HEIGHT: 70 IN | RESPIRATION RATE: 16 BRPM | OXYGEN SATURATION: 98 % | SYSTOLIC BLOOD PRESSURE: 137 MMHG | TEMPERATURE: 98 F | WEIGHT: 200 LBS | DIASTOLIC BLOOD PRESSURE: 91 MMHG | HEART RATE: 64 BPM | BODY MASS INDEX: 28.63 KG/M2

## 2018-09-22 DIAGNOSIS — N30.00 ACUTE CYSTITIS WITHOUT HEMATURIA: ICD-10-CM

## 2018-09-22 DIAGNOSIS — R53.83 FATIGUE, UNSPECIFIED TYPE: Primary | ICD-10-CM

## 2018-09-22 LAB
ALBUMIN SERPL-MCNC: 4.31 G/DL (ref 3.2–4.8)
ALBUMIN/GLOB SERPL: 2 G/DL (ref 1.5–2.5)
ALP SERPL-CCNC: 51 U/L (ref 25–100)
ALT SERPL W P-5'-P-CCNC: 24 U/L (ref 7–40)
ANION GAP SERPL CALCULATED.3IONS-SCNC: 5 MMOL/L (ref 3–11)
AST SERPL-CCNC: 33 U/L (ref 0–33)
BACTERIA UR QL AUTO: ABNORMAL /HPF
BASOPHILS # BLD AUTO: 0.02 10*3/MM3 (ref 0–0.2)
BASOPHILS NFR BLD AUTO: 0.4 % (ref 0–1)
BILIRUB SERPL-MCNC: 0.7 MG/DL (ref 0.3–1.2)
BILIRUB UR QL STRIP: NEGATIVE
BUN BLD-MCNC: 14 MG/DL (ref 9–23)
BUN/CREAT SERPL: 12.5 (ref 7–25)
CALCIUM SPEC-SCNC: 8.9 MG/DL (ref 8.7–10.4)
CHLORIDE SERPL-SCNC: 111 MMOL/L (ref 99–109)
CLARITY UR: CLEAR
CO2 SERPL-SCNC: 22 MMOL/L (ref 20–31)
COLOR UR: YELLOW
CREAT BLD-MCNC: 1.12 MG/DL (ref 0.6–1.3)
DEPRECATED RDW RBC AUTO: 47.8 FL (ref 37–54)
EOSINOPHIL # BLD AUTO: 0.1 10*3/MM3 (ref 0–0.3)
EOSINOPHIL NFR BLD AUTO: 2.1 % (ref 0–3)
ERYTHROCYTE [DISTWIDTH] IN BLOOD BY AUTOMATED COUNT: 14 % (ref 11.3–14.5)
GFR SERPL CREATININE-BSD FRML MDRD: 64 ML/MIN/1.73
GLOBULIN UR ELPH-MCNC: 2.2 GM/DL
GLUCOSE BLD-MCNC: 106 MG/DL (ref 70–100)
GLUCOSE UR STRIP-MCNC: NEGATIVE MG/DL
HCT VFR BLD AUTO: 37.9 % (ref 38.9–50.9)
HGB BLD-MCNC: 12.6 G/DL (ref 13.1–17.5)
HGB UR QL STRIP.AUTO: ABNORMAL
HYALINE CASTS UR QL AUTO: ABNORMAL /LPF
IMM GRANULOCYTES # BLD: 0.01 10*3/MM3 (ref 0–0.03)
IMM GRANULOCYTES NFR BLD: 0.2 % (ref 0–0.6)
KETONES UR QL STRIP: ABNORMAL
LEUKOCYTE ESTERASE UR QL STRIP.AUTO: ABNORMAL
LYMPHOCYTES # BLD AUTO: 0.9 10*3/MM3 (ref 0.6–4.8)
LYMPHOCYTES NFR BLD AUTO: 19.1 % (ref 24–44)
MCH RBC QN AUTO: 30.9 PG (ref 27–31)
MCHC RBC AUTO-ENTMCNC: 33.2 G/DL (ref 32–36)
MCV RBC AUTO: 92.9 FL (ref 80–99)
MONOCYTES # BLD AUTO: 0.77 10*3/MM3 (ref 0–1)
MONOCYTES NFR BLD AUTO: 16.3 % (ref 0–12)
NEUTROPHILS # BLD AUTO: 2.92 10*3/MM3 (ref 1.5–8.3)
NEUTROPHILS NFR BLD AUTO: 62.1 % (ref 41–71)
NITRITE UR QL STRIP: NEGATIVE
PH UR STRIP.AUTO: 6.5 [PH] (ref 5–8)
PLATELET # BLD AUTO: 256 10*3/MM3 (ref 150–450)
PMV BLD AUTO: 10.4 FL (ref 6–12)
POTASSIUM BLD-SCNC: 3.8 MMOL/L (ref 3.5–5.5)
PROT SERPL-MCNC: 6.5 G/DL (ref 5.7–8.2)
PROT UR QL STRIP: ABNORMAL
RBC # BLD AUTO: 4.08 10*6/MM3 (ref 4.2–5.76)
RBC # UR: ABNORMAL /HPF
REF LAB TEST METHOD: ABNORMAL
SODIUM BLD-SCNC: 138 MMOL/L (ref 132–146)
SP GR UR STRIP: 1.02 (ref 1–1.03)
SQUAMOUS #/AREA URNS HPF: ABNORMAL /HPF
UROBILINOGEN UR QL STRIP: ABNORMAL
WBC NRBC COR # BLD: 4.71 10*3/MM3 (ref 3.5–10.8)
WBC UR QL AUTO: ABNORMAL /HPF

## 2018-09-22 PROCEDURE — 81001 URINALYSIS AUTO W/SCOPE: CPT | Performed by: EMERGENCY MEDICINE

## 2018-09-22 PROCEDURE — 87040 BLOOD CULTURE FOR BACTERIA: CPT | Performed by: EMERGENCY MEDICINE

## 2018-09-22 PROCEDURE — 99284 EMERGENCY DEPT VISIT MOD MDM: CPT

## 2018-09-22 PROCEDURE — 85025 COMPLETE CBC W/AUTO DIFF WBC: CPT | Performed by: EMERGENCY MEDICINE

## 2018-09-22 PROCEDURE — 87186 SC STD MICRODIL/AGAR DIL: CPT | Performed by: EMERGENCY MEDICINE

## 2018-09-22 PROCEDURE — 87086 URINE CULTURE/COLONY COUNT: CPT | Performed by: EMERGENCY MEDICINE

## 2018-09-22 PROCEDURE — 87077 CULTURE AEROBIC IDENTIFY: CPT | Performed by: EMERGENCY MEDICINE

## 2018-09-22 PROCEDURE — 80053 COMPREHEN METABOLIC PANEL: CPT | Performed by: EMERGENCY MEDICINE

## 2018-09-22 NOTE — ED PROVIDER NOTES
Subjective   Patient is a 72-year-old male who presents with generalized fatigue.  The recent history of urinary retention for which he sees a urologist.  He had a new Bose catheter placed 38 days ago secondary to urinary retention.  He presents today with generalized fatigue and occasional hot flashes.  He has been reading on the Internet and wants to rule out sepsis.  He denies fever, chills, vomiting, chest pain or other acute complaints.        History provided by:  Patient  Fatigue   Location:  GENERALIZED FATIGUE  Quality:  NONE  Severity:  Moderate  Onset quality:  Gradual  Timing:  Intermittent  Progression:  Unchanged  Chronicity:  New  Context:  RECENTLY DIAGNOSED UTI AND URINARY RETENTION  Relieved by:  NOTHING  Worsened by:  NOTHING  Ineffective treatments:  PT IS CURRENTLY TAKING BACTRIM AND RECENTLY HAD BOSE CATHETER PLACED FOR URINARY RETENTION  Associated symptoms: abdominal pain and fatigue    Associated symptoms: no chest pain, no congestion, no cough, no diarrhea, no ear pain, no fever, no headaches, no loss of consciousness, no myalgias, no nausea, no shortness of breath, no sore throat, no vomiting and no wheezing        Review of Systems   Constitutional: Positive for fatigue. Negative for fever.   HENT: Negative for congestion, ear pain and sore throat.    Respiratory: Negative for cough, shortness of breath and wheezing.    Cardiovascular: Negative for chest pain.   Gastrointestinal: Positive for abdominal pain. Negative for diarrhea, nausea and vomiting.   Musculoskeletal: Negative for myalgias.   Neurological: Negative for loss of consciousness and headaches.   All other systems reviewed and are negative.      Past Medical History:   Diagnosis Date   • Allergic 25yrs.    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   • BPH (benign prostatic hypertrophy)    • CAD (coronary  artery disease) 3/6/2017   • Cancer (CMS/HCC) 2012    none   • Cardiac arrhythmia    • Chronic diastolic congestive heart failure (CMS/HCC)    • Colon polyp    • Diverticulosis    • Essential hypertension 3/14/2018   • GERD (gastroesophageal reflux disease)    • History of blood transfusion    • History of colonoscopy     Complete   • History of esophagogastroduodenoscopy     Diagnostic   • HL (hearing loss)    • Infection of kidney    • Iron deficiency    • Obesity    • Visual impairment !(97    Reading Glasses       No Known Allergies    Past Surgical History:   Procedure Laterality Date   • CARDIAC ABLATION  2012   • CARDIAC CATHETERIZATION     • CARDIAC PACEMAKER PLACEMENT     • COLON SURGERY     • COLONOSCOPY  2015   • HEMORRHOIDECTOMY  1970   • HERNIA REPAIR      X 5   • INGUINAL HERNIA REPAIR     • LYMPH NODE BIOPSY  2012    large intestine lymph nodes       Family History   Problem Relation Age of Onset   • Lung cancer Mother    • Osteoporosis Mother    • Thyroid disease Mother         mother   • Cancer Mother         Lung Cancer   • Early death Mother         46 yrs.   • Hearing loss Mother         mother   • Vision loss Mother         mother   • Heart disease Mother    • Cancer Father         Prostate Cancer   • Heart disease Father         Pacemaker   • Vision loss Father    • Heart disease Sister    • Heart failure Brother    • Heart disease Brother    • Cancer Sister         Breast Cancer   • Vision loss Brother    • Heart disease Brother    • Arrhythmia Brother    • Heart failure Brother    • Early death Sister          Around 40yrs.   • Early death Maternal Grandmother         Took Mother off.   • Heart disease Maternal Grandmother        Social History     Social History   • Marital status: Single     Social History Main Topics   • Smoking status: Former Smoker     Packs/day: 1.00     Years: 10.00     Types: Cigarettes     Start date: 1963     Quit date:  1973   • Smokeless tobacco: Never Used   • Alcohol use No   • Drug use: No   • Sexual activity: Not Currently     Partners: Female     Other Topics Concern   • Not on file     Social History Narrative    Caffeine: 2 servings per day    Patient lives at his home alone           Objective   Physical Exam   Constitutional: He is oriented to person, place, and time. He appears well-developed and well-nourished. No distress.   HENT:   Head: Normocephalic and atraumatic.   Eyes: Pupils are equal, round, and reactive to light. Conjunctivae and EOM are normal.   Neck: Normal range of motion. Neck supple. No thyromegaly present.   Cardiovascular: Normal rate, regular rhythm and normal heart sounds.  Exam reveals no gallop and no friction rub.    No murmur heard.  Pulmonary/Chest: Effort normal and breath sounds normal. No respiratory distress.   Abdominal: Soft. Bowel sounds are normal. There is no tenderness.   Musculoskeletal: Normal range of motion.   Lymphadenopathy:     He has no cervical adenopathy.   Neurological: He is alert and oriented to person, place, and time.   Skin: Skin is warm and dry.   Psychiatric: He has a normal mood and affect.   Nursing note and vitals reviewed.      Procedures           ED Course  ED Course as of Sep 24 0252   Sat Sep 22, 2018   0636 Patient had a completely benign exam.  I instructed him that we would draw blood cultures to completely addressed his concern for sepsis.  Otherwise his workup is unremarkable and I think it is most appropriate he continues to take his medications as previously prescribed, including his Bactrim and follow-up with his urologist at soonest availability.  He is comfortable with this plan and will return to the emergency department if any concerns arise.  [CP]      ED Course User Index  [CP] Wililam Biggs DO        Recent Results (from the past 24 hour(s))   CBC Auto Differential    Collection Time: 09/22/18  4:59 AM   Result Value Ref Range    WBC 4.71 3.50 -  10.80 10*3/mm3    RBC 4.08 (L) 4.20 - 5.76 10*6/mm3    Hemoglobin 12.6 (L) 13.1 - 17.5 g/dL    Hematocrit 37.9 (L) 38.9 - 50.9 %    MCV 92.9 80.0 - 99.0 fL    MCH 30.9 27.0 - 31.0 pg    MCHC 33.2 32.0 - 36.0 g/dL    RDW 14.0 11.3 - 14.5 %    RDW-SD 47.8 37.0 - 54.0 fl    MPV 10.4 6.0 - 12.0 fL    Platelets 256 150 - 450 10*3/mm3    Neutrophil % 62.1 41.0 - 71.0 %    Lymphocyte % 19.1 (L) 24.0 - 44.0 %    Monocyte % 16.3 (H) 0.0 - 12.0 %    Eosinophil % 2.1 0.0 - 3.0 %    Basophil % 0.4 0.0 - 1.0 %    Immature Grans % 0.2 0.0 - 0.6 %    Neutrophils, Absolute 2.92 1.50 - 8.30 10*3/mm3    Lymphocytes, Absolute 0.90 0.60 - 4.80 10*3/mm3    Monocytes, Absolute 0.77 0.00 - 1.00 10*3/mm3    Eosinophils, Absolute 0.10 0.00 - 0.30 10*3/mm3    Basophils, Absolute 0.02 0.00 - 0.20 10*3/mm3    Immature Grans, Absolute 0.01 0.00 - 0.03 10*3/mm3     Note: In addition to lab results from this visit, the labs listed above may include labs taken at another facility or during a different encounter within the last 24 hours. Please correlate lab times with ED admission and discharge times for further clarification of the services performed during this visit.    No orders to display     Vitals:    09/22/18 0408 09/22/18 0430 09/22/18 0431 09/22/18 0500   BP:  155/92  168/89   BP Location:       Patient Position:       Pulse:       Resp:       Temp:       TempSrc:       SpO2: 97% 93% 96% 97%   Weight:       Height:         Medications - No data to display  ECG/EMG Results (last 24 hours)     ** No results found for the last 24 hours. **                  MDM      Final diagnoses:   Fatigue, unspecified type   Acute cystitis without hematuria            William Biggs DO  09/24/18 0252

## 2018-09-24 LAB — BACTERIA SPEC AEROBE CULT: ABNORMAL

## 2018-09-25 ENCOUNTER — OFFICE VISIT (OUTPATIENT)
Dept: UROLOGY | Facility: CLINIC | Age: 73
End: 2018-09-25

## 2018-09-25 ENCOUNTER — APPOINTMENT (OUTPATIENT)
Dept: CT IMAGING | Facility: HOSPITAL | Age: 73
End: 2018-09-25

## 2018-09-25 ENCOUNTER — TELEPHONE (OUTPATIENT)
Dept: EMERGENCY DEPT | Facility: HOSPITAL | Age: 73
End: 2018-09-25

## 2018-09-25 ENCOUNTER — HOSPITAL ENCOUNTER (OUTPATIENT)
Facility: HOSPITAL | Age: 73
Setting detail: OBSERVATION
Discharge: HOME HEALTH | End: 2018-09-26
Attending: UROLOGY | Admitting: NURSE PRACTITIONER

## 2018-09-25 ENCOUNTER — APPOINTMENT (OUTPATIENT)
Dept: ULTRASOUND IMAGING | Facility: HOSPITAL | Age: 73
End: 2018-09-25

## 2018-09-25 VITALS
HEART RATE: 91 BPM | SYSTOLIC BLOOD PRESSURE: 132 MMHG | RESPIRATION RATE: 18 BRPM | DIASTOLIC BLOOD PRESSURE: 86 MMHG | OXYGEN SATURATION: 96 %

## 2018-09-25 DIAGNOSIS — N30.00 ACUTE CYSTITIS WITHOUT HEMATURIA: ICD-10-CM

## 2018-09-25 DIAGNOSIS — N30.00 ACUTE CYSTITIS WITHOUT HEMATURIA: Primary | ICD-10-CM

## 2018-09-25 DIAGNOSIS — R33.8 ACUTE URINARY RETENTION: Primary | ICD-10-CM

## 2018-09-25 PROBLEM — R33.9 URINARY RETENTION: Status: ACTIVE | Noted: 2018-09-25

## 2018-09-25 PROBLEM — N30.01 ACUTE CYSTITIS WITH HEMATURIA: Status: ACTIVE | Noted: 2018-09-25

## 2018-09-25 LAB
ANION GAP SERPL CALCULATED.3IONS-SCNC: 10 MMOL/L (ref 10–20)
BUN BLD-MCNC: 12 MG/DL (ref 7–20)
BUN/CREAT SERPL: 12 (ref 6.3–21.9)
CALCIUM SPEC-SCNC: 9 MG/DL (ref 8.4–10.2)
CHLORIDE SERPL-SCNC: 108 MMOL/L (ref 98–107)
CO2 SERPL-SCNC: 25 MMOL/L (ref 26–30)
CREAT BLD-MCNC: 1 MG/DL (ref 0.6–1.3)
DEPRECATED RDW RBC AUTO: 47.6 FL (ref 37–54)
ERYTHROCYTE [DISTWIDTH] IN BLOOD BY AUTOMATED COUNT: 13.7 % (ref 11.5–14.5)
GFR SERPL CREATININE-BSD FRML MDRD: 73 ML/MIN/1.73
GLUCOSE BLD-MCNC: 92 MG/DL (ref 74–98)
HCT VFR BLD AUTO: 36.8 % (ref 42–52)
HGB BLD-MCNC: 12.1 G/DL (ref 14–18)
MCH RBC QN AUTO: 31.1 PG (ref 27–31)
MCHC RBC AUTO-ENTMCNC: 32.9 G/DL (ref 30–37)
MCV RBC AUTO: 94.6 FL (ref 80–94)
PLATELET # BLD AUTO: 236 10*3/MM3 (ref 130–400)
PMV BLD AUTO: 10.2 FL (ref 6–12)
POTASSIUM BLD-SCNC: 4 MMOL/L (ref 3.5–5.1)
RBC # BLD AUTO: 3.89 10*6/MM3 (ref 4.7–6.1)
SODIUM BLD-SCNC: 139 MMOL/L (ref 137–145)
TROPONIN I SERPL-MCNC: <0.012 NG/ML (ref 0–0.03)
WBC NRBC COR # BLD: 5.24 10*3/MM3 (ref 4.8–10.8)

## 2018-09-25 PROCEDURE — 93005 ELECTROCARDIOGRAM TRACING: CPT | Performed by: NURSE PRACTITIONER

## 2018-09-25 PROCEDURE — G0378 HOSPITAL OBSERVATION PER HR: HCPCS

## 2018-09-25 PROCEDURE — 96372 THER/PROPH/DIAG INJ SC/IM: CPT

## 2018-09-25 PROCEDURE — 99213 OFFICE O/P EST LOW 20 MIN: CPT | Performed by: UROLOGY

## 2018-09-25 PROCEDURE — 80048 BASIC METABOLIC PNL TOTAL CA: CPT | Performed by: NURSE PRACTITIONER

## 2018-09-25 PROCEDURE — 25010000002 ENOXAPARIN PER 10 MG: Performed by: NURSE PRACTITIONER

## 2018-09-25 PROCEDURE — 93880 EXTRACRANIAL BILAT STUDY: CPT

## 2018-09-25 PROCEDURE — 85027 COMPLETE CBC AUTOMATED: CPT | Performed by: NURSE PRACTITIONER

## 2018-09-25 PROCEDURE — 96365 THER/PROPH/DIAG IV INF INIT: CPT

## 2018-09-25 PROCEDURE — G0379 DIRECT REFER HOSPITAL OBSERV: HCPCS

## 2018-09-25 PROCEDURE — 84484 ASSAY OF TROPONIN QUANT: CPT | Performed by: NURSE PRACTITIONER

## 2018-09-25 PROCEDURE — 25010000002 CEFEPIME PER 500 MG: Performed by: NURSE PRACTITIONER

## 2018-09-25 PROCEDURE — 70450 CT HEAD/BRAIN W/O DYE: CPT

## 2018-09-25 PROCEDURE — 99219 PR INITIAL OBSERVATION CARE/DAY 50 MINUTES: CPT | Performed by: NURSE PRACTITIONER

## 2018-09-25 RX ORDER — FUROSEMIDE 40 MG/1
40 TABLET ORAL DAILY PRN
Status: DISCONTINUED | OUTPATIENT
Start: 2018-09-25 | End: 2018-09-25

## 2018-09-25 RX ORDER — ASPIRIN 81 MG/1
81 TABLET, CHEWABLE ORAL DAILY
Status: DISCONTINUED | OUTPATIENT
Start: 2018-09-25 | End: 2018-09-26 | Stop reason: HOSPADM

## 2018-09-25 RX ORDER — POTASSIUM CHLORIDE 1.5 G/1.77G
POWDER, FOR SOLUTION ORAL
COMMUNITY
End: 2018-09-26 | Stop reason: HOSPADM

## 2018-09-25 RX ORDER — LOSARTAN POTASSIUM 25 MG/1
25 TABLET ORAL DAILY
Status: DISCONTINUED | OUTPATIENT
Start: 2018-09-25 | End: 2018-09-25

## 2018-09-25 RX ORDER — SODIUM CHLORIDE 0.9 % (FLUSH) 0.9 %
1-10 SYRINGE (ML) INJECTION AS NEEDED
Status: DISCONTINUED | OUTPATIENT
Start: 2018-09-25 | End: 2018-09-26 | Stop reason: HOSPADM

## 2018-09-25 RX ORDER — CHOLECALCIFEROL (VITAMIN D3) 125 MCG
5000 CAPSULE ORAL DAILY
COMMUNITY
End: 2019-07-03 | Stop reason: SDUPTHER

## 2018-09-25 RX ORDER — FINASTERIDE 5 MG/1
5 TABLET, FILM COATED ORAL
Status: DISCONTINUED | OUTPATIENT
Start: 2018-09-25 | End: 2018-09-26 | Stop reason: HOSPADM

## 2018-09-25 RX ORDER — TAMSULOSIN HYDROCHLORIDE 0.4 MG/1
0.4 CAPSULE ORAL
Status: DISCONTINUED | OUTPATIENT
Start: 2018-09-25 | End: 2018-09-26 | Stop reason: HOSPADM

## 2018-09-25 RX ORDER — LANOLIN ALCOHOL/MO/W.PET/CERES
3 CREAM (GRAM) TOPICAL NIGHTLY
Status: DISCONTINUED | OUTPATIENT
Start: 2018-09-25 | End: 2018-09-26 | Stop reason: HOSPADM

## 2018-09-25 RX ORDER — ACETAMINOPHEN 325 MG/1
650 TABLET ORAL EVERY 4 HOURS PRN
Status: DISCONTINUED | OUTPATIENT
Start: 2018-09-25 | End: 2018-09-26 | Stop reason: HOSPADM

## 2018-09-25 RX ORDER — FAMOTIDINE 20 MG/1
40 TABLET, FILM COATED ORAL DAILY
Status: DISCONTINUED | OUTPATIENT
Start: 2018-09-25 | End: 2018-09-26 | Stop reason: HOSPADM

## 2018-09-25 RX ORDER — SODIUM CHLORIDE/ALOE VERA
GEL (GRAM) NASAL AS NEEDED
Status: DISCONTINUED | OUTPATIENT
Start: 2018-09-25 | End: 2018-09-26 | Stop reason: HOSPADM

## 2018-09-25 RX ORDER — MELATONIN
5000 DAILY
COMMUNITY
End: 2019-07-03 | Stop reason: SDUPTHER

## 2018-09-25 RX ADMIN — LOSARTAN POTASSIUM 25 MG: 25 TABLET, FILM COATED ORAL at 15:00

## 2018-09-25 RX ADMIN — ACETAMINOPHEN 650 MG: 325 TABLET, FILM COATED ORAL at 21:59

## 2018-09-25 RX ADMIN — TAMSULOSIN HYDROCHLORIDE 0.4 MG: 0.4 CAPSULE ORAL at 15:00

## 2018-09-25 RX ADMIN — FAMOTIDINE 40 MG: 20 TABLET, FILM COATED ORAL at 15:00

## 2018-09-25 RX ADMIN — MELATONIN TAB 3 MG 3 MG: 3 TAB at 20:11

## 2018-09-25 RX ADMIN — ASPIRIN 81 MG 81 MG: 81 TABLET ORAL at 15:00

## 2018-09-25 RX ADMIN — ENOXAPARIN SODIUM 40 MG: 40 INJECTION SUBCUTANEOUS at 15:00

## 2018-09-25 RX ADMIN — FINASTERIDE 5 MG: 5 TABLET, FILM COATED ORAL at 15:00

## 2018-09-25 RX ADMIN — CEFEPIME HYDROCHLORIDE 2 G: 2 INJECTION, SOLUTION INTRAVENOUS at 15:00

## 2018-09-25 NOTE — PROGRESS NOTES
Chief Complaint  Urinary Retention (Patient is here to discuss treatment and plan after having avina anchored again in the ER, Urine culture is from a 3 day old cath insertion. Kamilla wants to discus this and the need for antibiotics. )        ELIECER Meier is a 72 y.o. male who returns today for follow-up of acute urinary retention.  He has failed several voiding trials and on his last visit to the ER a Avina was inserted and they obtained greater than 1 L of urine.  He also had a urine culture done that is growing out Pseudomonas resistant to all oral antibiotics.  He's known have about a 38 g prostate so at this point he is going to need TURP after the urinary tract infection has cleared.  I suggested admitting him to the medical service for evaluation and then referred to Dr. Santo for TURP Week.    Vitals:    09/25/18 1109   BP: 132/86   Pulse: 91   Resp: 18   SpO2: 96%       Past Medical History  Past Medical History:   Diagnosis Date   • Allergic 25yrs.    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   • BPH (benign prostatic hypertrophy)    • CAD (coronary artery disease) 3/6/2017   • Cancer (CMS/HCC) July 2012    none   • Cardiac arrhythmia    • Chronic diastolic congestive heart failure (CMS/HCC)    • Colon polyp    • Diverticulosis    • Essential hypertension 3/14/2018   • GERD (gastroesophageal reflux disease)    • History of blood transfusion    • History of colonoscopy 2015    Complete   • History of esophagogastroduodenoscopy 2015    Diagnostic   • HL (hearing loss)    • Infection of kidney    • Iron deficiency    • Obesity    • Visual impairment !(97    Reading Glasses       Past Surgical History  Past Surgical History:   Procedure Laterality Date   • CARDIAC ABLATION  03/2012   • CARDIAC CATHETERIZATION  2004   • CARDIAC PACEMAKER PLACEMENT  2004   • COLON SURGERY  2012   •  COLONOSCOPY  July 2015   • HEMORRHOIDECTOMY  1970   • HERNIA REPAIR      X 5   • INGUINAL HERNIA REPAIR     • LYMPH NODE BIOPSY  7-2-2012    large intestine lymph nodes       Medications  has a current medication list which includes the following prescription(s): aspirin, eszopiclone, finasteride, furosemide, losartan, melatonin, nitrofurantoin (macrocrystal-monohydrate), pantoprazole, potassium chloride, potassium chloride, sulfamethoxazole-trimethoprim, and tamsulosin.      Allergies  No Known Allergies    Social History  Social History     Social History Narrative    Caffeine: 2 servings per day    Patient lives at his home alone       Family History  He has no family history of bladder or kidney cancer  He has no family history of kidney stones      AUA Symptom Score:      Review of Systems  Review of Systems   Constitutional: Negative.    Genitourinary: Positive for decreased urine volume, difficulty urinating, frequency, hematuria and urgency.   All other systems reviewed and are negative.      Physical Exam  Physical Exam    Labs Recent and today in the office:  Results for orders placed or performed during the hospital encounter of 09/22/18   Blood Culture - Blood,   Result Value Ref Range    Blood Culture No growth at 3 days    Blood Culture - Blood,   Result Value Ref Range    Blood Culture No growth at 3 days    Urine Culture - Urine,   Result Value Ref Range    Urine Culture >100,000 CFU/mL Pseudomonas aeruginosa (A)        Susceptibility    Pseudomonas aeruginosa - CASSIUS     Aztreonam <=8 Susceptible ug/ml     Cefepime <=8 Susceptible ug/ml     Ceftazidime <=1 Susceptible ug/ml     Gentamicin <=4 Susceptible ug/ml     Levofloxacin >4 Resistant ug/ml     Meropenem <=1 Susceptible ug/ml     Piperacillin + Tazobactam <=16 Susceptible ug/ml     Tobramycin <=4 Susceptible ug/ml   Comprehensive Metabolic Panel   Result Value Ref Range    Glucose 106 (H) 70 - 100 mg/dL    BUN 14 9 - 23 mg/dL    Creatinine 1.12  0.60 - 1.30 mg/dL    Sodium 138 132 - 146 mmol/L    Potassium 3.8 3.5 - 5.5 mmol/L    Chloride 111 (H) 99 - 109 mmol/L    CO2 22.0 20.0 - 31.0 mmol/L    Calcium 8.9 8.7 - 10.4 mg/dL    Total Protein 6.5 5.7 - 8.2 g/dL    Albumin 4.31 3.20 - 4.80 g/dL    ALT (SGPT) 24 7 - 40 U/L    AST (SGOT) 33 0 - 33 U/L    Alkaline Phosphatase 51 25 - 100 U/L    Total Bilirubin 0.7 0.3 - 1.2 mg/dL    eGFR Non African Amer 64 >60 mL/min/1.73    Globulin 2.2 gm/dL    A/G Ratio 2.0 1.5 - 2.5 g/dL    BUN/Creatinine Ratio 12.5 7.0 - 25.0    Anion Gap 5.0 3.0 - 11.0 mmol/L   Urinalysis With Culture If Indicated - Urine, Clean Catch   Result Value Ref Range    Color, UA Yellow Yellow, Straw    Appearance, UA Clear Clear    pH, UA 6.5 5.0 - 8.0    Specific Gravity, UA 1.022 1.001 - 1.030    Glucose, UA Negative Negative    Ketones, UA 40 mg/dL (2+) (A) Negative    Bilirubin, UA Negative Negative    Blood, UA Large (3+) (A) Negative    Protein,  mg/dL (2+) (A) Negative    Leuk Esterase, UA Moderate (2+) (A) Negative    Nitrite, UA Negative Negative    Urobilinogen, UA 0.2 E.U./dL 0.2 - 1.0 E.U./dL   CBC Auto Differential   Result Value Ref Range    WBC 4.71 3.50 - 10.80 10*3/mm3    RBC 4.08 (L) 4.20 - 5.76 10*6/mm3    Hemoglobin 12.6 (L) 13.1 - 17.5 g/dL    Hematocrit 37.9 (L) 38.9 - 50.9 %    MCV 92.9 80.0 - 99.0 fL    MCH 30.9 27.0 - 31.0 pg    MCHC 33.2 32.0 - 36.0 g/dL    RDW 14.0 11.3 - 14.5 %    RDW-SD 47.8 37.0 - 54.0 fl    MPV 10.4 6.0 - 12.0 fL    Platelets 256 150 - 450 10*3/mm3    Neutrophil % 62.1 41.0 - 71.0 %    Lymphocyte % 19.1 (L) 24.0 - 44.0 %    Monocyte % 16.3 (H) 0.0 - 12.0 %    Eosinophil % 2.1 0.0 - 3.0 %    Basophil % 0.4 0.0 - 1.0 %    Immature Grans % 0.2 0.0 - 0.6 %    Neutrophils, Absolute 2.92 1.50 - 8.30 10*3/mm3    Lymphocytes, Absolute 0.90 0.60 - 4.80 10*3/mm3    Monocytes, Absolute 0.77 0.00 - 1.00 10*3/mm3    Eosinophils, Absolute 0.10 0.00 - 0.30 10*3/mm3    Basophils, Absolute 0.02 0.00 - 0.20  10*3/mm3    Immature Grans, Absolute 0.01 0.00 - 0.03 10*3/mm3   Urinalysis, Microscopic Only - Urine, Clean Catch   Result Value Ref Range    RBC, UA Too Numerous to Count (A) None Seen, 0-2 /HPF    WBC, UA 31-50 (A) None Seen, 0-2 /HPF    Bacteria, UA None Seen None Seen, Trace /HPF    Squamous Epithelial Cells, UA None Seen None Seen, 0-2 /HPF    Hyaline Casts, UA 7-12 0 - 6 /LPF    Methodology Automated Microscopy          Assessment & Plan  #1 acute urinary retention: This is despite several years of Proscar and Flomax so at this point he is going to need TURP.    #2 urinary tract infection secondary to resistant organism: He'll need parenteral antibiotics so he is admitted to the hospital service.  Hopefully he can have a PICC line and have part of this therapy outpatient.  He will subsequently need surgery for his urinary retention.

## 2018-09-26 VITALS
WEIGHT: 191.4 LBS | RESPIRATION RATE: 18 BRPM | DIASTOLIC BLOOD PRESSURE: 72 MMHG | TEMPERATURE: 98.1 F | HEIGHT: 70 IN | SYSTOLIC BLOOD PRESSURE: 116 MMHG | HEART RATE: 62 BPM | BODY MASS INDEX: 27.4 KG/M2 | OXYGEN SATURATION: 98 %

## 2018-09-26 PROBLEM — I65.21 STENOSIS OF RIGHT CAROTID ARTERY: Status: ACTIVE | Noted: 2018-09-26

## 2018-09-26 LAB
ANION GAP SERPL CALCULATED.3IONS-SCNC: 13 MMOL/L (ref 10–20)
BASOPHILS # BLD AUTO: 0.05 10*3/MM3 (ref 0–0.2)
BASOPHILS NFR BLD AUTO: 1.3 % (ref 0–2.5)
BUN BLD-MCNC: 12 MG/DL (ref 7–20)
BUN/CREAT SERPL: 12 (ref 6.3–21.9)
CALCIUM SPEC-SCNC: 9 MG/DL (ref 8.4–10.2)
CHLORIDE SERPL-SCNC: 111 MMOL/L (ref 98–107)
CO2 SERPL-SCNC: 20 MMOL/L (ref 26–30)
CREAT BLD-MCNC: 1 MG/DL (ref 0.6–1.3)
DEPRECATED RDW RBC AUTO: 46.6 FL (ref 37–54)
EOSINOPHIL # BLD AUTO: 0.29 10*3/MM3 (ref 0–0.7)
EOSINOPHIL NFR BLD AUTO: 7.5 % (ref 0–7)
ERYTHROCYTE [DISTWIDTH] IN BLOOD BY AUTOMATED COUNT: 13.7 % (ref 11.5–14.5)
GFR SERPL CREATININE-BSD FRML MDRD: 73 ML/MIN/1.73
GLUCOSE BLD-MCNC: 101 MG/DL (ref 74–98)
GLUCOSE BLDC GLUCOMTR-MCNC: 92 MG/DL (ref 70–130)
HCT VFR BLD AUTO: 39.1 % (ref 42–52)
HGB BLD-MCNC: 13.2 G/DL (ref 14–18)
IMM GRANULOCYTES # BLD: 0.03 10*3/MM3 (ref 0–0.06)
IMM GRANULOCYTES NFR BLD: 0.8 % (ref 0–0.6)
LYMPHOCYTES # BLD AUTO: 1.11 10*3/MM3 (ref 0.6–3.4)
LYMPHOCYTES NFR BLD AUTO: 28.6 % (ref 10–50)
MCH RBC QN AUTO: 31.4 PG (ref 27–31)
MCHC RBC AUTO-ENTMCNC: 33.8 G/DL (ref 30–37)
MCV RBC AUTO: 92.9 FL (ref 80–94)
MONOCYTES # BLD AUTO: 0.68 10*3/MM3 (ref 0–0.9)
MONOCYTES NFR BLD AUTO: 17.5 % (ref 0–12)
NEUTROPHILS # BLD AUTO: 1.72 10*3/MM3 (ref 2–6.9)
NEUTROPHILS NFR BLD AUTO: 44.3 % (ref 37–80)
NRBC BLD MANUAL-RTO: 0 /100 WBC (ref 0–0)
PLATELET # BLD AUTO: 246 10*3/MM3 (ref 130–400)
PMV BLD AUTO: 10.6 FL (ref 6–12)
POTASSIUM BLD-SCNC: 4 MMOL/L (ref 3.5–5.1)
RBC # BLD AUTO: 4.21 10*6/MM3 (ref 4.7–6.1)
SODIUM BLD-SCNC: 140 MMOL/L (ref 137–145)
WBC NRBC COR # BLD: 3.88 10*3/MM3 (ref 4.8–10.8)

## 2018-09-26 PROCEDURE — G0378 HOSPITAL OBSERVATION PER HR: HCPCS

## 2018-09-26 PROCEDURE — C1751 CATH, INF, PER/CENT/MIDLINE: HCPCS

## 2018-09-26 PROCEDURE — 96366 THER/PROPH/DIAG IV INF ADDON: CPT

## 2018-09-26 PROCEDURE — 25010000002 CEFEPIME PER 500 MG: Performed by: NURSE PRACTITIONER

## 2018-09-26 PROCEDURE — C1894 INTRO/SHEATH, NON-LASER: HCPCS

## 2018-09-26 PROCEDURE — 80048 BASIC METABOLIC PNL TOTAL CA: CPT | Performed by: NURSE PRACTITIONER

## 2018-09-26 PROCEDURE — 82962 GLUCOSE BLOOD TEST: CPT

## 2018-09-26 PROCEDURE — 99217 PR OBSERVATION CARE DISCHARGE MANAGEMENT: CPT | Performed by: NURSE PRACTITIONER

## 2018-09-26 PROCEDURE — 85025 COMPLETE CBC W/AUTO DIFF WBC: CPT | Performed by: NURSE PRACTITIONER

## 2018-09-26 RX ORDER — ROSUVASTATIN CALCIUM 20 MG/1
20 TABLET, COATED ORAL DAILY
Qty: 30 TABLET | Refills: 0 | Status: SHIPPED | OUTPATIENT
Start: 2018-09-26 | End: 2018-09-26 | Stop reason: HOSPADM

## 2018-09-26 RX ORDER — SODIUM CHLORIDE 0.9 % (FLUSH) 0.9 %
20 SYRINGE (ML) INJECTION AS NEEDED
Status: DISCONTINUED | OUTPATIENT
Start: 2018-09-26 | End: 2018-09-26 | Stop reason: HOSPADM

## 2018-09-26 RX ORDER — SODIUM CHLORIDE 0.9 % (FLUSH) 0.9 %
10 SYRINGE (ML) INJECTION AS NEEDED
Status: DISCONTINUED | OUTPATIENT
Start: 2018-09-26 | End: 2018-09-26 | Stop reason: HOSPADM

## 2018-09-26 RX ADMIN — TAMSULOSIN HYDROCHLORIDE 0.4 MG: 0.4 CAPSULE ORAL at 12:37

## 2018-09-26 RX ADMIN — ASPIRIN 81 MG 81 MG: 81 TABLET ORAL at 09:02

## 2018-09-26 RX ADMIN — CEFEPIME HYDROCHLORIDE 2 G: 2 INJECTION, SOLUTION INTRAVENOUS at 16:09

## 2018-09-26 RX ADMIN — CEFEPIME HYDROCHLORIDE 2 G: 2 INJECTION, SOLUTION INTRAVENOUS at 01:30

## 2018-09-26 RX ADMIN — FAMOTIDINE 40 MG: 20 TABLET, FILM COATED ORAL at 09:02

## 2018-09-26 RX ADMIN — FINASTERIDE 5 MG: 5 TABLET, FILM COATED ORAL at 09:02

## 2018-09-27 ENCOUNTER — TELEPHONE (OUTPATIENT)
Dept: INTERNAL MEDICINE | Facility: CLINIC | Age: 73
End: 2018-09-27

## 2018-09-27 ENCOUNTER — READMISSION MANAGEMENT (OUTPATIENT)
Dept: CALL CENTER | Facility: HOSPITAL | Age: 73
End: 2018-09-27

## 2018-09-27 LAB
BACTERIA SPEC AEROBE CULT: NORMAL
BACTERIA SPEC AEROBE CULT: NORMAL

## 2018-09-27 NOTE — TELEPHONE ENCOUNTER
I called the patient for hospital follow-up; ineligible for TCM call/MARTI visit due to observation admit status.    HH visited this AM for 1st ATB infusion via PICC. No issues.  He will infuse himself this evening, but has HH # to call w/ questions.  Eating/drinking/voiding ok.  Denies questions re meds or DC instructions. Confirms Dr Santo appt 10/1 and Dr Muse PCP f/up on 10/3.

## 2018-09-27 NOTE — OUTREACH NOTE
Prep Survey      Responses   Facility patient discharged from?  [facility]   Is patient eligible?  Yes   Discharge diagnosis  BPH (benign prostatic hyperplasia   Does the patient have one of the following disease processes/diagnoses(primary or secondary)?  Other   Does the patient have Home health ordered?  Yes   What is the Home health agency?   [facility]/   Is there a DME ordered?  No   Prep survey completed?  Yes          Alice Barnhart RN

## 2018-09-29 ENCOUNTER — READMISSION MANAGEMENT (OUTPATIENT)
Dept: CALL CENTER | Facility: HOSPITAL | Age: 73
End: 2018-09-29

## 2018-09-29 NOTE — OUTREACH NOTE
Medical Week 1 Survey      Responses   Facility patient discharged from?  [facility]   Does the patient have one of the following disease processes/diagnoses(primary or secondary)?  Other   Is there a successful TCM telephone encounter documented?  No   Week 1 attempt successful?  No   Unsuccessful attempts  Attempt 1          Lakeisha Barcenas RN

## 2018-10-01 ENCOUNTER — READMISSION MANAGEMENT (OUTPATIENT)
Dept: CALL CENTER | Facility: HOSPITAL | Age: 73
End: 2018-10-01

## 2018-10-01 ENCOUNTER — PROCEDURE VISIT (OUTPATIENT)
Dept: UROLOGY | Facility: CLINIC | Age: 73
End: 2018-10-01

## 2018-10-01 ENCOUNTER — TELEPHONE (OUTPATIENT)
Dept: INTERNAL MEDICINE | Facility: CLINIC | Age: 73
End: 2018-10-01

## 2018-10-01 ENCOUNTER — APPOINTMENT (OUTPATIENT)
Dept: PREADMISSION TESTING | Facility: HOSPITAL | Age: 73
End: 2018-10-01

## 2018-10-01 VITALS — HEIGHT: 70 IN | WEIGHT: 191 LBS | BODY MASS INDEX: 27.35 KG/M2

## 2018-10-01 DIAGNOSIS — I25.10 CORONARY ARTERY DISEASE INVOLVING NATIVE CORONARY ARTERY OF NATIVE HEART WITHOUT ANGINA PECTORIS: Chronic | ICD-10-CM

## 2018-10-01 DIAGNOSIS — N13.8 BENIGN PROSTATIC HYPERPLASIA WITH URINARY OBSTRUCTION: Primary | Chronic | ICD-10-CM

## 2018-10-01 DIAGNOSIS — I50.32 CHRONIC DIASTOLIC CONGESTIVE HEART FAILURE (HCC): ICD-10-CM

## 2018-10-01 DIAGNOSIS — I49.5 SICK SINUS SYNDROME (HCC): ICD-10-CM

## 2018-10-01 DIAGNOSIS — N32.89 BLADDER MASS: ICD-10-CM

## 2018-10-01 DIAGNOSIS — R33.9 URINARY RETENTION: ICD-10-CM

## 2018-10-01 DIAGNOSIS — N40.1 BENIGN PROSTATIC HYPERPLASIA WITH URINARY OBSTRUCTION: Primary | Chronic | ICD-10-CM

## 2018-10-01 PROCEDURE — 99214 OFFICE O/P EST MOD 30 MIN: CPT | Performed by: UROLOGY

## 2018-10-01 PROCEDURE — 51798 US URINE CAPACITY MEASURE: CPT | Performed by: UROLOGY

## 2018-10-01 PROCEDURE — 52000 CYSTOURETHROSCOPY: CPT | Performed by: UROLOGY

## 2018-10-01 PROCEDURE — 51741 ELECTRO-UROFLOWMETRY FIRST: CPT | Performed by: UROLOGY

## 2018-10-01 RX ORDER — SODIUM CHLORIDE 9 MG/ML
100 INJECTION, SOLUTION INTRAVENOUS CONTINUOUS
Status: CANCELLED | OUTPATIENT
Start: 2018-10-01

## 2018-10-01 NOTE — PROGRESS NOTES
Chief Complaint  Urinary retention  Clinical  BPH      HPI  Mr. Meier is a 73 y.o. male with history of coronary artery disease and carotid stenosis who presents with multiple episodes of urinary retention and urinary tract infections.  He has been previously managed by my partner, Dr. Hollins.  Per his previous note, He has failed several voiding trials and on his last visit to the ER a Sauer was inserted and they obtained greater than 1 L of urine.  He also had a urine culture done that is growing out Pseudomonas resistant to all oral antibiotics.  He's known have about a 38 g prostate on TRUS.     He was seen last week in the clinic then admitted for IV antibiotics, cefepime. A PICC line was placed in the hospital, and he underwent cardiac evaluation and clearance in addition to neurological clearance for his carotid stenosis.  He was cleared for surgery on Wednesday and has IV antibiotics until then.  His carotid stenosis is managed with a baby aspirin.  Is coronary artery disease and heart failure is managed with aspirin and a pacemaker.  His BPH has been managed in the past with maximal medical therapy with tamsulosin and finasteride.     He feels well today and denies fevers or gross hematuria.  He has a very distant history of smoking for about 10 years, but this was greater than 20 years ago.  He has had a Sauer catheter in for about 6 weeks now.     Past Medical History  Past Medical History:   Diagnosis Date   • Allergic 25yrs.    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   • BPH (benign prostatic hypertrophy)    • CAD (coronary artery disease) 3/6/2017   • Cancer (CMS/HCC) July 2012    none   • Cardiac arrhythmia    • Chronic diastolic congestive heart failure (CMS/HCC)    • Colon polyp    • Diverticulosis    • Essential hypertension 3/14/2018   • GERD (gastroesophageal reflux  disease)    • History of blood transfusion    • History of colonoscopy 2015    Complete   • History of esophagogastroduodenoscopy 2015    Diagnostic   • HL (hearing loss)    • Infection of kidney    • Iron deficiency    • Obesity    • Visual impairment !(97    Reading Glasses       Past Surgical History  Past Surgical History:   Procedure Laterality Date   • CARDIAC ABLATION  03/2012   • CARDIAC CATHETERIZATION  2004   • CARDIAC PACEMAKER PLACEMENT  2004   • COLON SURGERY  2012   • COLONOSCOPY  July 2015   • HEMORRHOIDECTOMY  1970   • HERNIA REPAIR      X 5   • INGUINAL HERNIA REPAIR     • LYMPH NODE BIOPSY  7-2-2012    large intestine lymph nodes       Medications    Current Outpatient Prescriptions:   •  aspirin 81 MG tablet, Take  by mouth Daily., Disp: , Rfl:   •  cefepime-dextrose (MAXIPIME) 2 GM/50ML IVPB, Infuse 50 mL into a venous catheter Every 12 (Twelve) Hours for 13 doses., Disp: 650 mL, Rfl: 0  •  cholecalciferol (VITAMIN D3) 1000 units tablet, Take 1,000 Units by mouth Daily., Disp: , Rfl:   •  eszopiclone (LUNESTA) 1 MG tablet, Take 1 tablet by mouth Every Night. Take immediately before bedtime. Can increase to 2 mg if 1 mg not effective, Disp: 45 tablet, Rfl: 2  •  finasteride (PROSCAR) 5 MG tablet, Take 1 tablet by mouth Daily., Disp: 90 tablet, Rfl: 3  •  furosemide (LASIX) 40 MG tablet, Take 1 tablet by mouth Daily. 40 mg po daily X 5 days and then decrease to prn daily, Disp: 90 tablet, Rfl: 0  •  losartan (COZAAR) 25 MG tablet, Take 1 tablet by mouth Daily., Disp: 90 tablet, Rfl: 3  •  Melatonin 3 MG tablet dispersible, Take 3 mg by mouth Every Night., Disp: , Rfl:   •  pantoprazole (PROTONIX) 20 MG EC tablet, 1 po in the am 30 minutes before breakfast., Disp: 90 tablet, Rfl: 3  •  potassium chloride (K-DUR,KLOR-CON) 20 MEQ CR tablet, Take 1 tablet by mouth Daily., Disp: 30 tablet, Rfl: 11  •  tamsulosin (FLOMAX) 0.4 MG capsule 24 hr capsule, Take 1 capsule by mouth Daily., Disp: 90 capsule,  Rfl: 3  •  vitamin B-12 (CYANOCOBALAMIN) 500 MCG tablet, Take 500 mcg by mouth Daily., Disp: , Rfl:     Allergies  Allergies   Allergen Reactions   • Ciprofloxacin Diarrhea       Social History  Social History     Social History   • Marital status: Single     Spouse name: N/A   • Number of children: N/A   • Years of education: N/A     Occupational History   • Not on file.     Social History Main Topics   • Smoking status: Former Smoker     Packs/day: 1.00     Years: 10.00     Types: Cigarettes     Start date: 1963     Quit date:    • Smokeless tobacco: Never Used   • Alcohol use No   • Drug use: No   • Sexual activity: Not Currently     Partners: Female     Other Topics Concern   • Not on file     Social History Narrative    Caffeine: 2 servings per day    Patient lives at his home alone       Family History  He has no family history of prostate, bladder or kidney cancer  Family History   Problem Relation Age of Onset   • Lung cancer Mother    • Osteoporosis Mother    • Thyroid disease Mother         mother   • Cancer Mother         Lung Cancer   • Early death Mother         46 yrs.   • Hearing loss Mother         mother   • Vision loss Mother         mother   • Heart disease Mother    • Cancer Father         Prostate Cancer   • Heart disease Father         Pacemaker   • Vision loss Father    • Heart disease Sister    • Heart failure Brother    • Heart disease Brother    • Cancer Sister         Breast Cancer   • Vision loss Brother    • Heart disease Brother    • Arrhythmia Brother    • Heart failure Brother    • Early death Sister          Around 40yrs.   • Early death Maternal Grandmother         Took Mother off.   • Heart disease Maternal Grandmother        Review of Systems  Constitutional: No fevers or chills  Skin: Negative for rash  Endocrine: No heat/cold intolerance   Cardiovascular: Negative for chest pain or dyspnea on exertion  Respiratory: Negative for shortness of breath or  "wheezing  Gastrointestinal: No constipation, nausea or vomiting  Genitourinary: Negative for new lower urinary tract symptoms, current gross hematuria or dysuria.  Musculoskeletal: No flank pain  Neurological:  Negative for frequent headaches or dizziness  Lymph/Heme: Negative for leg swelling or calf pain.    A full 10 pt ROS was performed and is otherwise negative.     Physical Exam  Visit Vitals  Ht 177.8 cm (70\")   Wt 86.6 kg (191 lb)   BMI 27.41 kg/m²     Constitutional: NAD, WDWN.   HEENT: NCAT. Conjunctivae normal.  MMM.    Cardiovascular: Regular rate.  Pulmonary/Chest: Respirations are even and non-labored bilaterally.  Abdominal: Soft. No distension, tenderness, masses or guarding. No CVA tenderness.  Neurological: A + O x 3.  Cranial Nerves II-XII grossly intact. Normal gait.  Extremities: LUZ ELENA x 4, Warm. No clubbing.  No cyanosis.    Skin: Pink, warm and dry.  No rashes noted.  Psychiatric:  Normal mood and affect  Genitourinary  Penis: circumcised penis, glans normal, no penile discharge.  No rashes/lesions.    Testes: descended bilaterally, no masses, nontender to palpation. Remainder of scrotal contents normal. No hernia appreciated.    Labs  Lab Results   Component Value Date    PSA 0.514 07/02/2018    PSA 0.540 07/03/2017    PSA 0.500 11/10/2016       Lab Results   Component Value Date    GLUCOSE 101 (H) 09/26/2018    CALCIUM 9.0 09/26/2018     09/26/2018    K 4.0 09/26/2018    CO2 20.0 (L) 09/26/2018     (H) 09/26/2018    BUN 12 09/26/2018    CREATININE 1.00 09/26/2018    EGFRIFAFRI 79 03/14/2018    EGFRIFNONA 73 09/26/2018    BCR 12.0 09/26/2018    ANIONGAP 13.0 09/26/2018       Lab Results   Component Value Date    WBC 3.88 (L) 09/26/2018    HGB 13.2 (L) 09/26/2018    HCT 39.1 (L) 09/26/2018    MCV 92.9 09/26/2018     09/26/2018     Brief Urine Lab Results  (Last result in the past 365 days)      Color   Clarity   Blood   Leuk Est   Nitrite   Protein   CREAT   Urine HCG        " "09/22/18 0511 Yellow Clear Large (3+)(A) Moderate (2+)(A) Negative 100 mg/dL (2+)(A)                Urine Culture >100,000 CFU/mL Pseudomonas aeruginosa           Resulting Agency: [facility]   Susceptibility      Pseudomonas aeruginosa     CASSIUS     Aztreonam <=8 ug/ml\"><=8 ug/ml Susceptible     Cefepime <=8 ug/ml\"><=8 ug/ml Susceptible     Ceftazidime <=1 ug/ml\"><=1 ug/ml Susceptible     Gentamicin <=4 ug/ml\"><=4 ug/ml Susceptible     Levofloxacin >4 ug/ml Resistant     Meropenem <=1 ug/ml\"><=1 ug/ml Susceptible     Piperacillin + Tazobactam <=16 ug/ml\"><=16 ug/ml Susceptible     Tobramycin <=4 ug/ml\"><=4 ug/ml Susceptible               Radiologic Studies  Ct Head Without Contrast    Result Date: 9/25/2018  Impression: No acute intracranial process.      991.18 mGy.cm     This study was performed with techniques to keep radiation doses as low as reasonably achievable (ALARA). Individualized dose reduction techniques using automated exposure control or adjustment of mA and/or kV according to the patient size were employed.  This report was finalized on 9/25/2018 3:13 PM by Rigoberto Schofield M.D..    Us Carotid Bilateral    Result Date: 9/25/2018  Impression: Moderate plaque in the right carotid bulb and proximal ICA producing a 50-69% stenosis.  This report was finalized on 9/25/2018 3:14 PM by Rigoberto Schofield M.D..    I have personally reviewed his labs and imaging    Preprocedure diagnosis  Urinary retention    Postprocedure diagnosis  Urinary retention  Clinical BPH  Bladder mass    Procedure  Flexible Cystourethroscopy    Attending surgeon  Bryce Santo MD    Anesthesia  2% lidocaine jelly intraurethrally    Complications  None    Indications  73 y.o. male undergoing a flexible cystoscopy for the above mentioned indications.  Informed consent was obtained.      Findings  Cystoscopic findings included one right and left ureteral orifice in the normal anatomic position with normal bladder mucosa and no " tumors, masses or stones. The urethral urothelium was within normal limits with no strictures.  There was not a prominent median lobe.  The lateral lobes were obstructive in appearance.  There was an area of erythematous frondular tissue on the posterior wall of the bladder, which looked most likely like irritation from the Sauer catheter.  There was a possibility that had had a papillary nature and could be a bladder tumor.    Procedure  The patient was placed in supine position and prepped and draped in sterile fashion with lidocaine jelly per urethra for anesthesia.  A timeout was performed.  The 14F flexible cystoscope was lubricated and gently placed through the penile urethra and into the bladder.  The bladder was completely visualized.  The cystoscope was retroflexed and the bladder neck and prostate visualized.  The cystoscope was slowly withdrawn while visualizing the urethra and the procedure terminated.  The patient tolerated the procedure well.      Uroflow:  Peak flow rate - 6.1  Average flow rate - 2.9  Flow curve - low / flat jagged  Voided volume: 100  PVR performed by staff - 295    I personally reviewed  and interpreted this study.       Assessment  Mr. Meier is a 73 y.o. male who presents with multiple episodes of urinary retention.  He has had full cardiac and neurologic workup for his CAD and carotid stenosis while recently hospitalized for IV antibiotics for a Pseudomonas UTI.  He is currently still on antibiotics today.  His cystoscopy today clearly showed an occlusive prostate with lateral lobes and a medium length urethra.  There is an area on the posterior wall of his bladder that is most likely inflammation, but does look a little suspicious for a bladder tumor.     We had an informed discussion about the procedure, TURP, and the procedure to biopsy his bladder mass.  I informed her of the risks, benefits, and alternatives to bladder outlet procedures.  He wished to proceed.    Plan  1.   Repeat urine culture and cytology today  2.  Replace Sauer catheter  3.  Schedule for TURP and TURBT on Wednesday, 10/3/2018; continue IV antibiotics until that time.  We will likely admit him overnight for CBI and try and get his catheter out in the morning      Bryce Santo MD

## 2018-10-01 NOTE — TELEPHONE ENCOUNTER
I admitted Saad Meier on Thursday of last week to home health services. He is receiving antibiotics in home and I will d/c picc line when complete, I plan on seeing for awhile due to upcoming TURP.    Thanks,  Ana

## 2018-10-01 NOTE — OUTREACH NOTE
Medical Week 1 Survey      Responses   Facility patient discharged from?  [facility]   Does the patient have one of the following disease processes/diagnoses(primary or secondary)?  Other   Is there a successful TCM telephone encounter documented?  No   Week 1 attempt successful?  Yes   Call start time  1317   Call end time  1322   Discharge diagnosis  BPH (benign prostatic hyperplasia   Is patient permission given to speak with other caregiver?  No   Meds reviewed with patient/caregiver?  Yes   Is the patient having any side effects they believe may be caused by any medication additions or changes?  No   Does the patient have all medications ordered at discharge?  Yes   Is the patient taking all medications as directed (includes completed medication regime)?  Yes   Comments regarding appointments  Patient scheduled for TURP surgery Wednesday, 10/3/18.    Does the patient have a primary care provider?   Yes   Does the patient have an appointment with their PCP within 7 days of discharge?  Yes   Comments regarding PCP  Dr Evelyn Muse    Has the patient kept scheduled appointments due by today?  Yes   What is the Home health agency?   [facility]   Has home health visited the patient within 72 hours of discharge?  Yes   Psychosocial issues?  No   Did the patient receive a copy of their discharge instructions?  Yes   What is the patient's perception of their health status since discharge?  Same   Is the patient/caregiver able to teach back signs and symptoms related to disease process for when to call PCP?  Yes   Is the patient/caregiver able to teach back signs and symptoms related to disease process for when to call 911?  Yes   Is the patient/caregiver able to teach back the hierarchy of who to call/visit for symptoms/problems? PCP, Specialist, Home health nurse, Urgent Care, ED, 911  Yes   Additional teach back comments  Patient states that he is doing fine with IV antibiotics per PICC line. Patient  scheduled for surgery 10/3/18.    Week 1 call completed?  Yes          Nichole Garces RN

## 2018-10-02 NOTE — NURSING NOTE
8855 SPOKE WITH MORENO RAMIREZ, INFECTION CONTROL NURSE, OF PT HAVING PSEUDOMONAS IN URINE AND BEING TREATED WITH ANTIBIOTICS. MORENO SAID THIS IS NOT A MULTIDRUG RESISTANT ORGANISM SO PT IS OK FOR SURGERY AS SCHEDULED.

## 2018-10-03 ENCOUNTER — ANESTHESIA EVENT (OUTPATIENT)
Dept: PERIOP | Facility: HOSPITAL | Age: 73
End: 2018-10-03

## 2018-10-03 ENCOUNTER — HOSPITAL ENCOUNTER (OUTPATIENT)
Facility: HOSPITAL | Age: 73
Discharge: HOME | End: 2018-10-04
Attending: UROLOGY | Admitting: UROLOGY

## 2018-10-03 ENCOUNTER — ANESTHESIA (OUTPATIENT)
Dept: PERIOP | Facility: HOSPITAL | Age: 73
End: 2018-10-03

## 2018-10-03 ENCOUNTER — READMISSION MANAGEMENT (OUTPATIENT)
Dept: CALL CENTER | Facility: HOSPITAL | Age: 73
End: 2018-10-03

## 2018-10-03 DIAGNOSIS — N40.1 BENIGN PROSTATIC HYPERPLASIA WITH URINARY OBSTRUCTION: ICD-10-CM

## 2018-10-03 DIAGNOSIS — N13.8 BENIGN PROSTATIC HYPERPLASIA WITH URINARY OBSTRUCTION: ICD-10-CM

## 2018-10-03 LAB
BACTERIA UR CULT: NO GROWTH
BACTERIA UR CULT: NORMAL

## 2018-10-03 PROCEDURE — A9270 NON-COVERED ITEM OR SERVICE: HCPCS | Performed by: UROLOGY

## 2018-10-03 PROCEDURE — 25010000002 DEXAMETHASONE PER 1 MG: Performed by: NURSE ANESTHETIST, CERTIFIED REGISTERED

## 2018-10-03 PROCEDURE — G0378 HOSPITAL OBSERVATION PER HR: HCPCS

## 2018-10-03 PROCEDURE — 63710000001 FINASTERIDE 5 MG TABLET: Performed by: UROLOGY

## 2018-10-03 PROCEDURE — 25010000002 CEFEPIME PER 500 MG: Performed by: UROLOGY

## 2018-10-03 PROCEDURE — 25010000002 ONDANSETRON PER 1 MG: Performed by: NURSE ANESTHETIST, CERTIFIED REGISTERED

## 2018-10-03 PROCEDURE — 25010000002 HYDROMORPHONE PER 4 MG: Performed by: NURSE ANESTHETIST, CERTIFIED REGISTERED

## 2018-10-03 PROCEDURE — 52601 PROSTATECTOMY (TURP): CPT | Performed by: UROLOGY

## 2018-10-03 PROCEDURE — 94762 N-INVAS EAR/PLS OXIMTRY CONT: CPT

## 2018-10-03 PROCEDURE — 25010000002 FENTANYL CITRATE (PF) 250 MCG/5ML SOLUTION: Performed by: NURSE ANESTHETIST, CERTIFIED REGISTERED

## 2018-10-03 PROCEDURE — 63710000001 LOSARTAN 25 MG TABLET: Performed by: UROLOGY

## 2018-10-03 PROCEDURE — 52234 CYSTOSCOPY AND TREATMENT: CPT | Performed by: UROLOGY

## 2018-10-03 PROCEDURE — 25010000002 PROPOFOL 200 MG/20ML EMULSION: Performed by: NURSE ANESTHETIST, CERTIFIED REGISTERED

## 2018-10-03 PROCEDURE — 63710000001 ASPIRIN 81 MG CHEWABLE TABLET: Performed by: UROLOGY

## 2018-10-03 PROCEDURE — 94799 UNLISTED PULMONARY SVC/PX: CPT

## 2018-10-03 RX ORDER — FINASTERIDE 5 MG/1
5 TABLET, FILM COATED ORAL NIGHTLY
Status: DISCONTINUED | OUTPATIENT
Start: 2018-10-03 | End: 2018-10-04 | Stop reason: HOSPADM

## 2018-10-03 RX ORDER — PROMETHAZINE HYDROCHLORIDE 25 MG/ML
6.25 INJECTION, SOLUTION INTRAMUSCULAR; INTRAVENOUS ONCE AS NEEDED
Status: DISCONTINUED | OUTPATIENT
Start: 2018-10-03 | End: 2018-10-03 | Stop reason: HOSPADM

## 2018-10-03 RX ORDER — FENTANYL CITRATE 50 UG/ML
INJECTION, SOLUTION INTRAMUSCULAR; INTRAVENOUS AS NEEDED
Status: DISCONTINUED | OUTPATIENT
Start: 2018-10-03 | End: 2018-10-03 | Stop reason: SURG

## 2018-10-03 RX ORDER — BUPIVACAINE HCL/0.9 % NACL/PF 0.125 %
PLASTIC BAG, INJECTION (ML) EPIDURAL AS NEEDED
Status: DISCONTINUED | OUTPATIENT
Start: 2018-10-03 | End: 2018-10-03 | Stop reason: SURG

## 2018-10-03 RX ORDER — DEXTROSE, SODIUM CHLORIDE, AND POTASSIUM CHLORIDE 5; .45; .15 G/100ML; G/100ML; G/100ML
100 INJECTION INTRAVENOUS CONTINUOUS
Status: DISCONTINUED | OUTPATIENT
Start: 2018-10-03 | End: 2018-10-04 | Stop reason: HOSPADM

## 2018-10-03 RX ORDER — TAMSULOSIN HYDROCHLORIDE 0.4 MG/1
0.4 CAPSULE ORAL DAILY
Status: DISCONTINUED | OUTPATIENT
Start: 2018-10-04 | End: 2018-10-04 | Stop reason: HOSPADM

## 2018-10-03 RX ORDER — PROPOFOL 10 MG/ML
INJECTION, EMULSION INTRAVENOUS AS NEEDED
Status: DISCONTINUED | OUTPATIENT
Start: 2018-10-03 | End: 2018-10-03 | Stop reason: SURG

## 2018-10-03 RX ORDER — ONDANSETRON 2 MG/ML
INJECTION INTRAMUSCULAR; INTRAVENOUS AS NEEDED
Status: DISCONTINUED | OUTPATIENT
Start: 2018-10-03 | End: 2018-10-03 | Stop reason: SURG

## 2018-10-03 RX ORDER — ALBUTEROL SULFATE 2.5 MG/3ML
2.5 SOLUTION RESPIRATORY (INHALATION) ONCE AS NEEDED
Status: DISCONTINUED | OUTPATIENT
Start: 2018-10-03 | End: 2018-10-03 | Stop reason: HOSPADM

## 2018-10-03 RX ORDER — ONDANSETRON 2 MG/ML
4 INJECTION INTRAMUSCULAR; INTRAVENOUS ONCE AS NEEDED
Status: DISCONTINUED | OUTPATIENT
Start: 2018-10-03 | End: 2018-10-03 | Stop reason: HOSPADM

## 2018-10-03 RX ORDER — FUROSEMIDE 40 MG/1
40 TABLET ORAL DAILY
Status: DISCONTINUED | OUTPATIENT
Start: 2018-10-04 | End: 2018-10-04 | Stop reason: HOSPADM

## 2018-10-03 RX ORDER — ASPIRIN 81 MG/1
81 TABLET, CHEWABLE ORAL NIGHTLY
Status: DISCONTINUED | OUTPATIENT
Start: 2018-10-03 | End: 2018-10-04 | Stop reason: HOSPADM

## 2018-10-03 RX ORDER — PANTOPRAZOLE SODIUM 40 MG/1
40 TABLET, DELAYED RELEASE ORAL
Status: DISCONTINUED | OUTPATIENT
Start: 2018-10-04 | End: 2018-10-04 | Stop reason: HOSPADM

## 2018-10-03 RX ORDER — MEPERIDINE HYDROCHLORIDE 50 MG/ML
12.5 INJECTION INTRAMUSCULAR; INTRAVENOUS; SUBCUTANEOUS
Status: DISCONTINUED | OUTPATIENT
Start: 2018-10-03 | End: 2018-10-03 | Stop reason: HOSPADM

## 2018-10-03 RX ORDER — LOSARTAN POTASSIUM 25 MG/1
25 TABLET ORAL DAILY
Status: DISCONTINUED | OUTPATIENT
Start: 2018-10-03 | End: 2018-10-04 | Stop reason: HOSPADM

## 2018-10-03 RX ORDER — SODIUM CHLORIDE 0.9 % (FLUSH) 0.9 %
3-10 SYRINGE (ML) INJECTION AS NEEDED
Status: DISCONTINUED | OUTPATIENT
Start: 2018-10-03 | End: 2018-10-04 | Stop reason: HOSPADM

## 2018-10-03 RX ORDER — PROMETHAZINE HYDROCHLORIDE 25 MG/1
25 SUPPOSITORY RECTAL ONCE AS NEEDED
Status: DISCONTINUED | OUTPATIENT
Start: 2018-10-03 | End: 2018-10-03 | Stop reason: HOSPADM

## 2018-10-03 RX ORDER — SODIUM CHLORIDE 9 MG/ML
100 INJECTION, SOLUTION INTRAVENOUS CONTINUOUS
Status: DISCONTINUED | OUTPATIENT
Start: 2018-10-03 | End: 2018-10-04 | Stop reason: HOSPADM

## 2018-10-03 RX ORDER — DEXAMETHASONE SODIUM PHOSPHATE 4 MG/ML
INJECTION, SOLUTION INTRA-ARTICULAR; INTRALESIONAL; INTRAMUSCULAR; INTRAVENOUS; SOFT TISSUE AS NEEDED
Status: DISCONTINUED | OUTPATIENT
Start: 2018-10-03 | End: 2018-10-03 | Stop reason: SURG

## 2018-10-03 RX ORDER — PROMETHAZINE HYDROCHLORIDE 25 MG/1
25 TABLET ORAL ONCE AS NEEDED
Status: DISCONTINUED | OUTPATIENT
Start: 2018-10-03 | End: 2018-10-03 | Stop reason: HOSPADM

## 2018-10-03 RX ORDER — HYDROMORPHONE HCL 110MG/55ML
PATIENT CONTROLLED ANALGESIA SYRINGE INTRAVENOUS AS NEEDED
Status: DISCONTINUED | OUTPATIENT
Start: 2018-10-03 | End: 2018-10-03 | Stop reason: SURG

## 2018-10-03 RX ORDER — SODIUM CHLORIDE 0.9 % (FLUSH) 0.9 %
3 SYRINGE (ML) INJECTION EVERY 12 HOURS SCHEDULED
Status: DISCONTINUED | OUTPATIENT
Start: 2018-10-03 | End: 2018-10-04 | Stop reason: HOSPADM

## 2018-10-03 RX ADMIN — FINASTERIDE 5 MG: 5 TABLET, FILM COATED ORAL at 20:35

## 2018-10-03 RX ADMIN — HYDROMORPHONE HYDROCHLORIDE 0.5 MG: 2 INJECTION, SOLUTION INTRAMUSCULAR; INTRAVENOUS; SUBCUTANEOUS at 15:57

## 2018-10-03 RX ADMIN — FENTANYL CITRATE 100 MCG: 50 INJECTION, SOLUTION INTRAMUSCULAR; INTRAVENOUS at 14:43

## 2018-10-03 RX ADMIN — PROPOFOL 150 MG: 10 INJECTION, EMULSION INTRAVENOUS at 14:43

## 2018-10-03 RX ADMIN — FENTANYL CITRATE 100 MCG: 50 INJECTION, SOLUTION INTRAMUSCULAR; INTRAVENOUS at 14:49

## 2018-10-03 RX ADMIN — Medication 100 MCG: at 15:05

## 2018-10-03 RX ADMIN — ONDANSETRON 4 MG: 2 INJECTION INTRAMUSCULAR; INTRAVENOUS at 14:49

## 2018-10-03 RX ADMIN — SODIUM CHLORIDE: 9 INJECTION, SOLUTION INTRAVENOUS at 16:20

## 2018-10-03 RX ADMIN — Medication 100 MCG: at 14:54

## 2018-10-03 RX ADMIN — DEXAMETHASONE SODIUM PHOSPHATE 4 MG: 4 INJECTION, SOLUTION INTRAMUSCULAR; INTRAVENOUS at 14:49

## 2018-10-03 RX ADMIN — HYDROMORPHONE HYDROCHLORIDE 0.5 MG: 2 INJECTION, SOLUTION INTRAMUSCULAR; INTRAVENOUS; SUBCUTANEOUS at 15:52

## 2018-10-03 RX ADMIN — LOSARTAN POTASSIUM 25 MG: 25 TABLET, FILM COATED ORAL at 18:20

## 2018-10-03 RX ADMIN — CEFEPIME HYDROCHLORIDE 2 G: 2 INJECTION, SOLUTION INTRAVENOUS at 14:38

## 2018-10-03 RX ADMIN — FENTANYL CITRATE 100 MCG: 50 INJECTION, SOLUTION INTRAMUSCULAR; INTRAVENOUS at 15:29

## 2018-10-03 RX ADMIN — POTASSIUM CHLORIDE, DEXTROSE MONOHYDRATE AND SODIUM CHLORIDE 100 ML/HR: 150; 5; 450 INJECTION, SOLUTION INTRAVENOUS at 18:20

## 2018-10-03 RX ADMIN — ASPIRIN 81 MG 81 MG: 81 TABLET ORAL at 20:35

## 2018-10-03 RX ADMIN — SODIUM CHLORIDE 100 ML/HR: 9 INJECTION, SOLUTION INTRAVENOUS at 13:05

## 2018-10-03 NOTE — BRIEF OP NOTE
CYSTOSCOPY TRANSURETHRAL RESECTION OF PROSTATE, CYSTOSCOPY TRANSURETHRAL RESECTION OF BLADDER TUMOR  Progress Note    Saad Meier  10/3/2018    Pre-op Diagnosis:   Benign prostatic hyperplasia with urinary obstruction [N40.1, N13.8]       Post-Op Diagnosis Codes:     * Benign prostatic hyperplasia with urinary obstruction [N40.1, N13.8]    Procedure/CPT® Codes:      Procedure(s):  TRANSURETHRAL RESECTION OF PROSTATE  CYSTOSCOPY TRANSURETHRAL RESECTION OF BLADDER TUMOR, COUDE CATHETER PLACEMENT    Surgeon(s):  Bryce Santo MD    Anesthesia: General    Staff:   Circulator: Charito Caruso, MARIBEL; Barney Mistry, MARIBEL; Josefa Edwards RN  Scrub Person: Yanelis Rawls; Pratik Marquez    Estimated Blood Loss: 0 mL    Urine Voided: 0 mL    Specimens:                ID Type Source Tests Collected by Time   A : posterior wall bladder mass Tissue Urinary Bladder TISSUE PATHOLOGY EXAM Barney Mistry RN 10/3/2018 1503   B : Prostate chips  Tissue Prostate TISSUE PATHOLOGY EXAM Charito Caruso RN 10/3/2018 1611         Drains:   Urethral Catheter Coude 18 Fr. (Active)       Findings: large obstructive prostate. Posterior wall frondular bladder mass    Complications: none      Bryce Santo MD     Date: 10/3/2018  Time: 4:24 PM

## 2018-10-03 NOTE — OP NOTE
Preoperative diagnosis  Clinical BPH (symptoms included acute urinary retention, weak stream, straining, hesitancy, and incomplete emptying)  Badder Mass    Postoperative diagnosis  Clinical BPH (symptoms included acute urinary retention, weak stream, straining, hesitancy, and incomplete emptying)  Bladder Mass    Procedure performed  1.  Rigid cystoscopy  2.  Transurethral resection of prostate with bipolar energy  3.  Transurethral resection of bladder tumor    Attending surgeon  Bryce Santo MD    Anesthesia  General    EBL  20 mL    Complications  None    Specimen  Prostate chips for pathology  Bladder tumor for pathology    Findings  Cystoscopy revealed bilateral ureteral orifices. This area was not involved in the resection. There was a bullous but also frondular erythematous mass on the posterior bladder wall, had some characteristics of a tumor, but could have been inflammation from a chronic indwelling avina catheter.     Prostate Resection proceded and remained proximal to the verumontanum.    Indications  73 y.o. male with history of coronary artery disease and carotid stenosis who presents with multiple episodes of urinary retention and urinary tract infections.  He has been previously managed by my partner, Dr. Hollins, and has been refractory to medical therapy. Informed consent was obtained.      Procedure  The patient was taken to the operating room and placed supine on the operating table. Pre-operative antibiotics were administered.    Bilateral lower extremity SCDs were placed. After induction of general anesthesia the patient was positioned in dorsal lithotomy and his cathter was removed. A time-out was performed and the patient was prepped and draped in a sterile fashion.      A 26 Fr resectoscope was introduced into the bladder under direct vision and a cystoscopy was performed. Resection began of the bladder mass with the bipolar unit and saline irrigation with settings at 2/2. The mass was  not completely resected, merely, sampled. Hemostasis achieved.     The continuous flow resectoscope was then pulled back into the prostatic urethra and we began resection with settings of 3/3, but the prostate was very vascular and bled, so I switched to the half moon button. Using a working element, the prostate was resected and a wide open bladder neck remained. Hemostasis was confirmed using the rbutton. An 18 Fr coude cathter was placed with clear yellow urine output.   The patient tolerated the procedure well, was awakened, extubated and transferred to the recovery room in stable condition.    He will be admitted for overnight observation with void trial in AM.

## 2018-10-03 NOTE — H&P (VIEW-ONLY)
Chief Complaint  Urinary retention  Clinical  BPH      HPI  Mr. Meier is a 73 y.o. male with history of coronary artery disease and carotid stenosis who presents with multiple episodes of urinary retention and urinary tract infections.  He has been previously managed by my partner, Dr. Hollins.  Per his previous note, He has failed several voiding trials and on his last visit to the ER a Sauer was inserted and they obtained greater than 1 L of urine.  He also had a urine culture done that is growing out Pseudomonas resistant to all oral antibiotics.  He's known have about a 38 g prostate on TRUS.     He was seen last week in the clinic then admitted for IV antibiotics, cefepime. A PICC line was placed in the hospital, and he underwent cardiac evaluation and clearance in addition to neurological clearance for his carotid stenosis.  He was cleared for surgery on Wednesday and has IV antibiotics until then.  His carotid stenosis is managed with a baby aspirin.  Is coronary artery disease and heart failure is managed with aspirin and a pacemaker.  His BPH has been managed in the past with maximal medical therapy with tamsulosin and finasteride.     He feels well today and denies fevers or gross hematuria.  He has a very distant history of smoking for about 10 years, but this was greater than 20 years ago.  He has had a Sauer catheter in for about 6 weeks now.     Past Medical History  Past Medical History:   Diagnosis Date   • Allergic 25yrs.    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   • BPH (benign prostatic hypertrophy)    • CAD (coronary artery disease) 3/6/2017   • Cancer (CMS/HCC) July 2012    none   • Cardiac arrhythmia    • Chronic diastolic congestive heart failure (CMS/HCC)    • Colon polyp    • Diverticulosis    • Essential hypertension 3/14/2018   • GERD (gastroesophageal reflux  disease)    • History of blood transfusion    • History of colonoscopy 2015    Complete   • History of esophagogastroduodenoscopy 2015    Diagnostic   • HL (hearing loss)    • Infection of kidney    • Iron deficiency    • Obesity    • Visual impairment !(97    Reading Glasses       Past Surgical History  Past Surgical History:   Procedure Laterality Date   • CARDIAC ABLATION  03/2012   • CARDIAC CATHETERIZATION  2004   • CARDIAC PACEMAKER PLACEMENT  2004   • COLON SURGERY  2012   • COLONOSCOPY  July 2015   • HEMORRHOIDECTOMY  1970   • HERNIA REPAIR      X 5   • INGUINAL HERNIA REPAIR     • LYMPH NODE BIOPSY  7-2-2012    large intestine lymph nodes       Medications    Current Outpatient Prescriptions:   •  aspirin 81 MG tablet, Take  by mouth Daily., Disp: , Rfl:   •  cefepime-dextrose (MAXIPIME) 2 GM/50ML IVPB, Infuse 50 mL into a venous catheter Every 12 (Twelve) Hours for 13 doses., Disp: 650 mL, Rfl: 0  •  cholecalciferol (VITAMIN D3) 1000 units tablet, Take 1,000 Units by mouth Daily., Disp: , Rfl:   •  eszopiclone (LUNESTA) 1 MG tablet, Take 1 tablet by mouth Every Night. Take immediately before bedtime. Can increase to 2 mg if 1 mg not effective, Disp: 45 tablet, Rfl: 2  •  finasteride (PROSCAR) 5 MG tablet, Take 1 tablet by mouth Daily., Disp: 90 tablet, Rfl: 3  •  furosemide (LASIX) 40 MG tablet, Take 1 tablet by mouth Daily. 40 mg po daily X 5 days and then decrease to prn daily, Disp: 90 tablet, Rfl: 0  •  losartan (COZAAR) 25 MG tablet, Take 1 tablet by mouth Daily., Disp: 90 tablet, Rfl: 3  •  Melatonin 3 MG tablet dispersible, Take 3 mg by mouth Every Night., Disp: , Rfl:   •  pantoprazole (PROTONIX) 20 MG EC tablet, 1 po in the am 30 minutes before breakfast., Disp: 90 tablet, Rfl: 3  •  potassium chloride (K-DUR,KLOR-CON) 20 MEQ CR tablet, Take 1 tablet by mouth Daily., Disp: 30 tablet, Rfl: 11  •  tamsulosin (FLOMAX) 0.4 MG capsule 24 hr capsule, Take 1 capsule by mouth Daily., Disp: 90 capsule,  Rfl: 3  •  vitamin B-12 (CYANOCOBALAMIN) 500 MCG tablet, Take 500 mcg by mouth Daily., Disp: , Rfl:     Allergies  Allergies   Allergen Reactions   • Ciprofloxacin Diarrhea       Social History  Social History     Social History   • Marital status: Single     Spouse name: N/A   • Number of children: N/A   • Years of education: N/A     Occupational History   • Not on file.     Social History Main Topics   • Smoking status: Former Smoker     Packs/day: 1.00     Years: 10.00     Types: Cigarettes     Start date: 1963     Quit date:    • Smokeless tobacco: Never Used   • Alcohol use No   • Drug use: No   • Sexual activity: Not Currently     Partners: Female     Other Topics Concern   • Not on file     Social History Narrative    Caffeine: 2 servings per day    Patient lives at his home alone       Family History  He has no family history of prostate, bladder or kidney cancer  Family History   Problem Relation Age of Onset   • Lung cancer Mother    • Osteoporosis Mother    • Thyroid disease Mother         mother   • Cancer Mother         Lung Cancer   • Early death Mother         46 yrs.   • Hearing loss Mother         mother   • Vision loss Mother         mother   • Heart disease Mother    • Cancer Father         Prostate Cancer   • Heart disease Father         Pacemaker   • Vision loss Father    • Heart disease Sister    • Heart failure Brother    • Heart disease Brother    • Cancer Sister         Breast Cancer   • Vision loss Brother    • Heart disease Brother    • Arrhythmia Brother    • Heart failure Brother    • Early death Sister          Around 40yrs.   • Early death Maternal Grandmother         Took Mother off.   • Heart disease Maternal Grandmother        Review of Systems  Constitutional: No fevers or chills  Skin: Negative for rash  Endocrine: No heat/cold intolerance   Cardiovascular: Negative for chest pain or dyspnea on exertion  Respiratory: Negative for shortness of breath or  "wheezing  Gastrointestinal: No constipation, nausea or vomiting  Genitourinary: Negative for new lower urinary tract symptoms, current gross hematuria or dysuria.  Musculoskeletal: No flank pain  Neurological:  Negative for frequent headaches or dizziness  Lymph/Heme: Negative for leg swelling or calf pain.    A full 10 pt ROS was performed and is otherwise negative.     Physical Exam  Visit Vitals  Ht 177.8 cm (70\")   Wt 86.6 kg (191 lb)   BMI 27.41 kg/m²     Constitutional: NAD, WDWN.   HEENT: NCAT. Conjunctivae normal.  MMM.    Cardiovascular: Regular rate.  Pulmonary/Chest: Respirations are even and non-labored bilaterally.  Abdominal: Soft. No distension, tenderness, masses or guarding. No CVA tenderness.  Neurological: A + O x 3.  Cranial Nerves II-XII grossly intact. Normal gait.  Extremities: LUZ ELENA x 4, Warm. No clubbing.  No cyanosis.    Skin: Pink, warm and dry.  No rashes noted.  Psychiatric:  Normal mood and affect  Genitourinary  Penis: circumcised penis, glans normal, no penile discharge.  No rashes/lesions.    Testes: descended bilaterally, no masses, nontender to palpation. Remainder of scrotal contents normal. No hernia appreciated.    Labs  Lab Results   Component Value Date    PSA 0.514 07/02/2018    PSA 0.540 07/03/2017    PSA 0.500 11/10/2016       Lab Results   Component Value Date    GLUCOSE 101 (H) 09/26/2018    CALCIUM 9.0 09/26/2018     09/26/2018    K 4.0 09/26/2018    CO2 20.0 (L) 09/26/2018     (H) 09/26/2018    BUN 12 09/26/2018    CREATININE 1.00 09/26/2018    EGFRIFAFRI 79 03/14/2018    EGFRIFNONA 73 09/26/2018    BCR 12.0 09/26/2018    ANIONGAP 13.0 09/26/2018       Lab Results   Component Value Date    WBC 3.88 (L) 09/26/2018    HGB 13.2 (L) 09/26/2018    HCT 39.1 (L) 09/26/2018    MCV 92.9 09/26/2018     09/26/2018     Brief Urine Lab Results  (Last result in the past 365 days)      Color   Clarity   Blood   Leuk Est   Nitrite   Protein   CREAT   Urine HCG        " "09/22/18 0511 Yellow Clear Large (3+)(A) Moderate (2+)(A) Negative 100 mg/dL (2+)(A)                Urine Culture >100,000 CFU/mL Pseudomonas aeruginosa           Resulting Agency: [facility]   Susceptibility      Pseudomonas aeruginosa     CASSIUS     Aztreonam <=8 ug/ml\"><=8 ug/ml Susceptible     Cefepime <=8 ug/ml\"><=8 ug/ml Susceptible     Ceftazidime <=1 ug/ml\"><=1 ug/ml Susceptible     Gentamicin <=4 ug/ml\"><=4 ug/ml Susceptible     Levofloxacin >4 ug/ml Resistant     Meropenem <=1 ug/ml\"><=1 ug/ml Susceptible     Piperacillin + Tazobactam <=16 ug/ml\"><=16 ug/ml Susceptible     Tobramycin <=4 ug/ml\"><=4 ug/ml Susceptible               Radiologic Studies  Ct Head Without Contrast    Result Date: 9/25/2018  Impression: No acute intracranial process.      991.18 mGy.cm     This study was performed with techniques to keep radiation doses as low as reasonably achievable (ALARA). Individualized dose reduction techniques using automated exposure control or adjustment of mA and/or kV according to the patient size were employed.  This report was finalized on 9/25/2018 3:13 PM by Rigoberto Schofield M.D..    Us Carotid Bilateral    Result Date: 9/25/2018  Impression: Moderate plaque in the right carotid bulb and proximal ICA producing a 50-69% stenosis.  This report was finalized on 9/25/2018 3:14 PM by Rigoberto Schofield M.D..    I have personally reviewed his labs and imaging    Preprocedure diagnosis  Urinary retention    Postprocedure diagnosis  Urinary retention  Clinical BPH  Bladder mass    Procedure  Flexible Cystourethroscopy    Attending surgeon  Bryce Santo MD    Anesthesia  2% lidocaine jelly intraurethrally    Complications  None    Indications  73 y.o. male undergoing a flexible cystoscopy for the above mentioned indications.  Informed consent was obtained.      Findings  Cystoscopic findings included one right and left ureteral orifice in the normal anatomic position with normal bladder mucosa and no " tumors, masses or stones. The urethral urothelium was within normal limits with no strictures.  There was not a prominent median lobe.  The lateral lobes were obstructive in appearance.  There was an area of erythematous frondular tissue on the posterior wall of the bladder, which looked most likely like irritation from the Sauer catheter.  There was a possibility that had had a papillary nature and could be a bladder tumor.    Procedure  The patient was placed in supine position and prepped and draped in sterile fashion with lidocaine jelly per urethra for anesthesia.  A timeout was performed.  The 14F flexible cystoscope was lubricated and gently placed through the penile urethra and into the bladder.  The bladder was completely visualized.  The cystoscope was retroflexed and the bladder neck and prostate visualized.  The cystoscope was slowly withdrawn while visualizing the urethra and the procedure terminated.  The patient tolerated the procedure well.      Uroflow:  Peak flow rate - 6.1  Average flow rate - 2.9  Flow curve - low / flat jagged  Voided volume: 100  PVR performed by staff - 295    I personally reviewed  and interpreted this study.       Assessment  Mr. Meier is a 73 y.o. male who presents with multiple episodes of urinary retention.  He has had full cardiac and neurologic workup for his CAD and carotid stenosis while recently hospitalized for IV antibiotics for a Pseudomonas UTI.  He is currently still on antibiotics today.  His cystoscopy today clearly showed an occlusive prostate with lateral lobes and a medium length urethra.  There is an area on the posterior wall of his bladder that is most likely inflammation, but does look a little suspicious for a bladder tumor.     We had an informed discussion about the procedure, TURP, and the procedure to biopsy his bladder mass.  I informed her of the risks, benefits, and alternatives to bladder outlet procedures.  He wished to proceed.    Plan  1.   Repeat urine culture and cytology today  2.  Replace Sauer catheter  3.  Schedule for TURP and TURBT on Wednesday, 10/3/2018; continue IV antibiotics until that time.  We will likely admit him overnight for CBI and try and get his catheter out in the morning      Bryce Santo MD

## 2018-10-03 NOTE — OUTREACH NOTE
Medical Week 2 Survey      Responses   Facility patient discharged from?  [facility]   Does the patient have one of the following disease processes/diagnoses(primary or secondary)?  Other   Week 2 attempt successful?  No   Revoke  Readmitted          Viky Lee RN

## 2018-10-03 NOTE — PLAN OF CARE
Problem: Patient Care Overview  Goal: Plan of Care Review  Outcome: Ongoing (interventions implemented as appropriate)   10/03/18 1814   Coping/Psychosocial   Plan of Care Reviewed With patient   Plan of Care Review   Progress no change       Problem: Fall Risk (Adult)  Goal: Absence of Fall  Outcome: Ongoing (interventions implemented as appropriate)      Problem: Urinary Elimination/Continence Impairment (IRF) (Adult)  Goal: Optimal Management of Bladder Program, Effective Elimination  Outcome: Ongoing (interventions implemented as appropriate)

## 2018-10-03 NOTE — ANESTHESIA PREPROCEDURE EVALUATION
Anesthesia Evaluation     Patient summary reviewed and Nursing notes reviewed   NPO Solid Status: > 8 hours  NPO Liquid Status: > 8 hours           Airway   Dental      Pulmonary    (+) asthma,   Cardiovascular   Exercise tolerance: good (4-7 METS)    ECG reviewed    (+) pacemaker pacemaker interrogated 3-6 months ago, hypertension, CAD, CHF, PVD, hyperlipidemia,  carotid artery disease (approx 60%) right carotid      Neuro/Psych  (+) psychiatric history,     GI/Hepatic/Renal/Endo    (+) obesity,  GERD,      Musculoskeletal     Abdominal    Substance History - negative use     OB/GYN negative ob/gyn ROS         Other   (+) arthritis   history of cancer    ROS/Med Hx Other: Rodriguez- sept 2018-no further w/u needed  Echo 2017-  · Left ventricular systolic function is normal. Estimated EF = 60%.  · Left ventricular diastolic dysfunction (grade I a) consistent with impaired relaxation.  · Trace-to-mild aortic valve regurgitation is present  · Mild tricuspid valve regurgitation is present.                Anesthesia Plan    ASA 2     general     intravenous induction   Anesthetic plan, all risks, benefits, and alternatives have been provided, discussed and informed consent has been obtained with: patient.

## 2018-10-03 NOTE — ANESTHESIA PROCEDURE NOTES
Airway  Urgency: elective    Airway not difficult    General Information and Staff    Patient location during procedure: OR  CRNA: PABLO HAYES    Indications and Patient Condition  Indications for airway management: CNS depression    Preoxygenated: yes  Mask difficulty assessment: 1 - vent by mask    Final Airway Details  Final airway type: supraglottic airway      Successful airway: classic  Size 4    Number of attempts at approach: 1

## 2018-10-03 NOTE — ANESTHESIA POSTPROCEDURE EVALUATION
Patient: Saad Meier    Procedure Summary     Date:  10/03/18 Room / Location:  [facility] /  [facility]    Anesthesia Start:  1437 Anesthesia Stop:  1622    Procedures:       TRANSURETHRAL RESECTION OF PROSTATE (N/A )      CYSTOSCOPY TRANSURETHRAL RESECTION OF BLADDER TUMOR, COUDE CATHETER PLACEMENT (N/A ) Diagnosis:       Benign prostatic hyperplasia with urinary obstruction      (Benign prostatic hyperplasia with urinary obstruction [N40.1, N13.8])    Surgeon:  Bryce Santo MD Provider:  Luis Reeves CRNA    Anesthesia Type:  general ASA Status:  2          Anesthesia Type: general  Last vitals  BP   160/78 @ 1627   Temp 98.6   Pulse 66   Resp 10   SpO2 100%     Post Anesthesia Care and Evaluation    Patient location during evaluation: PACU  Patient participation: complete - patient participated  Level of consciousness: awake and sleepy but conscious  Pain score: 0  Pain management: adequate  Airway patency: patent  Anesthetic complications: No anesthetic complications  PONV Status: none  Cardiovascular status: acceptable  Respiratory status: acceptable, oral airway and face mask  Hydration status: acceptable    Comments: Oral airway removed upon arrival in PACU

## 2018-10-04 VITALS
DIASTOLIC BLOOD PRESSURE: 65 MMHG | RESPIRATION RATE: 18 BRPM | OXYGEN SATURATION: 98 % | WEIGHT: 193 LBS | BODY MASS INDEX: 27.63 KG/M2 | TEMPERATURE: 98.1 F | SYSTOLIC BLOOD PRESSURE: 103 MMHG | HEART RATE: 63 BPM | HEIGHT: 70 IN

## 2018-10-04 LAB
ANION GAP SERPL CALCULATED.3IONS-SCNC: 8.3 MMOL/L (ref 10–20)
BUN BLD-MCNC: 11 MG/DL (ref 7–20)
BUN/CREAT SERPL: 15.7 (ref 6.3–21.9)
CALCIUM SPEC-SCNC: 8.5 MG/DL (ref 8.4–10.2)
CHLORIDE SERPL-SCNC: 111 MMOL/L (ref 98–107)
CO2 SERPL-SCNC: 23 MMOL/L (ref 26–30)
CREAT BLD-MCNC: 0.7 MG/DL (ref 0.6–1.3)
GFR SERPL CREATININE-BSD FRML MDRD: 110 ML/MIN/1.73
GLUCOSE BLD-MCNC: 110 MG/DL (ref 74–98)
POTASSIUM BLD-SCNC: 4.3 MMOL/L (ref 3.5–5.1)
SODIUM BLD-SCNC: 138 MMOL/L (ref 137–145)

## 2018-10-04 PROCEDURE — A9270 NON-COVERED ITEM OR SERVICE: HCPCS | Performed by: UROLOGY

## 2018-10-04 PROCEDURE — 63710000001 PANTOPRAZOLE 40 MG TABLET DELAYED-RELEASE: Performed by: UROLOGY

## 2018-10-04 PROCEDURE — 80048 BASIC METABOLIC PNL TOTAL CA: CPT | Performed by: UROLOGY

## 2018-10-04 PROCEDURE — 63710000001 TAMSULOSIN 0.4 MG CAPSULE: Performed by: UROLOGY

## 2018-10-04 PROCEDURE — 25010000002 CEFEPIME PER 500 MG: Performed by: UROLOGY

## 2018-10-04 PROCEDURE — 63710000001 FUROSEMIDE 40 MG TABLET: Performed by: UROLOGY

## 2018-10-04 PROCEDURE — G0378 HOSPITAL OBSERVATION PER HR: HCPCS

## 2018-10-04 RX ADMIN — POTASSIUM CHLORIDE, DEXTROSE MONOHYDRATE AND SODIUM CHLORIDE 100 ML/HR: 150; 5; 450 INJECTION, SOLUTION INTRAVENOUS at 06:05

## 2018-10-04 RX ADMIN — FUROSEMIDE 40 MG: 40 TABLET ORAL at 08:41

## 2018-10-04 RX ADMIN — CEFEPIME HYDROCHLORIDE 2 G: 2 INJECTION, SOLUTION INTRAVENOUS at 02:17

## 2018-10-04 RX ADMIN — TAMSULOSIN HYDROCHLORIDE 0.4 MG: 0.4 CAPSULE ORAL at 08:41

## 2018-10-04 RX ADMIN — PANTOPRAZOLE SODIUM 40 MG: 40 TABLET, DELAYED RELEASE ORAL at 06:05

## 2018-10-04 RX ADMIN — Medication 3 ML: at 08:41

## 2018-10-04 NOTE — DISCHARGE SUMMARY
[facility]   DISCHARGE SUMMARY      Name:  Saad Meier   Age:  73 y.o.  Sex:  male  :  1945  MRN:  [id number]   Visit Number:  [id number]  Primary Care Physician:  Evelyn Muse MD  Date of Discharge:  10/4/2018  Admission Date:  10/3/2018      Discharge Diagnosis:       Active Hospital Problems    Diagnosis Date Noted   • **Benign prostatic hyperplasia with urinary obstruction [N40.1, N13.8] 10/01/2018      Resolved Hospital Problems    Diagnosis Date Noted Date Resolved   No resolved problems to display.         Presenting Problem/History of Present Illness:    Benign prostatic hyperplasia with urinary obstruction [N40.1, N13.8]  Benign prostatic hyperplasia with urinary obstruction [N40.1, N13.8]         Hospital Course:    This is a 73-year-old male who underwent an uncomplicated transurethral resection of bladder tumor and of the prostate.  His urine was clear at the end of the case.  He was admitted overnight from sedation due to age and comorbidities.  He was discharged in stable prep fashion after passing a voiding trial the following morning.  He will follow-up with me in 1 month for uroflow and PVR.    Procedures Performed:    Procedure(s):  TRANSURETHRAL RESECTION OF PROSTATE  CYSTOSCOPY TRANSURETHRAL RESECTION OF BLADDER TUMOR, COUDE CATHETER PLACEMENT       Consults:     Consults     No orders found for last 30 day(s).          Pertinent Test Results:     Lab Results (all)     Procedure Component Value Units Date/Time    Basic Metabolic Panel [[id number]]  (Abnormal) Collected:  10/04/18 0503    Specimen:  Blood Updated:  10/04/18 0613     Glucose 110 (H) mg/dL      BUN 11 mg/dL      Creatinine 0.70 mg/dL      Sodium 138 mmol/L      Potassium 4.3 mmol/L      Chloride 111 (H) mmol/L      CO2 23.0 (L) mmol/L      Calcium 8.5 mg/dL      eGFR Non African Amer 110 mL/min/1.73      BUN/Creatinine Ratio 15.7     Anion Gap 8.3 (L) mmol/L     Narrative:       The  "MDRD GFR formula is only valid for adults with stable renal function between ages 18 and 70.          Imaging Results (all)     None          Condition on Discharge:      Improved    Vital Signs:    /65 (BP Location: Left arm, Patient Position: Lying)   Pulse 63   Temp 98.1 °F (36.7 °C) (Oral)   Resp 18   Ht 177.8 cm (70\")   Wt 87.5 kg (193 lb)   SpO2 98%   BMI 27.69 kg/m²         Discharge Disposition:    Home or Self Care    Discharge Medication:       Discharge Medications      Changes to Medications      Instructions Start Date   cefepime-dextrose 2 GM/50ML IVPB  Commonly known as:  MAXIPIME  What changed:  Another medication with the same name was added. Make sure you understand how and when to take each.   2 g, Intravenous, Every 12 Hours      cefepime-dextrose 2 GM/50ML IVPB  Commonly known as:  MAXIPIME  What changed:  You were already taking a medication with the same name, and this prescription was added. Make sure you understand how and when to take each.   2 g, Intravenous, Every 12 Hours      pantoprazole 20 MG EC tablet  Commonly known as:  PROTONIX  What changed:  · how much to take  · how to take this  · when to take this  · additional instructions   1 po in the am 30 minutes before breakfast.         Continue These Medications      Instructions Start Date   aspirin 81 MG tablet   Oral, Daily      cholecalciferol 1000 units tablet  Commonly known as:  VITAMIN D3   1,000 Units, Oral, Daily      eszopiclone 1 MG tablet  Commonly known as:  LUNESTA   1 mg, Oral, Nightly, Take immediately before bedtime. Can increase to 2 mg if 1 mg not effective      finasteride 5 MG tablet  Commonly known as:  PROSCAR   5 mg, Oral, Daily - RT      furosemide 40 MG tablet  Commonly known as:  LASIX   40 mg, Oral, Daily, 40 mg po daily X 5 days and then decrease to prn daily      losartan 25 MG tablet  Commonly known as:  COZAAR   25 mg, Oral, Daily      Melatonin 3 MG tablet dispersible   3 mg, Oral, " Nightly      potassium chloride 20 MEQ CR tablet  Commonly known as:  K-DUR,KLOR-CON   20 mEq, Oral, Daily      tamsulosin 0.4 MG capsule 24 hr capsule  Commonly known as:  FLOMAX   1 capsule, Oral, Daily Digoxin      vitamin B-12 500 MCG tablet  Commonly known as:  CYANOCOBALAMIN   500 mcg, Oral, Daily             Discharge Diet:         Activity at Discharge:         Follow-up Appointments:    Future Appointments  Date Time Provider Department Center   10/22/2018 8:45 AM Luis Hollins MD [facility] None   1/21/2019 4:00 PM Atilio Wall MD [facility] None   3/15/2019 9:00 AM Evelyn Muse MD [facility] None   7/5/2019 10:00 AM Luis Hollins MD [facility] None         Test Results Pending at Discharge:     Order Current Status    Tissue Pathology Exam Collected (10/03/18 1673)             Bryce Santo MD  10/04/18  9:11 AM

## 2018-10-04 NOTE — PROGRESS NOTES
Case Management Discharge Note         Destination     No service has been selected for the patient.      Durable Medical Equipment     No service has been selected for the patient.      Dialysis/Infusion     No service has been selected for the patient.      Home Medical Care - Selection Complete     Service Request Status Selected Specialties Address Phone Number Fax Number    [facility] Selected Home Health Services [address] [address] [phone]      Social Care     No service has been selected for the patient.        Other: Other (Private vehicle)    Final Discharge Disposition Code: 06 - home with home health care

## 2018-10-04 NOTE — PROGRESS NOTES
Discharge Planning Assessment  [facility]     Patient Name: Saad Meier  MRN: [id number]  Today's Date: 10/4/2018    Admit Date: 10/3/2018          Discharge Needs Assessment     Row Name 10/04/18 1528       Living Environment    Lives With alone    Current Living Arrangements home/apartment/condo    Primary Care Provided by self    Provides Primary Care For no one    Family Caregiver if Needed none    Quality of Family Relationships unable to assess    Able to Return to Prior Arrangements yes       Resource/Environmental Concerns    Resource/Environmental Concerns none       Transition Planning    Patient/Family Anticipates Transition to home    Patient/Family Anticipated Services at Transition home health care    Transportation Anticipated car, drives self       Discharge Needs Assessment    Readmission Within the Last 30 Days planned readmission    Concerns to be Addressed denies needs/concerns at this time    Equipment Currently Used at Home none    Anticipated Changes Related to Illness none    Equipment Needed After Discharge none    Outpatient/Agency/Support Group Needs homecare agency   [facility]    Discharge Facility/Level of Care Needs home with home health    Offered/Gave Vendor List no            Discharge Plan     Row Name 10/04/18 1529       Plan    Plan Home with [facility]    Patient/Family in Agreement with Plan yes    Plan Comments Spoke with pt in room; he is alone.  Pt states that he lives alone.  He is independent and plans to return home.  He reports that he is currently being followed by [facility] [place].  He denies DME.  However, he thinks he has a CPAP, but states doesn't use it and doesn't remember where he got it.  Pt can provide his own transportation.  Address, phone & PCP verified.  CM will continue to follow and assist with discharge as needed.        Destination     No service coordination in this encounter.      Durable Medical Equipment     No service coordination in  this encounter.      Dialysis/Infusion     No service coordination in this encounter.      Home Medical Care     No service coordination in this encounter.      Social Care     No service coordination in this encounter.        Expected Discharge Date and Time     Expected Discharge Date Expected Discharge Time    Oct 4, 2018  1:27 PM              Demographic Summary     Row Name 10/04/18 1527       General Information    Admission Type observation    Arrived From PACU/recovery room    Required Notices Provided Observation Status Notice    Referral Source admission list    Reason for Consult discharge planning    Preferred Language English     Used During This Interaction no            Functional Status     Row Name 10/04/18 1528       Functional Status    Usual Activity Tolerance good    Current Activity Tolerance good       Functional Status, IADL    Medications independent    Meal Preparation independent    Housekeeping independent    Laundry independent    Shopping independent       Mental Status    General Appearance WDL WDL       Mental Status Summary    Recent Changes in Mental Status/Cognitive Functioning no changes            Psychosocial    No documentation.           Abuse/Neglect    No documentation.           Legal    No documentation.           Substance Abuse    No documentation.           Patient Forms    No documentation.         Cassie Waters

## 2018-10-04 NOTE — PROGRESS NOTES
Discharge Planning Assessment  [facility]     Patient Name: Saad Meier  MRN: [id number]  Today's Date: 10/4/2018    Admit Date: 10/3/2018          Discharge Needs Assessment     Row Name 10/04/18 1528       Living Environment    Lives With alone    Current Living Arrangements home/apartment/condo    Primary Care Provided by self    Provides Primary Care For no one    Family Caregiver if Needed none    Quality of Family Relationships unable to assess    Able to Return to Prior Arrangements yes       Resource/Environmental Concerns    Resource/Environmental Concerns none       Transition Planning    Patient/Family Anticipates Transition to home    Patient/Family Anticipated Services at [facility]    Transportation Anticipated car, drives self       Discharge Needs Assessment    Readmission Within the Last 30 Days planned readmission    Concerns to be Addressed denies needs/concerns at this time    Equipment Currently Used at Home none    Anticipated Changes Related to Illness none    Equipment Needed After Discharge none    Outpatient/Agency/Support Group Needs homecare agency   [facility]    Discharge Facility/Level of Care Needs home with home health    Offered/Gave Vendor List no            Discharge Plan     Row Name 10/04/18 1529       Plan    Plan Home with [facility]    Patient/Family in Agreement with Plan yes    Plan Comments Spoke with pt in room; he is alone.  Pt states that he lives alone.  He is independent and plans to return home.  He reports that he is currently being followed by [facility] .  He denies DME.  However, he thinks he has a CPAP, but states doesn't use it and doesn't remember where he got it.  Pt can provide his own transportation.  Address, phone & PCP verified.  CM will continue to follow and assist with discharge as needed.    Called [facility] ; spoke with Patric.  Patric verifies that pt is current with their services.  She states no new orders needed.   They will see pt tomorrow. MARIBEL Liang updated.        Destination     No service coordination in this encounter.      Durable Medical Equipment     No service coordination in this encounter.      Dialysis/Infusion     No service coordination in this encounter.      Home Medical Care     No service coordination in this encounter.      Social Care     No service coordination in this encounter.        Expected Discharge Date and Time     Expected Discharge Date Expected Discharge Time    Oct 4, 2018  1:27 PM              Demographic Summary     Row Name 10/04/18 1527       General Information    Admission Type observation    Arrived From PACU/recovery room    Required Notices Provided Observation Status Notice    Referral Source admission list    Reason for Consult discharge planning    Preferred Language English     Used During This Interaction no            Functional Status     Row Name 10/04/18 1528       Functional Status    Usual Activity Tolerance good    Current Activity Tolerance good       Functional Status, IADL    Medications independent    Meal Preparation independent    Housekeeping independent    Laundry independent    Shopping independent       Mental Status    General Appearance WDL WDL       Mental Status Summary    Recent Changes in Mental Status/Cognitive Functioning no changes            Psychosocial    No documentation.           Abuse/Neglect    No documentation.           Legal    No documentation.           Substance Abuse    No documentation.           Patient Forms    No documentation.         Cassie Waters

## 2018-10-04 NOTE — PLAN OF CARE
Problem: Patient Care Overview  Goal: Plan of Care Review  Outcome: Ongoing (interventions implemented as appropriate)   10/04/18 0435   Coping/Psychosocial   Plan of Care Reviewed With patient   Plan of Care Review   Progress no change   OTHER   Outcome Summary No acute events overnight. Patient rested well. No complaints of pain or discorfort. Urine is clear at this time. Continue to monitor.      Goal: Discharge Needs Assessment  Outcome: Ongoing (interventions implemented as appropriate)      Problem: Fall Risk (Adult)  Goal: Identify Related Risk Factors and Signs and Symptoms  Outcome: Outcome(s) achieved Date Met: 10/04/18    Goal: Absence of Fall  Outcome: Ongoing (interventions implemented as appropriate)      Problem: Urinary Elimination/Continence Impairment (IRF) (Adult)  Goal: Optimal Management of Bladder Program, Effective Elimination  Outcome: Ongoing (interventions implemented as appropriate)    Goal: Eliminate/Minimize Urinary Incontinence Episodes  Outcome: Ongoing (interventions implemented as appropriate)

## 2018-10-04 NOTE — PLAN OF CARE
Problem: Patient Care Overview  Goal: Plan of Care Review  Outcome: Outcome(s) achieved Date Met: 10/04/18   10/04/18 9610   Coping/Psychosocial   Plan of Care Reviewed With patient   Plan of Care Review   Progress improving   OTHER   Outcome Summary Patient avina removed. Over 900 ml urine post. A+O. VSS. Pt DC to home.

## 2018-10-05 ENCOUNTER — TELEPHONE (OUTPATIENT)
Dept: INTERNAL MEDICINE | Facility: CLINIC | Age: 73
End: 2018-10-05

## 2018-10-05 ENCOUNTER — READMISSION MANAGEMENT (OUTPATIENT)
Dept: CALL CENTER | Facility: HOSPITAL | Age: 73
End: 2018-10-05

## 2018-10-05 NOTE — OUTREACH NOTE
Prep Survey      Responses   Facility patient discharged from?  [facility]   Is patient eligible?  Yes   Discharge diagnosis  BPH (benign prostatic hyperplasia with Urinary Obstruction TRANSURETHRAL RESECTION OF PROSTATE   Does the patient have one of the following disease processes/diagnoses(primary or secondary)?  General Surgery   Does the patient have Home health ordered?  Yes   What is the Home health agency?   [facility]   Is there a DME ordered?  No   Prep survey completed?  Yes          Aparna Dorado RN

## 2018-10-05 NOTE — TELEPHONE ENCOUNTER
Called pt for hospital f/u. LVM with [facility] contact info and for him to return call. He has a PCP appt 10/10/18. TCM not applicable d/t hospital observation status only.

## 2018-10-08 ENCOUNTER — TELEPHONE (OUTPATIENT)
Dept: INTERNAL MEDICINE | Facility: CLINIC | Age: 73
End: 2018-10-08

## 2018-10-08 NOTE — TELEPHONE ENCOUNTER
We received a call from Mr Meier this am regarding his PICC line, his IV antibiotics are complete per him and he wants to know when we are removing it. Please let me know, we do not have an order to remove. A verbal/email order will be fine.     Thank you!!        Evelin Saldana RN  Clinical Manager  [address] [address]  [phone] office  [phone] fax  [Secure Email]  [email or web address]

## 2018-10-08 NOTE — TELEPHONE ENCOUNTER
Confirm with Dr. Santo's office that they're done with it and if they are, I'm fine with it being pulled.

## 2018-10-08 NOTE — TELEPHONE ENCOUNTER
"Called pt. He states \"doing ok\" except for occas dizzy spells with standing that started yesterday. He said he had called and left a msg at PCP office today in regards but hasn't received a call back yet. He reports BP last night at 12:30am 142/70 and this am 120s/76. When asked about his voiding he says he urinates often but not much. He denies any burning or blood in urine. He said he's eating and drinking. He denies any n/v/d/c, pain, fever or chills. He said he has no walker or cane. He confirms PCP appt. I was not able to obtain any more info than this bc he immediately had to end call to go urinate.  "

## 2018-10-09 DIAGNOSIS — N40.1 BENIGN NON-NODULAR PROSTATIC HYPERPLASIA WITH LOWER URINARY TRACT SYMPTOMS: ICD-10-CM

## 2018-10-09 RX ORDER — FINASTERIDE 5 MG/1
5 TABLET, FILM COATED ORAL
Qty: 90 TABLET | Refills: 3 | Status: SHIPPED | OUTPATIENT
Start: 2018-10-09 | End: 2018-11-26

## 2018-10-10 ENCOUNTER — OFFICE VISIT (OUTPATIENT)
Dept: INTERNAL MEDICINE | Facility: CLINIC | Age: 73
End: 2018-10-10

## 2018-10-10 ENCOUNTER — READMISSION MANAGEMENT (OUTPATIENT)
Dept: CALL CENTER | Facility: HOSPITAL | Age: 73
End: 2018-10-10

## 2018-10-10 VITALS
WEIGHT: 203.38 LBS | HEART RATE: 73 BPM | DIASTOLIC BLOOD PRESSURE: 75 MMHG | SYSTOLIC BLOOD PRESSURE: 115 MMHG | OXYGEN SATURATION: 98 % | BODY MASS INDEX: 29.12 KG/M2 | TEMPERATURE: 98.6 F | HEIGHT: 70 IN

## 2018-10-10 DIAGNOSIS — Z90.79 S/P TURP: ICD-10-CM

## 2018-10-10 DIAGNOSIS — Z88.9 MULTIPLE ALLERGIES: ICD-10-CM

## 2018-10-10 DIAGNOSIS — N40.1 BENIGN PROSTATIC HYPERPLASIA WITH URINARY OBSTRUCTION: Primary | ICD-10-CM

## 2018-10-10 DIAGNOSIS — Z23 NEED FOR IMMUNIZATION AGAINST INFLUENZA: ICD-10-CM

## 2018-10-10 DIAGNOSIS — F51.01 PRIMARY INSOMNIA: ICD-10-CM

## 2018-10-10 DIAGNOSIS — I10 ESSENTIAL HYPERTENSION: Chronic | ICD-10-CM

## 2018-10-10 DIAGNOSIS — Z09 HOSPITAL DISCHARGE FOLLOW-UP: ICD-10-CM

## 2018-10-10 DIAGNOSIS — N13.8 BENIGN PROSTATIC HYPERPLASIA WITH URINARY OBSTRUCTION: Primary | ICD-10-CM

## 2018-10-10 DIAGNOSIS — I73.9 PERIPHERAL VASCULAR DISEASE OF LOWER EXTREMITY (HCC): ICD-10-CM

## 2018-10-10 PROBLEM — N30.00 ACUTE CYSTITIS WITHOUT HEMATURIA: Status: RESOLVED | Noted: 2018-09-25 | Resolved: 2018-10-10

## 2018-10-10 PROBLEM — N30.01 ACUTE CYSTITIS WITH HEMATURIA: Status: RESOLVED | Noted: 2018-09-25 | Resolved: 2018-10-10

## 2018-10-10 LAB
LAB AP CASE REPORT: NORMAL
PATH REPORT.FINAL DX SPEC: NORMAL

## 2018-10-10 PROCEDURE — 90662 IIV NO PRSV INCREASED AG IM: CPT | Performed by: FAMILY MEDICINE

## 2018-10-10 PROCEDURE — G0008 ADMIN INFLUENZA VIRUS VAC: HCPCS | Performed by: FAMILY MEDICINE

## 2018-10-10 PROCEDURE — 99214 OFFICE O/P EST MOD 30 MIN: CPT | Performed by: FAMILY MEDICINE

## 2018-10-10 RX ORDER — EPINEPHRINE 0.3 MG/.3ML
INJECTION SUBCUTANEOUS
COMMUNITY
Start: 2018-09-25 | End: 2020-05-29

## 2018-10-10 NOTE — ASSESSMENT & PLAN NOTE
Improving. Follow up with urology as scheduled. Discussed medicines, Flomax typically taken at night as it can be associated with dizziness.

## 2018-10-10 NOTE — ASSESSMENT & PLAN NOTE
May be related to he pain he's had in legs. Will monitor. Discussed with patient. Continue aspirin.

## 2018-10-10 NOTE — ASSESSMENT & PLAN NOTE
Slight improvement with Lunesta. Call when refill needed, may try 2 mg nightly. He may rest better once his prostate/urinary issues are resolved.

## 2018-10-10 NOTE — PROGRESS NOTES
"Subjective    Saad Meier is a 73 y.o. male here for:  Chief Complaint   Patient presents with   • Follow-up     Hospital Follow up  from [facility]     History of Present Illness   PICC comes out today. Urinating better than he was prior to surgery, going often but with small amounts. No dysuria. No fevers or chills. He's still taking his prostate medicines. S/p TURP 10/3/18. He notes some dizziness at times, was not having when he'd stopped his medicines, and as he's resumed them now he's having it again. He takes one prostate medicine in the morning and one at night but he can't identify which.    Hypertension is controlled, still on losartan, taper on lasix per d/c summary to go to prn dosing. Notes some discomfort in legs at times.    Continues to struggle with sleep. Continues to have a hard time falling asleep and staying asleep. Lunesta helps some. He's not tried 2 mg. He was having to get up about 4 times a night to urinate previously.     Wants his flu shot today.     The following portions of the patient's history were reviewed and updated as appropriate: allergies, current medications, past family history, past medical history, past social history, past surgical history and problem list.    Review of Systems   Constitutional: Negative for chills and fever.   Genitourinary: Positive for frequency. Negative for dysuria.   Musculoskeletal:        Leg pain   Neurological: Positive for dizziness.   Psychiatric/Behavioral: Positive for sleep disturbance.       Vitals:    10/10/18 0814   BP: 115/75   Pulse: 73   Temp: 98.6 °F (37 °C)   SpO2: 98%   Weight: 92.3 kg (203 lb 6 oz)   Height: 177.8 cm (70\")         Objective   Physical Exam   Constitutional: He is oriented to person, place, and time. Vital signs are normal. He appears well-developed and well-nourished. He is active.  Non-toxic appearance. No distress. He appears overweight.   HENT:   Head: Normocephalic and atraumatic.   Right Ear: Decreased hearing is " noted.   Left Ear: Decreased hearing is noted.   Mouth/Throat: Mucous membranes are not dry.   Hearing aid on right. Baseline hearing   Eyes: EOM are normal. No scleral icterus.   Neck: Phonation normal. Neck supple.   Cardiovascular: Normal rate, regular rhythm and normal heart sounds.  Exam reveals no gallop and no friction rub.    No murmur heard.  Pulmonary/Chest: Effort normal and breath sounds normal.   Musculoskeletal:   PICC noted upper right arm covered in gauze   Neurological: He is alert and oriented to person, place, and time. He displays no tremor. No cranial nerve deficit. Gait normal.   Skin: Skin is warm. He is not diaphoretic.   Psychiatric: He has a normal mood and affect. His speech is normal and behavior is normal. Judgment and thought content normal. Cognition and memory are normal.   Nursing note and vitals reviewed.        Assessment/Plan     Problem List Items Addressed This Visit        Cardiovascular and Mediastinum    Essential hypertension (Chronic)    Current Assessment & Plan     Hypertension is improving with treatment.  Continue current treatment regimen.  Blood pressure will be reassessed at the next regular appointment.         Relevant Medications    EPINEPHrine (EPIPEN) 0.3 MG/0.3ML solution auto-injector injection    Peripheral vascular disease of lower extremity (CMS/HCC)    Overview     · 6/14/16 doppler of bilateral lower extremities suggestive of mild peripheral vascular disease worse on right.         Current Assessment & Plan     May be related to he pain he's had in legs. Will monitor. Discussed with patient. Continue aspirin.            Genitourinary    Benign prostatic hyperplasia with urinary obstruction - Primary    Overview     · TURP by Dr. Santo 10/3/18         Current Assessment & Plan     Improving. Follow up with urology as scheduled. Discussed medicines, Flomax typically taken at night as it can be associated with dizziness.            Other    Insomnia (Chronic)     Current Assessment & Plan     Slight improvement with Lunesta. Call when refill needed, may try 2 mg nightly. He may rest better once his prostate/urinary issues are resolved.           Other Visit Diagnoses     Need for immunization against influenza        Relevant Orders    Flu Vaccine High Dose PF 65YR+ ([id number])    S/P TURP        Hospital discharge follow-up        Multiple allergies        Relevant Medications    EPINEPHrine (EPIPEN) 0.3 MG/0.3ML solution auto-injector injection          Current outpatient and discharge medications have been reconciled for the patient.  · Reviewed by: Evelyn Muse MD  ·     Return in about 4 months (around 2/10/2019).    Evelyn Muse MD

## 2018-10-10 NOTE — OUTREACH NOTE
General Surgery Week 1 Survey      Responses   Facility patient discharged from?  [facility]   Does the patient have one of the following disease processes/diagnoses(primary or secondary)?  General Surgery   Is there a successful TCM telephone encounter documented?  No   Week 1 attempt successful?  No   Unsuccessful attempts  Attempt 1          Kanika Whipple RN

## 2018-10-12 ENCOUNTER — READMISSION MANAGEMENT (OUTPATIENT)
Dept: CALL CENTER | Facility: HOSPITAL | Age: 73
End: 2018-10-12

## 2018-10-12 NOTE — OUTREACH NOTE
General Surgery Week 1 Survey      Responses   Facility patient discharged from?  [facility]   Does the patient have one of the following disease processes/diagnoses(primary or secondary)?  General Surgery   Is there a successful TCM telephone encounter documented?  No   Week 1 attempt successful?  No   Unsuccessful attempts  Attempt 2          Aparna Dorado RN

## 2018-10-15 ENCOUNTER — READMISSION MANAGEMENT (OUTPATIENT)
Dept: CALL CENTER | Facility: HOSPITAL | Age: 73
End: 2018-10-15

## 2018-10-15 ENCOUNTER — OFFICE VISIT (OUTPATIENT)
Dept: INTERNAL MEDICINE | Facility: CLINIC | Age: 73
End: 2018-10-15

## 2018-10-15 VITALS
TEMPERATURE: 98.2 F | HEIGHT: 70 IN | BODY MASS INDEX: 28.63 KG/M2 | WEIGHT: 200 LBS | DIASTOLIC BLOOD PRESSURE: 78 MMHG | SYSTOLIC BLOOD PRESSURE: 117 MMHG | HEART RATE: 78 BPM | OXYGEN SATURATION: 95 %

## 2018-10-15 DIAGNOSIS — M79.601 RIGHT ARM PAIN: Primary | ICD-10-CM

## 2018-10-15 PROCEDURE — 99214 OFFICE O/P EST MOD 30 MIN: CPT | Performed by: PHYSICIAN ASSISTANT

## 2018-10-15 RX ORDER — SULFAMETHOXAZOLE AND TRIMETHOPRIM 800; 160 MG/1; MG/1
1 TABLET ORAL 2 TIMES DAILY
Qty: 14 TABLET | Refills: 0 | Status: SHIPPED | OUTPATIENT
Start: 2018-10-15 | End: 2018-11-26

## 2018-10-15 NOTE — OUTREACH NOTE
General Surgery Week 2 Survey      Responses   Facility patient discharged from?  [facility]   Does the patient have one of the following disease processes/diagnoses(primary or secondary)?  General Surgery   Week 2 attempt successful?  Yes   Call start time  1220   Call end time  1226   Discharge diagnosis  BPH (benign prostatic hyperplasia with Urinary Obstruction TRANSURETHRAL RESECTION OF PROSTATE   Meds reviewed with patient/caregiver?  Yes   Is the patient taking all medications as directed (includes completed medication regime)?  Yes   Medication comments  Antibodics complete. PICC has been removed   Does the patient have a follow up appointment scheduled with their surgeon?  Yes   Has the patient kept scheduled appointments due by today?  Yes   Home health comments  Nurse made last visit today   What is the patient's perception of their health status since discharge?  Improving   Additional teach back comments  Pt will see  again today. He is concerned about site that PICC was removed saying it is painful.  nurse saw today and pt will bring to attention of  at appt today. Voiding with out difficulity, no pain with voiding   Week 2 call completed?  Yes          Ana Woodall RN

## 2018-10-16 NOTE — PROGRESS NOTES
"Subjective     Chief Complaint: right arm pain    History of Present Illness     Saad Meier is a 73 y.o. male presenting with complaints of right arm pain x 2-3 days. Recently had PICC line removed 10/10/18, had been on IV antibiotics as urine showed pseudomonas resistant to Levaquin.  Patient also had TURP on 10/3/18. States arm is uncomfortable to move and swollen, some redness. Denies any fevers, chills.     The following portions of the patient's history were reviewed and updated as appropriate: current medications, allergies, PMH.    Review of Systems   Constitutional: Negative for chills and fever.   Respiratory: Negative.    Cardiovascular: Negative.    Gastrointestinal: Negative.    Musculoskeletal:        Right arm pain.   Skin: Positive for color change and wound.   Neurological: Negative.        Objective     Vitals:    10/15/18 1313   BP: 117/78   Pulse: 78   Temp: 98.2 °F (36.8 °C)   SpO2: 95%   Weight: 90.7 kg (200 lb)   Height: 177.8 cm (70\")     Physical Exam   Constitutional: He appears well-developed and well-nourished. No distress.   HENT:   Right Ear: Decreased hearing is noted.   Left Ear: Decreased hearing is noted.   Mouth/Throat: Oropharynx is clear and moist.   Eyes: Pupils are equal, round, and reactive to light. Conjunctivae and EOM are normal.   Cardiovascular: Normal rate and regular rhythm.    Pulmonary/Chest: Effort normal.   Musculoskeletal:        Right upper arm: He exhibits tenderness. He exhibits no swelling.   Lymphadenopathy:     He has no cervical adenopathy.   Skin:        Small amount of induration and redness surrounding scab from PICC line. No warmth, no streaking. Patient still has full ROM.   Psychiatric: He has a normal mood and affect.       Assessment/Plan     Diagnoses and all orders for this visit:    Right arm pain  -     sulfamethoxazole-trimethoprim (BACTRIM DS) 800-160 MG per tablet; Take 1 tablet by mouth 2 (Two) Times a Day.    Likely phlebitis following " recent PICC line.   Will begin bactrim for cellulitis to be safe following patient's recent history.  Warm compresses, anti-inflammatory meds, elevate the arm, RTC if symptoms worsen (fever, expanding redness, worsening pain, etc)    Nalini Gonzalez PA-C  10/15/2018         Please note that portions of this note were completed with a voice recognition program. Efforts were made to edit dictation, but occasionally words are mistranscribed.

## 2018-10-22 ENCOUNTER — OFFICE VISIT (OUTPATIENT)
Dept: UROLOGY | Facility: CLINIC | Age: 73
End: 2018-10-22

## 2018-10-22 VITALS
WEIGHT: 200 LBS | HEART RATE: 62 BPM | BODY MASS INDEX: 28.63 KG/M2 | OXYGEN SATURATION: 97 % | DIASTOLIC BLOOD PRESSURE: 72 MMHG | TEMPERATURE: 98.2 F | SYSTOLIC BLOOD PRESSURE: 126 MMHG | HEIGHT: 70 IN

## 2018-10-22 DIAGNOSIS — R33.9 URINARY RETENTION: Primary | ICD-10-CM

## 2018-10-22 LAB
BILIRUB BLD-MCNC: NEGATIVE MG/DL
CLARITY, POC: CLEAR
COLOR UR: YELLOW
GLUCOSE UR STRIP-MCNC: NEGATIVE MG/DL
KETONES UR QL: NEGATIVE
LEUKOCYTE EST, POC: ABNORMAL
NITRITE UR-MCNC: NEGATIVE MG/ML
PH UR: 6.5 [PH] (ref 5–8)
PROT UR STRIP-MCNC: NEGATIVE MG/DL
RBC # UR STRIP: ABNORMAL /UL
SP GR UR: 1.01 (ref 1–1.03)
UROBILINOGEN UR QL: NORMAL

## 2018-10-22 PROCEDURE — 81003 URINALYSIS AUTO W/O SCOPE: CPT | Performed by: UROLOGY

## 2018-10-22 PROCEDURE — 99024 POSTOP FOLLOW-UP VISIT: CPT | Performed by: UROLOGY

## 2018-10-22 NOTE — PROGRESS NOTES
Chief Complaint  Follow up acute urinary retention (1 month ) and History of UTI        HPI  Hardeep is a 73 y.o. male who returns today 3 weeks after TURP.  He is currently voiding better with no catheter although still frequently and in small amounts.  His usual red and white cells in his urine after TURP but a culture will be submitted and he can be contacted if significant.  He is observed to have on a diaper today although it is dry.    Vitals:    10/22/18 0846   BP: 126/72   Pulse: 62   Temp: 98.2 °F (36.8 °C)   SpO2: 97%       Past Medical History  Past Medical History:   Diagnosis Date   • Allergic 25yrs.    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   • BPH (benign prostatic hypertrophy)    • CAD (coronary artery disease) 3/6/2017   • Cancer (CMS/HCC) July 2012    colon   • Cardiac arrhythmia    • Chronic diastolic congestive heart failure (CMS/HCC)    • Colon polyp    • Diverticulosis    • Essential hypertension 3/14/2018   • GERD (gastroesophageal reflux disease)    • History of blood transfusion    • History of colonoscopy 2015    Complete   • History of esophagogastroduodenoscopy 2015    Diagnostic   • HL (hearing loss)    • Hyperlipidemia    • Infection of kidney    • Iron deficiency    • Obesity    • Visual impairment !(97    Reading Glasses       Past Surgical History  Past Surgical History:   Procedure Laterality Date   • CARDIAC ABLATION  03/2012   • CARDIAC CATHETERIZATION  2004   • CARDIAC PACEMAKER PLACEMENT  2004   • COLON SURGERY  2012   • COLONOSCOPY  July 2015   • CYSTOSCOPY TRANSURETHRAL RESECTION OF PROSTATE N/A 10/3/2018    Procedure: TRANSURETHRAL RESECTION OF PROSTATE;  Surgeon: Bryce Santo MD;  Location: [facility];  Service: Urology   • ENDOSCOPY     • HEMORRHOIDECTOMY  1970   • HERNIA REPAIR      X 5   • INGUINAL HERNIA REPAIR     • LYMPH NODE BIOPSY   7-2-2012    large intestine lymph nodes   • TRANSURETHRAL RESECTION OF BLADDER TUMOR N/A 10/3/2018    Procedure: CYSTOSCOPY TRANSURETHRAL RESECTION OF BLADDER TUMOR, COUDE CATHETER PLACEMENT;  Surgeon: Bryce Santo MD;  Location: [facility];  Service: Urology       Medications  has a current medication list which includes the following prescription(s): aspirin, cholecalciferol, eszopiclone, finasteride, furosemide, losartan, melatonin, pantoprazole, potassium chloride, sulfamethoxazole-trimethoprim, tamsulosin, vitamin b-12, and epinephrine.      Allergies  Allergies   Allergen Reactions   • Ciprofloxacin Diarrhea       Social History  Social History     Social History Narrative    Caffeine: 2 servings per day    Patient lives at his home alone       Family History  He has no family history of bladder or kidney cancer  He has no family history of kidney stones      AUA Symptom Score:      Review of Systems  Review of Systems    Physical Exam  Physical Exam    Labs Recent and today in the office:  Results for orders placed or performed in visit on 10/22/18   POC Urinalysis Dipstick, Automated   Result Value Ref Range    Color Yellow Yellow, Straw, Dark Yellow, Kathy    Clarity, UA Clear Clear    Specific Gravity  1.015 1.005 - 1.030    pH, Urine 6.5 5.0 - 8.0    Leukocytes Trace (A) Negative    Nitrite, UA Negative Negative    Protein, POC Negative Negative mg/dL    Glucose, UA Negative Negative, 1000 mg/dL (3+) mg/dL    Ketones, UA Negative Negative    Urobilinogen, UA Normal Normal    Bilirubin Negative Negative    Blood, UA 1+ (A) Negative     Bladder scan: 40.1 mL    Assessment & Plan  #1 urinary retention: Much improved after TURP.  We'll send a urine culture and he can return in 3 months where we will contemplate discontinuing the Proscar and Flomax.

## 2018-10-23 ENCOUNTER — READMISSION MANAGEMENT (OUTPATIENT)
Dept: CALL CENTER | Facility: HOSPITAL | Age: 73
End: 2018-10-23

## 2018-10-23 NOTE — OUTREACH NOTE
General Surgery Week 3 Survey      Responses   Facility patient discharged from?  [facility]   Does the patient have one of the following disease processes/diagnoses(primary or secondary)?  General Surgery   Week 3 attempt successful?  Yes   Call start time  1431   Call end time  1436   Discharge diagnosis  BPH (benign prostatic hyperplasia with Urinary Obstruction TRANSURETHRAL RESECTION OF PROSTATE   Meds reviewed with patient/caregiver?  Yes   Is the patient having any side effects they believe may be caused by any medication additions or changes?  No   Does the patient have all medications related to this admission filled (includes all antibiotics, pain medications, etc.)  Yes   Is the patient taking all medications as directed (includes completed medication regime)?  Yes   Does the patient have a follow up appointment scheduled with their surgeon?  Yes   Has the patient kept scheduled appointments due by today?  Yes   Comments  pt stated has not had catheter in 2 weeks, [facility]   Did the patient receive a copy of their discharge instructions?  Yes   Nursing interventions  Reviewed instructions with patient   What is the patient's perception of their health status since discharge?  Improving   Nursing interventions  Nurse provided patient education   Is the patient /caregiver able to teach back basic post-op care?  Lifting as instructed by MD in discharge instructions   Is the patient/caregiver able to teach back steps to recovery at home?  Set small, achievable goals for return to baseline health   Is the patient/caregiver able to teach back the hierarchy of who to call/visit for symptoms/problems? PCP, Specialist, Home health nurse, Urgent Care, ED, 911  Yes   Week 3 call completed?  Yes          Ivette Lamas RN

## 2018-10-25 ENCOUNTER — OUTSIDE FACILITY SERVICE (OUTPATIENT)
Dept: INTERNAL MEDICINE | Facility: HOSPITAL | Age: 73
End: 2018-10-25

## 2018-10-25 LAB
BACTERIA UR CULT: NO GROWTH
BACTERIA UR CULT: NORMAL

## 2018-10-25 PROCEDURE — G0180 MD CERTIFICATION HHA PATIENT: HCPCS | Performed by: INTERNAL MEDICINE

## 2018-10-30 ENCOUNTER — READMISSION MANAGEMENT (OUTPATIENT)
Dept: CALL CENTER | Facility: HOSPITAL | Age: 73
End: 2018-10-30

## 2018-10-30 NOTE — OUTREACH NOTE
General Surgery Week 4 Survey      Responses   Facility patient discharged from?  [facility]   Does the patient have one of the following disease processes/diagnoses(primary or secondary)?  General Surgery   Week 4 attempt successful?  No          Alice Barnhart RN

## 2018-11-09 ENCOUNTER — TELEPHONE (OUTPATIENT)
Dept: INTERNAL MEDICINE | Facility: CLINIC | Age: 73
End: 2018-11-09

## 2018-11-09 NOTE — TELEPHONE ENCOUNTER
Patient called with complaints of burning during Urination. Patient states that this has been on going for 1-2 years. He states that he has taken an antibiotic in the past and it seemed to help.    Please advise.

## 2018-11-09 NOTE — TELEPHONE ENCOUNTER
Not something to throw a random antibiotic at. Suggest first seeing Dr. Santo in urology, can schedule in next week with me if he can't get in with him.

## 2018-11-14 NOTE — TELEPHONE ENCOUNTER
Patient has been notified about speaking with Dr. Santo.    I informed that patient that if he would like to see Dr. Muse, I was more than willing to get him in, but did advise that we felt it would be best for the patient to speak with his urologist regarding the matter, since this has been on going for some time now.

## 2018-11-26 ENCOUNTER — TELEPHONE (OUTPATIENT)
Dept: INTERNAL MEDICINE | Facility: CLINIC | Age: 73
End: 2018-11-26

## 2018-11-26 ENCOUNTER — OFFICE VISIT (OUTPATIENT)
Dept: INTERNAL MEDICINE | Facility: CLINIC | Age: 73
End: 2018-11-26

## 2018-11-26 VITALS
DIASTOLIC BLOOD PRESSURE: 82 MMHG | TEMPERATURE: 98.1 F | HEART RATE: 64 BPM | RESPIRATION RATE: 16 BRPM | HEIGHT: 70 IN | WEIGHT: 206 LBS | SYSTOLIC BLOOD PRESSURE: 128 MMHG | BODY MASS INDEX: 29.49 KG/M2 | OXYGEN SATURATION: 98 %

## 2018-11-26 DIAGNOSIS — N30.00 ACUTE CYSTITIS WITHOUT HEMATURIA: Primary | ICD-10-CM

## 2018-11-26 DIAGNOSIS — R39.9 SYMPTOMS OF URINARY TRACT INFECTION: ICD-10-CM

## 2018-11-26 LAB
BILIRUB BLD-MCNC: NEGATIVE MG/DL
CLARITY, POC: CLEAR
COLOR UR: YELLOW
GLUCOSE UR STRIP-MCNC: NEGATIVE MG/DL
KETONES UR QL: NEGATIVE
LEUKOCYTE EST, POC: ABNORMAL
NITRITE UR-MCNC: NEGATIVE MG/ML
PH UR: 7 [PH] (ref 5–8)
PROT UR STRIP-MCNC: NEGATIVE MG/DL
RBC # UR STRIP: ABNORMAL /UL
SP GR UR: 1 (ref 1–1.03)
UROBILINOGEN UR QL: NORMAL

## 2018-11-26 PROCEDURE — 81003 URINALYSIS AUTO W/O SCOPE: CPT | Performed by: PHYSICIAN ASSISTANT

## 2018-11-26 PROCEDURE — 99213 OFFICE O/P EST LOW 20 MIN: CPT | Performed by: PHYSICIAN ASSISTANT

## 2018-11-26 RX ORDER — NITROFURANTOIN 25; 75 MG/1; MG/1
100 CAPSULE ORAL 2 TIMES DAILY
Qty: 10 CAPSULE | Refills: 0 | Status: SHIPPED | OUTPATIENT
Start: 2018-11-26 | End: 2018-12-01

## 2018-11-26 NOTE — PROGRESS NOTES
Chief Complaint   Patient presents with   • Flank Pain     right side pain       Subjective   Saad Meier is a 73 y.o. male    History of Present Illness     Pain in the right side:  He has had an underlying chronic intermittent pain for about 1 year.  He describes this as an ache.  He reports that this changed 3 days ago when he awoke with constant ache in the right side.  He reports that the pain has gone away while he has been in office.  He denies any trauma to the area or events that caused pain.  He is followed by urology; Dr. Santo and Dr. Hollins, he had a TURP preformed in October.  There were not complications with this procedure.  He does however have history of UTI with pseudomonas aeruginosa, for which he was given parenteral antibiotics.   He denies any difficulty urination, reports burning at the end of voiding.  Denies any gross hematuria.  Denies any fever/chills, nausea/vomiting, constipation, or diarrhea.    Past Medical History:   Diagnosis Date   • Allergic 25yrs.    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   • BPH (benign prostatic hypertrophy)    • CAD (coronary artery disease) 3/6/2017   • Cancer (CMS/HCC) July 2012    colon   • Cardiac arrhythmia    • Chronic diastolic congestive heart failure (CMS/HCC)    • Colon polyp    • Diverticulosis    • Essential hypertension 3/14/2018   • GERD (gastroesophageal reflux disease)    • History of blood transfusion    • History of colonoscopy 2015    Complete   • History of esophagogastroduodenoscopy 2015    Diagnostic   • HL (hearing loss)    • Hyperlipidemia    • Infection of kidney    • Iron deficiency    • Obesity    • Stenosis of right carotid artery    • Visual impairment !(97    Reading Glasses     Past Surgical History:   Procedure Laterality Date   • CARDIAC ABLATION  03/2012   • CARDIAC CATHETERIZATION  2004   • CARDIAC  PACEMAKER PLACEMENT     • COLON SURGERY     • COLONOSCOPY  2015   • ENDOSCOPY     • HEMORRHOIDECTOMY  1970   • HERNIA REPAIR      X 5   • INGUINAL HERNIA REPAIR     • LYMPH NODE BIOPSY  2012    large intestine lymph nodes     Family History   Problem Relation Age of Onset   • Lung cancer Mother    • Osteoporosis Mother    • Thyroid disease Mother         mother   • Cancer Mother         Lung Cancer   • Early death Mother         46 yrs.   • Hearing loss Mother         mother   • Vision loss Mother         mother   • Heart disease Mother    • Cancer Father         Prostate Cancer   • Heart disease Father         Pacemaker   • Vision loss Father    • Heart disease Sister    • Heart failure Brother    • Heart disease Brother    • Cancer Sister         Breast Cancer   • Vision loss Brother    • Heart disease Brother    • Arrhythmia Brother    • Heart failure Brother    • Early death Sister          Around 40yrs.   • Early death Maternal Grandmother         Took Mother off.   • Heart disease Maternal Grandmother      Social History     Socioeconomic History   • Marital status: Single     Spouse name: Not on file   • Number of children: Not on file   • Years of education: Not on file   • Highest education level: Not on file   Social Needs   • Financial resource strain: Not on file   • Food insecurity - worry: Not on file   • Food insecurity - inability: Not on file   • Transportation needs - medical: Not on file   • Transportation needs - non-medical: Not on file   Occupational History   • Not on file   Tobacco Use   • Smoking status: Former Smoker     Packs/day: 1.00     Years: 10.00     Pack years: 10.00     Types: Cigarettes     Start date: 1963     Last attempt to quit: 1973     Years since quittin.9   • Smokeless tobacco: Never Used   Substance and Sexual Activity   • Alcohol use: No   • Drug use: No   • Sexual activity: Not Currently     Partners: Female   Other Topics Concern   • Not  on file   Social History Narrative    Caffeine: 2 servings per day    Patient lives at his home alone     Allergies   Allergen Reactions   • Ciprofloxacin Diarrhea     Review of Systems   Constitutional: Positive for fatigue. Negative for activity change, chills, fever and unexpected weight loss.   Respiratory: Negative for chest tightness and shortness of breath.    Cardiovascular: Negative for chest pain.   Gastrointestinal: Negative for abdominal pain, constipation, diarrhea, nausea and vomiting.   Genitourinary: Positive for dysuria. Negative for decreased urine volume, flank pain, hematuria, testicular pain and urgency.   Musculoskeletal: Negative for arthralgias and myalgias.        Right sided pain   Skin: Negative for color change, rash and bruise.   Neurological: Negative for dizziness and light-headedness.     Objective     Vitals:    11/26/18 1106   BP: 128/82   Pulse: 64   Resp: 16   Temp: 98.1 °F (36.7 °C)   SpO2: 98%     Physical Exam   Constitutional: He is oriented to person, place, and time. He appears well-developed and well-nourished. No distress.   HENT:   Head: Normocephalic and atraumatic.   Neck: Neck supple.   Cardiovascular: Normal rate, regular rhythm, normal heart sounds and intact distal pulses. Exam reveals no gallop and no friction rub.   No murmur heard.  Pulmonary/Chest: Effort normal and breath sounds normal. No respiratory distress. He has no wheezes. He has no rales.   Abdominal: Soft. He exhibits no distension. There is no hepatosplenomegaly. There is no tenderness. There is no CVA tenderness.   Musculoskeletal: Normal range of motion.   No decreased ROM or pain with palpation to the Upper Right side area.  There is no rash or erythema noted.     Neurological: He is alert and oriented to person, place, and time.   Skin: Skin is warm and dry. Capillary refill takes less than 2 seconds. Turgor is normal. No bruising and no rash noted. He is not diaphoretic. No erythema.    Psychiatric: He has a normal mood and affect. His behavior is normal.   Nursing note and vitals reviewed.      Brief Urine Lab Results  (Last result in the past 365 days)      Color   Clarity   Blood   Leuk Est   Nitrite   Protein   CREAT   Urine HCG        11/26/18 1143 Yellow Clear 250 Vincent/ul 500 Merari/ul Negative Negative             Assessment/Plan     Saad was seen today for flank pain.    Diagnoses and all orders for this visit:    Acute cystitis without hematuria  -     Will start Macrobid.  With patient's history of p.aeruginosa, would like him to contact his urologist today.  Last culture on 10/1/2018 was negative.  Will send urine for culture today.  Intermittent right side pain was resolved in office.  Will continue to follow this, as it may be related to his urinary symptoms.  There are was no CVA tenderness or rash noted on his exam today.  This could be musculoskeletal in nature.  Have advised him that he can use Tylenol as needed for minor pain.  -     POC Urinalysis Dipstick, Automated  -     nitrofurantoin, macrocrystal-monohydrate, (MACROBID) 100 MG capsule; Take 1 capsule by mouth 2 (Two) Times a Day for 5 days.  -     Urine Culture - Urine, Urine, Clean Catch    Symptoms of urinary tract infection  -     POC Urinalysis Dipstick, Automated      Return if symptoms worsen or fail to improve.    Jennifer Beach PA-C

## 2018-11-28 LAB
BACTERIA UR CULT: NO GROWTH
BACTERIA UR CULT: NORMAL

## 2018-12-04 ENCOUNTER — TELEPHONE (OUTPATIENT)
Dept: INTERNAL MEDICINE | Facility: CLINIC | Age: 73
End: 2018-12-04

## 2018-12-04 DIAGNOSIS — F51.01 PRIMARY INSOMNIA: ICD-10-CM

## 2018-12-04 NOTE — TELEPHONE ENCOUNTER
Patient called stating that he received a letter stating that the [facility] would not be able to fill his lunesta anymore, and he is wondering if something else could be called into the pharmacy.

## 2018-12-05 NOTE — TELEPHONE ENCOUNTER
Patient did not know that names of the medications, but states that he would look for the letter that he received from [facility].

## 2018-12-06 ENCOUNTER — TELEPHONE (OUTPATIENT)
Dept: INTERNAL MEDICINE | Facility: CLINIC | Age: 73
End: 2018-12-06

## 2018-12-12 RX ORDER — ZOLPIDEM TARTRATE 6.25 MG/1
6.25 TABLET, FILM COATED, EXTENDED RELEASE ORAL NIGHTLY PRN
Qty: 90 TABLET | Refills: 2 | Status: SHIPPED | OUTPATIENT
Start: 2018-12-12 | End: 2019-03-18

## 2018-12-12 NOTE — TELEPHONE ENCOUNTER
Spoke with patients insurance and Lunesta will not be covered in the 2019 calender year but Zolpidem will be covered with a PA. Please advise

## 2018-12-19 ENCOUNTER — OFFICE VISIT (OUTPATIENT)
Dept: SURGERY | Facility: CLINIC | Age: 73
End: 2018-12-19

## 2018-12-19 ENCOUNTER — TELEPHONE (OUTPATIENT)
Dept: INTERNAL MEDICINE | Facility: CLINIC | Age: 73
End: 2018-12-19

## 2018-12-19 VITALS
BODY MASS INDEX: 28.4 KG/M2 | TEMPERATURE: 98.7 F | WEIGHT: 198.4 LBS | HEIGHT: 70 IN | SYSTOLIC BLOOD PRESSURE: 130 MMHG | DIASTOLIC BLOOD PRESSURE: 70 MMHG | HEART RATE: 60 BPM | OXYGEN SATURATION: 99 %

## 2018-12-19 DIAGNOSIS — R11.0 NAUSEA: ICD-10-CM

## 2018-12-19 DIAGNOSIS — Z85.038 HISTORY OF COLON CANCER: ICD-10-CM

## 2018-12-19 DIAGNOSIS — R10.11 RIGHT UPPER QUADRANT ABDOMINAL PAIN: Primary | ICD-10-CM

## 2018-12-19 DIAGNOSIS — K40.30 NON-RECURRENT UNILATERAL INGUINAL HERNIA WITH OBSTRUCTION WITHOUT GANGRENE: ICD-10-CM

## 2018-12-19 DIAGNOSIS — K43.0 INCISIONAL HERNIA WITH OBSTRUCTION BUT NO GANGRENE: ICD-10-CM

## 2018-12-19 PROCEDURE — 99214 OFFICE O/P EST MOD 30 MIN: CPT | Performed by: SURGERY

## 2018-12-19 RX ORDER — BISACODYL 5 MG/1
10 TABLET, DELAYED RELEASE ORAL 2 TIMES DAILY
Qty: 4 TABLET | Refills: 0 | Status: SHIPPED | OUTPATIENT
Start: 2018-12-19 | End: 2018-12-20

## 2018-12-19 RX ORDER — POLYETHYLENE GLYCOL 1450
1 POWDER (GRAM) MISCELLANEOUS
Qty: 238 G | Refills: 0 | Status: SHIPPED | OUTPATIENT
Start: 2018-12-19 | End: 2018-12-19

## 2018-12-19 NOTE — PROGRESS NOTES
Patient: Saad Meier    YOB: 1945    Date: 12/19/2018    Primary Care Provider: Evelyn Muse MD    Chief Complaint   Patient presents with   • Abdominal Pain     Intermittent RUQ pain x year    • Colonoscopy     Hx of colon cancer        Subjective .     History of present illness:  I saw the patient in the office today as a consultation for evaluation for a colonoscopy.  Patient was diagnosed with colon cancer in 2012 and did have a colon resection. Last colonoscopy was done by Dr. Rivera in 2015 and the patient did have two hyperplastic polyps.  The patient does complain of intermittent dull RUQ pain and nausea.  This has been present for about a year. Symptoms only last a short amount of time and denies that anything relieves the pain.  Patient does not know if symptoms are related to food.  Denies vomiting, diarrhea, constipation or bloody stools.      He has had a previous hemicolectomy in the past.  He does have a mass palpable at the superior aspect of the abdominal incision, worse with heavy lifting, relieved by lying down, nonradiating in nature, associated with a mass, dull in discomfort, present for many years.  He also has a large mass of the right inguinal area and wears a chronic truss for continued worsening right inguinal hernia.    The following portions of the patient's history were reviewed and updated as appropriate: allergies, current medications, past family history, past medical history, past social history, past surgical history and problem list.      Review of Systems   Constitutional: Negative for chills, fever and unexpected weight change.   HENT: Negative for trouble swallowing and voice change.    Eyes: Negative for visual disturbance.   Respiratory: Negative for apnea, cough, chest tightness, shortness of breath and wheezing.    Cardiovascular: Negative for chest pain, palpitations and leg swelling.   Gastrointestinal: Positive for abdominal pain and nausea.  Negative for abdominal distention, anal bleeding, blood in stool, constipation, diarrhea, rectal pain and vomiting.   Endocrine: Negative for cold intolerance and heat intolerance.   Genitourinary: Negative for difficulty urinating, dysuria, flank pain, scrotal swelling and testicular pain.   Musculoskeletal: Negative for back pain, gait problem and joint swelling.   Skin: Negative for color change, rash and wound.   Neurological: Negative for dizziness, syncope, speech difficulty, weakness, numbness and headaches.   Hematological: Negative for adenopathy. Does not bruise/bleed easily.   Psychiatric/Behavioral: Negative for confusion. The patient is not nervous/anxious.        History:  Past Medical History:   Diagnosis Date   • Allergic 25yrs.    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   • BPH (benign prostatic hypertrophy)    • CAD (coronary artery disease) 3/6/2017   • Cancer (CMS/HCC) July 2012    colon   • Cardiac arrhythmia    • Chronic diastolic congestive heart failure (CMS/HCC)    • Colon polyp    • Diverticulosis    • Essential hypertension 3/14/2018   • GERD (gastroesophageal reflux disease)    • History of blood transfusion    • History of colonoscopy 2015    Complete   • History of esophagogastroduodenoscopy 2015    Diagnostic   • HL (hearing loss)    • Hyperlipidemia    • Infection of kidney    • Iron deficiency    • Obesity    • Stenosis of right carotid artery    • Visual impairment !(97    Reading Glasses       Past Surgical History:   Procedure Laterality Date   • CARDIAC ABLATION  03/2012   • CARDIAC CATHETERIZATION  2004   • CARDIAC PACEMAKER PLACEMENT  2004   • COLON SURGERY  2012   • COLONOSCOPY  July 2015   • CYSTOSCOPY TRANSURETHRAL RESECTION OF PROSTATE N/A 10/3/2018    Procedure: TRANSURETHRAL RESECTION OF PROSTATE;  Surgeon: Bryce Santo MD;  Location: [facility]  OR;  Service: Urology   • ENDOSCOPY     • HEMORRHOIDECTOMY  1970   • HERNIA REPAIR      X 5   • INGUINAL HERNIA REPAIR     • LYMPH NODE BIOPSY  2012    large intestine lymph nodes   • TRANSURETHRAL RESECTION OF BLADDER TUMOR N/A 10/3/2018    Procedure: CYSTOSCOPY TRANSURETHRAL RESECTION OF BLADDER TUMOR, COUDE CATHETER PLACEMENT;  Surgeon: Bryce Santo MD;  Location: [facility] OR;  Service: Urology       Family History   Problem Relation Age of Onset   • Lung cancer Mother    • Osteoporosis Mother    • Thyroid disease Mother         mother   • Cancer Mother         Lung Cancer   • Early death Mother         46 yrs.   • Hearing loss Mother         mother   • Vision loss Mother         mother   • Heart disease Mother    • Cancer Father         Prostate Cancer   • Heart disease Father         Pacemaker   • Vision loss Father    • Heart disease Sister    • Heart failure Brother    • Heart disease Brother    • Cancer Sister         Breast Cancer   • Vision loss Brother    • Heart disease Brother    • Arrhythmia Brother    • Heart failure Brother    • Early death Sister          Around 40yrs.   • Early death Maternal Grandmother         Took Mother off.   • Heart disease Maternal Grandmother        Social History     Tobacco Use   • Smoking status: Former Smoker     Packs/day: 1.00     Years: 10.00     Pack years: 10.00     Types: Cigarettes     Start date: 1963     Last attempt to quit:      Years since quittin.9   • Smokeless tobacco: Never Used   Substance Use Topics   • Alcohol use: No   • Drug use: No       Allergies:  Allergies   Allergen Reactions   • Ciprofloxacin Diarrhea       Medications:    Current Outpatient Medications:   •  aspirin 81 MG tablet, Take  by mouth Daily., Disp: , Rfl:   •  Cetirizine HCl 10 MG capsule, , Disp: , Rfl:   •  cholecalciferol (VITAMIN D3) 1000 units tablet, Take 1,000 Units by mouth Daily., Disp: , Rfl:   •  Docusate Calcium (STOOL SOFTENER PO), , Disp: ,  "Rfl:   •  EPINEPHrine (EPIPEN) 0.3 MG/0.3ML solution auto-injector injection, , Disp: , Rfl:   •  furosemide (LASIX) 40 MG tablet, Take 1 tablet by mouth Daily. 40 mg po daily X 5 days and then decrease to prn daily, Disp: 90 tablet, Rfl: 0  •  Melatonin 3 MG tablet dispersible, Take 3 mg by mouth Every Night., Disp: , Rfl:   •  pantoprazole (PROTONIX) 20 MG EC tablet, 1 po in the am 30 minutes before breakfast. (Patient taking differently: Take 20 mg by mouth Daily. 1 po in the am 30 minutes before breakfast.), Disp: 90 tablet, Rfl: 3  •  potassium chloride (K-DUR,KLOR-CON) 20 MEQ CR tablet, Take 1 tablet by mouth Daily., Disp: 30 tablet, Rfl: 11  •  vitamin B-12 (CYANOCOBALAMIN) 500 MCG tablet, Take 500 mcg by mouth Daily., Disp: , Rfl:   •  zolpidem CR (AMBIEN CR) 6.25 MG CR tablet, Take 1 tablet by mouth At Night As Needed for Sleep., Disp: 90 tablet, Rfl: 2  •  bisacodyl (DULCOLAX) 5 MG EC tablet, Take 2 tablets by mouth 2 (Two) Times a Day for 1 day., Disp: 4 tablet, Rfl: 0  •  Polyethylene Glycol powder, 1 bottle 1 (One) Time for 1 dose. To be used for bowel prep., Disp: 238 g, Rfl: 0    Objective     Vital Signs:   Vitals:    12/19/18 1419   BP: 130/70   Pulse: 60   Temp: 98.7 °F (37.1 °C)   TempSrc: Temporal   SpO2: 99%   Weight: 90 kg (198 lb 6.4 oz)   Height: 177.8 cm (70\")       Physical Exam:   General Appearance:    Alert, cooperative, in no acute distress   Head:    Normocephalic, without obvious abnormality, atraumatic   Eyes:            Lids and lashes normal, conjunctivae and sclerae normal, no   icterus, no pallor, corneas clear, PERRL   Ears:    Ears appear intact with no abnormalities noted   Throat:   No oral lesions, no thrush, oral mucosa moist   Neck:   No adenopathy, supple, trachea midline, no thyromegaly,  no JVD   Lungs:     Clear to auscultation,respirations regular, even and                  unlabored    Heart:    Regular rhythm and normal rate, normal S1 and S2, no            murmur "   Abdomen:     no masses, no organomegaly, soft non-tender, non-distended, no guarding, there is no evidence of tenderness, there is evidence of a reducible incisional hernia at the superior aspect of the midline incision, there is a reducible large right inguinal hernia    Extremities:   Moves all extremities well, no edema, no cyanosis, no             redness   Pulses:   Pulses palpable and equal bilaterally   Skin:   No bleeding, bruising or rash, he does have a lesion on the bridge of his nose irritated in appearance and shape and suspicious in nature    Lymph nodes:   No palpable adenopathy   Neurologic:   Cranial nerves 2 - 12 grossly intact, sensation intact      Results Review:   I reviewed the patient's new clinical results.  I reviewed the patient's new imaging results and agree with the interpretation.  I reviewed the patient's other test results and agree with the interpretation    Review of Systems was reviewed and confirmed as accurate today.    Assessment/Plan :    1. Right upper quadrant abdominal pain    2. Nausea    3. History of colon cancer    4. Incisional hernia with obstruction but no gangrene    5. Non-recurrent unilateral inguinal hernia with obstruction without gangrene        I recommend a colonoscopy for further evaluation. The procedure was explained as well as the risks which include but are not limited to bleeding, infection, perforation, abdominal pain etc. The patient understands these risks and the procedure and wishes to proceed.     He very well may require future incisional hernia he will require future right inguinal hernia.  He will also need to undergo skin lesion excision of the nares and I have informed him of the risk and benefits of operative versus nonoperative intervention.    Electronically signed by Jacky Walker MD  12/19/18 2:25 PM  Portions of this note have been scribed for Jacky Walker MD by Virgie Daigle 12/19/2018  3:10 PM

## 2018-12-19 NOTE — TELEPHONE ENCOUNTER
Patient came in the office and wanted to let Dr. Muse know:    He is going to have the place on his nose removed tomorrow with Dr. Avendano.     [facility]    He has his Colonoscopy scheduled for 1/2/2019.

## 2018-12-20 ENCOUNTER — PROCEDURE VISIT (OUTPATIENT)
Dept: SURGERY | Facility: CLINIC | Age: 73
End: 2018-12-20

## 2018-12-20 VITALS
OXYGEN SATURATION: 99 % | HEIGHT: 70 IN | DIASTOLIC BLOOD PRESSURE: 78 MMHG | HEART RATE: 66 BPM | SYSTOLIC BLOOD PRESSURE: 130 MMHG | WEIGHT: 198.8 LBS | BODY MASS INDEX: 28.46 KG/M2 | TEMPERATURE: 98.5 F

## 2018-12-20 DIAGNOSIS — L98.9 SKIN LESION: Primary | ICD-10-CM

## 2018-12-20 DIAGNOSIS — L98.9 SKIN LESION OF FACE: Primary | ICD-10-CM

## 2018-12-20 PROBLEM — Z85.038 HISTORY OF COLON CANCER: Status: ACTIVE | Noted: 2018-12-20

## 2018-12-20 PROCEDURE — 12051 INTMD RPR FACE/MM 2.5 CM/<: CPT | Performed by: SURGERY

## 2018-12-20 PROCEDURE — 11442 EXC FACE-MM B9+MARG 1.1-2 CM: CPT | Performed by: SURGERY

## 2018-12-20 NOTE — PROGRESS NOTES
Location:nose    Procedure:Patient is in the office today for an excision of a skin lesion @ nose.  Patient has given proper consent.      I recommend excision. Procedure and the risks and benefits were explained including bleeding and infection. The patient understands these and wishes to proceed.     The patient was brought to the procedure room. Consent and time out were performed. The area was prepped and draped in the usual fashion. 1% lidocaine with epinephrine was infused locally. An ellyptical incision was made around the lesion. Full thickness excision was performed. The lesion size was 1.2 cm. The wound was closed in layers with interrupted simple Nylon for the skin. Wound closure size was 1.5 cm. There were no complications and the patient tolerated the procedure well. Hemostasis was well controlled with pressure and there was minimal blood loss. Wound instructions were given.     Portions of this note have been scribed for Jacky Walker MD by Gena Palmer. 12/20/2018  12:27 PM

## 2018-12-26 ENCOUNTER — OFFICE VISIT (OUTPATIENT)
Dept: SURGERY | Facility: CLINIC | Age: 73
End: 2018-12-26

## 2018-12-26 ENCOUNTER — TELEPHONE (OUTPATIENT)
Dept: INTERNAL MEDICINE | Facility: CLINIC | Age: 73
End: 2018-12-26

## 2018-12-26 VITALS
BODY MASS INDEX: 28.34 KG/M2 | OXYGEN SATURATION: 98 % | HEIGHT: 70 IN | SYSTOLIC BLOOD PRESSURE: 120 MMHG | HEART RATE: 75 BPM | TEMPERATURE: 98.8 F | DIASTOLIC BLOOD PRESSURE: 82 MMHG | WEIGHT: 197.97 LBS

## 2018-12-26 DIAGNOSIS — Z98.890 POST-OPERATIVE STATE: Primary | ICD-10-CM

## 2018-12-26 PROCEDURE — 99024 POSTOP FOLLOW-UP VISIT: CPT | Performed by: SURGERY

## 2018-12-26 NOTE — PROGRESS NOTES
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  They state that they have done well and are having no problems.    The following portions of the patient's history were reviewed and updated as appropriate: allergies, current medications, past family history, past medical history, past social history, past surgical history and problem list.      Vital Signs:  Vitals:    12/26/18 1318   BP: 120/82   Pulse: 75   Temp: 98.8 °F (37.1 °C)   TempSrc: Temporal   SpO2: 98%   Weight: 89.8 kg (197 lb 15.6 oz)   Height: 177.8 cm (70\")       Physical Exam:   General Appearance:    Alert, cooperative, in no acute distress, wound clean dry without infection   Abdomen:     no masses, no organomegaly, soft non-tender, non-distended, no guarding, wounds are well healed   Chest:      Clear to ausculation       Assessment / Plan:    1. Post-operative state        I did discuss the situation with the patient today in the office and they have done well from their recent lesion excision, I don't think that the patient needs any further intervention and I need to see them back only if they have further problems. Pathology report was reviewed with the patient in the office.    Electronically signed by Jacky Walker MD  12/26/18    Portions of this note have been scribed for Jacky Walker MD by Michelle Milligan. 12/26/2018  2:08 PM                      "

## 2018-12-26 NOTE — TELEPHONE ENCOUNTER
Preadmission office called from [facility] in [place] and said they had just spoke to patient on the phone and he told them he had stopped taking his blood pressure medication a few weeks ago because he thought it was giving him leg cramps.  He has been checking it at home and it has been good.    They just wanted to let Dr. Muse know he had stopped taking his medication.

## 2018-12-27 ENCOUNTER — TELEPHONE (OUTPATIENT)
Dept: INTERNAL MEDICINE | Facility: CLINIC | Age: 73
End: 2018-12-27

## 2018-12-27 NOTE — TELEPHONE ENCOUNTER
Patient came into office today, stating that his blood pressure med was causing him leg cramps and also, he saw on television it could cause cancer. He'd like to know if something else could be prescribed.

## 2018-12-28 RX ORDER — IRBESARTAN 150 MG/1
150 TABLET ORAL NIGHTLY
Qty: 30 TABLET | Refills: 1 | Status: SHIPPED | OUTPATIENT
Start: 2018-12-28 | End: 2019-01-07

## 2018-12-28 NOTE — TELEPHONE ENCOUNTER
Chart states that losartan was discontinued in November.  Please call in Avapro 150 mg daily #30 tablets with one refill and ask patient to monitor blood pressure closely.

## 2018-12-31 ENCOUNTER — TELEPHONE (OUTPATIENT)
Dept: SURGERY | Facility: CLINIC | Age: 73
End: 2018-12-31

## 2019-01-01 ENCOUNTER — ANESTHESIA EVENT (OUTPATIENT)
Dept: GASTROENTEROLOGY | Facility: HOSPITAL | Age: 74
End: 2019-01-01

## 2019-01-02 ENCOUNTER — HOSPITAL ENCOUNTER (OUTPATIENT)
Facility: HOSPITAL | Age: 74
Setting detail: HOSPITAL OUTPATIENT SURGERY
Discharge: HOME | End: 2019-01-02
Attending: SURGERY | Admitting: SURGERY

## 2019-01-02 ENCOUNTER — ANESTHESIA (OUTPATIENT)
Dept: GASTROENTEROLOGY | Facility: HOSPITAL | Age: 74
End: 2019-01-02

## 2019-01-02 VITALS
DIASTOLIC BLOOD PRESSURE: 85 MMHG | WEIGHT: 198 LBS | SYSTOLIC BLOOD PRESSURE: 132 MMHG | OXYGEN SATURATION: 96 % | RESPIRATION RATE: 18 BRPM | HEIGHT: 70 IN | BODY MASS INDEX: 28.35 KG/M2 | HEART RATE: 66 BPM | TEMPERATURE: 97.8 F

## 2019-01-02 DIAGNOSIS — Z85.038 HISTORY OF COLON CANCER: ICD-10-CM

## 2019-01-02 PROCEDURE — 25010000002 MIDAZOLAM PER 1 MG: Performed by: NURSE ANESTHETIST, CERTIFIED REGISTERED

## 2019-01-02 PROCEDURE — 25010000002 PROPOFOL 200 MG/20ML EMULSION: Performed by: NURSE ANESTHETIST, CERTIFIED REGISTERED

## 2019-01-02 RX ORDER — SODIUM CHLORIDE, SODIUM LACTATE, POTASSIUM CHLORIDE, CALCIUM CHLORIDE 600; 310; 30; 20 MG/100ML; MG/100ML; MG/100ML; MG/100ML
1000 INJECTION, SOLUTION INTRAVENOUS CONTINUOUS
Status: DISCONTINUED | OUTPATIENT
Start: 2019-01-02 | End: 2019-01-02 | Stop reason: HOSPADM

## 2019-01-02 RX ORDER — PROPOFOL 10 MG/ML
INJECTION, EMULSION INTRAVENOUS AS NEEDED
Status: DISCONTINUED | OUTPATIENT
Start: 2019-01-02 | End: 2019-01-02 | Stop reason: SURG

## 2019-01-02 RX ORDER — MIDAZOLAM HYDROCHLORIDE 1 MG/ML
INJECTION INTRAMUSCULAR; INTRAVENOUS AS NEEDED
Status: DISCONTINUED | OUTPATIENT
Start: 2019-01-02 | End: 2019-01-02 | Stop reason: SURG

## 2019-01-02 RX ORDER — MEPERIDINE HYDROCHLORIDE 50 MG/ML
INJECTION INTRAMUSCULAR; INTRAVENOUS; SUBCUTANEOUS AS NEEDED
Status: DISCONTINUED | OUTPATIENT
Start: 2019-01-02 | End: 2019-01-02 | Stop reason: SURG

## 2019-01-02 RX ADMIN — PROPOFOL 10 MG: 10 INJECTION, EMULSION INTRAVENOUS at 08:12

## 2019-01-02 RX ADMIN — SODIUM CHLORIDE, POTASSIUM CHLORIDE, SODIUM LACTATE AND CALCIUM CHLORIDE 1000 ML: 600; 310; 30; 20 INJECTION, SOLUTION INTRAVENOUS at 07:16

## 2019-01-02 RX ADMIN — PROPOFOL 10 MG: 10 INJECTION, EMULSION INTRAVENOUS at 08:22

## 2019-01-02 RX ADMIN — PROPOFOL 10 MG: 10 INJECTION, EMULSION INTRAVENOUS at 08:13

## 2019-01-02 RX ADMIN — MIDAZOLAM HYDROCHLORIDE 2 MG: 1 INJECTION, SOLUTION INTRAMUSCULAR; INTRAVENOUS at 08:10

## 2019-01-02 RX ADMIN — PROPOFOL 10 MG: 10 INJECTION, EMULSION INTRAVENOUS at 08:16

## 2019-01-02 RX ADMIN — MEPERIDINE HYDROCHLORIDE 50 MG: 50 INJECTION, SOLUTION INTRAMUSCULAR; INTRAVENOUS; SUBCUTANEOUS at 08:11

## 2019-01-02 RX ADMIN — PROPOFOL 10 MG: 10 INJECTION, EMULSION INTRAVENOUS at 08:23

## 2019-01-02 RX ADMIN — PROPOFOL 10 MG: 10 INJECTION, EMULSION INTRAVENOUS at 08:14

## 2019-01-02 NOTE — DISCHARGE INSTRUCTIONS
To assist you in voiding:  Drink plenty of fluids  Listen to running water while attempting to void.    If you are unable to urinate and you have an uncomfortable urge to void or it has been   6 hours since you were discharged, return to the Emergency Room

## 2019-01-02 NOTE — ANESTHESIA PREPROCEDURE EVALUATION
Anesthesia Evaluation     Patient summary reviewed and Nursing notes reviewed   NPO Solid Status: > 8 hours  NPO Liquid Status: > 8 hours           Airway   Mallampati: II  TM distance: >3 FB  Neck ROM: full  No difficulty expected and Possible difficult intubation  Dental - normal exam     Pulmonary    (+) asthma, recent URI, decreased breath sounds,   Cardiovascular - normal exam  Exercise tolerance: good (4-7 METS)    ECG reviewed  PT is on anticoagulation therapy    (+) pacemaker pacemaker interrogated 3-6 months ago, hypertension, CAD, CHF, STATON, PVD, hyperlipidemia,  carotid artery disease (approx 60%) right carotid      Neuro/Psych  (+) psychiatric history Anxiety,     GI/Hepatic/Renal/Endo    (+) obesity, morbid obesity, GERD,      Musculoskeletal     Abdominal    Substance History - negative use     OB/GYN negative ob/gyn ROS         Other   (+) arthritis   history of cancer    ROS/Med Hx Other: Rodriguez- sept 2018-no further w/u needed  Echo 2017-  · Left ventricular systolic function is normal. Estimated EF = 60%.  · Left ventricular diastolic dysfunction (grade I a) consistent with impaired relaxation.  · Trace-to-mild aortic valve regurgitation is present  · Mild tricuspid valve regurgitation is present.                    Anesthesia Plan    ASA 3     MAC     intravenous induction   Anesthetic plan, all risks, benefits, and alternatives have been provided, discussed and informed consent has been obtained with: patient.

## 2019-01-02 NOTE — ANESTHESIA POSTPROCEDURE EVALUATION
Patient: Saad Meier    Procedure Summary     Date:  01/02/19 Room / Location:  [facility] ENDOSCOPY 3 / [facility] ENDOSCOPY    Anesthesia Start:  0803 Anesthesia Stop:      Procedure:  COLONOSCOPY W/ HOT BIOPSY POLYPECTOMY (N/A ) Diagnosis:       History of colon cancer      (History of colon cancer [Z85.038])    Surgeon:  Jacky Walker MD Provider:  Luis Villavicencio CRNA    Anesthesia Type:  MAC ASA Status:  3          Anesthesia Type: MAC  Last vitals  BP   136/83 (01/02/19 0709)   Temp   98.5 °F (36.9 °C) (01/02/19 0709)   Pulse   65 (01/02/19 0709)   Resp   18 (01/02/19 0709)     SpO2   94 % (01/02/19 0709)     Post Anesthesia Care and Evaluation    Patient location during evaluation: PHASE II  Patient participation: complete - patient participated  Level of consciousness: awake  Pain score: 0  Pain management: adequate  Airway patency: patent  Anesthetic complications: No anesthetic complications  PONV Status: none  Cardiovascular status: acceptable  Respiratory status: acceptable and nasal cannula  Hydration status: acceptable    Comments: vsss resp spont, reflexes intact, responsive, report given to pacu nurse

## 2019-01-07 ENCOUNTER — TELEPHONE (OUTPATIENT)
Dept: INTERNAL MEDICINE | Facility: CLINIC | Age: 74
End: 2019-01-07

## 2019-01-07 DIAGNOSIS — I10 ESSENTIAL HYPERTENSION: Primary | Chronic | ICD-10-CM

## 2019-01-07 LAB
LAB AP CASE REPORT: NORMAL
PATH REPORT.FINAL DX SPEC: NORMAL

## 2019-01-07 RX ORDER — IRBESARTAN 300 MG/1
300 TABLET ORAL NIGHTLY
Qty: 90 TABLET | Refills: 3 | Status: SHIPPED | OUTPATIENT
Start: 2019-01-07 | End: 2019-03-18

## 2019-01-07 NOTE — TELEPHONE ENCOUNTER
I increased his avapro to 300 mg. Can take 2 of the 150s until new supply arrives from mail order. Keep log of bp

## 2019-01-07 NOTE — TELEPHONE ENCOUNTER
Patient called stating that his blood pressure medication is not working. His Bp has been running very high and staying high in the 160s/90s

## 2019-01-08 LAB
ALBUMIN SERPL-MCNC: 4.2 G/DL (ref 3.5–5)
ALBUMIN/GLOB SERPL: 1.8 G/DL (ref 1–2)
ALP SERPL-CCNC: 66 U/L (ref 38–126)
ALT SERPL-CCNC: 34 U/L (ref 13–69)
AST SERPL-CCNC: 30 U/L (ref 15–46)
BILIRUB SERPL-MCNC: 0.7 MG/DL (ref 0.2–1.3)
BUN SERPL-MCNC: 18 MG/DL (ref 7–20)
BUN/CREAT SERPL: 18 (ref 6.3–21.9)
CALCIUM SERPL-MCNC: 9.9 MG/DL (ref 8.4–10.2)
CHLORIDE SERPL-SCNC: 105 MMOL/L (ref 98–107)
CHOLEST SERPL-MCNC: 187 MG/DL (ref 0–199)
CO2 SERPL-SCNC: 29 MMOL/L (ref 26–30)
CREAT SERPL-MCNC: 1 MG/DL (ref 0.6–1.3)
GLOBULIN SER CALC-MCNC: 2.4 GM/DL
GLUCOSE SERPL-MCNC: 99 MG/DL (ref 74–98)
HDLC SERPL-MCNC: 39 MG/DL (ref 40–60)
LDLC SERPL CALC-MCNC: 127 MG/DL (ref 0–99)
POTASSIUM SERPL-SCNC: 5 MMOL/L (ref 3.5–5.1)
PROT SERPL-MCNC: 6.6 G/DL (ref 6.3–8.2)
SODIUM SERPL-SCNC: 141 MMOL/L (ref 137–145)
TRIGL SERPL-MCNC: 107 MG/DL
VLDLC SERPL CALC-MCNC: 21.4 MG/DL

## 2019-01-09 ENCOUNTER — TELEPHONE (OUTPATIENT)
Dept: CARDIOLOGY | Facility: CLINIC | Age: 74
End: 2019-01-09

## 2019-01-09 ENCOUNTER — OFFICE VISIT (OUTPATIENT)
Dept: SURGERY | Facility: CLINIC | Age: 74
End: 2019-01-09

## 2019-01-09 VITALS
WEIGHT: 197 LBS | HEIGHT: 70 IN | DIASTOLIC BLOOD PRESSURE: 74 MMHG | SYSTOLIC BLOOD PRESSURE: 142 MMHG | BODY MASS INDEX: 28.2 KG/M2 | HEART RATE: 60 BPM | TEMPERATURE: 97.9 F | OXYGEN SATURATION: 98 %

## 2019-01-09 DIAGNOSIS — K43.0 INCISIONAL HERNIA WITH OBSTRUCTION BUT NO GANGRENE: ICD-10-CM

## 2019-01-09 DIAGNOSIS — K40.01 BILATERAL RECURRENT INGUINAL HERNIA WITH OBSTRUCTION AND WITHOUT GANGRENE: Primary | ICD-10-CM

## 2019-01-09 PROCEDURE — 99213 OFFICE O/P EST LOW 20 MIN: CPT | Performed by: SURGERY

## 2019-01-09 NOTE — PROGRESS NOTES
Patient: Saad Meier    YOB: 1945    Date: 01/09/2019    Primary Care Provider: Evelyn Muse MD    Reason for Consultation: Follow-up colonoscopy    Chief Complaint   Patient presents with   • Follow-up     colonoscopy       History of present illness:  I saw the patient in the office today as a followup from their recent colonoscopy with polypectomy, the pathology report did show hyperplastic polyp.  They state that they have done well and are having no complaints.  He does have a incisional hernia present above the umbilicus and also wears a truss for large right inguinal hernia that has been present for a long time.    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  He also has a long-standing history of bilateral inguinal hernias and wears a chronic truss.  He has had previous colon surgery and has an incisional hernia present.    The following portions of the patient's history were reviewed and updated as appropriate: allergies, current medications, past family history, past medical history, past social history, past surgical history and problem list.      Review of Systems   Constitutional: Negative for chills, fever and unexpected weight change.   HENT: Negative for trouble swallowing and voice change.    Eyes: Negative for visual disturbance.   Respiratory: Negative for apnea, cough, chest tightness, shortness of breath and wheezing.    Cardiovascular: Negative for chest pain, palpitations and leg swelling.   Gastrointestinal: Negative for abdominal distention, abdominal pain, anal bleeding, blood in stool, constipation, diarrhea, nausea, rectal pain and vomiting.   Endocrine: Negative for cold intolerance and heat intolerance.   Genitourinary: Negative for difficulty urinating, dysuria, flank pain, scrotal swelling and testicular pain.   Musculoskeletal: Negative for back pain, gait problem and joint swelling.   Skin: Negative for  "color change, rash and wound.   Neurological: Negative for dizziness, syncope, speech difficulty, weakness, numbness and headaches.   Hematological: Negative for adenopathy. Does not bruise/bleed easily.   Psychiatric/Behavioral: Negative for confusion. The patient is not nervous/anxious.        Allergies:  Allergies   Allergen Reactions   • Ciprofloxacin Diarrhea       Medications:    Current Outpatient Medications:   •  aspirin 81 MG tablet, Take 81 mg by mouth Daily., Disp: , Rfl:   •  Cetirizine HCl 10 MG capsule, , Disp: , Rfl:   •  cholecalciferol (VITAMIN D3) 1000 units tablet, Take 5,000 Units by mouth Daily., Disp: , Rfl:   •  EPINEPHrine (EPIPEN) 0.3 MG/0.3ML solution auto-injector injection, , Disp: , Rfl:   •  furosemide (LASIX) 40 MG tablet, Take 1 tablet by mouth Daily. 40 mg po daily X 5 days and then decrease to prn daily, Disp: 90 tablet, Rfl: 0  •  irbesartan (AVAPRO) 300 MG tablet, Take 1 tablet by mouth Every Night., Disp: 90 tablet, Rfl: 3  •  NON FORMULARY, Take 1 capsule by mouth Daily As Needed. ALISHA'S LEG CRAMP RELIEF, Disp: , Rfl:   •  pantoprazole (PROTONIX) 20 MG EC tablet, 1 po in the am 30 minutes before breakfast. (Patient taking differently: Take 20 mg by mouth Daily. 1 po in the am 30 minutes before breakfast.), Disp: 90 tablet, Rfl: 3  •  potassium chloride (K-DUR,KLOR-CON) 20 MEQ CR tablet, Take 1 tablet by mouth Daily., Disp: 30 tablet, Rfl: 11  •  vitamin B-12 (CYANOCOBALAMIN) 500 MCG tablet, Take 5,000 mcg by mouth Daily., Disp: , Rfl:   •  zolpidem CR (AMBIEN CR) 6.25 MG CR tablet, Take 1 tablet by mouth At Night As Needed for Sleep., Disp: 90 tablet, Rfl: 2    Vital Signs:  Vitals:    01/09/19 1316   BP: 142/74   Pulse: 60   Temp: 97.9 °F (36.6 °C)   SpO2: 98%   Weight: 89.4 kg (197 lb)   Height: 177.8 cm (70\")       Physical Exam:   General Appearance:    Alert, cooperative, in no acute distress   Abdomen:     no masses, no organomegaly, soft non-tender, non-distended, no " guarding, wounds are well healed, there is evidence of a recurrent right inguinal hernia, this is reducible, there is a smaller left inguinal hernia.  There is an incarcerated incisional hernia superior to the umbilicus    Chest:      Clear to ausculation            Cor:     Regular rate and rhythm    Results Review:   I reviewed the patient's new clinical results.  I reviewed the patient's new imaging results and agree with the interpretation.  I reviewed the patient's other test results and agree with the interpretation     I did review the patient's previous CT scans from last year, there is evidence of bilateral inguinal hernias    Assessment / Plan:    1. Bilateral recurrent inguinal hernia with obstruction and without gangrene    2. Incisional hernia with obstruction but no gangrene        I did discuss the situation with the patient today in the office and they have done well from their recent colonoscopy with polypectomy.  I have released the patient back to normal activity.  I need to see the patient back in the office in 3 years and they will need to have repeat colonoscopy at that time.    I think I would like to have the patient come back and see me in 1 week and we will get a right inguinal ultrasound at that time to better ascertain whether he needs to undergo recurrent right inguinal herniorrhaphy with mesh implantation.    Electronically signed by Jacky Walker MD  01/09/19

## 2019-01-15 ENCOUNTER — HOSPITAL ENCOUNTER (OUTPATIENT)
Dept: GENERAL RADIOLOGY | Facility: HOSPITAL | Age: 74
Discharge: HOME | End: 2019-01-15
Admitting: SURGERY

## 2019-01-15 ENCOUNTER — APPOINTMENT (OUTPATIENT)
Dept: PREADMISSION TESTING | Facility: HOSPITAL | Age: 74
End: 2019-01-15

## 2019-01-15 ENCOUNTER — OFFICE VISIT (OUTPATIENT)
Dept: SURGERY | Facility: CLINIC | Age: 74
End: 2019-01-15

## 2019-01-15 VITALS
BODY MASS INDEX: 28.2 KG/M2 | SYSTOLIC BLOOD PRESSURE: 130 MMHG | HEART RATE: 94 BPM | WEIGHT: 197 LBS | TEMPERATURE: 98.3 F | HEIGHT: 70 IN | OXYGEN SATURATION: 99 % | DIASTOLIC BLOOD PRESSURE: 84 MMHG

## 2019-01-15 VITALS — WEIGHT: 197 LBS | HEIGHT: 70 IN | BODY MASS INDEX: 28.2 KG/M2

## 2019-01-15 DIAGNOSIS — K40.91 RECURRENT INGUINAL HERNIA WITHOUT OBSTRUCTION OR GANGRENE, UNSPECIFIED LATERALITY: Primary | ICD-10-CM

## 2019-01-15 DIAGNOSIS — K43.0 INCISIONAL HERNIA WITH OBSTRUCTION BUT NO GANGRENE: ICD-10-CM

## 2019-01-15 LAB
DEPRECATED RDW RBC AUTO: 49.8 FL (ref 37–54)
ERYTHROCYTE [DISTWIDTH] IN BLOOD BY AUTOMATED COUNT: 14.3 % (ref 11.5–14.5)
HCT VFR BLD AUTO: 44 % (ref 42–52)
HGB BLD-MCNC: 14.4 G/DL (ref 14–18)
MCH RBC QN AUTO: 31.2 PG (ref 27–31)
MCHC RBC AUTO-ENTMCNC: 32.7 G/DL (ref 30–37)
MCV RBC AUTO: 95.2 FL (ref 80–94)
PLATELET # BLD AUTO: 327 10*3/MM3 (ref 130–400)
PMV BLD AUTO: 11.1 FL (ref 6–12)
RBC # BLD AUTO: 4.62 10*6/MM3 (ref 4.7–6.1)
WBC NRBC COR # BLD: 7.23 10*3/MM3 (ref 4.8–10.8)

## 2019-01-15 PROCEDURE — 85027 COMPLETE CBC AUTOMATED: CPT | Performed by: SURGERY

## 2019-01-15 PROCEDURE — 71045 X-RAY EXAM CHEST 1 VIEW: CPT

## 2019-01-15 PROCEDURE — 36415 COLL VENOUS BLD VENIPUNCTURE: CPT

## 2019-01-15 PROCEDURE — 99213 OFFICE O/P EST LOW 20 MIN: CPT | Performed by: SURGERY

## 2019-01-15 NOTE — NURSING NOTE
Nicola Simpson, CRNA called and informed of abnormal EKG 9/26/2018 and cardiac history. Pt is scheduled to [facility] on 1/21/2019. Cardiac clearance requested per CRNA

## 2019-01-15 NOTE — H&P (VIEW-ONLY)
Patient: Saad Meier    YOB: 1945    Date: 01/15/2019    Primary Care Provider: Evelyn Muse MD    Chief Complaint   Patient presents with   • Follow-up     Inguinal hernia        History: I am seeing the patient in the office today in follow up for an incisional hernia just above the umbilicus and right inguinal hernia.  Patient states this has been present for a long time and he does wear a truss for the right inguinal hernia. Patient denies pain or difficult bowel movements.    He does have a long-standing truss of the right groin region for a large recurrent right inguinal hernia.  He has had a previous right inguinal hernia repaired with mesh in the past, ultrasonography was performed today and showed evidence of a medial recurrence.  He also has a smaller left inguinal hernia that is recurrent in nature, he is also had a previous left inguinal herniorrhaphy with mesh implantation.  He also has had a previous exploration of the abdomen for colon carcinoma and has an incisional hernia located above the umbilicus.    The following portions of the patient's history were reviewed and updated as appropriate: allergies, current medications, past family history, past medical history, past social history, past surgical history and problem list.      Review of Systems   Constitutional: Negative for chills, fever and unexpected weight change.   HENT: Negative for trouble swallowing and voice change.    Eyes: Negative for visual disturbance.   Respiratory: Negative for apnea, cough, chest tightness, shortness of breath and wheezing.    Cardiovascular: Negative for chest pain, palpitations and leg swelling.   Gastrointestinal: Negative for abdominal distention, abdominal pain, anal bleeding, blood in stool, constipation, diarrhea, nausea, rectal pain and vomiting.   Endocrine: Negative for cold intolerance and heat intolerance.   Genitourinary: Negative for difficulty urinating, dysuria, flank  "pain, scrotal swelling and testicular pain.   Musculoskeletal: Negative for back pain, gait problem and joint swelling.   Skin: Negative for color change, rash and wound.   Neurological: Negative for dizziness, syncope, speech difficulty, weakness, numbness and headaches.   Hematological: Negative for adenopathy. Does not bruise/bleed easily.   Psychiatric/Behavioral: Negative for confusion. The patient is not nervous/anxious.        Vital Signs  Vitals:    01/15/19 1413   BP: 130/84   Pulse: 94   Temp: 98.3 °F (36.8 °C)   SpO2: 99%   Weight: 89.4 kg (197 lb)   Height: 177.8 cm (70\")       Allergies:  Allergies   Allergen Reactions   • Ciprofloxacin Diarrhea       Medications:    Current Outpatient Medications:   •  aspirin 81 MG tablet, Take 81 mg by mouth Daily., Disp: , Rfl:   •  Cetirizine HCl 10 MG capsule, , Disp: , Rfl:   •  cholecalciferol (VITAMIN D3) 1000 units tablet, Take 5,000 Units by mouth Daily., Disp: , Rfl:   •  EPINEPHrine (EPIPEN) 0.3 MG/0.3ML solution auto-injector injection, , Disp: , Rfl:   •  furosemide (LASIX) 40 MG tablet, Take 1 tablet by mouth Daily. 40 mg po daily X 5 days and then decrease to prn daily, Disp: 90 tablet, Rfl: 0  •  irbesartan (AVAPRO) 300 MG tablet, Take 1 tablet by mouth Every Night., Disp: 90 tablet, Rfl: 3  •  NON FORMULARY, Take 1 capsule by mouth Daily As Needed. ALISHA'S LEG CRAMP RELIEF, Disp: , Rfl:   •  pantoprazole (PROTONIX) 20 MG EC tablet, 1 po in the am 30 minutes before breakfast. (Patient taking differently: Take 20 mg by mouth Daily. 1 po in the am 30 minutes before breakfast.), Disp: 90 tablet, Rfl: 3  •  potassium chloride (K-DUR,KLOR-CON) 20 MEQ CR tablet, Take 1 tablet by mouth Daily., Disp: 30 tablet, Rfl: 11  •  vitamin B-12 (CYANOCOBALAMIN) 500 MCG tablet, Take 5,000 mcg by mouth Daily., Disp: , Rfl:   •  zolpidem CR (AMBIEN CR) 6.25 MG CR tablet, Take 1 tablet by mouth At Night As Needed for Sleep., Disp: 90 tablet, Rfl: 2    Physical " Exam:   General Appearance:    Alert, cooperative, in no acute distress   Head:    Normocephalic, without obvious abnormality, atraumatic   Lungs:     Clear to auscultation,respirations regular, even and                  unlabored    Heart:    Regular rhythm and normal rate, normal S1 and S2, no            murmur, no gallop, no rub, no click   Abdomen:     Normal bowel sounds, no masses, no organomegaly, soft        non-tender, non-distended, no guarding, no rebound                tenderness, there is evidence of a reducible incisional hernia above the umbilicus, there is bilateral inguinal hernias present on both sides    Extremities:   Moves all extremities well, no edema, no cyanosis, no             redness   Pulses:   Pulses palpable and equal bilaterally   Skin:   No bleeding, bruising or rash       Results Review:   I reviewed the patient's new clinical results.  I reviewed the patient's new imaging results and agree with the interpretation.  I reviewed the patient's other test results and agree with the interpretation     Assessment/Plan     1. Recurrent inguinal hernia without obstruction or gangrene, unspecified laterality    2. Incisional hernia with obstruction but no gangrene      I did have a long and detailed discussion with the patient in the office today.  He does have a fairly large recurrent right inguinal hernia that he wears a truss for and I would like to perform right inguinal herniorrhaphy repair of a recurrent right inguinal hernia, I would like to do this in the Kugel fashion in order to be in the preperitoneal space and avoid the previously placed mesh.  Risk and benefits of operative versus nonoperative intervention of been discussed with the patient, he understands and agrees, and wishes to proceed.  He will need future left inguinal hernia repair and also will need future    incisional herniorrhaphy with mesh implantation.    Electronically signed by Jacky Walker  MD  01/15/19    Portions of this note have been scribed for Jacky Walker MD by Virgie Daigle 1/15/2019  2:38 PM

## 2019-01-21 ENCOUNTER — TELEPHONE (OUTPATIENT)
Dept: SURGERY | Facility: CLINIC | Age: 74
End: 2019-01-21

## 2019-01-21 ENCOUNTER — TELEPHONE (OUTPATIENT)
Dept: INTERNAL MEDICINE | Facility: CLINIC | Age: 74
End: 2019-01-21

## 2019-01-21 ENCOUNTER — OFFICE VISIT (OUTPATIENT)
Dept: CARDIOLOGY | Facility: CLINIC | Age: 74
End: 2019-01-21

## 2019-01-21 ENCOUNTER — OFFICE VISIT (OUTPATIENT)
Dept: UROLOGY | Facility: CLINIC | Age: 74
End: 2019-01-21

## 2019-01-21 VITALS
HEIGHT: 70 IN | BODY MASS INDEX: 28.2 KG/M2 | HEART RATE: 72 BPM | OXYGEN SATURATION: 97 % | WEIGHT: 197 LBS | TEMPERATURE: 97.1 F

## 2019-01-21 VITALS
HEART RATE: 58 BPM | DIASTOLIC BLOOD PRESSURE: 74 MMHG | HEIGHT: 70 IN | BODY MASS INDEX: 28.35 KG/M2 | WEIGHT: 198 LBS | SYSTOLIC BLOOD PRESSURE: 110 MMHG | OXYGEN SATURATION: 93 %

## 2019-01-21 DIAGNOSIS — N40.1 BENIGN PROSTATIC HYPERPLASIA WITH URINARY OBSTRUCTION: ICD-10-CM

## 2019-01-21 DIAGNOSIS — I47.1 AV NODAL RE-ENTRY TACHYCARDIA (HCC): Primary | ICD-10-CM

## 2019-01-21 DIAGNOSIS — R33.9 URINARY RETENTION: Primary | ICD-10-CM

## 2019-01-21 DIAGNOSIS — N13.8 BENIGN PROSTATIC HYPERPLASIA WITH URINARY OBSTRUCTION: ICD-10-CM

## 2019-01-21 DIAGNOSIS — I25.10 CORONARY ARTERY DISEASE INVOLVING NATIVE CORONARY ARTERY OF NATIVE HEART WITHOUT ANGINA PECTORIS: ICD-10-CM

## 2019-01-21 DIAGNOSIS — I10 ESSENTIAL HYPERTENSION: ICD-10-CM

## 2019-01-21 DIAGNOSIS — I65.21 STENOSIS OF RIGHT CAROTID ARTERY: ICD-10-CM

## 2019-01-21 DIAGNOSIS — E78.5 DYSLIPIDEMIA: ICD-10-CM

## 2019-01-21 DIAGNOSIS — I50.32 CHRONIC DIASTOLIC CONGESTIVE HEART FAILURE (HCC): ICD-10-CM

## 2019-01-21 DIAGNOSIS — I49.5 SSS (SICK SINUS SYNDROME) (HCC): ICD-10-CM

## 2019-01-21 PROCEDURE — 93280 PM DEVICE PROGR EVAL DUAL: CPT | Performed by: INTERNAL MEDICINE

## 2019-01-21 PROCEDURE — 99214 OFFICE O/P EST MOD 30 MIN: CPT | Performed by: INTERNAL MEDICINE

## 2019-01-21 PROCEDURE — 99213 OFFICE O/P EST LOW 20 MIN: CPT | Performed by: UROLOGY

## 2019-01-21 PROCEDURE — 51798 US URINE CAPACITY MEASURE: CPT | Performed by: UROLOGY

## 2019-01-21 RX ORDER — ATORVASTATIN CALCIUM 40 MG/1
40 TABLET, FILM COATED ORAL DAILY
Qty: 90 TABLET | Refills: 3 | Status: SHIPPED | OUTPATIENT
Start: 2019-01-21 | End: 2019-02-20 | Stop reason: ALTCHOICE

## 2019-01-21 NOTE — TELEPHONE ENCOUNTER
Patient ordered his pantoprazole late and will not receive it for a few days since it is mail order . Can he have a few called into his local pharmacy? He uses [facility]. Please and thank you.

## 2019-01-21 NOTE — PROGRESS NOTES
[facility] AT [address], [address]  [place], [place], [address]    [phone]  [email or web address]           OUTPATIENT CLINIC FOLLOW UP NOTE    Patient Care Team:  Patient Care Team:  Evelyn Muse MD as PCP - General (Family Medicine)  Atilio Wall MD as Consulting Physician (Cardiology)  Luis Hollins MD as Consulting Physician (Urology)  Liddle, Susan E., MD as Consulting Physician (Hematology and Oncology)  Gloria Garcia MD as Consulting Physician (General Surgery)  Elvie Alvarez MD as Surgeon (General Surgery)  Jacky Walker MD as Consulting Physician (General Surgery)    Subjective:      Chief Complaint   Patient presents with   • Coronary Artery Disease   • Congestive Heart Failure     HPI:    Saad Meier is a 73 y.o. male.  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  The patient presents today for follow-up.    The patient presents for follow-up.  Since his last visit he denies chest pain, dyspnea with exertion, palpitations.  He does have worsening lower extremity edema over the last couple weeks.  Improves only partially with Lasix.  No associated symptoms.  Denies PND or orthopnea as well.    Review of Systems:  Positive for lower extremity edema  Negative for exertional chest pain, dyspnea with exertion, orthopnea, PND, palpitations, lightheadedness, syncope.    PFSH:  Patient Active Problem List   Diagnosis   • Arthritis   • BPH (benign prostatic hyperplasia)   • History of colon cancer in adulthood   • Chronic diastolic congestive heart failure (CMS/HCC)   • Acid reflux   • Chronic nausea   • Juvenile rheumatic fever   • Hernia, inguinal, right   • Ventral hernia without obstruction or gangrene   • Insomnia   • Sick sinus  syndrome (CMS/HCC)   • Chronic prostatitis   • Immunodeficiency (CMS/HCC)   • Coronary artery disease involving native coronary artery of native heart without angina pectoris   • AV gunnar re-entry tachycardia (CMS/HCC)   • Bilateral renal masses   • Essential hypertension   • Umbilical hernia without obstruction or gangrene   • Dyslipidemia   • Urinary retention   • Stenosis of right carotid artery   • Benign prostatic hyperplasia with urinary obstruction   • Peripheral vascular disease of lower extremity (CMS/HCC)   • History of colon cancer   • Recurrent inguinal hernia without obstruction or gangrene         Current Outpatient Medications:   •  aspirin 81 MG tablet, Take 81 mg by mouth Daily., Disp: , Rfl:   •  Cetirizine HCl 10 MG capsule, , Disp: , Rfl:   •  cholecalciferol (VITAMIN D3) 1000 units tablet, Take 5,000 Units by mouth Daily., Disp: , Rfl:   •  EPINEPHrine (EPIPEN) 0.3 MG/0.3ML solution auto-injector injection, , Disp: , Rfl:   •  furosemide (LASIX) 40 MG tablet, Take 1 tablet by mouth Daily. 40 mg po daily X 5 days and then decrease to prn daily (Patient taking differently: Take 40 mg by mouth Daily. 40 mg po daily X 5 days and then decrease to prn skyler), Disp: 90 tablet, Rfl: 0  •  irbesartan (AVAPRO) 300 MG tablet, Take 1 tablet by mouth Every Night., Disp: 90 tablet, Rfl: 3  •  NON FORMULARY, Take 1 capsule by mouth Daily As Needed. ALISHA'S LEG CRAMP RELIEF, Disp: , Rfl:   •  pantoprazole (PROTONIX) 20 MG EC tablet, 1 po in the am 30 minutes before breakfast. (Patient taking differently: Take 20 mg by mouth Daily. 1 po in the am 30 minutes before breakfast.), Disp: 90 tablet, Rfl: 3  •  potassium chloride (K-DUR,KLOR-CON) 20 MEQ CR tablet, Take 1 tablet by mouth Daily., Disp: 30 tablet, Rfl: 11  •  vitamin B-12 (CYANOCOBALAMIN) 500 MCG tablet, Take 5,000 mcg by mouth Daily., Disp: , Rfl:   •  zolpidem CR (AMBIEN CR) 6.25 MG CR tablet, Take 1 tablet by mouth At Night As Needed for Sleep., Disp:  "90 tablet, Rfl: 2    Allergies   Allergen Reactions   • Ciprofloxacin Diarrhea        reports that he quit smoking about 46 years ago. His smoking use included cigarettes. He started smoking about 55 years ago. He has a 10.00 pack-year smoking history. he has never used smokeless tobacco.    Family History   Problem Relation Age of Onset   • Lung cancer Mother    • Osteoporosis Mother    • Thyroid disease Mother         mother   • Cancer Mother         Lung Cancer   • Early death Mother         46 yrs.   • Hearing loss Mother         mother   • Vision loss Mother         mother   • Heart disease Mother    • Cancer Father         Prostate Cancer   • Heart disease Father         Pacemaker   • Vision loss Father    • Heart disease Sister    • Heart failure Brother    • Heart disease Brother    • Cancer Sister         Breast Cancer   • Vision loss Brother    • Heart disease Brother    • Arrhythmia Brother    • Heart failure Brother    • Early death Sister          Around 40yrs.   • Early death Maternal Grandmother         Took Mother off.   • Heart disease Maternal Grandmother          Objective:   Blood pressure 110/74, pulse 58, height 177.8 cm (70\"), weight 89.8 kg (198 lb), SpO2 93 %.  CONSTITUTIONAL: No acute distress, normal affect  RESPIRATORY: Normal effort. Clear to auscultation bilaterally without wheezing or rales  CARDIOVASCULAR: Carotids with normal upstrokes without bruits.  Regular rate and rhythm with normal S1 and S2. Without murmur, gallop or rub.  PERIPHERAL VASCULAR: Normal radial pulses bilaterally. There is trace lower extremity edema bilaterally.    Labs:    Glucose   Date Value Ref Range Status   10/04/2018 110 (H) 74 - 98 mg/dL Final     BUN   Date Value Ref Range Status   2019 18 7 - 20 mg/dL Final   10/04/2018 11 7 - 20 mg/dL Final     Creatinine   Date Value Ref Range Status   2019 1.00 0.60 - 1.30 mg/dL Final   10/04/2018 0.70 0.60 - 1.30 mg/dL Final     Sodium   Date Value " Ref Range Status   01/08/2019 141 137 - 145 mmol/L Final   10/04/2018 138 137 - 145 mmol/L Final     Potassium   Date Value Ref Range Status   01/08/2019 5.0 3.5 - 5.1 mmol/L Final   10/04/2018 4.3 3.5 - 5.1 mmol/L Final     Chloride   Date Value Ref Range Status   01/08/2019 105 98 - 107 mmol/L Final   10/04/2018 111 (H) 98 - 107 mmol/L Final     CO2   Date Value Ref Range Status   10/04/2018 23.0 (L) 26.0 - 30.0 mmol/L Final     Total CO2   Date Value Ref Range Status   01/08/2019 29.0 26.0 - 30.0 mmol/L Final     Calcium   Date Value Ref Range Status   01/08/2019 9.9 8.4 - 10.2 mg/dL Final   10/04/2018 8.5 8.4 - 10.2 mg/dL Final     Total Protein   Date Value Ref Range Status   09/22/2018 6.5 5.7 - 8.2 g/dL Final     Albumin   Date Value Ref Range Status   01/08/2019 4.20 3.50 - 5.00 g/dL Final   09/22/2018 4.31 3.20 - 4.80 g/dL Final     ALT (SGPT)   Date Value Ref Range Status   01/08/2019 34 13 - 69 U/L Final   09/22/2018 24 7 - 40 U/L Final     AST (SGOT)   Date Value Ref Range Status   01/08/2019 30 15 - 46 U/L Final   09/22/2018 33 0 - 33 U/L Final     Alkaline Phosphatase   Date Value Ref Range Status   01/08/2019 66 38 - 126 U/L Final   09/22/2018 51 25 - 100 U/L Final     Total Bilirubin   Date Value Ref Range Status   01/08/2019 0.7 0.2 - 1.3 mg/dL Final   09/22/2018 0.7 0.3 - 1.2 mg/dL Final     eGFR Non  Am   Date Value Ref Range Status   01/08/2019 73 >60 mL/min/1.73 Final     Comment:     GFR Normal >60, Chronic Kidney Disease <60, Kidney Failure  The MDRD GFR formula is only valid for adults with stable  renal function between ages 18 and 70.  <15       eGFR Non  Amer   Date Value Ref Range Status   10/04/2018 110 >60 mL/min/1.73 Final     A/G Ratio   Date Value Ref Range Status   01/08/2019 1.8 1.0 - 2.0 g/dL Final     BUN/Creatinine Ratio   Date Value Ref Range Status   01/08/2019 18.0 6.3 - 21.9 Final   10/04/2018 15.7 6.3 - 21.9 Final     Anion Gap   Date Value Ref Range Status    10/04/2018 8.3 (L) 10.0 - 20.0 mmol/L Final       No results found for: CHOL  Lab Results   Component Value Date    TRIG 107 01/08/2019    TRIG 121 03/14/2018    TRIG 59 11/10/2016     Lab Results   Component Value Date    HDL 39 (L) 01/08/2019    HDL 46 03/14/2018    HDL 47 11/10/2016     No components found for: LDLCALC  Lab Results   Component Value Date    VLDL 21.4 01/08/2019    VLDL 24.2 03/14/2018    VLDL 11.8 11/10/2016     No results found for: LDLHDL    Diagnostic Data:    Procedures    DEVICE INTERROGATION:  St. Jona PPM, Interrogation date 01/21/2019-   RA pacing 89%, RV pacing <1%. P wave is 1.1 mV with a threshold of 0.5 V at 0.5 msec and an impedance of 490 ohms. R wave is 6.6 mV with a threshold of 1.37 V at 0.5 msec and an impedance of 640 ohms. Battery voltage is 2.86V, 4.5-4.8 years. <1% AMS, longest 8 minutes, no atrial fibrillation. RA sensitivity increased to 0.3 mv. Decreased Atrial tach detection to 160 for AMS.      Device Interrogation 7/16/18, St Jona PM  77% atrial paced with a P wave of 1.5 mV threshold 0.75 V at 0.5 ms impedance of 430 ohms and <1% V pacing RV R-wave is 6.0 mV threshold 1.25 V at 0.5 ms impedance of 610 ohms. Battery voltage is 2.89 V 5.9 years. <1% mode switches, longest 50min, 7 HVR lasting seconds      Carotid Duplex  Moderate plaque in the right carotid bulb and proximal ICA producing a  50-69% stenosis.    [facility] 2004  - Minimal coronary artery disease and normal left ventricular function     TTE 9/2017  · Left ventricular systolic function is normal. Estimated EF = 60%.  · Left ventricular diastolic dysfunction (grade I a) consistent with impaired relaxation.  · Trace-to-mild aortic valve regurgitation is present  · Mild tricuspid valve regurgitation is present    Assessment and Plan:   Saad was seen today for coronary artery disease.    Diagnoses and all orders for this visit:    AV gunnar re-entry tachycardia (CMS/HCC)  SSS (sick sinus syndrome)  (CMS/[facility])  -Stable pacemaker, interrogated this visit.  -Repeat interrogation 6 months    Coronary artery disease involving native coronary artery of native heart without angina pectoris, dyslipidemia  -No anginal symptoms  -, 1/2019  -Begin atorvastatin 40 mg po daily.  It does not appear that the patient has started this medicine in the past.  We will call the patient in one week to confirm that he is starting to take this medicine    Chronic diastolic congestive heart failure (CMS/[facility])  -Continue Lasix 40 mg po daily.     Essential hypertension  -Stable. Continue current medications.     Carotid Stenosis  -50-69% stenosis of the right carotid bulb and proximal ICA.    Preoperative cardiac risk assessment  -Okay to proceed with hernia repair from a cardiac standpoint without further cardiac testing.  -Would continue all his current cardiac medications perioperatively    - Return in about 6 months (around 7/21/2019).     JUSTA Todd obtained past medical, family history, social history, review of systems and functioned as a scribe for the remainder of the dictation for Dr. Wall.    I, Atilio Wall MD, personally performed the services as scribed by the above named individual. I have made any necessary edits and it is both accurate and complete.     Atilio Wall MD, MSc, [facility]  Interventional Cardiology  [facility] at [facility]

## 2019-01-21 NOTE — PROGRESS NOTES
"Chief Complaint  Urinary retention  Clinical  BPH      HPI  Mr. Mieer is a 73 y.o. male with history of coronary artery disease and carotid stenosis who presents with multiple episodes of urinary retention and urinary tract infections.  He was dependent upon a Sauer catheter and had multiple urinary tract infections when he was referred to me in October from Dr. Hollins.  He underwent TURBT and TURP on 10/1/18.    Since his surgery, he has been able to get off the pros and Flomax successfully.  He says that he is emptying his bladder well he feels and is only getting up one time at night to urinate.  He has not had any interim urinary infections.  He says that his stream could be a little bit stronger, but overall he is very very happy with his symptoms and being catheter free.     Past Medical History  Past Medical History:   Diagnosis Date   • Allergic 25yrs.    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   • BPH (benign prostatic hypertrophy)    • CAD (coronary artery disease) 3/6/2017   • Cancer (CMS/HCC) July 2012    colon   • Cardiac arrhythmia    • Chronic diastolic congestive heart failure (CMS/HCC)    • Colon polyp    • Diverticulosis    • Essential hypertension 3/14/2018   • GERD (gastroesophageal reflux disease)    • History of blood transfusion    • History of colonoscopy 2015    Complete   • History of echocardiogram    • History of esophagogastroduodenoscopy 2015    Diagnostic   • History of Holter monitoring    • History of stress test     PT UNSURE OF DATE OR TYPE    • History of stress test     PT STATES \"LONG TIME AGO BUT IT WAS OK\"   • HL (hearing loss)    • Hyperlipidemia    • Infection of kidney    • Iron deficiency    • Obesity    • Poor historian    • Sleep apnea    • Stenosis of right carotid artery    • Visual impairment !(97    Reading Glasses       Past Surgical " History  Past Surgical History:   Procedure Laterality Date   • CARDIAC ABLATION  03/2012   • CARDIAC CATHETERIZATION  2004   • CARDIAC PACEMAKER PLACEMENT  2004   • COLON SURGERY  2012   • COLONOSCOPY  July 2015   • COLONOSCOPY N/A 1/2/2019    Procedure: COLONOSCOPY W/ HOT BIOPSY POLYPECTOMY X3;  Surgeon: Jacky Walker MD;  Location: [facility] ENDOSCOPY;  Service: Gastroenterology   • CYSTOSCOPY TRANSURETHRAL RESECTION OF PROSTATE N/A 10/3/2018    Procedure: TRANSURETHRAL RESECTION OF PROSTATE;  Surgeon: Bryce Santo MD;  Location: [facility] OR;  Service: Urology   • ENDOSCOPY     • HEMORRHOIDECTOMY  1970   • HERNIA REPAIR      X 5   • INGUINAL HERNIA REPAIR     • LYMPH NODE BIOPSY  7-2-2012    large intestine lymph nodes   • TRANSURETHRAL RESECTION OF BLADDER TUMOR N/A 10/3/2018    Procedure: CYSTOSCOPY TRANSURETHRAL RESECTION OF BLADDER TUMOR, COUDE CATHETER PLACEMENT;  Surgeon: Bryce Santo MD;  Location: [facility] OR;  Service: Urology       Medications    Current Outpatient Medications:   •  aspirin 81 MG tablet, Take 81 mg by mouth Daily., Disp: , Rfl:   •  Cetirizine HCl 10 MG capsule, , Disp: , Rfl:   •  cholecalciferol (VITAMIN D3) 1000 units tablet, Take 5,000 Units by mouth Daily., Disp: , Rfl:   •  EPINEPHrine (EPIPEN) 0.3 MG/0.3ML solution auto-injector injection, , Disp: , Rfl:   •  furosemide (LASIX) 40 MG tablet, Take 1 tablet by mouth Daily. 40 mg po daily X 5 days and then decrease to prn daily, Disp: 90 tablet, Rfl: 0  •  irbesartan (AVAPRO) 300 MG tablet, Take 1 tablet by mouth Every Night., Disp: 90 tablet, Rfl: 3  •  NON FORMULARY, Take 1 capsule by mouth Daily As Needed. ALISHA'S LEG CRAMP RELIEF, Disp: , Rfl:   •  pantoprazole (PROTONIX) 20 MG EC tablet, 1 po in the am 30 minutes before breakfast. (Patient taking differently: Take 20 mg by mouth Daily. 1 po in the am 30 minutes before breakfast.), Disp: 90 tablet, Rfl: 3  •  potassium chloride (K-DUR,KLOR-CON) 20 MEQ CR tablet,  Take 1 tablet by mouth Daily., Disp: 30 tablet, Rfl: 11  •  vitamin B-12 (CYANOCOBALAMIN) 500 MCG tablet, Take 5,000 mcg by mouth Daily., Disp: , Rfl:   •  zolpidem CR (AMBIEN CR) 6.25 MG CR tablet, Take 1 tablet by mouth At Night As Needed for Sleep., Disp: 90 tablet, Rfl: 2    Allergies  Allergies   Allergen Reactions   • Ciprofloxacin Diarrhea       Social History  Social History     Socioeconomic History   • Marital status: Single     Spouse name: Not on file   • Number of children: Not on file   • Years of education: Not on file   • Highest education level: Not on file   Social Needs   • Financial resource strain: Not on file   • Food insecurity - worry: Not on file   • Food insecurity - inability: Not on file   • Transportation needs - medical: Not on file   • Transportation needs - non-medical: Not on file   Occupational History   • Not on file   Tobacco Use   • Smoking status: Former Smoker     Packs/day: 1.00     Years: 10.00     Pack years: 10.00     Types: Cigarettes     Start date: 1963     Last attempt to quit:      Years since quittin.0   • Smokeless tobacco: Never Used   Substance and Sexual Activity   • Alcohol use: No   • Drug use: No   • Sexual activity: Not Currently     Partners: Female   Other Topics Concern   • Not on file   Social History Narrative    Caffeine: 2 servings per day    Patient lives at his home alone       Family History  He has no family history of prostate, bladder or kidney cancer  Family History   Problem Relation Age of Onset   • Lung cancer Mother    • Osteoporosis Mother    • Thyroid disease Mother         mother   • Cancer Mother         Lung Cancer   • Early death Mother         46 yrs.   • Hearing loss Mother         mother   • Vision loss Mother         mother   • Heart disease Mother    • Cancer Father         Prostate Cancer   • Heart disease Father         Pacemaker   • Vision loss Father    • Heart disease Sister    • Heart failure Brother    • Heart  "disease Brother    • Cancer Sister         Breast Cancer   • Vision loss Brother    • Heart disease Brother    • Arrhythmia Brother    • Heart failure Brother    • Early death Sister          Around 40yrs.   • Early death Maternal Grandmother         Took Mother off.   • Heart disease Maternal Grandmother        Review of Systems  Constitutional: No fevers or chills  Skin: Negative for rash  Endocrine: No heat/cold intolerance   Cardiovascular: Negative for chest pain or dyspnea on exertion  Respiratory: Negative for shortness of breath or wheezing  Gastrointestinal: No constipation, nausea or vomiting  Genitourinary: Negative for new lower urinary tract symptoms, current gross hematuria or dysuria.  Musculoskeletal: No flank pain  Neurological:  Negative for frequent headaches or dizziness  Lymph/Heme: Negative for leg swelling or calf pain.    A full 10 pt ROS was performed and is otherwise negative.     Physical Exam  Visit Vitals  Pulse 72   Temp 97.1 °F (36.2 °C)   Ht 177.8 cm (70\")   Wt 89.4 kg (197 lb)   SpO2 97%   BMI 28.27 kg/m²     Constitutional: NAD, WDWN.   HEENT: NCAT. Conjunctivae normal.  MMM.    Cardiovascular: Regular rate.  Pulmonary/Chest: Respirations are even and non-labored bilaterally.  Abdominal: Soft. No distension, tenderness, masses or guarding. No CVA tenderness.  Neurological: A + O x 3.  Cranial Nerves II-XII grossly intact. Normal gait.  Extremities: LUZ ELENA x 4, Warm. No clubbing.  No cyanosis.    Skin: Pink, warm and dry.  No rashes noted.  Psychiatric:  Normal mood and affect  Genitourinary  Penis: circumcised penis, glans normal, no penile discharge.  No rashes/lesions.    Testes: descended bilaterally, no masses, nontender to palpation. Remainder of scrotal contents normal. No hernia appreciated.    Labs  Lab Results   Component Value Date    PSA 0.514 2018    PSA 0.540 2017    PSA 0.500 11/10/2016       Lab Results   Component Value Date    GLUCOSE 110 (H) 10/04/2018 " "   CALCIUM 9.9 01/08/2019     01/08/2019    K 5.0 01/08/2019    CO2 29.0 01/08/2019     01/08/2019    BUN 18 01/08/2019    CREATININE 1.00 01/08/2019    EGFRIFAFRI 88 01/08/2019    EGFRIFNONA 73 01/08/2019    BCR 18.0 01/08/2019    ANIONGAP 8.3 (L) 10/04/2018       Lab Results   Component Value Date    WBC 7.23 01/15/2019    HGB 14.4 01/15/2019    HCT 44.0 01/15/2019    MCV 95.2 (H) 01/15/2019     01/15/2019     Brief Urine Lab Results  (Last result in the past 365 days)      Color   Clarity   Blood   Leuk Est   Nitrite   Protein   CREAT   Urine HCG        11/26/18 1143 Yellow Clear 250 Vincent/ul 500 Merari/ul Negative Negative                Urine Culture >100,000 CFU/mL Pseudomonas aeruginosa           Resulting Agency: [facility]   Susceptibility      Pseudomonas aeruginosa     CASSIUS     Aztreonam <=8 ug/ml\"><=8 ug/ml Susceptible     Cefepime <=8 ug/ml\"><=8 ug/ml Susceptible     Ceftazidime <=1 ug/ml\"><=1 ug/ml Susceptible     Gentamicin <=4 ug/ml\"><=4 ug/ml Susceptible     Levofloxacin >4 ug/ml Resistant     Meropenem <=1 ug/ml\"><=1 ug/ml Susceptible     Piperacillin + Tazobactam <=16 ug/ml\"><=16 ug/ml Susceptible     Tobramycin <=4 ug/ml\"><=4 ug/ml Susceptible               Radiologic Studies  Xr Chest 1 View    Result Date: 1/16/2019  Impression: Findings are favored to be chronic. Followup PA and lateral views would be helpful for a more complete evaluation.  This report was finalized on 1/16/2019 10:19 AM by Roni Milian MD.    Us Soft Tissue    Result Date: 1/21/2019  Impression: Bilateral inguinal hernias containing omentum and reducible.    I have personally reviewed his labs and imaging    PVR  Post-void residual performed with ultrasound scanner by staff and interpreted by me - 61        Assessment  Mr. Meier is a 73 y.o. male who presents with multiple episodes of urinary retention.  He has had full cardiac and neurologic workup for his CAD and carotid stenosis while recently hospitalized " for IV antibiotics for a Pseudomonas UTI.  He is s/p TURP and TURBT on 10/1/18.  He is catheter free and has not had any interim urinary infections.     Plan  1.  Follow-up in 1 year with PVR      Bryce Santo MD

## 2019-01-22 ENCOUNTER — ANESTHESIA (OUTPATIENT)
Dept: PERIOP | Facility: HOSPITAL | Age: 74
End: 2019-01-22

## 2019-01-22 ENCOUNTER — HOSPITAL ENCOUNTER (OUTPATIENT)
Facility: HOSPITAL | Age: 74
Setting detail: HOSPITAL OUTPATIENT SURGERY
Discharge: HOME | End: 2019-01-22
Attending: SURGERY | Admitting: SURGERY

## 2019-01-22 ENCOUNTER — ANESTHESIA EVENT (OUTPATIENT)
Dept: PERIOP | Facility: HOSPITAL | Age: 74
End: 2019-01-22

## 2019-01-22 VITALS
DIASTOLIC BLOOD PRESSURE: 77 MMHG | RESPIRATION RATE: 18 BRPM | SYSTOLIC BLOOD PRESSURE: 175 MMHG | HEART RATE: 60 BPM | TEMPERATURE: 98.4 F | OXYGEN SATURATION: 98 %

## 2019-01-22 DIAGNOSIS — R12 HEARTBURN: ICD-10-CM

## 2019-01-22 PROCEDURE — 25010000002 PROPOFOL 200 MG/20ML EMULSION: Performed by: NURSE ANESTHETIST, CERTIFIED REGISTERED

## 2019-01-22 PROCEDURE — 49521 REREPAIR ING HERNIA BLOCKED: CPT | Performed by: SURGERY

## 2019-01-22 PROCEDURE — 25010000002 FENTANYL CITRATE (PF) 100 MCG/2ML SOLUTION: Performed by: NURSE ANESTHETIST, CERTIFIED REGISTERED

## 2019-01-22 PROCEDURE — 25010000002 ONDANSETRON PER 1 MG: Performed by: NURSE ANESTHETIST, CERTIFIED REGISTERED

## 2019-01-22 PROCEDURE — 94799 UNLISTED PULMONARY SVC/PX: CPT

## 2019-01-22 PROCEDURE — 25010000002 DEXAMETHASONE PER 1 MG: Performed by: NURSE ANESTHETIST, CERTIFIED REGISTERED

## 2019-01-22 PROCEDURE — C1781 MESH (IMPLANTABLE): HCPCS | Performed by: SURGERY

## 2019-01-22 PROCEDURE — 25010000003 CEFAZOLIN SODIUM-DEXTROSE 2-3 GM-%(50ML) RECONSTITUTED SOLUTION: Performed by: SURGERY

## 2019-01-22 PROCEDURE — C1713 ANCHOR/SCREW BN/BN,TIS/BN: HCPCS | Performed by: SURGERY

## 2019-01-22 DEVICE — BARD KUGEL HERNIA PATCH LARGE OVAL
Type: IMPLANTABLE DEVICE | Site: ABDOMEN | Status: FUNCTIONAL
Brand: BARD KUGEL HERNIA PATCH

## 2019-01-22 RX ORDER — PROMETHAZINE HYDROCHLORIDE 25 MG/1
25 SUPPOSITORY RECTAL ONCE AS NEEDED
Status: DISCONTINUED | OUTPATIENT
Start: 2019-01-22 | End: 2019-01-22 | Stop reason: HOSPADM

## 2019-01-22 RX ORDER — PROMETHAZINE HYDROCHLORIDE 25 MG/ML
6.25 INJECTION, SOLUTION INTRAMUSCULAR; INTRAVENOUS ONCE AS NEEDED
Status: DISCONTINUED | OUTPATIENT
Start: 2019-01-22 | End: 2019-01-22 | Stop reason: HOSPADM

## 2019-01-22 RX ORDER — HYDROCODONE BITARTRATE AND ACETAMINOPHEN 7.5; 325 MG/1; MG/1
1-2 TABLET ORAL EVERY 4 HOURS PRN
Qty: 20 TABLET | Refills: 0 | Status: SHIPPED | OUTPATIENT
Start: 2019-01-22 | End: 2019-03-18

## 2019-01-22 RX ORDER — ONDANSETRON 4 MG/1
4 TABLET, FILM COATED ORAL ONCE AS NEEDED
Status: COMPLETED | OUTPATIENT
Start: 2019-01-22 | End: 2019-01-22

## 2019-01-22 RX ORDER — SODIUM CHLORIDE, SODIUM LACTATE, POTASSIUM CHLORIDE, CALCIUM CHLORIDE 600; 310; 30; 20 MG/100ML; MG/100ML; MG/100ML; MG/100ML
1000 INJECTION, SOLUTION INTRAVENOUS CONTINUOUS
Status: DISCONTINUED | OUTPATIENT
Start: 2019-01-22 | End: 2019-01-22 | Stop reason: HOSPADM

## 2019-01-22 RX ORDER — ONDANSETRON 2 MG/ML
INJECTION INTRAMUSCULAR; INTRAVENOUS AS NEEDED
Status: DISCONTINUED | OUTPATIENT
Start: 2019-01-22 | End: 2019-01-22 | Stop reason: SURG

## 2019-01-22 RX ORDER — CEFAZOLIN SODIUM 2 G/50ML
2 SOLUTION INTRAVENOUS ONCE
Status: COMPLETED | OUTPATIENT
Start: 2019-01-22 | End: 2019-01-22

## 2019-01-22 RX ORDER — PROMETHAZINE HYDROCHLORIDE 25 MG/ML
12.5 INJECTION, SOLUTION INTRAMUSCULAR; INTRAVENOUS ONCE AS NEEDED
Status: DISCONTINUED | OUTPATIENT
Start: 2019-01-22 | End: 2019-01-22 | Stop reason: HOSPADM

## 2019-01-22 RX ORDER — ALBUTEROL SULFATE 2.5 MG/3ML
2.5 SOLUTION RESPIRATORY (INHALATION) ONCE AS NEEDED
Status: DISCONTINUED | OUTPATIENT
Start: 2019-01-22 | End: 2019-01-22 | Stop reason: HOSPADM

## 2019-01-22 RX ORDER — PROPOFOL 10 MG/ML
INJECTION, EMULSION INTRAVENOUS AS NEEDED
Status: DISCONTINUED | OUTPATIENT
Start: 2019-01-22 | End: 2019-01-22 | Stop reason: SURG

## 2019-01-22 RX ORDER — NEOSTIGMINE METHYLSULFATE 5 MG/5 ML
SYRINGE (ML) INTRAVENOUS AS NEEDED
Status: DISCONTINUED | OUTPATIENT
Start: 2019-01-22 | End: 2019-01-22 | Stop reason: SURG

## 2019-01-22 RX ORDER — SODIUM CHLORIDE 0.9 % (FLUSH) 0.9 %
3 SYRINGE (ML) INJECTION AS NEEDED
Status: DISCONTINUED | OUTPATIENT
Start: 2019-01-22 | End: 2019-01-22 | Stop reason: HOSPADM

## 2019-01-22 RX ORDER — MAGNESIUM HYDROXIDE 1200 MG/15ML
LIQUID ORAL AS NEEDED
Status: DISCONTINUED | OUTPATIENT
Start: 2019-01-22 | End: 2019-01-22 | Stop reason: HOSPADM

## 2019-01-22 RX ORDER — IBUPROFEN 600 MG/1
600 TABLET ORAL EVERY 6 HOURS PRN
Status: DISCONTINUED | OUTPATIENT
Start: 2019-01-22 | End: 2019-01-22 | Stop reason: HOSPADM

## 2019-01-22 RX ORDER — PANTOPRAZOLE SODIUM 20 MG/1
TABLET, DELAYED RELEASE ORAL
Qty: 90 TABLET | Refills: 3 | Status: SHIPPED | OUTPATIENT
Start: 2019-01-22 | End: 2019-10-16 | Stop reason: SDUPTHER

## 2019-01-22 RX ORDER — ONDANSETRON 2 MG/ML
4 INJECTION INTRAMUSCULAR; INTRAVENOUS ONCE AS NEEDED
Status: DISCONTINUED | OUTPATIENT
Start: 2019-01-22 | End: 2019-01-22 | Stop reason: HOSPADM

## 2019-01-22 RX ORDER — BUPIVACAINE HYDROCHLORIDE 5 MG/ML
INJECTION, SOLUTION EPIDURAL; INTRACAUDAL AS NEEDED
Status: DISCONTINUED | OUTPATIENT
Start: 2019-01-22 | End: 2019-01-22 | Stop reason: HOSPADM

## 2019-01-22 RX ORDER — FENTANYL CITRATE 50 UG/ML
INJECTION, SOLUTION INTRAMUSCULAR; INTRAVENOUS AS NEEDED
Status: DISCONTINUED | OUTPATIENT
Start: 2019-01-22 | End: 2019-01-22 | Stop reason: SURG

## 2019-01-22 RX ORDER — GLYCOPYRROLATE 0.2 MG/ML
INJECTION INTRAMUSCULAR; INTRAVENOUS AS NEEDED
Status: DISCONTINUED | OUTPATIENT
Start: 2019-01-22 | End: 2019-01-22 | Stop reason: SURG

## 2019-01-22 RX ORDER — PROMETHAZINE HYDROCHLORIDE 25 MG/1
25 TABLET ORAL ONCE AS NEEDED
Status: DISCONTINUED | OUTPATIENT
Start: 2019-01-22 | End: 2019-01-22 | Stop reason: HOSPADM

## 2019-01-22 RX ORDER — DEXAMETHASONE SODIUM PHOSPHATE 4 MG/ML
INJECTION, SOLUTION INTRA-ARTICULAR; INTRALESIONAL; INTRAMUSCULAR; INTRAVENOUS; SOFT TISSUE AS NEEDED
Status: DISCONTINUED | OUTPATIENT
Start: 2019-01-22 | End: 2019-01-22 | Stop reason: SURG

## 2019-01-22 RX ORDER — ROCURONIUM BROMIDE 10 MG/ML
INJECTION, SOLUTION INTRAVENOUS AS NEEDED
Status: DISCONTINUED | OUTPATIENT
Start: 2019-01-22 | End: 2019-01-22 | Stop reason: SURG

## 2019-01-22 RX ADMIN — CEFAZOLIN SODIUM 2 G: 2 SOLUTION INTRAVENOUS at 07:20

## 2019-01-22 RX ADMIN — ONDANSETRON 4 MG: 2 INJECTION INTRAMUSCULAR; INTRAVENOUS at 07:21

## 2019-01-22 RX ADMIN — FENTANYL CITRATE 50 MCG: 50 INJECTION, SOLUTION INTRAMUSCULAR; INTRAVENOUS at 08:09

## 2019-01-22 RX ADMIN — SODIUM CHLORIDE, POTASSIUM CHLORIDE, SODIUM LACTATE AND CALCIUM CHLORIDE 1000 ML: 600; 310; 30; 20 INJECTION, SOLUTION INTRAVENOUS at 06:39

## 2019-01-22 RX ADMIN — PROPOFOL 140 MG: 10 INJECTION, EMULSION INTRAVENOUS at 07:21

## 2019-01-22 RX ADMIN — LIDOCAINE HYDROCHLORIDE 60 MG: 20 INJECTION, SOLUTION INTRAVENOUS at 07:21

## 2019-01-22 RX ADMIN — GLYCOPYRROLATE 0.5 MG: 0.2 INJECTION, SOLUTION INTRAMUSCULAR; INTRAVENOUS at 08:10

## 2019-01-22 RX ADMIN — DEXAMETHASONE SODIUM PHOSPHATE 8 MG: 4 INJECTION, SOLUTION INTRAMUSCULAR; INTRAVENOUS at 07:21

## 2019-01-22 RX ADMIN — IBUPROFEN 600 MG: 600 TABLET ORAL at 09:56

## 2019-01-22 RX ADMIN — ROCURONIUM BROMIDE 30 MG: 10 INJECTION INTRAVENOUS at 07:21

## 2019-01-22 RX ADMIN — Medication 3.5 MG: at 08:10

## 2019-01-22 RX ADMIN — ONDANSETRON 4 MG: 4 TABLET, FILM COATED ORAL at 09:57

## 2019-01-22 RX ADMIN — FENTANYL CITRATE 50 MCG: 50 INJECTION, SOLUTION INTRAMUSCULAR; INTRAVENOUS at 07:39

## 2019-01-22 RX ADMIN — FENTANYL CITRATE 100 MCG: 50 INJECTION, SOLUTION INTRAMUSCULAR; INTRAVENOUS at 07:21

## 2019-01-22 NOTE — ANESTHESIA POSTPROCEDURE EVALUATION
Patient: Saad Meier    Procedure Summary     Date:  01/22/19 Room / Location:  [facility] OR [address] [place] [address] OR    Anesthesia Start:  0717 Anesthesia Stop:  0823    Procedure:  INGUINAL HERNIA REPAIR (Right Abdomen) Diagnosis:       Recurrent inguinal hernia without obstruction or gangrene, unspecified laterality      (Recurrent inguinal hernia without obstruction or gangrene, unspecified laterality [K40.91])    Surgeon:  Jacky Walker MD Provider:  Luis Reeves CRNA    Anesthesia Type:  general ASA Status:  3          Anesthesia Type: general  Last vitals  BP   171/78 @ 0824 1/22/2019   Temp 97.2   Pulse 60    Resp 17   SpO2 98%     Post Anesthesia Care and Evaluation    Patient location during evaluation: PACU  Patient participation: complete - patient participated  Level of consciousness: responsive to painful stimuli  Pain management: adequate  Airway patency: patent  Anesthetic complications: No anesthetic complications  PONV Status: none  Cardiovascular status: acceptable  Respiratory status: acceptable, face mask, oral airway, nonlabored ventilation and spontaneous ventilation  Hydration status: acceptable

## 2019-01-22 NOTE — ANESTHESIA PREPROCEDURE EVALUATION
Anesthesia Evaluation     Patient summary reviewed and Nursing notes reviewed   NPO Solid Status: > 8 hours  NPO Liquid Status: > 8 hours           Airway   Mallampati: II  TM distance: >3 FB  Neck ROM: full  No difficulty expected and Possible difficult intubation  Dental - normal exam     Pulmonary    (+) a smoker Former, asthma, recent URI, sleep apnea, decreased breath sounds,   Cardiovascular - normal exam  Exercise tolerance: good (4-7 METS)    ECG reviewed  PT is on anticoagulation therapy    (+) pacemaker pacemaker interrogated 3-6 months ago, hypertension less than 2 medications, CAD, CHF, STATON, PVD, hyperlipidemia,  carotid artery disease (approx 60%) right carotid      Neuro/Psych  (+) psychiatric history Anxiety,     GI/Hepatic/Renal/Endo    (+) obesity, morbid obesity, GERD,      Musculoskeletal     Abdominal    Substance History - negative use     OB/GYN negative ob/gyn ROS         Other   (+) arthritis   history of cancer    ROS/Med Hx Other: Kumar- sept 2018-no further w/u needed  Echo 2017-  · Left ventricular systolic function is normal. Estimated EF = 60%.  · Left ventricular diastolic dysfunction (grade I a) consistent with impaired relaxation.  · Trace-to-mild aortic valve regurgitation is present  · Mild tricuspid valve regurgitation is present.                    Anesthesia Plan    ASA 3     general   (Risks and benefits of general anesthesia discussed with patient, including, aspiration, recall, dental damage, cardiac or respiratory compromise, stroke, fluctuations in blood pressure, seizure or death.     Pt advised that a endotracheal tube (ETT), laryngeal mask airway (LMA) or mask would be utilized to maintain the airway. Pt verbalized understanding and agreed to plan.)  intravenous induction   Anesthetic plan, all risks, benefits, and alternatives have been provided, discussed and informed consent has been obtained with: patient.

## 2019-01-22 NOTE — OP NOTE
PATIENT:    Saad Meier    DATE OF SURGERY:   January 22, 2019    PHYSICIAN:    Jacky Walker MD    REFERRING PHYSICIAN:  Evelyn Muse MD    YOB: 1945    PREOPERATIVE DIAGNOSIS:  Recurrent and incarcerated right inguinal hernia    POSTOPERATIVE DIAGNOSIS: Recurrent incarcerated right inguinal hernia    PROCEDURE:  Right inguinal herniorraphy via Kugel repair with reduction of incarceration    HISTORY:  The patient is a white male sent to me as a consultation by Evelyn Muse MD for evaluation and treatment of a history significant for a bulge in the right inguinal canal.  He is here now today for herniorrhaphy.    ANESTHESIA:  General Anesthesia    OPERATIVE PROCEDURE:  The patient was taken to the operating room, placed in the supine position, and given general endotracheal anesthesia per the Anesthesia service.  The patient was prepped and draped in the normal sterile fashion and the patient was given preoperative IV antibiotics.  An appropriate timeout was performed by the nursing staff prior to the incision.  I did meet with the patient preoperatively and marked them accordingly. I should note that the patient did have a sterile straight cath performed after intubation for complete drainage of the bladder.    A transverse incision was made over the right inguinal canal.  This was sharply dissected down through the fascia and blunt dissection was used through the muscle.  The preperitoneal space was entered without difficulty and we carefully exposed the preperitoneal structures including the pubic tubercle, Benny’s ligament, inguinal canal, and iliac vessels were noted.  The spermatic cord structures were dissected free.  There was evidence of a direct hernia sac medially which was reduced with some difficulty, the hernia defect was then palpable medially.  There was a large amount of preperitoneal fat in the hernia that was reduced as mentioned above.    A 5.5 inch x 7  inch piece of oval Kugel patch was then placed in the preperitoneal space.  This carefully covered all aspects of the inguinal canal.  It was placed medial to the pubic tubercle and carefully covered all structures in the inguinal canal.  The spermatic cord structures were placed lateral to the Marlex mesh, there was no evidence of recurrent indirect inguinal hernia.    The Marlex mesh was tacked to the internal oblique musculature with 0-Vicryl suture.  A tacker was then used to tack the mesh to the Benny's ligament and superiorly to the transversalis fascia due to the size of the recurrence.  A local lidocaine/Marcaine mixture was used for postoperative wound anesthetic and then a running 0-Vicryl suture was used for fascia reapproximation.      3-0 Vicryl was used to close the wound and a 4-0 Vicryl subcuticular stitch and Steri-Strips were used to close the skin.    The patient was stable at this point in time and subsequently transferred back to the recovery room in stable condition.           Jacky Walker M.D., FACS

## 2019-01-22 NOTE — ANESTHESIA PROCEDURE NOTES
Airway  Urgency: elective    Airway not difficult    General Information and Staff    Patient location during procedure: OR  CRNA: Luis Reeves CRNA    Indications and Patient Condition  Indications for airway management: airway protection    Preoxygenated: yes  MILS maintained throughout  Mask difficulty assessment: 1 - vent by mask    Final Airway Details  Final airway type: endotracheal airway      Successful airway: ETT  Cuffed: yes   Successful intubation technique: direct laryngoscopy  Facilitating devices/methods: intubating stylet  Endotracheal tube insertion site: oral  Blade: Missy  Blade size: 3  ETT size (mm): 7.5  Cormack-Lehane Classification: grade I - full view of glottis  Placement verified by: chest auscultation and capnometry   Cuff volume (mL): 6  Measured from: teeth  ETT to teeth (cm): 22  Number of attempts at approach: 1    Additional Comments  Airway placed without problems. Dentition and lips as noted on pre-induction. ETT cuff inflated to minimal occlusive pressure. ETT secured.

## 2019-01-22 NOTE — DISCHARGE INSTRUCTIONS
Please follow all post op instructions and follow up appointment time from your physician's office included in your discharge packet.     Rest today  No pushing,pulling,tugging,heavy lifting, or strenuous activity   No major decision making,driving,or drinking alcoholic beverages for 24 hours due to the sedation you received  Always use good hand hygiene/washing technique  No driving on pain medication.    To assist you in voiding:  Drink plenty of fluids  Listen to running water while attempting to void.    If you are unable to urinate and you have an uncomfortable urge to void or it has been   6 hours since you were discharged, return to the Emergency Room.    Turn, cough, and deep breath every 2 hours to prevent post-op complications of infection or pneumonia.    May splint incision site when moving, coughing, sneezing, laughing, or increasing activity as comfort measure.    Apply ice to incision site, remove, and reapply as directed per physician.  Do not use ice continuously.

## 2019-01-29 ENCOUNTER — OFFICE VISIT (OUTPATIENT)
Dept: SURGERY | Facility: CLINIC | Age: 74
End: 2019-01-29

## 2019-01-29 ENCOUNTER — TELEPHONE (OUTPATIENT)
Dept: CARDIOLOGY | Facility: CLINIC | Age: 74
End: 2019-01-29

## 2019-01-29 VITALS
HEIGHT: 70 IN | BODY MASS INDEX: 28.35 KG/M2 | OXYGEN SATURATION: 98 % | DIASTOLIC BLOOD PRESSURE: 78 MMHG | WEIGHT: 198 LBS | TEMPERATURE: 98.2 F | SYSTOLIC BLOOD PRESSURE: 140 MMHG | HEART RATE: 73 BPM

## 2019-01-29 DIAGNOSIS — Z48.89 POSTOPERATIVE VISIT: Primary | ICD-10-CM

## 2019-01-29 PROCEDURE — 99024 POSTOP FOLLOW-UP VISIT: CPT | Performed by: SURGERY

## 2019-01-29 NOTE — PROGRESS NOTES
"Patient: Saad Meier    YOB: 1945    Date: 01/29/2019    Primary Care Provider: Evelyn Muse MD    Reason for Consultation: Follow-up hernia    Chief Complaint   Patient presents with   • Post-op Follow-up     right inguinal hernia repair       History of present illness:  I saw the patient in the office today as a followup from their recent right inguinal hernia repair. Procedure was performed 1/22/2019 .  They state that they have done well however does experience some discomfort in area.    The following portions of the patient's history were reviewed and updated as appropriate: allergies, current medications, past family history, past medical history, past social history, past surgical history and problem list.    Vital Signs:   Vitals:    01/29/19 1426   BP: 140/78   Pulse: 73   Temp: 98.2 °F (36.8 °C)   SpO2: 98%   Weight: 89.8 kg (198 lb)   Height: 177.8 cm (70\")       Physical Exam:   General Appearance:    Alert, cooperative, in no acute distress   Abdomen:     no masses, no organomegaly, soft non-tender, non-distended, no guarding, wounds are well healed, no evidence of recurrent hernia         Assessment / Plan:    1. Postoperative visit        I did discuss the situation with the patient today in the office and they have done well from their recent herniorraphy.  I have released the patient back to normal activity, they understand that they need to be careful about heavy lifting.  I need to see the patient back in the office only if they are having further problems, they know to call me if they are.    Electronically signed by Jacky Walker MD  01/29/19              "

## 2019-01-30 NOTE — TELEPHONE ENCOUNTER
Pt contacted and verified he has started his Lipitor. Pt states that believes he stopped taking it previously because it caused muscle cramps. To date, pt states that he has not had any this time around. Pt advised to call office if muscle cramps start and do not approve. At this time we can discuss with MJS about switching medications. Pt is agreeable, all questions answered at this time.

## 2019-02-08 ENCOUNTER — OFFICE VISIT (OUTPATIENT)
Dept: UROLOGY | Facility: CLINIC | Age: 74
End: 2019-02-08

## 2019-02-08 VITALS
DIASTOLIC BLOOD PRESSURE: 82 MMHG | SYSTOLIC BLOOD PRESSURE: 126 MMHG | HEART RATE: 60 BPM | TEMPERATURE: 98.2 F | OXYGEN SATURATION: 97 %

## 2019-02-08 DIAGNOSIS — N40.1 BENIGN PROSTATIC HYPERPLASIA WITH URINARY OBSTRUCTION: Primary | ICD-10-CM

## 2019-02-08 DIAGNOSIS — N13.8 BENIGN PROSTATIC HYPERPLASIA WITH URINARY OBSTRUCTION: Primary | ICD-10-CM

## 2019-02-08 PROCEDURE — 51798 US URINE CAPACITY MEASURE: CPT | Performed by: UROLOGY

## 2019-02-08 PROCEDURE — 99214 OFFICE O/P EST MOD 30 MIN: CPT | Performed by: UROLOGY

## 2019-02-08 RX ORDER — TAMSULOSIN HYDROCHLORIDE 0.4 MG/1
1 CAPSULE ORAL DAILY
Qty: 90 CAPSULE | Refills: 3 | Status: SHIPPED | OUTPATIENT
Start: 2019-02-08 | End: 2019-03-18

## 2019-02-08 RX ORDER — FINASTERIDE 5 MG/1
5 TABLET, FILM COATED ORAL DAILY
Qty: 90 TABLET | Refills: 3 | Status: SHIPPED | OUTPATIENT
Start: 2019-02-08 | End: 2019-03-18

## 2019-02-08 NOTE — PROGRESS NOTES
"Chief Complaint  Yolandet wants to discuss night time erections (Patient wants medication to stop night time erections, he is worried about getting a hernia.)        ELIECER Meier is a 73 y.o. male who returns today insisting on urgent evaluation because he's been getting nocturnal erections.  Somehow he feels these are causing his hernia to return which he just had repaired by Dr. Walker.  He states he's had so many hernias he can always tell when they're coming back.  He's had TURP by Dr. Santo since I last saw Mr. Meier and he states he's voiding better off Proscar and Flomax.    Vitals:    02/08/19 0839   BP: 126/82   Pulse: 60   Temp: 98.2 °F (36.8 °C)   SpO2: 97%       Past Medical History  Past Medical History:   Diagnosis Date   • Allergic 25yrs.    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   • BPH (benign prostatic hypertrophy)    • CAD (coronary artery disease) 3/6/2017   • Cancer (CMS/HCC) July 2012    colon   • Cardiac arrhythmia    • Chronic diastolic congestive heart failure (CMS/HCC)    • Colon polyp    • Diverticulosis    • Essential hypertension 3/14/2018   • GERD (gastroesophageal reflux disease)    • History of blood transfusion    • History of colonoscopy 2015    Complete   • History of echocardiogram    • History of esophagogastroduodenoscopy 2015    Diagnostic   • History of Holter monitoring    • History of stress test     PT UNSURE OF DATE OR TYPE    • History of stress test     PT STATES \"LONG TIME AGO BUT IT WAS OK\"   • HL (hearing loss)    • Hyperlipidemia    • Infection of kidney    • Iron deficiency    • Obesity    • Poor historian    • Sleep apnea    • Stenosis of right carotid artery    • Visual impairment !(97    Reading Glasses       Past Surgical History  Past Surgical History:   Procedure Laterality Date   • CARDIAC ABLATION  03/2012   • CARDIAC CATHETERIZATION  " 2004   • CARDIAC PACEMAKER PLACEMENT  2004   • COLON SURGERY  2012   • COLONOSCOPY  July 2015   • COLONOSCOPY N/A 1/2/2019    Procedure: COLONOSCOPY W/ HOT BIOPSY POLYPECTOMY X3;  Surgeon: Jacky Walker MD;  Location: [facility] ENDOSCOPY;  Service: Gastroenterology   • CYSTOSCOPY TRANSURETHRAL RESECTION OF PROSTATE N/A 10/3/2018    Procedure: TRANSURETHRAL RESECTION OF PROSTATE;  Surgeon: Bryce Santo MD;  Location: [facility] OR;  Service: Urology   • ENDOSCOPY     • HEMORRHOIDECTOMY  1970   • HERNIA REPAIR      X 5   • INGUINAL HERNIA REPAIR Bilateral    • INGUINAL HERNIA REPAIR Right 1/22/2019    Procedure: INGUINAL HERNIA REPAIR;  Surgeon: Jacky Walker MD;  Location: [facility] OR;  Service: General   • LYMPH NODE BIOPSY  7-2-2012    large intestine lymph nodes   • TRANSURETHRAL RESECTION OF BLADDER TUMOR N/A 10/3/2018    Procedure: CYSTOSCOPY TRANSURETHRAL RESECTION OF BLADDER TUMOR, COUDE CATHETER PLACEMENT;  Surgeon: Bryce Santo MD;  Location: [facility] OR;  Service: Urology       Medications  has a current medication list which includes the following prescription(s): aspirin, atorvastatin, cetirizine hcl, cholecalciferol, furosemide, hydrocodone-acetaminophen, irbesartan, NON FORMULARY, pantoprazole, potassium chloride, vitamin b-12, zolpidem cr, and epinephrine.      Allergies  Allergies   Allergen Reactions   • Ciprofloxacin Diarrhea       Social History  Social History     Social History Narrative    Caffeine: 2 servings per day    Patient lives at his home alone       Family History  He has no family history of bladder or kidney cancer  He has no family history of kidney stones      AUA Symptom Score:      Review of Systems  Review of Systems    Physical Exam  Physical Exam   Constitutional: He is oriented to person, place, and time. He appears well-developed and well-nourished.   HENT:   Head: Normocephalic and atraumatic.   Neck: Normal range of motion.   Pulmonary/Chest: Effort normal. No  respiratory distress.   Abdominal: Soft. He exhibits no distension and no mass. There is no tenderness. No hernia. Hernia confirmed negative in the right inguinal area and confirmed negative in the left inguinal area.   Musculoskeletal: Normal range of motion.   Lymphadenopathy:     He has no cervical adenopathy.   Neurological: He is alert and oriented to person, place, and time.   Skin: Skin is warm and dry.   Psychiatric: He has a normal mood and affect. His behavior is normal.   Vitals reviewed.      Labs Recent and today in the office:  Results for orders placed or performed in visit on 01/15/19   CBC (No Diff)   Result Value Ref Range    WBC 7.23 4.80 - 10.80 10*3/mm3    RBC 4.62 (L) 4.70 - 6.10 10*6/mm3    Hemoglobin 14.4 14.0 - 18.0 g/dL    Hematocrit 44.0 42.0 - 52.0 %    MCV 95.2 (H) 80.0 - 94.0 fL    MCH 31.2 (H) 27.0 - 31.0 pg    MCHC 32.7 30.0 - 37.0 g/dL    RDW 14.3 11.5 - 14.5 %    RDW-SD 49.8 37.0 - 54.0 fl    MPV 11.1 6.0 - 12.0 fL    Platelets 327 130 - 400 10*3/mm3     Bladder scan: Postvoid residual is 41.5 mm    Assessment & Plan  #1 BPH with outlet obstruction/history of urinary retention: His first postvoid residual was significant but he was able to void again and only has 41 mL of postvoid residual.  His urine today is clear without evidence of infection.  He's requesting resume the Proscar and Flomax primarily because this helped stop him from getting erections.  He is not sexually active and does not have a partner.  He seems to be voiding adequately without it but we will resume these medications at his request.    #2 right inguinal hernia: The patient is grabbing soft tissue just above his penis indicating a recurrent hernia but this is simply subcutaneous fat and his recent hernia repair feel strong.  There is no palpable hernia on either side.  He is due to check back with Dr. Walker for his postoperative visits have encouraged him to keep that visit.

## 2019-02-19 ENCOUNTER — TELEPHONE (OUTPATIENT)
Dept: CARDIOLOGY | Facility: CLINIC | Age: 74
End: 2019-02-19

## 2019-02-19 NOTE — TELEPHONE ENCOUNTER
"Pt called reporting that his Lipitor 40 mg  is giving him \"bad muscle aches and spasms\" and would like to try a different cholesterol medicine.           Would you like to switch him to something else?  "

## 2019-02-19 NOTE — TELEPHONE ENCOUNTER
Try rosuvastatin 10mg nightly. If he has side effects with rosuvastatin, then switch to pravastatin 20mg nightly.

## 2019-02-20 RX ORDER — ROSUVASTATIN CALCIUM 10 MG/1
10 TABLET, COATED ORAL DAILY
Qty: 90 TABLET | Refills: 3 | Status: SHIPPED | OUTPATIENT
Start: 2019-02-20 | End: 2019-04-16 | Stop reason: SINTOL

## 2019-02-20 NOTE — TELEPHONE ENCOUNTER
Pt contacted with recommendations per MJS. Pt to begin Crestor 10 mg nightly. LVM requesting return call with any questions.

## 2019-02-25 ENCOUNTER — OFFICE VISIT (OUTPATIENT)
Dept: SURGERY | Facility: CLINIC | Age: 74
End: 2019-02-25

## 2019-02-25 VITALS
OXYGEN SATURATION: 99 % | DIASTOLIC BLOOD PRESSURE: 76 MMHG | HEIGHT: 70 IN | HEART RATE: 75 BPM | SYSTOLIC BLOOD PRESSURE: 118 MMHG | BODY MASS INDEX: 28.34 KG/M2 | TEMPERATURE: 98.6 F | WEIGHT: 197.97 LBS

## 2019-02-25 DIAGNOSIS — Z48.89 POSTOPERATIVE VISIT: Primary | ICD-10-CM

## 2019-02-25 PROCEDURE — 99024 POSTOP FOLLOW-UP VISIT: CPT | Performed by: SURGERY

## 2019-02-25 NOTE — PROGRESS NOTES
"Patient: Saad Meier    YOB: 1945    Date: 02/25/2019    Primary Care Provider: Evelyn Muse MD    Reason for Consultation: Follow-up hernia    Chief Complaint   Patient presents with   • Post-op Hernia       History of present illness:  I saw the patient in the office today as a followup from their recent hernia repair.  He complains of tenderness in his right groin, radiates into his scrotum.     The following portions of the patient's history were reviewed and updated as appropriate: allergies, current medications, past family history, past medical history, past social history, past surgical history and problem list.    Vital Signs:   Vitals:    02/25/19 0853   BP: 118/76   Pulse: 75   Temp: 98.6 °F (37 °C)   SpO2: 99%   Weight: 89.8 kg (197 lb 15.6 oz)   Height: 177.8 cm (70\")       Physical Exam:   General Appearance:    Alert, cooperative, in no acute distress   Abdomen:     no masses, no organomegaly, soft non-tender, non-distended, no guarding, wounds are well healed, no evidence of recurrent hernia         Assessment / Plan:    1. Postoperative visit        I did discuss the situation with the patient today in the office and they have done well from their recent herniorraphy.  I have released the patient back to normal activity, they understand that they need to be careful about heavy lifting.  I need to see the patient back in the office only if they are having further problems, they know to call me if they are.    He does have a large incisional hernia present in the midline from previous colectomy, I would like to see him back in the office in  1 month.    Electronically signed by Jacky Walker MD  02/25/19              "

## 2019-02-26 ENCOUNTER — OFFICE VISIT (OUTPATIENT)
Dept: UROLOGY | Facility: CLINIC | Age: 74
End: 2019-02-26

## 2019-02-26 VITALS
DIASTOLIC BLOOD PRESSURE: 90 MMHG | TEMPERATURE: 97.2 F | HEART RATE: 63 BPM | SYSTOLIC BLOOD PRESSURE: 138 MMHG | OXYGEN SATURATION: 98 %

## 2019-02-26 DIAGNOSIS — N13.8 BENIGN PROSTATIC HYPERPLASIA WITH URINARY OBSTRUCTION: Primary | Chronic | ICD-10-CM

## 2019-02-26 DIAGNOSIS — N40.1 BENIGN PROSTATIC HYPERPLASIA WITH URINARY OBSTRUCTION: Primary | Chronic | ICD-10-CM

## 2019-02-26 LAB
BILIRUB BLD-MCNC: NEGATIVE MG/DL
CLARITY, POC: CLEAR
COLOR UR: YELLOW
GLUCOSE UR STRIP-MCNC: NEGATIVE MG/DL
KETONES UR QL: NEGATIVE
LEUKOCYTE EST, POC: NEGATIVE
NITRITE UR-MCNC: NEGATIVE MG/ML
PH UR: 6 [PH] (ref 5–8)
PROT UR STRIP-MCNC: NEGATIVE MG/DL
RBC # UR STRIP: NEGATIVE /UL
SP GR UR: 1.01 (ref 1–1.03)
UROBILINOGEN UR QL: NORMAL

## 2019-02-26 PROCEDURE — 51798 US URINE CAPACITY MEASURE: CPT | Performed by: UROLOGY

## 2019-02-26 PROCEDURE — 81003 URINALYSIS AUTO W/O SCOPE: CPT | Performed by: UROLOGY

## 2019-02-26 PROCEDURE — 99214 OFFICE O/P EST MOD 30 MIN: CPT | Performed by: UROLOGY

## 2019-02-26 NOTE — PROGRESS NOTES
Chief Complaint  Benign Prostatic Hypertrophy (discuss meds not working)        ELIECER Meier is a 73 y.o. male who returns today requesting urgent evaluation for nocturnal erections that he is concerned will cause his hernias to return.  He states he can always tell when his hernias are coming back.  He saw Dr. Walker yesterday who performed a hernia repair and states he sees no evidence of recurrence.  He suggested wearing his truss that he has had for years if it makes him feel better.  He is asking if I can block his testosterone so he will not have nocturnal erections.  I suggested putting on JASON but this would carry a high risk of cardiovascular complications.  Another alternative would be luteinizing hormone like Lupron but his insurance would not provide it simply because he wants it on patient request.  His care is complicated by his hard of hearing problem and despite my yelling at the top of my lungs he requires me to repeat everything 3-4 times.  Unfortunately he has no family with him to provide support stating they live 100 miles away.  He states the Proscar and Flomax are not helping him because he still keeps getting erections.  I have tried to explain to him that is not their purpose and that I personally do not think he even needs it since he has had a TURP.  His voided urine today is clear.  Bladder scan reveals 270 mL postvoid residual but I suggest he go back to the bathroom and void a second time and his second postvoid residual is only 86 mL.  There is a possibility has residual obstruction but I am starting to think he has a decompensated or neurogenic bladder.  Ideally he should undergo a complete urodynamic evaluation but I suggested a trial of Urecholine in addition to Flomax.  He will return in 6 weeks for follow-up.    Vitals:    02/26/19 1106   BP: 138/90   Pulse: 63   Temp: 97.2 °F (36.2 °C)   SpO2: 98%       Past Medical History  Past Medical History:   Diagnosis Date   • Allergic  "25yrs.    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   • BPH (benign prostatic hypertrophy)    • CAD (coronary artery disease) 3/6/2017   • Cancer (CMS/HCC) July 2012    colon   • Cardiac arrhythmia    • Chronic diastolic congestive heart failure (CMS/HCC)    • Colon polyp    • Diverticulosis    • Essential hypertension 3/14/2018   • GERD (gastroesophageal reflux disease)    • History of blood transfusion    • History of colonoscopy 2015    Complete   • History of echocardiogram    • History of esophagogastroduodenoscopy 2015    Diagnostic   • History of Holter monitoring    • History of stress test     PT UNSURE OF DATE OR TYPE    • History of stress test     PT STATES \"LONG TIME AGO BUT IT WAS OK\"   • HL (hearing loss)    • Hyperlipidemia    • Infection of kidney    • Iron deficiency    • Obesity    • Poor historian    • Sleep apnea    • Stenosis of right carotid artery    • Visual impairment !(97    Reading Glasses       Past Surgical History  Past Surgical History:   Procedure Laterality Date   • CARDIAC ABLATION  03/2012   • CARDIAC CATHETERIZATION  2004   • CARDIAC PACEMAKER PLACEMENT  2004   • COLON SURGERY  2012   • COLONOSCOPY  July 2015   • COLONOSCOPY N/A 1/2/2019    Procedure: COLONOSCOPY W/ HOT BIOPSY POLYPECTOMY X3;  Surgeon: Jacky Walker MD;  Location: [facility] ENDOSCOPY;  Service: Gastroenterology   • CYSTOSCOPY TRANSURETHRAL RESECTION OF PROSTATE N/A 10/3/2018    Procedure: TRANSURETHRAL RESECTION OF PROSTATE;  Surgeon: Bryce Santo MD;  Location: [facility] OR;  Service: Urology   • ENDOSCOPY     • HEMORRHOIDECTOMY  1970   • HERNIA REPAIR      X 5   • INGUINAL HERNIA REPAIR Bilateral    • INGUINAL HERNIA REPAIR Right 1/22/2019    Procedure: INGUINAL HERNIA REPAIR;  Surgeon: Jacky Walker MD;  Location: [facility] OR;  Service: General   • LYMPH NODE BIOPSY  7-2-2012    " large intestine lymph nodes   • TRANSURETHRAL RESECTION OF BLADDER TUMOR N/A 10/3/2018    Procedure: CYSTOSCOPY TRANSURETHRAL RESECTION OF BLADDER TUMOR, COUDE CATHETER PLACEMENT;  Surgeon: Bryce Santo MD;  Location: [facility];  Service: Urology       Medications  has a current medication list which includes the following prescription(s): aspirin, cetirizine hcl, cholecalciferol, epinephrine, finasteride, furosemide, hydrocodone-acetaminophen, irbesartan, NON FORMULARY, pantoprazole, potassium chloride, rosuvastatin, tamsulosin, vitamin b-12, and zolpidem cr.      Allergies  Allergies   Allergen Reactions   • Ciprofloxacin Diarrhea       Social History  Social History     Social History Narrative    Caffeine: 2 servings per day    Patient lives at his home alone       Family History  He has no family history of bladder or kidney cancer  He has no family history of kidney stones      AUA Symptom Score:      Review of Systems  Review of Systems   Constitutional: Negative.    Genitourinary: Positive for difficulty urinating and frequency.   All other systems reviewed and are negative.      Physical Exam  Physical Exam    Labs Recent and today in the office:  Results for orders placed or performed in visit on 02/26/19   POC Urinalysis Dipstick, Automated   Result Value Ref Range    Color Yellow Yellow, Straw, Dark Yellow, Kathy    Clarity, UA Clear Clear    Specific Gravity  1.015 1.005 - 1.030    pH, Urine 6.0 5.0 - 8.0    Leukocytes Negative Negative    Nitrite, UA Negative Negative    Protein, POC Negative Negative mg/dL    Glucose, UA Negative Negative, 1000 mg/dL (3+) mg/dL    Ketones, UA Negative Negative    Urobilinogen, UA Normal Normal    Bilirubin Negative Negative    Blood, UA Negative Negative         Assessment & Plan  Chronic urinary retention: After TURP is still inconsistently emptying the bladder with an initial postvoid residual of 270 even though on second try he is able to empty to 86 mL.   Therefore we will add Urecholine and continue the Flomax and then return for follow-up.  He may need referral for urodynamics and consideration of InterStim.  He is hard to communicate with because he is deaf but also suspect he may have some degree of dementia.  He is absolutely convinced the nocturnal erections are causing his hernias to return.  25 minutes is spent counseling the patient out of the office visit.

## 2019-03-04 ENCOUNTER — TELEPHONE (OUTPATIENT)
Dept: INTERNAL MEDICINE | Facility: CLINIC | Age: 74
End: 2019-03-04

## 2019-03-04 NOTE — TELEPHONE ENCOUNTER
Pt is wanting to know if you can prescribe him a different sleeping medication that has anxiety built into it.  Please return call to pt and let him know what it can be changed to?

## 2019-03-05 DIAGNOSIS — F41.9 ANXIETY: ICD-10-CM

## 2019-03-05 DIAGNOSIS — F51.04 PSYCHOPHYSIOLOGICAL INSOMNIA: Primary | Chronic | ICD-10-CM

## 2019-03-05 RX ORDER — QUETIAPINE FUMARATE 25 MG/1
25 TABLET, FILM COATED ORAL NIGHTLY
Qty: 90 TABLET | Refills: 3 | Status: SHIPPED | OUTPATIENT
Start: 2019-03-05 | End: 2019-07-03

## 2019-03-05 NOTE — TELEPHONE ENCOUNTER
Sent Seroquel lowest dose to try. Not to take other sleep medicines with this. Can discuss at his next visit 3/18

## 2019-03-18 ENCOUNTER — OFFICE VISIT (OUTPATIENT)
Dept: INTERNAL MEDICINE | Facility: CLINIC | Age: 74
End: 2019-03-18

## 2019-03-18 VITALS
HEIGHT: 70 IN | SYSTOLIC BLOOD PRESSURE: 142 MMHG | HEART RATE: 61 BPM | TEMPERATURE: 97.6 F | BODY MASS INDEX: 27.99 KG/M2 | DIASTOLIC BLOOD PRESSURE: 90 MMHG | WEIGHT: 195.5 LBS | OXYGEN SATURATION: 98 %

## 2019-03-18 DIAGNOSIS — I50.32 CHRONIC DIASTOLIC CONGESTIVE HEART FAILURE (HCC): ICD-10-CM

## 2019-03-18 DIAGNOSIS — N13.8 BENIGN PROSTATIC HYPERPLASIA WITH URINARY OBSTRUCTION: ICD-10-CM

## 2019-03-18 DIAGNOSIS — R10.9 CHRONIC RIGHT FLANK PAIN: ICD-10-CM

## 2019-03-18 DIAGNOSIS — K42.9 UMBILICAL HERNIA WITHOUT OBSTRUCTION OR GANGRENE: ICD-10-CM

## 2019-03-18 DIAGNOSIS — Z00.00 MEDICARE ANNUAL WELLNESS VISIT, SUBSEQUENT: Primary | ICD-10-CM

## 2019-03-18 DIAGNOSIS — I10 ESSENTIAL HYPERTENSION: Chronic | ICD-10-CM

## 2019-03-18 DIAGNOSIS — N28.89 BILATERAL RENAL MASSES: Chronic | ICD-10-CM

## 2019-03-18 DIAGNOSIS — K43.9 VENTRAL HERNIA WITHOUT OBSTRUCTION OR GANGRENE: ICD-10-CM

## 2019-03-18 DIAGNOSIS — I25.10 CORONARY ARTERY DISEASE INVOLVING NATIVE CORONARY ARTERY OF NATIVE HEART WITHOUT ANGINA PECTORIS: Chronic | ICD-10-CM

## 2019-03-18 DIAGNOSIS — G47.00 INSOMNIA, UNSPECIFIED TYPE: Chronic | ICD-10-CM

## 2019-03-18 DIAGNOSIS — N40.1 BENIGN PROSTATIC HYPERPLASIA WITH URINARY OBSTRUCTION: ICD-10-CM

## 2019-03-18 DIAGNOSIS — E78.5 DYSLIPIDEMIA: ICD-10-CM

## 2019-03-18 DIAGNOSIS — I49.5 SICK SINUS SYNDROME (HCC): ICD-10-CM

## 2019-03-18 DIAGNOSIS — G89.29 CHRONIC RIGHT FLANK PAIN: ICD-10-CM

## 2019-03-18 DIAGNOSIS — I47.1 AV NODAL RE-ENTRY TACHYCARDIA (HCC): ICD-10-CM

## 2019-03-18 PROBLEM — R33.9 URINARY RETENTION: Status: RESOLVED | Noted: 2018-09-25 | Resolved: 2019-03-18

## 2019-03-18 PROCEDURE — 99397 PER PM REEVAL EST PAT 65+ YR: CPT | Performed by: FAMILY MEDICINE

## 2019-03-18 PROCEDURE — G0439 PPPS, SUBSEQ VISIT: HCPCS | Performed by: FAMILY MEDICINE

## 2019-03-18 PROCEDURE — 96160 PT-FOCUSED HLTH RISK ASSMT: CPT | Performed by: FAMILY MEDICINE

## 2019-03-18 NOTE — PATIENT INSTRUCTIONS
Serving Sizes  A serving size is a measured amount of food or drink, such as one slice of bread, that has an associated nutrient content. Knowing the serving size of a food or drink can help you determine how much of that food you should consume.  What is the size of one serving?  The size of one healthy serving depends on the food or drink. To determine a serving size, read the food label. If the food or drink does not have a food label, try to find serving size information online. Or, use the following to estimate the size of one adult serving:  Grain  1 slice bread. ½ bagel. ½ cup pasta.  Vegetable  ½ cup cooked or canned vegetables. 1 cup raw, leafy greens.  Fruit  ½ cup canned fruit. 1 medium fruit. ¼ cup dried fruit.  Meat and Other Protein Sources  1 oz meat, poultry, or fish. ¼ cup cooked beans. 1 egg. ¼ cup nuts or seeds. 1 Tbsp nut butter. ¼ cup tofu or tempeh. 2 Tbsp hummus.  Dairy  An individual container of yogurt (6-8 oz). 1 piece of cheese the size of your thumb (1 oz). 1 cup (8 oz) milk or milk alternative.  Fat  A piece the size of one dice. 1 tsp soft margarine. 1 Tbsp mayonnaise. 1 tsp vegetable oil. 1 Tbsp regular salad dressing. 2 Tbsp low-fat salad dressing.  How many servings should I eat from each food group each day?  The following are the suggested number of servings to try and have every day from each food group. You can also look at your eating throughout the week and aim for meeting these requirements on most days for overall healthy eating.  Grain  6-8 servings. Try to have half of your grains from whole grains, such as whole wheat bread, corn tortillas, oatmeal, brown rice, whole wheat pasta, and bulgur.  Vegetable  At least 2½-3 servings.  Fruit  2 servings.  Meat and Other Protein Foods  5-6 servings. Aim to have lean proteins, such as chicken, turkey, fish, beans, or tofu.  Dairy  3 servings. Choose low-fat or nonfat if you are trying to control your weight.  Fat  2-3 servings.  Is  a serving the same thing as a portion?  No. A portion is the actual amount you eat, which may be more than one serving. Knowing the specific serving size of a food and the nutritional information that goes with it can help you make a healthy decision on what size portion to eat.  What are some tips to help me learn healthy serving sizes?  · Check food labels for serving sizes. Many foods that come as a single portion actually contain multiple servings.  · Determine the serving size of foods you commonly eat and figure out how large a portion you usually eat.  · Measure the number of servings that can be held by the bowls, glasses, cups, and plates you typically use. For example, pour your breakfast cereal into your regular bowl and then pour it into a measuring cup.  · For 1-2 days, measure the serving sizes of all the foods you eat.  · Practice estimating serving sizes and determining how big your portions should be.      This information is not intended to replace advice given to you by your health care provider. Make sure you discuss any questions you have with your health care provider.  Document Released: 09/15/2004 Document Revised: 08/12/2017 Document Reviewed: 03/17/2015  [employer] Interactive Patient Education © 2018 [employer]    MyPlate from [employer]    The general, healthful diet is based on the 2010 Dietary Guidelines for Americans. The amount of food you need to eat from each food group depends on your age, sex, and level of physical activity and can be individualized by a dietitian. Go to ChooseMyPlate.gov for more information.  What do I need to know about the MyPlate plan?  · Enjoy your food, but eat less.  · Avoid oversized portions.  ? ½ of your plate should include fruits and vegetables.  ? ¼ of your plate should be grains.  ? ¼ of your plate should be protein.  Grains  · Make at least half of your grains whole grains.  · For a 2,000 calorie daily food plan, eat 6 oz every day.  · 1 oz is about 1  slice bread, 1 cup cereal, or ½ cup cooked rice, cereal, or pasta.  Vegetables  · Make half your plate fruits and vegetables.  · For a 2,000 calorie daily food plan, eat 2½ cups every day.  · 1 cup is about 1 cup raw or cooked vegetables or vegetable juice or 2 cups raw leafy greens.  Fruits  · Make half your plate fruits and vegetables.  · For a 2,000 calorie daily food plan, eat 2 cups every day.  · 1 cup is about 1 cup fruit or 100% fruit juice or ½ cup dried fruit.  Protein  · For a 2,000 calorie daily food plan, eat 5½ oz every day.  · 1 oz is about 1 oz meat, poultry, or fish, ¼ cup cooked beans, 1 egg, 1 Tbsp peanut butter, or ½ oz nuts or seeds.  Dairy  · Switch to fat-free or low-fat (1%) milk.  · For a 2,000 calorie daily food plan, eat 3 cups every day.  · 1 cup is about 1 cup milk or yogurt or soy milk (soy beverage), 1½ oz natural cheese, or 2 oz processed cheese.  Fats, Oils, and Empty Calories  · Only small amounts of oils are recommended.  · Empty calories are calories from solid fats or added sugars.  · Compare sodium in foods like soup, bread, and frozen meals. Choose the foods with lower numbers.  · Drink water instead of sugary drinks.  What foods can I eat?  Grains  Whole grains such as whole wheat, quinoa, millet, and bulgur. Bread, rolls, and pasta made from whole grains. Brown or wild rice. Hot or cold cereals made from whole grains and without added sugar.  Vegetables  All fresh vegetables, especially fresh red, dark green, or orange vegetables. Peas and beans. Low-sodium frozen or canned vegetables prepared without added salt. Low-sodium vegetable juices.  Fruits  All fresh, frozen, and dried fruits. Canned fruit packed in water or fruit juice without added sugar. Fruit juices without added sugar.  Meats and Other Protein Sources  Boiled, baked, or grilled lean meat trimmed of fat. Skinless poultry. Fresh seafood and shellfish. Canned seafood packed in water. Unsalted nuts and unsalted nut  butters. Tofu. Dried beans and pea. Eggs.  Dairy  Low-fat or fat-free milk, yogurt, and cheeses.  Sweets and Desserts  Frozen desserts made from low-fat milk.  Fats and Oils  Olive, peanut, and canola oils and margarine. Salad dressing and mayonnaise made from these oils.  Other  Soups and casseroles made from allowed ingredients and without added fat or salt.  The items listed above may not be a complete list of recommended foods or beverages. Contact your dietitian for more options.  What foods are not recommended?  Grains  Sweetened, low-fiber cereals. Packaged baked goods. Snack crackers and chips. Cheese crackers, butter crackers, and biscuits. Frozen waffles, sweet breads, doughnuts, pastries, packaged baking mixes, pancakes, cakes, and cookies.  Vegetables  Regular canned or frozen vegetables or vegetables prepared with salt. Canned tomatoes. Canned tomato sauce. Fried vegetables. Vegetables in cream sauce or cheese sauce.  Fruits  Fruits packed in syrup or made with added sugar.  Meats and Other Protein Sources  Marbled or fatty meats such as ribs. Poultry with skin. Fried meats, poultry, eggs, or fish. Sausages, hot dogs, and deli meats such as pastrami, bologna, or salami.  Dairy  Whole milk, cream, cheeses made from whole milk, sour cream. Ice cream or yogurt made from whole milk or with added sugar.  Beverages  For adults, no more than one alcoholic drink per day. Regular soft drinks or other sugary beverages. Juice drinks.  Sweets and Desserts  Sugary or fatty desserts, candy, and other sweets.  Fats and Oils  Solid shortening or partially hydrogenated oils. Solid margarine. Margarine that contains trans fats. Butter.    The items listed above may not be a complete list of foods and beverages to avoid. Contact your dietitian for more information.    This information is not intended to replace advice given to you by your health care provider. Make sure you discuss any questions you have with your health  care provider.  Document Released: 01/06/2009 Document Revised: 05/25/2017 Document Reviewed: 11/26/2014  [employer] Interactive Patient Education © 2018 [employer]        Calorie Counting for Weight Loss  Calories are units of energy. Your body needs a certain amount of calories from food to keep you going throughout the day. When you eat more calories than your body needs, your body stores the extra calories as fat. When you eat fewer calories than your body needs, your body burns fat to get the energy it needs.  Calorie counting means keeping track of how many calories you eat and drink each day. Calorie counting can be helpful if you need to lose weight. If you make sure to eat fewer calories than your body needs, you should lose weight. Ask your health care provider what a healthy weight is for you.  For calorie counting to work, you will need to eat the right number of calories in a day in order to lose a healthy amount of weight per week. A dietitian can help you determine how many calories you need in a day and will give you suggestions on how to reach your calorie goal.  · A healthy amount of weight to lose per week is usually 1-2 lb (0.5-0.9 kg). This usually means that your daily calorie intake should be reduced by 500-750 calories.  · Eating 1,200 - 1,500 calories per day can help most women lose weight.  · Eating 1,500 - 1,800 calories per day can help most men lose weight.     What do I need to know about calorie counting?  In order to meet your daily calorie goal, you will need to:  · Find out how many calories are in each food you would like to eat. Try to do this before you eat.  · Decide how much of the food you plan to eat.  · Write down what you ate and how many calories it had. Doing this is called keeping a food log.     To successfully lose weight, it is important to balance calorie counting with a healthy lifestyle that includes regular activity. Aim for 150 minutes of moderate exercise  (such as walking) or 75 minutes of vigorous exercise (such as running) each week.  Where do I find calorie information?       The number of calories in a food can be found on a Nutrition Facts label. If a food does not have a Nutrition Facts label, try to look up the calories online or ask your dietitian for help.  Remember that calories are listed per serving. If you choose to have more than one serving of a food, you will have to multiply the calories per serving by the amount of servings you plan to eat. For example, the label on a package of bread might say that a serving size is 1 slice and that there are 90 calories in a serving. If you eat 1 slice, you will have eaten 90 calories. If you eat 2 slices, you will have eaten 180 calories.  How do I keep a food log?  Immediately after each meal, record the following information in your food log:  · What you ate. Don't forget to include toppings, sauces, and other extras on the food.  · How much you ate. This can be measured in cups, ounces, or number of items.  · How many calories each food and drink had.  · The total number of calories in the meal.     Keep your food log near you, such as in a small notebook in your pocket, or use a mobile emmy or website. Some programs will calculate calories for you and show you how many calories you have left for the day to meet your goal.  What are some calorie counting tips?  · Use your calories on foods and drinks that will fill you up and not leave you hungry:  ? Some examples of foods that fill you up are nuts and nut butters, vegetables, lean proteins, and high-fiber foods like whole grains. High-fiber foods are foods with more than 5 g fiber per serving.  ? Drinks such as sodas, specialty coffee drinks, alcohol, and juices have a lot of calories, yet do not fill you up.  · Eat nutritious foods and avoid empty calories. Empty calories are calories you get from foods or beverages that do not have many vitamins or protein,  "such as candy, sweets, and soda. It is better to have a nutritious high-calorie food (such as an avocado) than a food with few nutrients (such as a bag of chips).  · Know how many calories are in the foods you eat most often. This will help you calculate calorie counts faster.  · Pay attention to calories in drinks. Low-calorie drinks include water and unsweetened drinks.  · Pay attention to nutrition labels for \"low fat\" or \"fat free\" foods. These foods sometimes have the same amount of calories or more calories than the full fat versions. They also often have added sugar, starch, or salt, to make up for flavor that was removed with the fat.  ·   · Find a way of tracking calories that works for you. Get creative. Try different apps or programs if writing down calories does not work for you.  What are some portion control tips?  · Know how many calories are in a serving. This will help you know how many servings of a certain food you can have.  · Use a measuring cup to measure serving sizes. You could also try weighing out portions on a kitchen scale. With time, you will be able to estimate serving sizes for some foods.  · Take some time to put servings of different foods on your favorite plates, bowls, and cups so you know what a serving looks like.  · Try not to eat straight from a bag or box. Doing this can lead to overeating. Put the amount you would like to eat in a cup or on a plate to make sure you are eating the right portion.  · Use smaller plates, glasses, and bowls to prevent overeating.  · Try not to multitask (for example, watch TV or use your computer) while eating. If it is time to eat, sit down at a table and enjoy your food. This will help you to know when you are full. It will also help you to be aware of what you are eating and how much you are eating.  What are tips for following this plan?  Reading food labels  · Check the calorie count compared to the serving size. The serving size may be " smaller than what you are used to eating.  · Check the source of the calories. Make sure the food you are eating is high in vitamins and protein and low in saturated and trans fats.  Shopping  · Read nutrition labels while you shop. This will help you make healthy decisions before you decide to purchase your food.  · Make a grocery list and stick to it.  Cooking  · Try to cook your favorite foods in a healthier way. For example, try baking instead of frying.  · Use low-fat dairy products.  Meal planning  · Use more fruits and vegetables. Half of your plate should be fruits and vegetables.  · Include lean proteins like poultry and fish.  How do I count calories when eating out?  · Ask for smaller portion sizes.  · Consider sharing an entree and sides instead of getting your own entree.  · If you get your own entree, eat only half. Ask for a box at the beginning of your meal and put the rest of your entree in it so you are not tempted to eat it.  · If calories are listed on the menu, choose the lower calorie options.  · Choose dishes that include vegetables, fruits, whole grains, low-fat dairy products, and lean protein.  · Choose items that are boiled, broiled, grilled, or steamed. Stay away from items that are buttered, battered, fried, or served with cream sauce. Items labeled “crispy” are usually fried, unless stated otherwise.  · Choose water, low-fat milk, unsweetened iced tea, or other drinks without added sugar. If you want an alcoholic beverage, choose a lower calorie option such as a glass of wine or light beer.  · Ask for dressings, sauces, and syrups on the side. These are usually high in calories, so you should limit the amount you eat.  · If you want a salad, choose a garden salad and ask for grilled meats. Avoid extra toppings like lee, cheese, or fried items. Ask for the dressing on the side, or ask for olive oil and vinegar or lemon to use as dressing.  · Estimate how many servings of a food you are  given. For example, a serving of cooked rice is ½ cup or about the size of half a baseball. Knowing serving sizes will help you be aware of how much food you are eating at restaurants. The list below tells you how big or small some common portion sizes are based on everyday objects:  ? 1 oz--4 stacked dice.  ? 3 oz--1 deck of cards.  ? 1 tsp--1 die.  ? 1 Tbsp--½ a ping-pong ball.  ? 2 Tbsp--1 ping-pong ball.  ? ½ cup--½ baseball.  ? 1 cup--1 baseball.  Summary  · Calorie counting means keeping track of how many calories you eat and drink each day. If you eat fewer calories than your body needs, you should lose weight.  · A healthy amount of weight to lose per week is usually 1-2 lb (0.5-0.9 kg). This usually means reducing your daily calorie intake by 500-750 calories.  · The number of calories in a food can be found on a Nutrition Facts label. If a food does not have a Nutrition Facts label, try to look up the calories online or ask your dietitian for help.  · Use your calories on foods and drinks that will fill you up, and not on foods and drinks that will leave you hungry.  · Use smaller plates, glasses, and bowls to prevent overeating.      This information is not intended to replace advice given to you by your health care provider. Make sure you discuss any questions you have with your health care provider.  Document Released: 2006 Document Revised: 2017 Document Reviewed: 2017  [employer] Interactive Patient Education © 2018 [employer]           Medicare Wellness  Personal Prevention Plan of Service     Date of Office Visit:  2019  Encounter Provider:  Evelyn Muse MD  Place of Service:  [facility]  Patient Name: Saad Meier  :  1945    As part of the Medicare Wellness portion of your visit today, we are providing you with this personalized preventive plan of services (PPPS). This plan is based upon recommendations of the [place] Preventive Services Task Force (USPSTF) and the Advisory Committee on Immunization Practices (ACIP).    This lists the preventive care services that should be considered, and provides dates of when you are due. Items listed as completed are up-to-date and do not require any further intervention.    Health Maintenance   Topic Date Due   • TDAP/TD VACCINES (1 - Tdap) 09/28/1962   • ZOSTER VACCINE (1 of 2) 09/28/1993   • LIPID PANEL  01/08/2020   • MEDICARE ANNUAL WELLNESS  03/18/2020   • COLONOSCOPY  01/02/2029   • INFLUENZA VACCINE  Completed   • PNEUMOCOCCAL VACCINES (65+ LOW/MEDIUM RISK)  Completed   • AAA SCREEN (ONE-TIME)  Completed       No orders of the defined types were placed in this encounter.      Return in about 366 days (around 3/18/2020) for Medicare Wellness.

## 2019-03-18 NOTE — PROGRESS NOTES
QUICK REFERENCE INFORMATION:  The ABCs of the Annual Wellness Visit    Subsequent Medicare Wellness Visit    HEALTH RISK ASSESSMENT    1945    Recent Hospitalizations:  Recently treated at the following:  [facility].        Current Medical Providers:  Patient Care Team:  Evelyn Muse MD as PCP - General (Family Medicine)  Atilio Wall MD as Consulting Physician (Cardiology)  Luis Hollins MD as Consulting Physician (Urology)  Liddle, Susan E., MD as Consulting Physician (Hematology and Oncology)  Gloria Garcia MD as Consulting Physician (General Surgery)  Elvie Alvarez MD as Surgeon (General Surgery)  Jacky Walker MD as Consulting Physician (General Surgery)        Smoking Status:  Social History     Tobacco Use   Smoking Status Former Smoker   • Packs/day: 1.00   • Years: 10.00   • Pack years: 10.00   • Types: Cigarettes   • Start date: 1963   • Last attempt to quit:    • Years since quittin.2   Smokeless Tobacco Never Used       Alcohol Consumption:  Social History     Substance and Sexual Activity   Alcohol Use No       Depression Screen:   PHQ-2/PHQ-9 Depression Screening 3/18/2019   Little interest or pleasure in doing things 0   Feeling down, depressed, or hopeless 0   Trouble falling or staying asleep, or sleeping too much -   Feeling tired or having little energy -   Poor appetite or overeating -   Feeling bad about yourself - or that you are a failure or have let yourself or your family down -   Trouble concentrating on things, such as reading the newspaper or watching television -   Moving or speaking so slowly that other people could have noticed. Or the opposite - being so fidgety or restless that you have been moving around a lot more than usual -   Thoughts that you would be better off dead, or of hurting yourself in some way -   Total Score 0   If you checked off any problems, how difficult have these problems made it for you to do your work,  take care of things at home, or get along with other people? -       Health Habits and Functional and Cognitive Screening:  Functional & Cognitive Status 3/18/2019   Do you have difficulty preparing food and eating? No   Do you have difficulty bathing yourself, getting dressed or grooming yourself? No   Do you have difficulty using the toilet? No   Do you have difficulty moving around from place to place? No   Do you have trouble with steps or getting out of a bed or a chair? No   In the past year have you fallen or experienced a near fall? No   Current Diet Limited Junk Food   Dental Exam Up to date   Eye Exam Not up to date   Exercise (times per week) 7 times per week   Current Exercise Activities Include Walking   Do you need help using the phone?  No   Are you deaf or do you have serious difficulty hearing?  No   Do you need help with transportation? No   Do you need help shopping? No   Do you need help preparing meals?  No   Do you need help with housework?  No   Do you need help with laundry? No   Do you need help taking your medications? No   Do you need help managing money? No   Do you ever drive or ride in a car without wearing a seat belt? No   Have you felt unusual stress, anger or loneliness in the last month? -   Who do you live with? -   If you need help, do you have trouble finding someone available to you? -   Do you have difficulty concentrating, remembering or making decisions? -           Does the patient have evidence of cognitive impairment? No    Aspirin use counseling: Taking ASA appropriately as indicated      Recent Lab Results:  CMP:  Lab Results   Component Value Date    GLU 99 (H) 01/08/2019    BUN 18 01/08/2019    CREATININE 1.00 01/08/2019    EGFRIFNONA 73 01/08/2019    EGFRIFAFRI 88 01/08/2019    BCR 18.0 01/08/2019     01/08/2019    K 5.0 01/08/2019    CO2 29.0 01/08/2019    CALCIUM 9.9 01/08/2019    PROTENTOTREF 6.6 01/08/2019    ALBUMIN 4.20 01/08/2019    LABGLOBREF 2.4  01/08/2019    LABIL2 1.8 01/08/2019    BILITOT 0.7 01/08/2019    ALKPHOS 66 01/08/2019    AST 30 01/08/2019    ALT 34 01/08/2019     Lipid Panel:  Lab Results   Component Value Date    TRIG 107 01/08/2019    HDL 39 (L) 01/08/2019    VLDL 21.4 01/08/2019     HbA1c:       Visual Acuity:  No exam data present    Age-appropriate Screening Schedule:  Refer to the list below for future screening recommendations based on patient's age, sex and/or medical conditions. Orders for these recommended tests are listed in the plan section. The patient has been provided with a written plan.    Health Maintenance   Topic Date Due   • TDAP/TD VACCINES (1 - Tdap) 09/28/1962   • ZOSTER VACCINE (1 of 2) 09/28/1993   • LIPID PANEL  01/08/2020   • COLONOSCOPY  01/02/2029   • INFLUENZA VACCINE  Completed   • PNEUMOCOCCAL VACCINES (65+ LOW/MEDIUM RISK)  Completed        Subjective   History of Present Illness    Saad Meier is a 73 y.o. male who presents for an Subsequent Wellness Visit.    He is followed by cardiology due to his multiple heart conditions which include coronary artery disease, AV gunnar reentry tachycardia, sick sinus syndrome, and chronic diastolic congestive heart failure.  He is on a diuretic.  Blood pressure is controlled.  He is on a statin for dyslipidemia.    Also followed by urology due to recurrent issues with BPH and urinary obstruction.    He has history of renal mass, urology previously address this and felt CT-guided needle aspiration may be warranted if his right flank pain continued.  Patient's renal mass has not been evaluated since a CT scan in December 2017.  He continues to have the right flank pain.  Historically patient has been trying to avoid further surgeries due to his abdominal wall hernias.    Continues to struggle with insomnia.  He has tried many sleep medicines without much improvement.  Currently taking a low-dose of Seroquel which helps him get a couple hours of sleep.  Patient would like  to see a sleep specialist to further discuss this issue.  Upon chart review patient has tried and failed the following: Elavil, doxepin, Lunesta, Ambien, trazodone.    The following portions of the patient's history were reviewed and updated as appropriate: allergies, current medications, past family history, past medical history, past social history, past surgical history and problem list.    Outpatient Medications Prior to Visit   Medication Sig Dispense Refill   • aspirin 81 MG tablet Take 81 mg by mouth Daily.     • cholecalciferol (VITAMIN D3) 1000 units tablet Take 5,000 Units by mouth Daily.     • EPINEPHrine (EPIPEN) 0.3 MG/0.3ML solution auto-injector injection      • furosemide (LASIX) 40 MG tablet Take 1 tablet by mouth Daily. 40 mg po daily X 5 days and then decrease to prn daily (Patient taking differently: Take 40 mg by mouth Daily. 40 mg po daily X 5 days and then decrease to prn skyler) 90 tablet 0   • NON FORMULARY Take 1 capsule by mouth Daily As Needed. ALISHA'S LEG CRAMP RELIEF     • pantoprazole (PROTONIX) 20 MG EC tablet 1 po in the am 30 minutes before breakfast. 90 tablet 3   • potassium chloride (K-DUR,KLOR-CON) 20 MEQ CR tablet Take 1 tablet by mouth Daily. 30 tablet 11   • QUEtiapine (SEROQUEL) 25 MG tablet Take 1 tablet by mouth Every Night. 90 tablet 3   • rosuvastatin (CRESTOR) 10 MG tablet Take 1 tablet by mouth Daily. 90 tablet 3   • vitamin B-12 (CYANOCOBALAMIN) 500 MCG tablet Take 5,000 mcg by mouth Daily.     • Cetirizine HCl 10 MG capsule      • finasteride (PROSCAR) 5 MG tablet Take 1 tablet by mouth Daily. 90 tablet 3   • HYDROcodone-acetaminophen (NORCO) 7.5-325 MG per tablet Take 1-2 tablets by mouth Every 4 (Four) Hours As Needed for Moderate Pain 20 tablet 0   • irbesartan (AVAPRO) 300 MG tablet Take 1 tablet by mouth Every Night. 90 tablet 3   • tamsulosin (FLOMAX) 0.4 MG capsule 24 hr capsule Take 1 capsule by mouth Daily. 90 capsule 3   • zolpidem CR (AMBIEN CR) 6.25 MG CR  tablet Take 1 tablet by mouth At Night As Needed for Sleep. 90 tablet 2     No facility-administered medications prior to visit.        Patient Active Problem List   Diagnosis   • Arthritis   • Chronic diastolic congestive heart failure (CMS/HCC)   • Acid reflux   • Chronic nausea   • Juvenile rheumatic fever   • Hernia, inguinal, right   • Ventral hernia without obstruction or gangrene   • Insomnia   • Sick sinus syndrome (CMS/HCC)   • Chronic prostatitis   • Immunodeficiency (CMS/HCC)   • Coronary artery disease involving native coronary artery of native heart without angina pectoris   • AV gunnar re-entry tachycardia (CMS/HCC)   • Bilateral renal masses   • Essential hypertension   • Umbilical hernia without obstruction or gangrene   • Dyslipidemia   • Stenosis of right carotid artery   • Benign prostatic hyperplasia with urinary obstruction   • Peripheral vascular disease of lower extremity (CMS/HCC)   • History of colon cancer   • Recurrent inguinal hernia without obstruction or gangrene       Advance Care Planning:  has an advance directive - a copy has been provided and is in file    Identification of Risk Factors:  Risk factors include: weight  and polypharmacy.    Review of Systems   Constitutional: Positive for fatigue.   Gastrointestinal: Positive for abdominal pain.   Genitourinary: Positive for flank pain.   Musculoskeletal: Positive for arthralgias.   Psychiatric/Behavioral: Positive for sleep disturbance.   All other systems reviewed and are negative.      Compared to one year ago, the patient feels his physical health is the same.  Compared to one year ago, the patient feels his mental health is the same.    Objective     Physical Exam   Constitutional: He is oriented to person, place, and time. Vital signs are normal. He appears well-developed and well-nourished. He is active.  Non-toxic appearance. He does not appear ill. No distress.   overweight   HENT:   Head: Normocephalic and atraumatic. 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   Right Ear: External ear normal. Decreased hearing is noted.   Left Ear: External ear normal. Decreased hearing is noted.   Nose: Nose normal.   Mouth/Throat: Uvula is midline, oropharynx is clear and moist and mucous membranes are normal. He has dentures.   Hearing aids   Eyes: Conjunctivae, EOM and lids are normal. Pupils are equal, round, and reactive to light.   Neck: Neck supple. No thyromegaly present.   Cardiovascular: Normal rate, regular rhythm and normal heart sounds.   No murmur heard.  Pulmonary/Chest: Effort normal and breath sounds normal.   Abdominal: Soft. Bowel sounds are normal. He exhibits no distension and no mass. There is no tenderness. There is no rigidity, no rebound, no guarding and negative Sprague's sign. A hernia (abdominal wall) is present.   Musculoskeletal: He exhibits no edema or deformity.   Lymphadenopathy:     He has no cervical adenopathy.   Neurological: He is alert and oriented to person, place, and time. He has normal strength. He displays no tremor. No cranial nerve deficit. Gait abnormal.   Skin: Skin is warm and dry. No rash noted. He is not diaphoretic. No cyanosis. Nails show no clubbing.   Psychiatric: He has a normal mood and affect. His speech is normal and behavior is normal. Judgment and thought content normal. Cognition and memory are normal.   Nursing note and vitals reviewed.      Vitals:    03/18/19 1319   BP: 142/90   Pulse: 61   Temp: 97.6 °F (36.4 °C)   TempSrc: Temporal   SpO2: 98%   Weight: 88.7 kg (195 lb 8 oz)   Height: 177.8 cm (70\")   PainSc: 0-No pain       Patient's Body mass index is 28.05 kg/m². BMI is above normal parameters. Recommendations include: exercise counseling.      Assessment/Plan   Patient Self-Management and Personalized Health Advice  The patient has been provided with information about: diet and weight management and preventive services including:   · tetanus booster if sustains injury, " Melita.    Visit Diagnoses:    ICD-10-CM ICD-9-CM   1. Medicare annual wellness visit, subsequent Z00.00 V70.0   2. Chronic right flank pain R10.9 789.09    G89.29 338.29   3. Bilateral renal masses N28.89 593.9   4. Insomnia, unspecified type G47.00 780.52   5. Coronary artery disease involving native coronary artery of native heart without angina pectoris I25.10 414.01   6. AV gunnar re-entry tachycardia (CMS/[facility]) I47.1 427.89   7. Sick sinus syndrome (CMS/[facility]) I49.5 427.81   8. Chronic diastolic congestive heart failure (CMS/[facility]) I50.32 428.32     428.0   9. Benign prostatic hyperplasia with urinary obstruction N40.1 600.01    N13.8 599.69   10. Essential hypertension I10 401.9   11. Dyslipidemia E78.5 272.4   12. Umbilical hernia without obstruction or gangrene K42.9 553.1   13. Ventral hernia without obstruction or gangrene K43.9 553.20       Orders Placed This Encounter   Procedures   • CT abdomen pelvis w contrast     Standing Status:   Future     Standing Expiration Date:   3/18/2020     Order Specific Question:   Will Oral Contrast be needed for this procedure?     Answer:   No   • Ambulatory Referral to Sleep Medicine     Referral Priority:   Routine     Referral Type:   Consultation     Referral Reason:   Specialty Services Required     Requested Specialty:   Sleep Medicine     Number of Visits Requested:   1       Outpatient Encounter Medications as of 3/18/2019   Medication Sig Dispense Refill   • aspirin 81 MG tablet Take 81 mg by mouth Daily.     • cholecalciferol (VITAMIN D3) 1000 units tablet Take 5,000 Units by mouth Daily.     • EPINEPHrine (EPIPEN) 0.3 MG/0.3ML solution auto-injector injection      • furosemide (LASIX) 40 MG tablet Take 1 tablet by mouth Daily. 40 mg po daily X 5 days and then decrease to prn daily (Patient taking differently: Take 40 mg by mouth Daily. 40 mg po daily X 5 days and then decrease to prn skyler) 90 tablet 0   • NON FORMULARY Take 1 capsule by mouth Daily As Needed.  ALISHA'S LEG CRAMP RELIEF     • pantoprazole (PROTONIX) 20 MG EC tablet 1 po in the am 30 minutes before breakfast. 90 tablet 3   • potassium chloride (K-DUR,KLOR-CON) 20 MEQ CR tablet Take 1 tablet by mouth Daily. 30 tablet 11   • QUEtiapine (SEROQUEL) 25 MG tablet Take 1 tablet by mouth Every Night. 90 tablet 3   • rosuvastatin (CRESTOR) 10 MG tablet Take 1 tablet by mouth Daily. 90 tablet 3   • vitamin B-12 (CYANOCOBALAMIN) 500 MCG tablet Take 5,000 mcg by mouth Daily.     • [DISCONTINUED] Cetirizine HCl 10 MG capsule      • [DISCONTINUED] finasteride (PROSCAR) 5 MG tablet Take 1 tablet by mouth Daily. 90 tablet 3   • [DISCONTINUED] HYDROcodone-acetaminophen (NORCO) 7.5-325 MG per tablet Take 1-2 tablets by mouth Every 4 (Four) Hours As Needed for Moderate Pain 20 tablet 0   • [DISCONTINUED] irbesartan (AVAPRO) 300 MG tablet Take 1 tablet by mouth Every Night. 90 tablet 3   • [DISCONTINUED] tamsulosin (FLOMAX) 0.4 MG capsule 24 hr capsule Take 1 capsule by mouth Daily. 90 capsule 3   • [DISCONTINUED] zolpidem CR (AMBIEN CR) 6.25 MG CR tablet Take 1 tablet by mouth At Night As Needed for Sleep. 90 tablet 2     No facility-administered encounter medications on file as of 3/18/2019.        Reviewed use of high risk medication in the elderly: yes  Reviewed for potential of harmful drug interactions in the elderly: yes    Follow Up:  Return in about 366 days (around 3/18/2020) for Medicare Wellness.     An After Visit Summary and PPPS with all of these plans were given to the patient.

## 2019-03-25 ENCOUNTER — OFFICE VISIT (OUTPATIENT)
Dept: SURGERY | Facility: CLINIC | Age: 74
End: 2019-03-25

## 2019-03-25 DIAGNOSIS — R19.00 ABDOMINAL WALL BULGE: Primary | ICD-10-CM

## 2019-03-25 DIAGNOSIS — K43.2 INCISIONAL HERNIA, WITHOUT OBSTRUCTION OR GANGRENE: ICD-10-CM

## 2019-03-25 PROCEDURE — 99213 OFFICE O/P EST LOW 20 MIN: CPT | Performed by: SURGERY

## 2019-03-25 NOTE — PROGRESS NOTES
Patient: Saad Meier    YOB: 1945    Date: 03/25/2019    Primary Care Provider: Evelyn Muse MD    Reason for Consultation: Follow-up hernia    Chief Complaint   Patient presents with   • Follow-up     incisional hernia       History of present illness:  I saw the patient in the office today as a followup from their recent incisional hernia repair completed 2 months ago. This is patients second PO visit due to groin pain.  They state that they have done well and are having no complaints at this time.     He has had a previous colectomy performed and does have a long-standing large incisional hernia that is reducible.  He states that he feels markedly better since his recent right inguinal hernia and no longer has to wear his truss.    The following portions of the patient's history were reviewed and updated as appropriate: allergies, current medications, past family history, past medical history, past social history, past surgical history and problem list.    Vital Signs:   There were no vitals filed for this visit.    Physical Exam:   General Appearance:    Alert, cooperative, in no acute distress   Abdomen:     no masses, no organomegaly, soft non-tender, non-distended, no guarding, wounds are well healed, no evidence of recurrent hernia         Assessment / Plan:    1. Abdominal wall bulge    2. Incisional hernia, without obstruction or gangrene        I did discuss the situation with the patient today in the office and they have done well from their recent herniorraphy.  I have released the patient back to normal activity, they understand that they need to be careful about heavy lifting.  I need to see the patient back in the office only if they are having further problems, they know to call me if they are.    As far as his incisional hernia is concerned, this is reducible and because of its large size and the patient's elderly status I would recommend no surgical intervention at  this time unless he become symptomatic.  He clearly knows to see me again if he has problems.    Electronically signed by Jacky Walker MD  03/25/19

## 2019-03-28 ENCOUNTER — HOSPITAL ENCOUNTER (OUTPATIENT)
Dept: CT IMAGING | Facility: HOSPITAL | Age: 74
Discharge: HOME | End: 2019-03-28
Admitting: FAMILY MEDICINE

## 2019-03-28 DIAGNOSIS — N28.89 BILATERAL RENAL MASSES: Chronic | ICD-10-CM

## 2019-03-28 LAB — CREAT BLDA-MCNC: 1 MG/DL (ref 0.6–1.3)

## 2019-03-28 PROCEDURE — 82565 ASSAY OF CREATININE: CPT

## 2019-03-28 PROCEDURE — 74177 CT ABD & PELVIS W/CONTRAST: CPT

## 2019-03-28 PROCEDURE — 25010000002 IOPAMIDOL 61 % SOLUTION: Performed by: FAMILY MEDICINE

## 2019-03-28 RX ADMIN — IOPAMIDOL 95 ML: 612 INJECTION, SOLUTION INTRAVENOUS at 10:50

## 2019-04-02 ENCOUNTER — TELEPHONE (OUTPATIENT)
Dept: INTERNAL MEDICINE | Facility: CLINIC | Age: 74
End: 2019-04-02

## 2019-04-02 NOTE — TELEPHONE ENCOUNTER
Patient called wanting result of CT scan he asked about gallbladder and I said that it stated that was normal. If you could give patient a call back concerning the CT thanks.

## 2019-04-15 ENCOUNTER — TELEPHONE (OUTPATIENT)
Dept: CARDIOLOGY | Facility: CLINIC | Age: 74
End: 2019-04-15

## 2019-04-15 RX ORDER — POTASSIUM CHLORIDE 20 MEQ/1
20 TABLET, EXTENDED RELEASE ORAL DAILY
Qty: 90 TABLET | Refills: 3 | Status: SHIPPED | OUTPATIENT
Start: 2019-04-15 | End: 2020-01-16 | Stop reason: SDUPTHER

## 2019-04-15 RX ORDER — POTASSIUM CHLORIDE 20 MEQ/1
20 TABLET, EXTENDED RELEASE ORAL DAILY
Qty: 30 TABLET | Refills: 11 | Status: SHIPPED | OUTPATIENT
Start: 2019-04-15 | End: 2019-04-15 | Stop reason: SDUPTHER

## 2019-04-15 NOTE — TELEPHONE ENCOUNTER
Lm on vm to call me back- he left a msg this morning requesting a return call regarding Rosuvastatin- he is requesting another statin, as Rosuvastatin causes Albina Gehrig's disease-    Per phone note in February, Dr. Wall states that we can try pravastatin 20 mg daily if he experiences any problems with Rosuvastatin- will await his return call

## 2019-04-16 ENCOUNTER — TELEPHONE (OUTPATIENT)
Dept: CARDIOLOGY | Facility: CLINIC | Age: 74
End: 2019-04-16

## 2019-04-16 RX ORDER — PRAVASTATIN SODIUM 20 MG
20 TABLET ORAL NIGHTLY
Qty: 90 TABLET | Refills: 3 | Status: SHIPPED | OUTPATIENT
Start: 2019-04-16 | End: 2019-07-29

## 2019-04-16 NOTE — TELEPHONE ENCOUNTER
LVM for pt to let him know we can try Pravastatin 20 mg nightly (per [facility] note in on 02/19/19) since he is worried about the side effects of Rosuvastatin. I advised I would call in the new RX to his mail order pharmacy listed and advised to call back if he has any questions. Will await return call.

## 2019-04-19 ENCOUNTER — TELEPHONE (OUTPATIENT)
Dept: UROLOGY | Facility: CLINIC | Age: 74
End: 2019-04-19

## 2019-04-23 ENCOUNTER — OFFICE VISIT (OUTPATIENT)
Dept: PULMONOLOGY | Facility: CLINIC | Age: 74
End: 2019-04-23

## 2019-04-23 VITALS
BODY MASS INDEX: 27.77 KG/M2 | HEART RATE: 71 BPM | HEIGHT: 70 IN | OXYGEN SATURATION: 98 % | WEIGHT: 194 LBS | DIASTOLIC BLOOD PRESSURE: 72 MMHG | SYSTOLIC BLOOD PRESSURE: 120 MMHG | RESPIRATION RATE: 18 BRPM

## 2019-04-23 DIAGNOSIS — J30.89 OTHER ALLERGIC RHINITIS: ICD-10-CM

## 2019-04-23 DIAGNOSIS — R06.83 SNORING: ICD-10-CM

## 2019-04-23 DIAGNOSIS — G47.19 EXCESSIVE DAYTIME SLEEPINESS: ICD-10-CM

## 2019-04-23 DIAGNOSIS — G47.52 DREAM ENACTMENT BEHAVIOR: ICD-10-CM

## 2019-04-23 DIAGNOSIS — G47.00 INSOMNIA, UNSPECIFIED TYPE: Primary | ICD-10-CM

## 2019-04-23 PROCEDURE — 99204 OFFICE O/P NEW MOD 45 MIN: CPT | Performed by: INTERNAL MEDICINE

## 2019-04-23 RX ORDER — ZOLPIDEM TARTRATE 10 MG/1
5 TABLET ORAL NIGHTLY PRN
Qty: 1 TABLET | Refills: 0 | Status: SHIPPED | OUTPATIENT
Start: 2019-04-23 | End: 2019-04-23 | Stop reason: SDUPTHER

## 2019-04-23 RX ORDER — FLUNISOLIDE 0.25 MG/ML
1 SOLUTION NASAL EVERY 12 HOURS
Qty: 1 BOTTLE | Refills: 5 | Status: SHIPPED | OUTPATIENT
Start: 2019-04-23 | End: 2020-05-29 | Stop reason: SDUPTHER

## 2019-04-23 RX ORDER — ZOLPIDEM TARTRATE 10 MG/1
5 TABLET ORAL NIGHTLY PRN
Qty: 1 TABLET | Refills: 0 | Status: SHIPPED | OUTPATIENT
Start: 2019-04-23 | End: 2019-07-03

## 2019-04-23 RX ORDER — AZELASTINE 1 MG/ML
1 SPRAY, METERED NASAL 2 TIMES DAILY PRN
Qty: 1 EACH | Refills: 5 | Status: SHIPPED | OUTPATIENT
Start: 2019-04-23 | End: 2020-05-29 | Stop reason: SDUPTHER

## 2019-04-23 NOTE — PROGRESS NOTES
CONSULT NOTE    Requested by:   Evelyn Muse MD Culver, Tabitha Dawn, MD      Chief Complaint   Patient presents with   • Consult   • Sleeping Problem       Subjective:  Saad Meier is a 73 y.o. male.     History of Present Illness   Patient was sent in today for evaluation of sleep disturbance. Patient says that for the past few years, he has had trouble with occasional snoring.     Patient says that he feels tired in the morning after waking up. he is also complaining of occasionally falling asleep while watching TV and sometimes while reading a book.    he also occasionally acts out his dreams.    he is not complaining of occasional headaches.    Patient's sleep schedule was reviewed. he drinks 1-2 cups/cans of caffeinated drinks per day.     he does not have a family history of LULU.     Goes to bed at 10-11 PM. Usually gets out at 2 AM then lays in lounge. Still can't sleep. Says that he occasionally goes without sleeping for 3 nights and then can't stay awake and sleeps for 4-5 hours, on night # 4 or 5.      Patient also complains of runny nose and dribbling in the back of the throat for the past few months. This has been sometimes associated with seasonal variation.      The following portions of the patient's history were reviewed and updated as appropriate: allergies, current medications, past family history, past medical history, past social history and past surgical history.    Review of Systems   HENT: Positive for sinus pressure. Negative for sneezing and sore throat.    Respiratory: Negative for cough, chest tightness, shortness of breath and wheezing.    Cardiovascular: Positive for leg swelling. Negative for palpitations.   Psychiatric/Behavioral: Positive for sleep disturbance.   All other systems reviewed and are negative.      Past Medical History:   Diagnosis Date   • Allergic 25yrs.    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   • BPH (benign prostatic hypertrophy)    • CAD (coronary artery disease) 3/6/2017   • Cancer (CMS/HCC) 2012    colon   • Cardiac arrhythmia    • Chronic diastolic congestive heart failure (CMS/HCC)    • Colon polyp    • Diverticulosis    • Essential hypertension 3/14/2018   • GERD (gastroesophageal reflux disease)    • History of blood transfusion    • History of colonoscopy     Complete   • History of echocardiogram    • History of esophagogastroduodenoscopy     Diagnostic   • History of Holter monitoring    • History of stress test     PT UNSURE OF DATE OR TYPE    • History of stress test     PT STATES \"LONG TIME AGO BUT IT WAS OK\"   • HL (hearing loss)    • Hyperlipidemia    • Infection of kidney    • Iron deficiency    • Obesity    • Poor historian    • Sleep apnea    • Stenosis of right carotid artery    • Visual impairment !(97    Reading Glasses       Social History     Tobacco Use   • Smoking status: Former Smoker     Packs/day: 1.00     Years: 10.00     Pack years: 10.00     Types: Cigarettes     Start date: 1963     Last attempt to quit: 1973     Years since quittin.3   • Smokeless tobacco: Never Used   Substance Use Topics   • Alcohol use: No         Objective:  Visit Vitals  /72   Pulse 71   Resp 18   Ht 177.8 cm (70\")   Wt 88 kg (194 lb)   SpO2 98%   BMI 27.84 kg/m²       Physical Exam   Constitutional: He is oriented to person, place, and time. He appears well-developed and well-nourished.   HENT:   Head: Atraumatic.   Nostril showed moderate erythema.  Oropharynx was cobblestoned & crowded.  Dentures noted.    Eyes: EOM are normal. Pupils are equal, round, and reactive to light.   Neck: No JVD present. No tracheal deviation present. No thyromegaly present.   Increased adipose tissue.    Cardiovascular: Normal rate and regular rhythm.   Pulmonary/Chest: Effort normal and breath sounds normal. No " respiratory distress. He has no wheezes.   Musculoskeletal: Normal range of motion. He exhibits no edema.   Gait was normal.   Neurological: He is alert and oriented to person, place, and time.   Skin: Skin is warm and dry.   Psychiatric: He has a normal mood and affect. His behavior is normal.   Vitals reviewed.      Assessment/Plan:  Saad was seen today for consult and sleeping problem.    Diagnoses and all orders for this visit:    Insomnia, unspecified type  -     Polysomnography 4 or More Parameters; Future    Other allergic rhinitis    Snoring  -     Polysomnography 4 or More Parameters; Future    Excessive daytime sleepiness  -     Polysomnography 4 or More Parameters; Future    Dream enactment behavior  -     Polysomnography 4 or More Parameters; Future    Other orders  -     azelastine (ASTELIN) 0.1 % nasal spray; 1 spray into the nostril(s) as directed by provider 2 (Two) Times a Day As Needed for Rhinitis or Allergies. Use in each nostril as directed  -     flunisolide (NASALIDE) 25 MCG/ACT (0.025%) solution nasal spray; Inhale 1 spray Every 12 (Twelve) Hours.  -     Discontinue: zolpidem (AMBIEN) 10 MG tablet; Take 0.5 tablets by mouth At Night As Needed for Sleep. To be taken on the night of sleep study ONLY!  -     zolpidem (AMBIEN) 10 MG tablet; Take 0.5 tablets by mouth At Night As Needed for Sleep. To be taken on the night of sleep study ONLY!        Return in about 10 weeks (around 7/2/2019) for Sleep/Johnna.    DISCUSSION(if any):  Laboratory workup was also reviewed which showed   Lab Results   Component Value Date    CO2 29.0 01/08/2019       ===========================  ===========================    Sleep questionnaire was provided to the patient.    The pathophysiology of sleep apnea was discussed, with him.     We will encourage him to schedule the sleep study soon.     The patient will definitely need to be considered for an in lab study due to dream enactment among other reasons.  It  should be noted that a home sleep study is likely to underestimate the true AHI and is unable to assess sleep stages and abnormal sleep behaviors etc. The patient has understood.    The patient is agreeable to try CPAP/BiPAP, if needed.     Patient was educated on good sleep hygiene measures and voiced understanding of the same.     If his sleep study does not show LULU or other etiologies that may explain his insomnia, we will consider a sleeping aid.     He will be given Ambien 5 mg PO (1 tab) to be taken on the night of sleep study.     Patient was given reading material regarding sleep apnea.    Patient will be started on nasal spray for symptoms which are definitely consistent with allergic rhinitis.     If his symptoms do not improve, then we will consider ordering IgE/RAST panel.      Dictated utilizing Dragon dictation.    This document was electronically signed by Neel Zimmer MD on 04/23/19 at 10:09 AM

## 2019-04-24 NOTE — TELEPHONE ENCOUNTER
Per Dr. Hollins, the spot that Mr. Meier referenced has remained stable for 3 CT scans.  It is consistent with a benign cyst.  This information was relayed to Mr. Meier.  He says his pain has resolved.  He will call back if he has any additional concerns.

## 2019-04-25 ENCOUNTER — HOSPITAL ENCOUNTER (EMERGENCY)
Facility: HOSPITAL | Age: 74
Discharge: HOME | End: 2019-04-25
Attending: STUDENT IN AN ORGANIZED HEALTH CARE EDUCATION/TRAINING PROGRAM | Admitting: STUDENT IN AN ORGANIZED HEALTH CARE EDUCATION/TRAINING PROGRAM

## 2019-04-25 ENCOUNTER — APPOINTMENT (OUTPATIENT)
Dept: GENERAL RADIOLOGY | Facility: HOSPITAL | Age: 74
End: 2019-04-25

## 2019-04-25 VITALS
BODY MASS INDEX: 28.49 KG/M2 | DIASTOLIC BLOOD PRESSURE: 87 MMHG | HEART RATE: 88 BPM | SYSTOLIC BLOOD PRESSURE: 144 MMHG | RESPIRATION RATE: 16 BRPM | OXYGEN SATURATION: 99 % | TEMPERATURE: 98 F | HEIGHT: 70 IN | WEIGHT: 199 LBS

## 2019-04-25 DIAGNOSIS — S62.115A CLOSED NONDISPLACED FRACTURE OF TRIQUETRUM OF LEFT WRIST, INITIAL ENCOUNTER: Primary | ICD-10-CM

## 2019-04-25 PROCEDURE — 73110 X-RAY EXAM OF WRIST: CPT

## 2019-04-25 PROCEDURE — 25010000002 MORPHINE PER 10 MG: Performed by: EMERGENCY MEDICINE

## 2019-04-25 PROCEDURE — 99283 EMERGENCY DEPT VISIT LOW MDM: CPT

## 2019-04-25 PROCEDURE — 96374 THER/PROPH/DIAG INJ IV PUSH: CPT

## 2019-04-25 RX ORDER — MORPHINE SULFATE 2 MG/ML
2 INJECTION, SOLUTION INTRAMUSCULAR; INTRAVENOUS ONCE
Status: COMPLETED | OUTPATIENT
Start: 2019-04-25 | End: 2019-04-25

## 2019-04-25 RX ORDER — ONDANSETRON 4 MG/1
4 TABLET, ORALLY DISINTEGRATING ORAL ONCE
Status: COMPLETED | OUTPATIENT
Start: 2019-04-25 | End: 2019-04-25

## 2019-04-25 RX ADMIN — ONDANSETRON 4 MG: 4 TABLET, ORALLY DISINTEGRATING ORAL at 19:37

## 2019-04-25 RX ADMIN — MORPHINE SULFATE 2 MG: 2 INJECTION, SOLUTION INTRAMUSCULAR; INTRAVENOUS at 19:38

## 2019-05-10 ENCOUNTER — APPOINTMENT (OUTPATIENT)
Dept: SLEEP MEDICINE | Facility: HOSPITAL | Age: 74
End: 2019-05-10

## 2019-05-16 ENCOUNTER — HOSPITAL ENCOUNTER (OUTPATIENT)
Dept: SLEEP MEDICINE | Facility: HOSPITAL | Age: 74
Setting detail: THERAPIES SERIES
End: 2019-05-16

## 2019-05-16 DIAGNOSIS — G47.52 DREAM ENACTMENT BEHAVIOR: ICD-10-CM

## 2019-05-16 DIAGNOSIS — G47.19 EXCESSIVE DAYTIME SLEEPINESS: ICD-10-CM

## 2019-05-16 DIAGNOSIS — R06.83 SNORING: ICD-10-CM

## 2019-05-16 DIAGNOSIS — G47.00 INSOMNIA, UNSPECIFIED TYPE: ICD-10-CM

## 2019-05-16 PROCEDURE — 95810 POLYSOM 6/> YRS 4/> PARAM: CPT

## 2019-05-16 PROCEDURE — 95810 POLYSOM 6/> YRS 4/> PARAM: CPT | Performed by: INTERNAL MEDICINE

## 2019-05-21 DIAGNOSIS — G47.33 OSA (OBSTRUCTIVE SLEEP APNEA): Primary | ICD-10-CM

## 2019-05-28 DIAGNOSIS — R60.0 BILATERAL EDEMA OF LOWER EXTREMITY: ICD-10-CM

## 2019-05-28 RX ORDER — FUROSEMIDE 40 MG/1
40 TABLET ORAL DAILY
Qty: 90 TABLET | Refills: 0 | Status: SHIPPED | OUTPATIENT
Start: 2019-05-28 | End: 2019-06-10 | Stop reason: SDUPTHER

## 2019-06-10 DIAGNOSIS — R60.0 BILATERAL EDEMA OF LOWER EXTREMITY: ICD-10-CM

## 2019-06-10 RX ORDER — FUROSEMIDE 40 MG/1
40 TABLET ORAL DAILY
Qty: 90 TABLET | Refills: 0 | Status: SHIPPED | OUTPATIENT
Start: 2019-06-10 | End: 2019-06-14 | Stop reason: SDUPTHER

## 2019-06-12 ENCOUNTER — TELEPHONE (OUTPATIENT)
Dept: INTERNAL MEDICINE | Facility: CLINIC | Age: 74
End: 2019-06-12

## 2019-06-12 DIAGNOSIS — G47.61 PERIODIC LIMB MOVEMENT SLEEP DISORDER: Primary | ICD-10-CM

## 2019-06-12 NOTE — TELEPHONE ENCOUNTER
Pt is requesting a prescription for restless leg syndrome.  Please call and let him know if you can send this in.

## 2019-06-13 RX ORDER — ROPINIROLE 0.5 MG/1
TABLET, FILM COATED ORAL
Qty: 90 TABLET | Refills: 0 | Status: SHIPPED | OUTPATIENT
Start: 2019-06-13 | End: 2019-06-24 | Stop reason: SDUPTHER

## 2019-06-13 NOTE — TELEPHONE ENCOUNTER
I looked over his sleep study. Sent low dose Requip to try, sent to [facility] but can be resent to mail order if needed. If this fails needs to come in to see me. May need labs (per sleep study report).

## 2019-06-14 ENCOUNTER — TELEPHONE (OUTPATIENT)
Dept: INTERNAL MEDICINE | Facility: CLINIC | Age: 74
End: 2019-06-14

## 2019-06-14 DIAGNOSIS — R60.0 BILATERAL EDEMA OF LOWER EXTREMITY: ICD-10-CM

## 2019-06-14 RX ORDER — FUROSEMIDE 40 MG/1
40 TABLET ORAL DAILY
Qty: 90 TABLET | Refills: 0 | Status: SHIPPED | OUTPATIENT
Start: 2019-06-14 | End: 2019-06-17

## 2019-06-14 NOTE — TELEPHONE ENCOUNTER
Patient came into the office stating that he has received 5 calls from [facility] stating that Dr. Muse needs to fax a request to [employer] for his Furosemide. Fax# [phone]. Patient said that he is not out of the medication yet.     Please call patient if you need to discuss. [phone]

## 2019-06-17 RX ORDER — FUROSEMIDE 40 MG/1
40 TABLET ORAL DAILY PRN
Qty: 90 TABLET | Refills: 0 | Status: SHIPPED | OUTPATIENT
Start: 2019-06-17 | End: 2019-06-18 | Stop reason: SDUPTHER

## 2019-06-18 ENCOUNTER — TELEPHONE (OUTPATIENT)
Dept: CARDIOLOGY | Facility: CLINIC | Age: 74
End: 2019-06-18

## 2019-06-18 RX ORDER — FUROSEMIDE 40 MG/1
40 TABLET ORAL DAILY PRN
Qty: 30 TABLET | Refills: 0 | Status: SHIPPED | OUTPATIENT
Start: 2019-06-18 | End: 2019-07-03

## 2019-06-18 NOTE — TELEPHONE ENCOUNTER
Patient requesting Lasix refills. He is due for follow up in July and does not have scheduled. We will get him scheduled. 30 day refill given. Pt amendable to plan.

## 2019-06-24 ENCOUNTER — TELEPHONE (OUTPATIENT)
Dept: INTERNAL MEDICINE | Facility: CLINIC | Age: 74
End: 2019-06-24

## 2019-06-24 DIAGNOSIS — G47.61 PERIODIC LIMB MOVEMENT SLEEP DISORDER: ICD-10-CM

## 2019-06-24 RX ORDER — ROPINIROLE 0.5 MG/1
TABLET, FILM COATED ORAL
Qty: 90 TABLET | Refills: 0 | Status: SHIPPED | OUTPATIENT
Start: 2019-06-24 | End: 2020-03-16 | Stop reason: SDUPTHER

## 2019-06-24 RX ORDER — FUROSEMIDE 40 MG/1
40 TABLET ORAL DAILY PRN
Qty: 5 TABLET | Refills: 0 | Status: SHIPPED | OUTPATIENT
Start: 2019-06-24 | End: 2019-07-29 | Stop reason: SDUPTHER

## 2019-06-24 NOTE — TELEPHONE ENCOUNTER
Patient would like to know if a few days worth of Furosemide can be called until he receives his mail order prescription. ([facility]) He took his last pill this morning.     He would also like to inform you that he does not want to take Irbesartan any longer because he  saw on TV that it causes cancer.

## 2019-06-24 NOTE — TELEPHONE ENCOUNTER
I do not suggest d/c medicine without asking doctor first. Stopping may have raised his blood pressure very high which increases chance of stroke or heart attack. Increased strain on heart can worsen his swelling.    I will send five furosemide.

## 2019-07-03 ENCOUNTER — OFFICE VISIT (OUTPATIENT)
Dept: INTERNAL MEDICINE | Facility: CLINIC | Age: 74
End: 2019-07-03

## 2019-07-03 ENCOUNTER — OFFICE VISIT (OUTPATIENT)
Dept: PULMONOLOGY | Facility: CLINIC | Age: 74
End: 2019-07-03

## 2019-07-03 VITALS
WEIGHT: 197.4 LBS | OXYGEN SATURATION: 98 % | HEART RATE: 61 BPM | HEIGHT: 70 IN | TEMPERATURE: 96.9 F | BODY MASS INDEX: 28.26 KG/M2 | DIASTOLIC BLOOD PRESSURE: 76 MMHG | RESPIRATION RATE: 18 BRPM | SYSTOLIC BLOOD PRESSURE: 120 MMHG

## 2019-07-03 VITALS
HEIGHT: 70 IN | OXYGEN SATURATION: 98 % | HEART RATE: 60 BPM | BODY MASS INDEX: 28.2 KG/M2 | SYSTOLIC BLOOD PRESSURE: 98 MMHG | WEIGHT: 197 LBS | DIASTOLIC BLOOD PRESSURE: 64 MMHG | RESPIRATION RATE: 18 BRPM

## 2019-07-03 DIAGNOSIS — F41.9 ANXIETY: Primary | ICD-10-CM

## 2019-07-03 DIAGNOSIS — G47.33 OSA (OBSTRUCTIVE SLEEP APNEA): Primary | ICD-10-CM

## 2019-07-03 DIAGNOSIS — G47.00 INSOMNIA, UNSPECIFIED TYPE: ICD-10-CM

## 2019-07-03 DIAGNOSIS — G47.19 EXCESSIVE DAYTIME SLEEPINESS: ICD-10-CM

## 2019-07-03 PROCEDURE — 99213 OFFICE O/P EST LOW 20 MIN: CPT | Performed by: NURSE PRACTITIONER

## 2019-07-03 RX ORDER — AMITRIPTYLINE HYDROCHLORIDE 10 MG/1
5 TABLET, FILM COATED ORAL NIGHTLY PRN
Qty: 30 TABLET | Refills: 0 | Status: SHIPPED | OUTPATIENT
Start: 2019-07-03 | End: 2019-09-10

## 2019-07-03 RX ORDER — MELATONIN
2000 DAILY
Qty: 30 TABLET | Refills: 2 | Status: SHIPPED | OUTPATIENT
Start: 2019-07-03 | End: 2022-11-14

## 2019-07-03 RX ORDER — LANOLIN ALCOHOL/MO/W.PET/CERES
3 CREAM (GRAM) TOPICAL NIGHTLY
COMMUNITY
End: 2021-07-08 | Stop reason: SDUPTHER

## 2019-07-03 RX ORDER — CHOLECALCIFEROL (VITAMIN D3) 125 MCG
1000 CAPSULE ORAL DAILY
Qty: 30 TABLET | Refills: 0 | Status: SHIPPED | OUTPATIENT
Start: 2019-07-03 | End: 2019-07-29

## 2019-07-03 NOTE — PATIENT INSTRUCTIONS
DO NOT TAKE WITH REQUIP/ROPINIROLE - may cause you to be too sleep, change your level of consciousness

## 2019-07-03 NOTE — PROGRESS NOTES
"Date: 2019    Name: Saad Meier  : 1945    Chief Complaint:   Chief Complaint   Patient presents with   • Insomnia     Pt states he cannot sleep, states he has tried sleeping pills and he still can't sleep. Pt has had a sleep study and brought papers in.         HPI:   Mr Meier has had trouble sleeping for a few years.  Recently tested for LULU, positive results.  He's been using CPAP for 2 months, feels more rested in the mornings but still has trouble falling asleep.  Feels an anxiety pill before bed would help him.  He states he is no longer taking seroquel, as it was ineffective. He watches tv for a couple of hours every night, until he falls asleep.      History:  The following portions of the patient's history were reviewed and updated as appropriate: allergies, current medications, past medical history, family history, surgical history, social history and problem list.     ROS:  Review of Systems   Constitutional: Positive for fatigue. Negative for appetite change and fever.   Eyes: Negative for visual disturbance.   Respiratory: Negative for cough, shortness of breath and wheezing.    Cardiovascular: Negative for chest pain, palpitations and leg swelling.   Skin: Negative for rash.   Neurological: Negative for dizziness, syncope, weakness, light-headedness, headache, memory problem and confusion.   Psychiatric/Behavioral: Positive for sleep disturbance. Negative for agitation, decreased concentration, suicidal ideas and stress. The patient is nervous/anxious (especially HS).        VS:  Vitals:    19 1503   BP: 120/76   Pulse: 61   Resp: 18   Temp: 96.9 °F (36.1 °C)   TempSrc: Temporal   SpO2: 98%   Weight: 89.5 kg (197 lb 6.4 oz)   Height: 177.8 cm (70\")     Body mass index is 28.32 kg/m².    PE:  Physical Exam   Constitutional: He is oriented to person, place, and time. He appears well-developed and well-nourished.   HENT:   Head: Normocephalic.   Right Ear: External ear normal. "   Left Ear: External ear normal.   Eyes: Conjunctivae are normal.   Cardiovascular: Normal rate, regular rhythm, normal heart sounds and intact distal pulses.   Pulmonary/Chest: Effort normal and breath sounds normal.   Neurological: He is alert and oriented to person, place, and time.   Skin: Skin is warm and dry. Capillary refill takes less than 2 seconds.   Psychiatric: He has a normal mood and affect. His behavior is normal. Judgment and thought content normal.     Assessment/Plan:  Saad was seen today for insomnia.    Diagnoses and all orders for this visit:    Anxiety  -     amitriptyline (ELAVIL) 10 MG tablet; Take 0.5 tablets by mouth At Night As Needed for Sleep. Verbalizes understanding this is not to be taken with Requip.      Insomnia, unspecified type  -     amitriptyline (ELAVIL) 10 MG tablet; Take 0.5 tablets by mouth At Night As Needed for Sleep.  - Discussed better sleep hygiene habits; he agreed to read before bed, rather than watch tv for a week, if it helps he will continue this habit; encouraged to keep sleeping area free from distraction, don't do anything but sleep in bed, exercise on most days as possible.  - May try increasing melatonin to 5 mg if elavil is ineffective  - Continue using CPAP as prescribed    Other orders  -     cholecalciferol (VITAMIN D3) 1000 units tablet; Take 2 tablets by mouth Daily.  -     vitamin B-12 (CYANOCOBALAMIN) 500 MCG tablet; Take 2 tablets by mouth Daily.    Return in about 4 weeks (around 7/31/2019) for F/U with Dr Muse.

## 2019-07-03 NOTE — PROGRESS NOTES
"Chief Complaint   Patient presents with   • Follow-up   • Sleeping Problem         Subjective   Saad Meier is a 73 y.o. male.     History of Present Illness   The patient comes in today for follow-up of obstructive sleep apnea.    I reviewed his sleep study from May 16, 2019.  I discussed the results with him today.  The sleep study revealed moderate sleep apnea with an apnea hypopnea index of 24 per hour.      The patient has been set up with CPAP at a pressure of 8 but he is having some difficulty maintaining sleep.  He feels that he may have anxiety and this causes him to not be able to sleep.    Upon questioning he has tried several sleep aids in the past and none of them have helped.  He states that he was on a medication for anxiety in the past and that seemed to help him sleep.  Unfortunately he cannot think of the name of this medication but it was given to him before he was with his current PCP.    The following portions of the patient's history were reviewed and updated as appropriate: allergies, current medications, past family history, past medical history, past social history and past surgical history.    Review of Systems   HENT: Negative for sinus pressure, sneezing and sore throat.    Respiratory: Negative for cough, shortness of breath and wheezing.        Objective   Visit Vitals  BP 98/64   Pulse 60   Resp 18   Ht 177.8 cm (70\")   Wt 89.4 kg (197 lb)   SpO2 98%   BMI 28.27 kg/m²     Physical Exam   Constitutional: He is oriented to person, place, and time. He appears well-developed and well-nourished.   HENT:   Head: Normocephalic and atraumatic.   Crowded oropharynx. Dentures.   Musculoskeletal:   Gait was slow.   Neurological: He is alert and oriented to person, place, and time.   Psychiatric: He has a normal mood and affect.   Vitals reviewed.      Assessment/Plan   Saad was seen today for follow-up and sleeping problem.    Diagnoses and all orders for this visit:    LULU (obstructive " sleep apnea)    Insomnia, unspecified type    Excessive daytime sleepiness           Return in about 4 months (around 11/3/2019) for Recheck, For Dr. Zimmer.    DISCUSSION (if any):  Continue treatment with CPAP at a pressure of 8, with a full-face mask.    He will need to bring in the SD card at his next visit.    Humidification setup, hose and mask care discussed.    Weight loss advised.    Use every night for atleast 4 hours stressed.    He states that he was given a medication 3 or 4 years ago that he thinks was for anxiety and this medication helped him sleep.  Unfortunately the patient cannot remember the name of the medication.  I have asked him to discuss this with his PCP.    Dictated utilizing Dragon dictation.    This document was electronically signed by JUSTA Weiss July 3, 2019  2:01 PM

## 2019-07-16 NOTE — PROGRESS NOTES
Regarding his sleep medicines, do you all have any preference on what I prescribe him (or any that you recommend strongly against?

## 2019-07-29 ENCOUNTER — OFFICE VISIT (OUTPATIENT)
Dept: CARDIOLOGY | Facility: CLINIC | Age: 74
End: 2019-07-29

## 2019-07-29 VITALS
SYSTOLIC BLOOD PRESSURE: 100 MMHG | WEIGHT: 196 LBS | BODY MASS INDEX: 28.06 KG/M2 | HEIGHT: 70 IN | DIASTOLIC BLOOD PRESSURE: 70 MMHG | HEART RATE: 61 BPM | OXYGEN SATURATION: 95 %

## 2019-07-29 DIAGNOSIS — I47.1 AV NODAL RE-ENTRY TACHYCARDIA (HCC): Primary | ICD-10-CM

## 2019-07-29 DIAGNOSIS — I49.5 SSS (SICK SINUS SYNDROME) (HCC): ICD-10-CM

## 2019-07-29 DIAGNOSIS — I50.32 CHRONIC DIASTOLIC CONGESTIVE HEART FAILURE (HCC): ICD-10-CM

## 2019-07-29 DIAGNOSIS — I65.21 STENOSIS OF RIGHT CAROTID ARTERY: ICD-10-CM

## 2019-07-29 DIAGNOSIS — I25.10 CORONARY ARTERY DISEASE INVOLVING NATIVE CORONARY ARTERY OF NATIVE HEART WITHOUT ANGINA PECTORIS: ICD-10-CM

## 2019-07-29 DIAGNOSIS — E78.5 DYSLIPIDEMIA: ICD-10-CM

## 2019-07-29 DIAGNOSIS — I10 ESSENTIAL HYPERTENSION: ICD-10-CM

## 2019-07-29 PROCEDURE — 99213 OFFICE O/P EST LOW 20 MIN: CPT | Performed by: INTERNAL MEDICINE

## 2019-07-29 PROCEDURE — 93280 PM DEVICE PROGR EVAL DUAL: CPT | Performed by: INTERNAL MEDICINE

## 2019-07-29 RX ORDER — EZETIMIBE 10 MG/1
10 TABLET ORAL DAILY
Qty: 30 TABLET | Refills: 0 | Status: SHIPPED | OUTPATIENT
Start: 2019-07-29 | End: 2019-08-12 | Stop reason: SDUPTHER

## 2019-07-29 RX ORDER — FUROSEMIDE 40 MG/1
40 TABLET ORAL DAILY PRN
Qty: 30 TABLET | Refills: 3 | Status: SHIPPED | OUTPATIENT
Start: 2019-07-29 | End: 2019-09-18 | Stop reason: SDUPTHER

## 2019-07-29 RX ORDER — EZETIMIBE 10 MG/1
10 TABLET ORAL DAILY
Qty: 90 TABLET | Refills: 3 | Status: SHIPPED | OUTPATIENT
Start: 2019-07-29 | End: 2019-07-29 | Stop reason: SDUPTHER

## 2019-07-29 RX ORDER — UBIDECARENONE 100 MG
300 CAPSULE ORAL DAILY
COMMUNITY
End: 2021-02-26 | Stop reason: SDUPTHER

## 2019-07-29 NOTE — PROGRESS NOTES
[facility] AT [address], [address]  [place], [place], [address]    [phone]  [email or web address]           OUTPATIENT CLINIC FOLLOW UP NOTE    Patient Care Team:  Patient Care Team:  Evelyn Muse MD as PCP - General (Family Medicine)  Atilio Wall MD as Consulting Physician (Cardiology)  Luis Hollins MD as Consulting Physician (Urology)  Liddle, Susan E., MD as Consulting Physician (Hematology and Oncology)  Gloria Garcia MD as Consulting Physician (General Surgery)  Elvie Alvarez MD as Surgeon (General Surgery)  Jacky Walker MD as Consulting Physician (General Surgery)    Subjective:      Chief Complaint   Patient presents with   • AV node re-entry tachycardia   • Coronary Artery Disease     HPI:    Saad Meier is a 73 y.o. male.  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  The patient presents today for follow-up.    Since the patient was last seen he reports that he has been doing well from a cardiac standpoint.  He denies any chest pain, shortness of breath, palpitations, lightheadedness, or syncope.  He does have intermittent lower extremity edema, but this is improved with Lasix.  He reports that he trialed atorvastatin and pravastatin and developed myalgias with both.    Review of Systems:  Positive for lower extremity edema  Negative for exertional chest pain, dyspnea with exertion, orthopnea, PND, palpitations, lightheadedness, syncope.    PFSH:  Patient Active Problem List   Diagnosis   • Arthritis   • Chronic diastolic congestive heart failure (CMS/HCC)   • Acid reflux   • Chronic nausea   • Juvenile rheumatic fever   • Hernia, inguinal, right   • Ventral hernia without obstruction or gangrene   • Insomnia   • Sick sinus syndrome (CMS/HCC)   • Chronic  prostatitis   • Immunodeficiency (CMS/HCC)   • Coronary artery disease involving native coronary artery of native heart without angina pectoris   • AV gunnar re-entry tachycardia (CMS/HCC)   • Bilateral renal masses   • Essential hypertension   • Umbilical hernia without obstruction or gangrene   • Dyslipidemia   • Stenosis of right carotid artery   • Benign prostatic hyperplasia with urinary obstruction   • Peripheral vascular disease of lower extremity (CMS/HCC)   • History of colon cancer   • Recurrent inguinal hernia without obstruction or gangrene         Current Outpatient Medications:   •  amitriptyline (ELAVIL) 10 MG tablet, Take 0.5 tablets by mouth At Night As Needed for Sleep., Disp: 30 tablet, Rfl: 0  •  aspirin 81 MG tablet, Take 81 mg by mouth Daily., Disp: , Rfl:   •  azelastine (ASTELIN) 0.1 % nasal spray, 1 spray into the nostril(s) as directed by provider 2 (Two) Times a Day As Needed for Rhinitis or Allergies. Use in each nostril as directed, Disp: 1 each, Rfl: 5  •  cholecalciferol (VITAMIN D3) 1000 units tablet, Take 2 tablets by mouth Daily., Disp: 30 tablet, Rfl: 2  •  coenzyme Q10 100 MG capsule, Take 300 mg by mouth Daily., Disp: , Rfl:   •  EPINEPHrine (EPIPEN) 0.3 MG/0.3ML solution auto-injector injection, , Disp: , Rfl:   •  flunisolide (NASALIDE) 25 MCG/ACT (0.025%) solution nasal spray, Inhale 1 spray Every 12 (Twelve) Hours., Disp: 1 bottle, Rfl: 5  •  furosemide (LASIX) 40 MG tablet, Take 1 tablet by mouth Daily As Needed (swelling, weight gain >10 lb)., Disp: 5 tablet, Rfl: 0  •  melatonin 1 MG tablet, Take 3 mg by mouth Every Night., Disp: , Rfl:   •  NON FORMULARY, Take 1 capsule by mouth Daily As Needed. ALISHA'S LEG CRAMP RELIEF, Disp: , Rfl:   •  pantoprazole (PROTONIX) 20 MG EC tablet, 1 po in the am 30 minutes before breakfast., Disp: 90 tablet, Rfl: 3  •  potassium chloride (K-DUR,KLOR-CON) 20 MEQ CR tablet, Take 1 tablet by mouth Daily., Disp: 90 tablet, Rfl: 3  •  rOPINIRole  "(REQUIP) 0.5 MG tablet, TAKE 1/2 TO 1 TAB PO QHS PRN RESTLESS LEG SYNDROME.Take 1 hour before bedtime., Disp: 90 tablet, Rfl: 0    Allergies   Allergen Reactions   • Pravastatin Myalgia   • Ciprofloxacin Diarrhea        reports that he quit smoking about 46 years ago. His smoking use included cigarettes. He started smoking about 56 years ago. He has a 10.00 pack-year smoking history. He has never used smokeless tobacco.    Family History   Problem Relation Age of Onset   • Lung cancer Mother    • Osteoporosis Mother    • Thyroid disease Mother         mother   • Cancer Mother         Lung Cancer   • Early death Mother         46 yrs.   • Hearing loss Mother         mother   • Vision loss Mother         mother   • Heart disease Mother    • Cancer Father         Prostate Cancer   • Heart disease Father         Pacemaker   • Vision loss Father    • Heart disease Sister    • Heart failure Brother    • Heart disease Brother    • Cancer Sister         Breast Cancer   • Vision loss Brother    • Heart disease Brother    • Arrhythmia Brother    • Heart failure Brother    • Early death Sister          Around 40yrs.   • Early death Maternal Grandmother         Took Mother off.   • Heart disease Maternal Grandmother          Objective:   Blood pressure 100/70, pulse 61, height 177.8 cm (70\"), weight 88.9 kg (196 lb), SpO2 95 %.  CONSTITUTIONAL: No acute distress, normal affect  RESPIRATORY: Normal effort. Clear to auscultation bilaterally without wheezing or rales  CARDIOVASCULAR: Carotids with normal upstrokes without bruits.  Regular rate and rhythm with normal S1 and S2. Without murmur, gallop or rub.  PERIPHERAL VASCULAR: Normal radial pulses bilaterally. There is no lower extremity edema bilaterally.    Labs:    Glucose   Date Value Ref Range Status   10/04/2018 110 (H) 74 - 98 mg/dL Final     BUN   Date Value Ref Range Status   2019 18 7 - 20 mg/dL Final   10/04/2018 11 7 - 20 mg/dL Final     Creatinine   Date " Value Ref Range Status   03/28/2019 1.00 0.60 - 1.30 mg/dL Final     Comment:     Serial Number: [id number]:  [id number]     Sodium   Date Value Ref Range Status   01/08/2019 141 137 - 145 mmol/L Final   10/04/2018 138 137 - 145 mmol/L Final     Potassium   Date Value Ref Range Status   01/08/2019 5.0 3.5 - 5.1 mmol/L Final   10/04/2018 4.3 3.5 - 5.1 mmol/L Final     Chloride   Date Value Ref Range Status   01/08/2019 105 98 - 107 mmol/L Final   10/04/2018 111 (H) 98 - 107 mmol/L Final     CO2   Date Value Ref Range Status   10/04/2018 23.0 (L) 26.0 - 30.0 mmol/L Final     Total CO2   Date Value Ref Range Status   01/08/2019 29.0 26.0 - 30.0 mmol/L Final     Calcium   Date Value Ref Range Status   01/08/2019 9.9 8.4 - 10.2 mg/dL Final   10/04/2018 8.5 8.4 - 10.2 mg/dL Final     Total Protein   Date Value Ref Range Status   09/22/2018 6.5 5.7 - 8.2 g/dL Final     Albumin   Date Value Ref Range Status   01/08/2019 4.20 3.50 - 5.00 g/dL Final   09/22/2018 4.31 3.20 - 4.80 g/dL Final     ALT (SGPT)   Date Value Ref Range Status   01/08/2019 34 13 - 69 U/L Final   09/22/2018 24 7 - 40 U/L Final     AST (SGOT)   Date Value Ref Range Status   01/08/2019 30 15 - 46 U/L Final   09/22/2018 33 0 - 33 U/L Final     Alkaline Phosphatase   Date Value Ref Range Status   01/08/2019 66 38 - 126 U/L Final   09/22/2018 51 25 - 100 U/L Final     Total Bilirubin   Date Value Ref Range Status   01/08/2019 0.7 0.2 - 1.3 mg/dL Final   09/22/2018 0.7 0.3 - 1.2 mg/dL Final     eGFR Non  Am   Date Value Ref Range Status   01/08/2019 73 >60 mL/min/1.73 Final     Comment:     GFR Normal >60, Chronic Kidney Disease <60, Kidney Failure  The MDRD GFR formula is only valid for adults with stable  renal function between ages 18 and 70.  <15       eGFR Non  Amer   Date Value Ref Range Status   10/04/2018 110 >60 mL/min/1.73 Final     A/G Ratio   Date Value Ref Range Status   01/08/2019 1.8 1.0 - 2.0 g/dL Final     BUN/Creatinine Ratio    Date Value Ref Range Status   01/08/2019 18.0 6.3 - 21.9 Final   10/04/2018 15.7 6.3 - 21.9 Final     Anion Gap   Date Value Ref Range Status   10/04/2018 8.3 (L) 10.0 - 20.0 mmol/L Final       No results found for: CHOL  Lab Results   Component Value Date    TRIG 107 01/08/2019    TRIG 121 03/14/2018    TRIG 59 11/10/2016     Lab Results   Component Value Date    HDL 39 (L) 01/08/2019    HDL 46 03/14/2018    HDL 47 11/10/2016     No components found for: LDLCALC  Lab Results   Component Value Date    VLDL 21.4 01/08/2019    VLDL 24.2 03/14/2018    VLDL 11.8 11/10/2016     No results found for: LDLHDL    Diagnostic Data:    Procedures    DEVICE INTERROGATION: [employer]ona pacemaker, Interrogation date 7/29/2019-   RA pacing 89%, RV pacing less than 1 %. P wave is 3.9 mV with a threshold of 0.75 V at 0.5 msec and an impedance of 440 ohms. R wave is 7.2 mV with a threshold of 1.25 V at 0.5 msec and an impedance of 640 ohms. Battery voltage is 3.4 years.  Less than 1% mode switching, longest 16 minutes.    DEVICE INTERROGATION:  [employer]ona PPM, Interrogation date 01/21/2019-   RA pacing 89%, RV pacing <1%. P wave is 1.1 mV with a threshold of 0.5 V at 0.5 msec and an impedance of 490 ohms. R wave is 6.6 mV with a threshold of 1.37 V at 0.5 msec and an impedance of 640 ohms. Battery voltage is 2.86V, 4.5-4.8 years. <1% AMS, longest 8 minutes, no atrial fibrillation. RA sensitivity increased to 0.3 mv. Decreased Atrial tach detection to 160 for AMS.     Carotid Duplex 9/2018  Moderate plaque in the right carotid bulb and proximal ICA producing a  50-69% stenosis.    [facility] 2004  - Minimal coronary artery disease and normal left ventricular function     TTE 9/2017  · Left ventricular systolic function is normal. Estimated EF = 60%.  · Left ventricular diastolic dysfunction (grade I a) consistent with impaired relaxation.  · Trace-to-mild aortic valve regurgitation is present  · Mild tricuspid valve regurgitation is  present    Assessment and Plan:   Saad was seen today for coronary artery disease.    Diagnoses and all orders for this visit:    AV gunnar re-entry tachycardia (CMS/HCC)  SSS (sick sinus syndrome) (CMS/HCC)  -Stable pacemaker, interrogated this visit.  Less than 1% mode switching with longest being 16 minutes.  EGM's printed out today appear to show atrial tachycardia with variable conduction  -Repeat interrogation 6 months    Coronary artery disease involving native coronary artery of native heart without angina pectoris, dyslipidemia  -No anginal symptoms.  -Myalgias noted with atorvastatin and pravastatin.  Patient reports trying a third agent with similar symptoms, but is unsure of the name.  -, 1/2019    Chronic diastolic congestive heart failure (CMS/HCC)  -EF 60% on 9/20/2017  -Continue Lasix as needed.    Essential hypertension  -Stable.  Not currently on antihypertensive medications.    Carotid Stenosis  -50-69% stenosis of the right carotid bulb and proximal ICA in 9/2018    - Return in about 6 months (around 1/29/2020).     Scribed for Atilio Wall MD by Soheila Rodgers, APRN   7/29/2019  9:14 AM    I, Atilio Wall MD, personally performed the services as scribed by the above named individual. I have made any necessary edits and it is both accurate and complete.     Atilio Wall MD, MSc, [facility]  Interventional Cardiology  [facility] at [facility]

## 2019-08-12 RX ORDER — EZETIMIBE 10 MG/1
10 TABLET ORAL DAILY
Qty: 90 TABLET | Refills: 3 | Status: SHIPPED | OUTPATIENT
Start: 2019-08-12 | End: 2019-12-19 | Stop reason: SDUPTHER

## 2019-08-27 ENCOUNTER — TELEPHONE (OUTPATIENT)
Dept: INTERNAL MEDICINE | Facility: CLINIC | Age: 74
End: 2019-08-27

## 2019-08-27 DIAGNOSIS — G47.00 INSOMNIA, UNSPECIFIED TYPE: Primary | ICD-10-CM

## 2019-08-30 RX ORDER — TEMAZEPAM 7.5 MG/1
7.5 CAPSULE ORAL NIGHTLY PRN
Qty: 30 CAPSULE | Refills: 0 | Status: SHIPPED | OUTPATIENT
Start: 2019-08-30 | End: 2019-09-05 | Stop reason: SDUPTHER

## 2019-09-05 DIAGNOSIS — G47.00 INSOMNIA, UNSPECIFIED TYPE: ICD-10-CM

## 2019-09-05 RX ORDER — TEMAZEPAM 7.5 MG/1
7.5 CAPSULE ORAL NIGHTLY PRN
Qty: 30 CAPSULE | Refills: 0 | Status: SHIPPED | OUTPATIENT
Start: 2019-09-05 | End: 2019-10-02

## 2019-09-09 ENCOUNTER — TELEPHONE (OUTPATIENT)
Dept: INTERNAL MEDICINE | Facility: CLINIC | Age: 74
End: 2019-09-09

## 2019-09-09 DIAGNOSIS — G47.00 INSOMNIA, UNSPECIFIED TYPE: Primary | ICD-10-CM

## 2019-09-09 NOTE — TELEPHONE ENCOUNTER
Pt wants to see if he can get trazodone instead of the amitriptyline.  Please call and let pt know if this can be done.

## 2019-09-10 RX ORDER — TRAZODONE HYDROCHLORIDE 50 MG/1
50 TABLET ORAL NIGHTLY PRN
Qty: 90 TABLET | Refills: 4 | Status: SHIPPED | OUTPATIENT
Start: 2019-09-10 | End: 2019-10-02 | Stop reason: SDUPTHER

## 2019-09-18 RX ORDER — FUROSEMIDE 40 MG/1
40 TABLET ORAL DAILY PRN
Qty: 90 TABLET | Refills: 3 | Status: SHIPPED | OUTPATIENT
Start: 2019-09-18 | End: 2019-09-24 | Stop reason: SDUPTHER

## 2019-09-24 RX ORDER — FUROSEMIDE 40 MG/1
40 TABLET ORAL DAILY PRN
Qty: 90 TABLET | Refills: 0 | Status: SHIPPED | OUTPATIENT
Start: 2019-09-24 | End: 2019-12-19 | Stop reason: SDUPTHER

## 2019-10-02 ENCOUNTER — OFFICE VISIT (OUTPATIENT)
Dept: INTERNAL MEDICINE | Facility: CLINIC | Age: 74
End: 2019-10-02

## 2019-10-02 VITALS
OXYGEN SATURATION: 98 % | BODY MASS INDEX: 28.13 KG/M2 | TEMPERATURE: 97 F | WEIGHT: 196.5 LBS | SYSTOLIC BLOOD PRESSURE: 100 MMHG | HEIGHT: 70 IN | DIASTOLIC BLOOD PRESSURE: 62 MMHG | HEART RATE: 62 BPM | RESPIRATION RATE: 16 BRPM

## 2019-10-02 DIAGNOSIS — G47.00 INSOMNIA, UNSPECIFIED TYPE: ICD-10-CM

## 2019-10-02 DIAGNOSIS — R22.1 NECK MASS: Primary | ICD-10-CM

## 2019-10-02 PROBLEM — N13.8 BENIGN PROSTATIC HYPERPLASIA WITH URINARY OBSTRUCTION: Status: RESOLVED | Noted: 2018-10-01 | Resolved: 2019-10-02

## 2019-10-02 PROBLEM — N40.1 BENIGN PROSTATIC HYPERPLASIA WITH URINARY OBSTRUCTION: Status: RESOLVED | Noted: 2018-10-01 | Resolved: 2019-10-02

## 2019-10-02 PROCEDURE — 99213 OFFICE O/P EST LOW 20 MIN: CPT | Performed by: FAMILY MEDICINE

## 2019-10-02 RX ORDER — TRAZODONE HYDROCHLORIDE 50 MG/1
TABLET ORAL
Qty: 180 TABLET | Refills: 3 | Status: SHIPPED | OUTPATIENT
Start: 2019-10-02 | End: 2020-05-21

## 2019-10-03 ENCOUNTER — APPOINTMENT (OUTPATIENT)
Dept: ULTRASOUND IMAGING | Facility: HOSPITAL | Age: 74
End: 2019-10-03

## 2019-10-05 NOTE — PROGRESS NOTES
"Subjective    Saad Meier is a 74 y.o. male here for:    Chief Complaint   Patient presents with   • Insomnia     f/u on sleep medication    • Edema     swollen lymph nodes on left side of neck x 6 months        Insomnia: chronic issue that occurs nightly, difficult to control. Has tried many medicines. Per pulmonology recommendations, no further Ambien suggested. Patient has tried and failed temazepam, elavil, doxepin, melatonin, Seroquel, belsomra. Currently taking trazodone and feels it helps some, though still limited on hours of sleep (up to 5).     Neck mass: new issue for last few days. Tender swollen area under left jaw. No fevers, no known illness. He's not had a similar issue in the past. Mouth mildly dry, no history of salivary gland stones.            The following portions of the patient's history were reviewed and updated as appropriate: allergies, current medications, past family history, past medical history, past social history, past surgical history and problem list.    Review of Systems   Constitutional: Positive for fatigue.   Musculoskeletal: Positive for arthralgias and neck pain.   Psychiatric/Behavioral: Positive for sleep disturbance.       Vitals:    10/02/19 1004   BP: 100/62   Pulse: 62   Resp: 16   Temp: 97 °F (36.1 °C)   TempSrc: Temporal   SpO2: 98%   Weight: 89.1 kg (196 lb 8 oz)   Height: 177.8 cm (70\")       Objective   Physical Exam   Constitutional: He is oriented to person, place, and time. Vital signs are normal. He appears well-developed and well-nourished. He is active.  Non-toxic appearance. He does not have a sickly appearance. He does not appear ill. No distress.   HENT:   Head: Normocephalic and atraumatic.   Right Ear: Hearing and external ear normal.   Left Ear: Hearing and external ear normal.   Nose: Nose normal.   Mouth/Throat: Oropharynx is clear and moist. Mucous membranes are not dry.   Eyes: Conjunctivae, EOM and lids are normal. No scleral icterus.   Neck: " Phonation normal. Neck supple. No tracheal deviation present.       Pulmonary/Chest: Effort normal.   Musculoskeletal: He exhibits no deformity.   Neurological: He is alert and oriented to person, place, and time. He displays no tremor. No cranial nerve deficit. He exhibits normal muscle tone. Gait normal.   Skin: Skin is warm. No rash noted. He is not diaphoretic. No cyanosis. No pallor. Nails show no clubbing.   Psychiatric: He has a normal mood and affect. His speech is normal and behavior is normal. Judgment and thought content normal. Cognition and memory are normal.   Nursing note and vitals reviewed.        Assessment/Plan     Problem List Items Addressed This Visit        Other    Insomnia (Chronic)    Relevant Medications    traZODone (DESYREL) 50 MG tablet      Other Visit Diagnoses     Neck mass    -  Primary    Relevant Orders    US Head Neck Soft Tissue          · Discussed options on neck lesion, monitor or image, he prefers latter.     Return in about 6 months (around 4/2/2020) for Medicare Wellness or sooner if needed. or sooner if needed.    Evelyn Muse MD

## 2019-10-16 DIAGNOSIS — R12 HEARTBURN: ICD-10-CM

## 2019-10-16 RX ORDER — PANTOPRAZOLE SODIUM 20 MG/1
TABLET, DELAYED RELEASE ORAL
Qty: 90 TABLET | Refills: 3 | Status: SHIPPED | OUTPATIENT
Start: 2019-10-16 | End: 2020-01-16 | Stop reason: SDUPTHER

## 2019-11-19 ENCOUNTER — OFFICE VISIT (OUTPATIENT)
Dept: INTERNAL MEDICINE | Facility: CLINIC | Age: 74
End: 2019-11-19

## 2019-11-19 ENCOUNTER — HOSPITAL ENCOUNTER (OUTPATIENT)
Dept: GENERAL RADIOLOGY | Facility: HOSPITAL | Age: 74
Discharge: HOME | End: 2019-11-19
Admitting: FAMILY MEDICINE

## 2019-11-19 VITALS
TEMPERATURE: 96.9 F | DIASTOLIC BLOOD PRESSURE: 78 MMHG | HEART RATE: 62 BPM | OXYGEN SATURATION: 99 % | HEIGHT: 70 IN | SYSTOLIC BLOOD PRESSURE: 122 MMHG | WEIGHT: 197.4 LBS | BODY MASS INDEX: 28.26 KG/M2 | RESPIRATION RATE: 14 BRPM

## 2019-11-19 DIAGNOSIS — M54.50 ACUTE MIDLINE LOW BACK PAIN WITHOUT SCIATICA: Primary | ICD-10-CM

## 2019-11-19 DIAGNOSIS — Z85.038 HISTORY OF COLON CANCER: ICD-10-CM

## 2019-11-19 PROCEDURE — 99213 OFFICE O/P EST LOW 20 MIN: CPT | Performed by: FAMILY MEDICINE

## 2019-11-19 PROCEDURE — 72100 X-RAY EXAM L-S SPINE 2/3 VWS: CPT

## 2019-11-19 PROCEDURE — 72072 X-RAY EXAM THORAC SPINE 3VWS: CPT

## 2019-11-19 RX ORDER — HYDROCODONE BITARTRATE AND ACETAMINOPHEN 5; 325 MG/1; MG/1
1 TABLET ORAL EVERY 8 HOURS PRN
Qty: 9 TABLET | Refills: 0 | Status: SHIPPED | OUTPATIENT
Start: 2019-11-19 | End: 2019-11-22

## 2019-11-19 RX ORDER — TIZANIDINE 2 MG/1
2 TABLET ORAL 2 TIMES DAILY PRN
Qty: 30 TABLET | Refills: 0 | Status: SHIPPED | OUTPATIENT
Start: 2019-11-19 | End: 2020-05-21

## 2019-11-19 NOTE — PROGRESS NOTES
"Subjective    Saad Meier is a 74 y.o. male here for:    Chief Complaint   Patient presents with   • Back Pain     pt states he hurt his back 10 days ago lifting an air conditioner and is still no better       Hurt back carrying a window unit AC. History of colon cancer 2012, he worries about not checking back soon enough and something being possibly missed.    Had hydrocodone from a past surgery and took and it helped some.      Back Pain   This is a new problem. Episode onset: 10 days ago. The problem occurs constantly. The pain is present in the lumbar spine and thoracic spine. The quality of the pain is described as aching. The pain does not radiate. The symptoms are aggravated by bending and twisting. Risk factors include history of cancer and sedentary lifestyle. He has tried analgesics for the symptoms. The treatment provided mild relief.          The following portions of the patient's history were reviewed and updated as appropriate: allergies, current medications, past family history, past medical history, past social history, past surgical history and problem list.    Review of Systems   Musculoskeletal: Positive for arthralgias, back pain and myalgias.       Visit Vitals  /78   Pulse 62   Temp 96.9 °F (36.1 °C) (Temporal)   Resp 14   Ht 177.8 cm (70\")   Wt 89.5 kg (197 lb 6.4 oz)   SpO2 99%   BMI 28.32 kg/m²         Objective   Physical Exam   Constitutional: He is oriented to person, place, and time. Vital signs are normal. He appears well-developed and well-nourished. He is active.  Non-toxic appearance. He does not have a sickly appearance. He does not appear ill. No distress.   HENT:   Head: Normocephalic and atraumatic.   Right Ear: Hearing normal.   Left Ear: Hearing normal.   Nose: Nose normal.   Mouth/Throat: Mucous membranes are not dry.   Eyes: EOM are normal. No scleral icterus.   Neck: Phonation normal. Neck supple.   Pulmonary/Chest: Effort normal.   Musculoskeletal:        " Thoracic back: He exhibits no tenderness.        Lumbar back: He exhibits no tenderness.   Neurological: He is alert and oriented to person, place, and time. He displays no tremor. No cranial nerve deficit. Gait (baseline, no change) abnormal.   Skin: Skin is warm. He is not diaphoretic. No cyanosis. No pallor.   Psychiatric: He has a normal mood and affect. His speech is normal and behavior is normal. Judgment and thought content normal. Cognition and memory are normal.   Nursing note and vitals reviewed.        Assessment/Plan     Problem List Items Addressed This Visit        Other    History of colon cancer    Overview     Added automatically from request for surgery [id number]         Relevant Orders    XR Spine Lumbar 2 or 3 View    XR Spine Thoracic 3 View      Other Visit Diagnoses     Acute midline low back pain without sciatica    -  Primary    Relevant Medications    HYDROcodone-acetaminophen (NORCO) 5-325 MG per tablet    tiZANidine (ZANAFLEX) 2 MG tablet    Other Relevant Orders    XR Spine Lumbar 2 or 3 View    XR Spine Thoracic 3 View          · Encouraged movement as tolerated, return to clinic if does not improve  · Sparingly use narcotic  · Watch for fatigue with muscle relaxer    Evelyn Muse MD

## 2019-11-21 ENCOUNTER — TELEPHONE (OUTPATIENT)
Dept: INTERNAL MEDICINE | Facility: CLINIC | Age: 74
End: 2019-11-21

## 2019-11-21 NOTE — TELEPHONE ENCOUNTER
Reports not signed off on by radiology so they'd not yet been sent to me. I looked at the reports they have as preliminary, shows some weakened bones (osteopenia) and arthric changes but no signs of something bad like cancer and no breaks seen. Sorry for the delay in getting him an answer (I'm not sure what's going on with our reads right now, his is not the only one that has been delayed.)

## 2019-11-21 NOTE — TELEPHONE ENCOUNTER
PT left a voicemail at 12:00 pm stating that he had received a call from the office and is requesting a call back.

## 2019-12-10 ENCOUNTER — TELEPHONE (OUTPATIENT)
Dept: INTERNAL MEDICINE | Facility: CLINIC | Age: 74
End: 2019-12-10

## 2019-12-10 DIAGNOSIS — M54.50 ACUTE MIDLINE LOW BACK PAIN WITHOUT SCIATICA: Primary | ICD-10-CM

## 2019-12-10 NOTE — TELEPHONE ENCOUNTER
Called patient to let him know Dr. Muse has put in an order for a referral to a back specialist. However, the patient did not answer and his voicemail has not been set up.

## 2019-12-10 NOTE — TELEPHONE ENCOUNTER
Patient came into office today stating that he would like a referral to see a back specialist. States he is not any better since last visit and is unable to lay in bed to sleep at night, he has to sleep sitting up.

## 2019-12-19 RX ORDER — FUROSEMIDE 40 MG/1
40 TABLET ORAL DAILY PRN
Qty: 90 TABLET | Refills: 0 | Status: SHIPPED | OUTPATIENT
Start: 2019-12-19 | End: 2020-09-08 | Stop reason: SDUPTHER

## 2019-12-19 RX ORDER — EZETIMIBE 10 MG/1
10 TABLET ORAL DAILY
Qty: 90 TABLET | Refills: 3 | Status: SHIPPED | OUTPATIENT
Start: 2019-12-19 | End: 2020-04-20 | Stop reason: SDUPTHER

## 2020-01-16 ENCOUNTER — TELEPHONE (OUTPATIENT)
Dept: INTERNAL MEDICINE | Facility: CLINIC | Age: 75
End: 2020-01-16

## 2020-01-16 DIAGNOSIS — R12 HEARTBURN: ICD-10-CM

## 2020-01-16 RX ORDER — POTASSIUM CHLORIDE 20 MEQ/1
20 TABLET, EXTENDED RELEASE ORAL DAILY
Qty: 90 TABLET | Refills: 3 | Status: SHIPPED | OUTPATIENT
Start: 2020-01-16 | End: 2020-01-16 | Stop reason: SDUPTHER

## 2020-01-16 RX ORDER — POTASSIUM CHLORIDE 20 MEQ/1
20 TABLET, EXTENDED RELEASE ORAL DAILY
Qty: 90 TABLET | Refills: 3 | Status: SHIPPED | OUTPATIENT
Start: 2020-01-16 | End: 2021-11-03 | Stop reason: SDUPTHER

## 2020-01-16 RX ORDER — PANTOPRAZOLE SODIUM 20 MG/1
TABLET, DELAYED RELEASE ORAL
Qty: 90 TABLET | Refills: 3 | Status: SHIPPED | OUTPATIENT
Start: 2020-01-16 | End: 2021-01-04 | Stop reason: SDUPTHER

## 2020-01-16 NOTE — TELEPHONE ENCOUNTER
Patient called needing a refill on    potassium chloride (K-DUR,KLOR-CON) 20 MEQ CR tablet    pantoprazole (PROTONIX) 20 MG EC tablet    [facility] - [place], [place] - [address] AT Portal to Registered [facility] - [phone] [facility] [phone] FX     Please advise    Saad: [phone]

## 2020-02-13 ENCOUNTER — TELEPHONE (OUTPATIENT)
Dept: INTERNAL MEDICINE | Facility: CLINIC | Age: 75
End: 2020-02-13

## 2020-02-19 ENCOUNTER — LAB (OUTPATIENT)
Dept: LAB | Facility: HOSPITAL | Age: 75
End: 2020-02-19

## 2020-02-19 ENCOUNTER — TRANSCRIBE ORDERS (OUTPATIENT)
Dept: LAB | Facility: HOSPITAL | Age: 75
End: 2020-02-19

## 2020-02-19 DIAGNOSIS — C18.9 MALIGNANT NEOPLASM OF COLON, UNSPECIFIED PART OF COLON (HCC): ICD-10-CM

## 2020-02-19 DIAGNOSIS — C18.9 MALIGNANT NEOPLASM OF COLON, UNSPECIFIED PART OF COLON (HCC): Primary | ICD-10-CM

## 2020-02-19 LAB
BASOPHILS # BLD MANUAL: 0.14 10*3/MM3 (ref 0–0.2)
BASOPHILS NFR BLD AUTO: 2 % (ref 0–1.5)
DEPRECATED RDW RBC AUTO: 47.6 FL (ref 37–54)
EOSINOPHIL # BLD MANUAL: 0.57 10*3/MM3 (ref 0–0.4)
EOSINOPHIL NFR BLD MANUAL: 8 % (ref 0.3–6.2)
ERYTHROCYTE [DISTWIDTH] IN BLOOD BY AUTOMATED COUNT: 13 % (ref 12.3–15.4)
HCT VFR BLD AUTO: 42.9 % (ref 37.5–51)
HGB BLD-MCNC: 14.2 G/DL (ref 13–17.7)
IRON 24H UR-MRATE: 134 MCG/DL (ref 59–158)
IRON SATN MFR SERPL: 29 % (ref 20–50)
LYMPHOCYTES # BLD MANUAL: 1.57 10*3/MM3 (ref 0.7–3.1)
LYMPHOCYTES NFR BLD MANUAL: 22 % (ref 19.6–45.3)
LYMPHOCYTES NFR BLD MANUAL: 9 % (ref 5–12)
MCH RBC QN AUTO: 32.9 PG (ref 26.6–33)
MCHC RBC AUTO-ENTMCNC: 33.1 G/DL (ref 31.5–35.7)
MCV RBC AUTO: 99.3 FL (ref 79–97)
MONOCYTES # BLD AUTO: 0.64 10*3/MM3 (ref 0.1–0.9)
NEUTROPHILS # BLD AUTO: 4.06 10*3/MM3 (ref 1.7–7)
NEUTROPHILS NFR BLD MANUAL: 57 % (ref 42.7–76)
PLATELET # BLD AUTO: 301 10*3/MM3 (ref 140–450)
PMV BLD AUTO: 10.2 FL (ref 6–12)
RBC # BLD AUTO: 4.32 10*6/MM3 (ref 4.14–5.8)
RBC MORPH BLD: NORMAL
SMALL PLATELETS BLD QL SMEAR: ADEQUATE
TIBC SERPL-MCNC: 468 MCG/DL (ref 298–536)
TRANSFERRIN SERPL-MCNC: 314 MG/DL (ref 200–360)
VARIANT LYMPHS NFR BLD MANUAL: 2 % (ref 0–5)
WBC MORPH BLD: NORMAL
WBC NRBC COR # BLD: 7.12 10*3/MM3 (ref 3.4–10.8)

## 2020-02-19 PROCEDURE — 85027 COMPLETE CBC AUTOMATED: CPT

## 2020-02-19 PROCEDURE — 85007 BL SMEAR W/DIFF WBC COUNT: CPT

## 2020-02-19 PROCEDURE — 84466 ASSAY OF TRANSFERRIN: CPT

## 2020-02-19 PROCEDURE — 83540 ASSAY OF IRON: CPT

## 2020-02-19 PROCEDURE — 36415 COLL VENOUS BLD VENIPUNCTURE: CPT

## 2020-03-09 ENCOUNTER — APPOINTMENT (OUTPATIENT)
Dept: LAB | Facility: HOSPITAL | Age: 75
End: 2020-03-09

## 2020-03-09 ENCOUNTER — OFFICE VISIT (OUTPATIENT)
Dept: CARDIOLOGY | Facility: CLINIC | Age: 75
End: 2020-03-09

## 2020-03-09 VITALS
HEART RATE: 67 BPM | BODY MASS INDEX: 30.36 KG/M2 | WEIGHT: 205 LBS | HEIGHT: 69 IN | DIASTOLIC BLOOD PRESSURE: 64 MMHG | SYSTOLIC BLOOD PRESSURE: 98 MMHG | OXYGEN SATURATION: 98 %

## 2020-03-09 DIAGNOSIS — I10 ESSENTIAL HYPERTENSION: ICD-10-CM

## 2020-03-09 DIAGNOSIS — I25.10 CORONARY ARTERY DISEASE INVOLVING NATIVE CORONARY ARTERY OF NATIVE HEART WITHOUT ANGINA PECTORIS: Primary | ICD-10-CM

## 2020-03-09 DIAGNOSIS — I47.1 AV NODAL RE-ENTRY TACHYCARDIA (HCC): ICD-10-CM

## 2020-03-09 DIAGNOSIS — I50.32 CHRONIC DIASTOLIC CONGESTIVE HEART FAILURE (HCC): ICD-10-CM

## 2020-03-09 DIAGNOSIS — E78.5 DYSLIPIDEMIA: ICD-10-CM

## 2020-03-09 DIAGNOSIS — I49.5 SSS (SICK SINUS SYNDROME) (HCC): ICD-10-CM

## 2020-03-09 PROCEDURE — 99214 OFFICE O/P EST MOD 30 MIN: CPT | Performed by: NURSE PRACTITIONER

## 2020-03-09 PROCEDURE — 93280 PM DEVICE PROGR EVAL DUAL: CPT | Performed by: NURSE PRACTITIONER

## 2020-03-09 RX ORDER — IRBESARTAN 300 MG/1
300 TABLET ORAL AS NEEDED
COMMUNITY
End: 2021-02-26 | Stop reason: SDUPTHER

## 2020-03-09 NOTE — PROGRESS NOTES
[facility] AT [address], [address]  [place], [place], [address]    [phone]  [email or web address] HospitalInboxQ[facility]           OUTPATIENT CLINIC FOLLOW UP NOTE    Patient Care Team:  Patient Care Team:  Evelyn Muse MD as PCP - General (Family Medicine)  Atilio Wall MD as Consulting Physician (Cardiology)  Luis Hollins MD as Consulting Physician (Urology)  Liddle, Susan E., MD as Consulting Physician (Hematology and Oncology)  Gloria Garcia MD as Consulting Physician (General Surgery)  Elvie Alvarez MD as Surgeon (General Surgery)  Jacky Walker MD as Consulting Physician (General Surgery)    Subjective:      Chief Complaint   Patient presents with   • AV gunnar re-entry tachycardia     f/u     HPI:    Saad Meier is a 74 y.o. male.  Cardiac problem list:  1. Sick sinus syndrome status post [place] PPM 2004  2. Minimal CAD and normal LVEF by cardiac catheterization 2004  3. 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 Atrial tachycardia  5. Carotid stenosis  6. Colon cancer    The patient presents today for follow-up.  Since patient was last seen he reports he has been doing well from a cardiac standpoint.  He denies any chest pain, shortness of breath, palpitations, lightheadedness, or syncope.  He has mild intermittent lower extremity edema that is controlled with as needed Lasix.  He only occasionally takes irbesartan.    Review of Systems:  Positive for lower extremity edema  Negative for exertional chest pain, dyspnea with exertion, orthopnea, PND, palpitations, lightheadedness, syncope.    PFSH:  Patient Active Problem List   Diagnosis   • Arthritis   • Chronic diastolic congestive heart failure (CMS/HCC)   • Acid reflux   • Chronic nausea   • Juvenile rheumatic fever   • Hernia, inguinal, right   • Ventral hernia without obstruction or gangrene   • Insomnia   • Sick sinus syndrome (CMS/HCC)   • Immunodeficiency (CMS/HCC)   • Coronary artery disease  involving native coronary artery of native heart without angina pectoris   • AV gunnar re-entry tachycardia (CMS/HCC)   • Bilateral renal masses   • Essential hypertension   • Umbilical hernia without obstruction or gangrene   • Dyslipidemia   • Stenosis of right carotid artery   • Peripheral vascular disease of lower extremity (CMS/HCC)   • History of colon cancer   • Recurrent inguinal hernia without obstruction or gangrene   • Lower urinary tract symptoms due to benign prostatic hyperplasia         Current Outpatient Medications:   •  aspirin 81 MG tablet, Take 1 tablet by mouth Daily., Disp: 90 tablet, Rfl: 3  •  azelastine (ASTELIN) 0.1 % nasal spray, 1 spray into the nostril(s) as directed by provider 2 (Two) Times a Day As Needed for Rhinitis or Allergies. Use in each nostril as directed, Disp: 1 each, Rfl: 5  •  cholecalciferol (VITAMIN D3) 1000 units tablet, Take 2 tablets by mouth Daily., Disp: 30 tablet, Rfl: 2  •  coenzyme Q10 100 MG capsule, Take 300 mg by mouth Daily., Disp: , Rfl:   •  EPINEPHrine (EPIPEN) 0.3 MG/0.3ML solution auto-injector injection, , Disp: , Rfl:   •  ezetimibe (ZETIA) 10 MG tablet, Take 1 tablet by mouth Daily., Disp: 90 tablet, Rfl: 3  •  flunisolide (NASALIDE) 25 MCG/ACT (0.025%) solution nasal spray, Inhale 1 spray Every 12 (Twelve) Hours., Disp: 1 bottle, Rfl: 5  •  furosemide (LASIX) 40 MG tablet, Take 1 tablet by mouth Daily As Needed (swelling, weight gain >10 lb)., Disp: 90 tablet, Rfl: 0  •  irbesartan (AVAPRO) 300 MG tablet, Take 300 mg by mouth As Needed., Disp: , Rfl:   •  melatonin 1 MG tablet, Take 3 mg by mouth Every Night., Disp: , Rfl:   •  pantoprazole (PROTONIX) 20 MG EC tablet, 1 po in the am 30 minutes before breakfast., Disp: 90 tablet, Rfl: 3  •  potassium chloride (K-DUR,KLOR-CON) 20 MEQ CR tablet, Take 1 tablet by mouth Daily., Disp: 90 tablet, Rfl: 3  •  rOPINIRole (REQUIP) 0.5 MG tablet, TAKE 1/2 TO 1 TAB PO QHS PRN RESTLESS LEG SYNDROME.Take 1 hour  "before bedtime., Disp: 90 tablet, Rfl: 0  •  tiZANidine (ZANAFLEX) 2 MG tablet, Take 1 tablet by mouth 2 (Two) Times a Day As Needed for Muscle Spasms., Disp: 30 tablet, Rfl: 0  •  traZODone (DESYREL) 50 MG tablet, TAKE 1-2 TAB PO QHS PRN SLEEP, Disp: 180 tablet, Rfl: 3    Allergies   Allergen Reactions   • Pravastatin Myalgia   • Ciprofloxacin Diarrhea        reports that he quit smoking about 47 years ago. His smoking use included cigarettes. He started smoking about 56 years ago. He has a 10.00 pack-year smoking history. He has never used smokeless tobacco.    Family History   Problem Relation Age of Onset   • Lung cancer Mother    • Osteoporosis Mother    • Thyroid disease Mother         mother   • Cancer Mother         Lung Cancer   • Early death Mother         46 yrs.   • Hearing loss Mother         mother   • Vision loss Mother         mother   • Heart disease Mother    • Cancer Father         Prostate Cancer   • Heart disease Father         Pacemaker   • Vision loss Father    • Heart disease Sister    • Heart failure Brother    • Heart disease Brother    • Cancer Sister         Breast Cancer   • Vision loss Brother    • Heart disease Brother    • Arrhythmia Brother    • Heart failure Brother    • Early death Sister          Around 40yrs.   • Early death Maternal Grandmother         Took Mother off.   • Heart disease Maternal Grandmother          Objective:   Blood pressure 98/64, pulse 67, height 175.3 cm (69\"), weight 93 kg (205 lb), SpO2 98 %.  CONSTITUTIONAL: No acute distress, normal affect  RESPIRATORY: Normal effort. Clear to auscultation bilaterally without wheezing or rales.  CARDIOVASCULAR:Regular rate and rhythm with normal S1 and S2. Without murmur, gallop or rub.  PERIPHERAL VASCULAR: Normal radial pulses bilaterally. There is mild left greater than right lower extremity edema bilaterally.    Labs:    Glucose   Date Value Ref Range Status   10/04/2018 110 (H) 74 - 98 mg/dL Final     BUN "   Date Value Ref Range Status   01/08/2019 18 7 - 20 mg/dL Final   10/04/2018 11 7 - 20 mg/dL Final     Creatinine   Date Value Ref Range Status   03/28/2019 1.00 0.60 - 1.30 mg/dL Final     Comment:     Serial Number: [id number]:  [id number]     Sodium   Date Value Ref Range Status   01/08/2019 141 137 - 145 mmol/L Final   10/04/2018 138 137 - 145 mmol/L Final     Potassium   Date Value Ref Range Status   01/08/2019 5.0 3.5 - 5.1 mmol/L Final   10/04/2018 4.3 3.5 - 5.1 mmol/L Final     Chloride   Date Value Ref Range Status   01/08/2019 105 98 - 107 mmol/L Final   10/04/2018 111 (H) 98 - 107 mmol/L Final     CO2   Date Value Ref Range Status   10/04/2018 23.0 (L) 26.0 - 30.0 mmol/L Final     Total CO2   Date Value Ref Range Status   01/08/2019 29.0 26.0 - 30.0 mmol/L Final     Calcium   Date Value Ref Range Status   01/08/2019 9.9 8.4 - 10.2 mg/dL Final   10/04/2018 8.5 8.4 - 10.2 mg/dL Final     Total Protein   Date Value Ref Range Status   09/22/2018 6.5 5.7 - 8.2 g/dL Final     Albumin   Date Value Ref Range Status   01/08/2019 4.20 3.50 - 5.00 g/dL Final   09/22/2018 4.31 3.20 - 4.80 g/dL Final     ALT (SGPT)   Date Value Ref Range Status   01/08/2019 34 13 - 69 U/L Final   09/22/2018 24 7 - 40 U/L Final     AST (SGOT)   Date Value Ref Range Status   01/08/2019 30 15 - 46 U/L Final   09/22/2018 33 0 - 33 U/L Final     Alkaline Phosphatase   Date Value Ref Range Status   01/08/2019 66 38 - 126 U/L Final   09/22/2018 51 25 - 100 U/L Final     Total Bilirubin   Date Value Ref Range Status   01/08/2019 0.7 0.2 - 1.3 mg/dL Final   09/22/2018 0.7 0.3 - 1.2 mg/dL Final     eGFR Non  Am   Date Value Ref Range Status   01/08/2019 73 >60 mL/min/1.73 Final     Comment:     GFR Normal >60, Chronic Kidney Disease <60, Kidney Failure  The MDRD GFR formula is only valid for adults with stable  renal function between ages 18 and 70.  <15       eGFR Non  Amer   Date Value Ref Range Status   10/04/2018 110 >60  mL/min/1.73 Final     A/G Ratio   Date Value Ref Range Status   01/08/2019 1.8 1.0 - 2.0 g/dL Final     BUN/Creatinine Ratio   Date Value Ref Range Status   01/08/2019 18.0 6.3 - 21.9 Final   10/04/2018 15.7 6.3 - 21.9 Final     Anion Gap   Date Value Ref Range Status   10/04/2018 8.3 (L) 10.0 - 20.0 mmol/L Final       No results found for: CHOL  Lab Results   Component Value Date    TRIG 107 01/08/2019    TRIG 121 03/14/2018    TRIG 59 11/10/2016     Lab Results   Component Value Date    HDL 39 (L) 01/08/2019    HDL 46 03/14/2018    HDL 47 11/10/2016     No components found for: LDLCALC  Lab Results   Component Value Date    VLDL 21.4 01/08/2019    VLDL 24.2 03/14/2018    VLDL 11.8 11/10/2016     No results found for: LDLHDL    Diagnostic Data:    Procedures    DEVICE INTERROGATION: [place] PPM, Interrogation date 3/9/2020-   RA pacing 91 %, RV pacing 2.7 %. P wave is 0.8 mV with a threshold of 0.8 V at 0.5 msec and an impedance of 480 ohms. R wave is 6.3 mV with a threshold of 1.25 V at 0.5 msec and an impedance of 640 ohms. Battery voltage is 2.78 V.<1% mode switching with the maximum duration being 16 minutes in the maximum rate being 131.    DEVICE INTERROGATION:  [place] PPM, Interrogation date 01/21/2019-   RA pacing 89%, RV pacing <1%. P wave is 1.1 mV with a threshold of 0.5 V at 0.5 msec and an impedance of 490 ohms. R wave is 6.6 mV with a threshold of 1.37 V at 0.5 msec and an impedance of 640 ohms. Battery voltage is 2.86V, 4.5-4.8 years. <1% AMS, longest 8 minutes, no atrial fibrillation. RA sensitivity increased to 0.3 mv. Decreased Atrial tach detection to 160 for AMS.     Carotid Duplex 9/2018  Moderate plaque in the right carotid bulb and proximal ICA producing a  50-69% stenosis.    [facility] 2004  - Minimal coronary artery disease and normal left ventricular function     TTE 9/2017  · Left ventricular systolic function is normal. Estimated EF = 60%.  · Left ventricular diastolic dysfunction (grade  I a) consistent with impaired relaxation.  · Trace-to-mild aortic valve regurgitation is present  · Mild tricuspid valve regurgitation is present    Assessment and Plan:   Saad was seen today for coronary artery disease.    Diagnoses and all orders for this visit:    AV gunnar re-entry tachycardia (CMS/HCC)  SSS (sick sinus syndrome) (CMS/HCC)  Atrial tachycardia  -Stable pacemaker, interrogated this visit.  Less than 1% mode switching with longest being 16 minutes.  EGM's printed out today appear to show atrial tachycardia with variable conduction  -Repeat interrogation 6 months    Coronary artery disease involving native coronary artery of native heart without angina pectoris, dyslipidemia  -No anginal symptoms.  -Myalgias noted with atorvastatin and pravastatin.  Patient reports trying a third agent with similar symptoms, but is unsure of the name.  -, 1/2019  -Repeat lipid panel and LFTs when fasting.    Chronic diastolic congestive heart failure (CMS/HCC)  -EF 60% on 9/20/2017  -Continue Lasix as needed.  -Repeat CMP and ProBNP today.    Essential hypertension  -Stable.  Rarely taking irbesartan    Carotid Stenosis  -50-69% stenosis of the right carotid bulb and proximal ICA in 9/2018    - Return in about 6 months (around 9/9/2020).     Soheila Rodgers, JUSTA  03/09/20 1:05 PM

## 2020-03-14 ENCOUNTER — RESULTS ENCOUNTER (OUTPATIENT)
Dept: CARDIOLOGY | Facility: CLINIC | Age: 75
End: 2020-03-14

## 2020-03-14 DIAGNOSIS — E78.5 DYSLIPIDEMIA: ICD-10-CM

## 2020-03-16 ENCOUNTER — TELEPHONE (OUTPATIENT)
Dept: INTERNAL MEDICINE | Facility: CLINIC | Age: 75
End: 2020-03-16

## 2020-03-16 DIAGNOSIS — G47.61 PERIODIC LIMB MOVEMENT SLEEP DISORDER: ICD-10-CM

## 2020-03-16 RX ORDER — ROPINIROLE 0.5 MG/1
TABLET, FILM COATED ORAL
Qty: 90 TABLET | Refills: 0 | Status: SHIPPED | OUTPATIENT
Start: 2020-03-16 | End: 2020-04-03 | Stop reason: SDUPTHER

## 2020-03-16 NOTE — TELEPHONE ENCOUNTER
Sent prescription of ropinrole in for patient and informed a CMP had already been ordered to be drawn by Dr. Muse which would check his kidney function.

## 2020-03-16 NOTE — TELEPHONE ENCOUNTER
Pt called and stated that he recently saw his cardiologist, Dr. Atilio Wall ([phone]), and the patient was told that he needs to have a kidney function test after he told Dr. Wall the amount of water he passes daily.  Pt stated that he takes a water pill daily. Dr. Wall suggested that the kidney function test be completed by the patients PCP.     Pt callback: [phone]     Please advise

## 2020-03-18 LAB
CHOLEST SERPL-MCNC: 202 MG/DL (ref 0–200)
HDLC SERPL-MCNC: 41 MG/DL (ref 40–60)
LDLC SERPL CALC-MCNC: 143 MG/DL (ref 0–100)
TRIGL SERPL-MCNC: 88 MG/DL (ref 0–150)
VLDLC SERPL CALC-MCNC: 17.6 MG/DL

## 2020-03-19 ENCOUNTER — TELEPHONE (OUTPATIENT)
Dept: CARDIOLOGY | Facility: CLINIC | Age: 75
End: 2020-03-19

## 2020-03-19 NOTE — TELEPHONE ENCOUNTER
----- Message from JUSTA Zaldivar sent at 3/18/2020  1:27 PM EDT -----  Can you let the patient know his bad cholesterol was a little higher this time when compared to last year.  He should continue to work on his diet.

## 2020-04-03 DIAGNOSIS — G47.61 PERIODIC LIMB MOVEMENT SLEEP DISORDER: ICD-10-CM

## 2020-04-03 RX ORDER — ROPINIROLE 0.5 MG/1
TABLET, FILM COATED ORAL
Qty: 90 TABLET | Refills: 0 | Status: SHIPPED | OUTPATIENT
Start: 2020-04-03 | End: 2020-04-20 | Stop reason: SDUPTHER

## 2020-04-20 ENCOUNTER — TELEPHONE (OUTPATIENT)
Dept: INTERNAL MEDICINE | Facility: CLINIC | Age: 75
End: 2020-04-20

## 2020-04-20 DIAGNOSIS — G47.61 PERIODIC LIMB MOVEMENT SLEEP DISORDER: ICD-10-CM

## 2020-04-20 RX ORDER — ROPINIROLE 0.5 MG/1
TABLET, FILM COATED ORAL
Qty: 90 TABLET | Refills: 0 | Status: SHIPPED | OUTPATIENT
Start: 2020-04-20 | End: 2020-05-05 | Stop reason: SDUPTHER

## 2020-04-20 RX ORDER — EZETIMIBE 10 MG/1
10 TABLET ORAL DAILY
Qty: 90 TABLET | Refills: 3 | Status: SHIPPED | OUTPATIENT
Start: 2020-04-20 | End: 2020-05-05 | Stop reason: SDUPTHER

## 2020-04-20 NOTE — TELEPHONE ENCOUNTER
Pt called to request a prescription of     rOPINIRole (Requip) 0.5 MG tablet    ezetimibe (ZETIA) 10 MG tablet        Pt stated that he would call [facility] to see if it's there. If order was not sent there please send to     [facility]  PH: [phone]  FAX: [phone]   no

## 2020-04-23 ENCOUNTER — OFFICE VISIT (OUTPATIENT)
Dept: INTERNAL MEDICINE | Facility: CLINIC | Age: 75
End: 2020-04-23

## 2020-04-23 VITALS
WEIGHT: 200 LBS | OXYGEN SATURATION: 97 % | HEART RATE: 65 BPM | HEIGHT: 69 IN | DIASTOLIC BLOOD PRESSURE: 92 MMHG | BODY MASS INDEX: 29.62 KG/M2 | RESPIRATION RATE: 15 BRPM | SYSTOLIC BLOOD PRESSURE: 138 MMHG

## 2020-04-23 DIAGNOSIS — S05.8X1A ABRASION OF RIGHT EYE, INITIAL ENCOUNTER: Primary | ICD-10-CM

## 2020-04-23 DIAGNOSIS — I10 ESSENTIAL HYPERTENSION: ICD-10-CM

## 2020-04-23 PROCEDURE — 99214 OFFICE O/P EST MOD 30 MIN: CPT | Performed by: NURSE PRACTITIONER

## 2020-04-23 NOTE — PROGRESS NOTES
"  Chief Complaint / Reason:      Chief Complaint   Patient presents with   • Eye Pain     rubbed eye and got something in it       Subjective     HPI  Patient states that he has been having eye pain since yesterday he states that he was rubbing his eye around 4 PM and tried rinsing it out.  He denies it feeling gritty or denies any vision changes but states that he had has had some burning sensation and pain.  Denies any abnormal discharge.  Patient does have seasonal allergies.  Vital signs are stable with exception of elevated blood pressure.  He denies chest pain, shortness of breath or heart palpitations.  History taken from: patient    PMH/FH/Social History were reviewed and updated appropriately in the electronic medical record.   Past Medical History:   Diagnosis Date   • Allergic 25yrs.    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   • BPH (benign prostatic hypertrophy)    • CAD (coronary artery disease) 3/6/2017   • Cancer (CMS/HCC) July 2012    colon   • Cardiac arrhythmia    • Chronic diastolic congestive heart failure (CMS/HCC)    • Colon polyp    • Diverticulosis    • Essential hypertension 3/14/2018   • GERD (gastroesophageal reflux disease)    • History of blood transfusion    • History of colonoscopy 2015    Complete   • History of echocardiogram    • History of esophagogastroduodenoscopy 2015    Diagnostic   • History of Holter monitoring    • History of stress test     PT UNSURE OF DATE OR TYPE    • History of stress test     PT STATES \"LONG TIME AGO BUT IT WAS OK\"   • HL (hearing loss)    • Hyperlipidemia    • Infection of kidney    • Iron deficiency    • Obesity    • Poor historian    • Sleep apnea    • Stenosis of right carotid artery    • Visual impairment !(97    Reading Glasses     Past Surgical History:   Procedure Laterality Date   • CARDIAC ABLATION  03/2012   • CARDIAC " CATHETERIZATION     • CARDIAC PACEMAKER PLACEMENT     • COLON SURGERY     • COLONOSCOPY  2015   • COLONOSCOPY N/A 2019    Procedure: COLONOSCOPY W/ HOT BIOPSY POLYPECTOMY X3;  Surgeon: Jacky Walker MD;  Location: [facility] ENDOSCOPY;  Service: Gastroenterology   • CYSTOSCOPY TRANSURETHRAL RESECTION OF PROSTATE N/A 10/3/2018    Procedure: TRANSURETHRAL RESECTION OF PROSTATE;  Surgeon: Bryce Santo MD;  Location: [facility] OR;  Service: Urology   • ENDOSCOPY     • HEMORRHOIDECTOMY  1970   • HERNIA REPAIR      X 5   • INGUINAL HERNIA REPAIR Bilateral    • INGUINAL HERNIA REPAIR Right 2019    Procedure: INGUINAL HERNIA REPAIR;  Surgeon: Jacky Walker MD;  Location: [facility] OR;  Service: General   • LYMPH NODE BIOPSY  2012    large intestine lymph nodes   • TRANSURETHRAL RESECTION OF BLADDER TUMOR N/A 10/3/2018    Procedure: CYSTOSCOPY TRANSURETHRAL RESECTION OF BLADDER TUMOR, COUDE CATHETER PLACEMENT;  Surgeon: Bryce Santo MD;  Location: [facility] OR;  Service: Urology     Social History     Socioeconomic History   • Marital status: Single     Spouse name: Not on file   • Number of children: Not on file   • Years of education: Not on file   • Highest education level: Not on file   Tobacco Use   • Smoking status: Former Smoker     Packs/day: 1.00     Years: 10.00     Pack years: 10.00     Types: Cigarettes     Start date: 1963     Last attempt to quit: 1973     Years since quittin.3   • Smokeless tobacco: Never Used   Substance and Sexual Activity   • Alcohol use: No   • Drug use: No   • Sexual activity: Not Currently     Partners: Female   Social History Narrative    Caffeine: 2 servings per day    Patient lives at his home alone     Family History   Problem Relation Age of Onset   • Lung cancer Mother    • Osteoporosis Mother    • Thyroid disease Mother         mother   • Cancer Mother         Lung Cancer   • Early death Mother         46 yrs.   • Hearing loss  Mother         mother   • Vision loss Mother         mother   • Heart disease Mother    • Cancer Father         Prostate Cancer   • Heart disease Father         Pacemaker   • Vision loss Father    • Heart disease Sister    • Heart failure Brother    • Heart disease Brother    • Cancer Sister         Breast Cancer   • Vision loss Brother    • Heart disease Brother    • Arrhythmia Brother    • Heart failure Brother    • Early death Sister          Around 40yrs.   • Early death Maternal Grandmother         Took Mother off.   • Heart disease Maternal Grandmother        Review of Systems:   Review of Systems   Constitutional: Positive for fatigue.   Eyes: Positive for pain, redness and itching.   Respiratory: Negative.    Cardiovascular: Negative.    Allergic/Immunologic: Positive for environmental allergies.   Neurological: Negative.    Psychiatric/Behavioral: Negative.          All other systems were reviewed and are negative.  Exceptions are noted in the subjective or above.      Objective     Vital Signs  Vitals:    20 0908   BP: 138/92   Pulse: 65   Resp: 15   SpO2: 97%       Body mass index is 29.53 kg/m².    Physical Exam   Constitutional: He is oriented to person, place, and time. He appears well-developed and well-nourished. No distress.   HENT:   Head: Normocephalic.   Right Ear: Decreased hearing is noted.   Left Ear: Decreased hearing is noted.   Hearing aids present   Eyes: Pupils are equal, round, and reactive to light. EOM and lids are normal.   Slit lamp exam:       The right eye shows fluorescein uptake. The right eye shows no foreign body.   Bilateral eyes dry and reddened.  Unable to identify foreign body but small amount of substance was flushed out.   Cardiovascular: Normal rate, regular rhythm and normal heart sounds.   No murmur heard.  Pulmonary/Chest: Effort normal and breath sounds normal. No respiratory distress. He exhibits no tenderness.   Neurological: He is alert and oriented to  person, place, and time.   Skin: Skin is warm and dry. Capillary refill takes less than 2 seconds. He is not diaphoretic.   Psychiatric: He has a normal mood and affect. His behavior is normal. Judgment and thought content normal.   Nursing note and vitals reviewed.           Results Review:    I reviewed the patient's previous clinical results.       Medication Review:     Current Outpatient Medications:   •  aspirin 81 MG tablet, Take 1 tablet by mouth Daily., Disp: 90 tablet, Rfl: 3  •  azelastine (ASTELIN) 0.1 % nasal spray, 1 spray into the nostril(s) as directed by provider 2 (Two) Times a Day As Needed for Rhinitis or Allergies. Use in each nostril as directed, Disp: 1 each, Rfl: 5  •  cholecalciferol (VITAMIN D3) 1000 units tablet, Take 2 tablets by mouth Daily., Disp: 30 tablet, Rfl: 2  •  coenzyme Q10 100 MG capsule, Take 300 mg by mouth Daily., Disp: , Rfl:   •  EPINEPHrine (EPIPEN) 0.3 MG/0.3ML solution auto-injector injection, , Disp: , Rfl:   •  ezetimibe (ZETIA) 10 MG tablet, Take 1 tablet by mouth Daily., Disp: 90 tablet, Rfl: 3  •  flunisolide (NASALIDE) 25 MCG/ACT (0.025%) solution nasal spray, Inhale 1 spray Every 12 (Twelve) Hours., Disp: 1 bottle, Rfl: 5  •  furosemide (LASIX) 40 MG tablet, Take 1 tablet by mouth Daily As Needed (swelling, weight gain >10 lb)., Disp: 90 tablet, Rfl: 0  •  irbesartan (AVAPRO) 300 MG tablet, Take 300 mg by mouth As Needed., Disp: , Rfl:   •  melatonin 1 MG tablet, Take 3 mg by mouth Every Night., Disp: , Rfl:   •  pantoprazole (PROTONIX) 20 MG EC tablet, 1 po in the am 30 minutes before breakfast., Disp: 90 tablet, Rfl: 3  •  potassium chloride (K-DUR,KLOR-CON) 20 MEQ CR tablet, Take 1 tablet by mouth Daily., Disp: 90 tablet, Rfl: 3  •  rOPINIRole (Requip) 0.5 MG tablet, TAKE 1/2 TO 1 TAB PO QHS PRN RESTLESS LEG SYNDROME.Take 1 hour before bedtime., Disp: 90 tablet, Rfl: 0  •  tiZANidine (ZANAFLEX) 2 MG tablet, Take 1 tablet by mouth 2 (Two) Times a Day As Needed  for Muscle Spasms., Disp: 30 tablet, Rfl: 0  •  traZODone (DESYREL) 50 MG tablet, TAKE 1-2 TAB PO QHS PRN SLEEP, Disp: 180 tablet, Rfl: 3    Assessment/Plan   Saad was seen today for eye pain.    Diagnoses and all orders for this visit:    Abrasion of right eye, initial encounter  Discussed home care instructions and recommend avoiding rubbing eyes.  Discussed worsening signs and symptoms and recommend maintaining hydration nutrition and adequate rest.  May need antibiotics if symptoms worsen or possible referral.  Recommend artificial tears for lubrication.  Patient stated understanding.  Essential hypertension  Advised patient close monitor blood pressure at home and contact office if blood pressure remains elevated.      Return if symptoms worsen or fail to improve.    Josy Dalton, APRN  04/23/2020

## 2020-04-24 ENCOUNTER — TELEPHONE (OUTPATIENT)
Dept: INTERNAL MEDICINE | Facility: CLINIC | Age: 75
End: 2020-04-24

## 2020-04-24 NOTE — TELEPHONE ENCOUNTER
We seen this patient yesterday and flushed his eye out and stained it. Couldn't see anything in it or a scratch. Got this message this morning. Stephanie wants to know what you recommend to do. Just let me know. Thanks!

## 2020-04-24 NOTE — TELEPHONE ENCOUNTER
Patient states that he had something in his eye and Dr. Muse flushed it out and he still feels like he has something in his eye.  Please advise.  He can be reached at [phone]

## 2020-04-24 NOTE — TELEPHONE ENCOUNTER
Needs to be seen by an eye doctor. See if Helena Maldonado can call around and see who can see him. Needs a slit light exam by eye professional.

## 2020-04-24 NOTE — TELEPHONE ENCOUNTER
Talked with mr casper and gave number [phone] for him to call to get into eye specialist as instructed by Stephanie

## 2020-05-05 DIAGNOSIS — G47.61 PERIODIC LIMB MOVEMENT SLEEP DISORDER: ICD-10-CM

## 2020-05-05 RX ORDER — ROPINIROLE 0.5 MG/1
TABLET, FILM COATED ORAL
Qty: 90 TABLET | Refills: 0 | Status: SHIPPED | OUTPATIENT
Start: 2020-05-05 | End: 2020-09-11 | Stop reason: SDUPTHER

## 2020-05-05 RX ORDER — EZETIMIBE 10 MG/1
10 TABLET ORAL DAILY
Qty: 90 TABLET | Refills: 3 | Status: SHIPPED | OUTPATIENT
Start: 2020-05-05 | End: 2020-07-02 | Stop reason: SDUPTHER

## 2020-05-05 NOTE — TELEPHONE ENCOUNTER
PT CALLED TO REQUEST A 30 DAY REFILL FOR rOPINIRole (Requip) 0.5 MG tablet SENT TO [facility]. PT IS COMPLETELY OUT.    PT WOULD LIKE A 90 DAY REFILL FOR ezetimibe (ZETIA) 10 MG tablet AND rOPINIRole (Requip) 0.5 MG tablet SENT TO [facility].    PT CONTACT [phone]     PHARMACY VERIFIED [facility] FOR rOPINIRole (Requip) 0.5 MG tablet.    [facility] FOR rOPINIRole (Requip) 0.5 MG tablet AND ezetimibe (ZETIA) 10 MG tablet.

## 2020-05-21 ENCOUNTER — OFFICE VISIT (OUTPATIENT)
Dept: INTERNAL MEDICINE | Facility: CLINIC | Age: 75
End: 2020-05-21

## 2020-05-21 VITALS
WEIGHT: 202 LBS | BODY MASS INDEX: 29.92 KG/M2 | SYSTOLIC BLOOD PRESSURE: 132 MMHG | HEIGHT: 69 IN | TEMPERATURE: 98.2 F | HEART RATE: 60 BPM | DIASTOLIC BLOOD PRESSURE: 78 MMHG | OXYGEN SATURATION: 99 %

## 2020-05-21 DIAGNOSIS — I50.32 CHRONIC DIASTOLIC CONGESTIVE HEART FAILURE (HCC): Primary | ICD-10-CM

## 2020-05-21 DIAGNOSIS — R60.0 BILATERAL LOWER EXTREMITY EDEMA: ICD-10-CM

## 2020-05-21 PROCEDURE — 99213 OFFICE O/P EST LOW 20 MIN: CPT | Performed by: PHYSICIAN ASSISTANT

## 2020-05-21 NOTE — PROGRESS NOTES
Saad Meier is a 74 y.o. male.     Subjective   History of Present Illness   6 patient previously unknown to me with history of CAD, chronic diastolic heart failure, hypertension, sick sinus syndrome with pacemaker, peripheral vascular disease, immunodeficiency and colon cancer (2012) presents today with concern of edema of both feet.  He has had edema of the lower legs intermittently in the past which historically has been controlled with Lasix 40 mg as needed, however in the last week he has had increased bilateral lower leg edema.  He was taking Lasix every other day but has taken it now for the last 3 days in a row without any improvement.  When he last saw cardiology around 2 months ago and they requested CMP and proBNP which were not drawn when the lipid panel was drawn.  He denies any chest pain or SOA.  No claudication or other pain. He does not wear compression stockings.  Blood pressure and weight appear to be stable compared to last couple of office visits but increased 5 pounds compared to 6 months ago.        The following portions of the patient's history were reviewed and updated as appropriate: allergies, current medications, past family history, past medical history, past social history, past surgical history and problem list.    Review of Systems   Constitutional: Negative for appetite change, chills, fatigue, fever and unexpected weight change.   HENT: Negative.    Eyes: Negative for visual disturbance.   Respiratory: Negative for cough, choking, chest tightness, shortness of breath and wheezing.    Cardiovascular: Positive for leg swelling. Negative for chest pain and palpitations.   Gastrointestinal: Negative for abdominal pain, constipation, diarrhea, nausea and vomiting.   Musculoskeletal: Negative for arthralgias, gait problem and myalgias.   Skin: Negative for color change and rash.   Allergic/Immunologic: Positive for immunocompromised state.   Neurological: Negative for dizziness,  "syncope, weakness, numbness and headaches.   Hematological: Negative for adenopathy. Does not bruise/bleed easily.   Psychiatric/Behavioral: Negative for dysphoric mood, self-injury and suicidal ideas. The patient is not nervous/anxious.          Objective    Physical Exam   Constitutional: He is oriented to person, place, and time. He appears well-developed and well-nourished. No distress.   HENT:   Head: Normocephalic and atraumatic.   Eyes: Pupils are equal, round, and reactive to light. Conjunctivae and EOM are normal.   Neck: Normal range of motion. Neck supple.   Cardiovascular: Normal rate, regular rhythm and normal heart sounds. Exam reveals no gallop and no friction rub.   No murmur heard.  Pulmonary/Chest: Effort normal and breath sounds normal. No respiratory distress. He has no wheezes. He has no rales. He exhibits no tenderness.   Abdominal: Soft. Bowel sounds are normal. There is no tenderness.   Musculoskeletal: Normal range of motion. He exhibits edema (1-2+ pitting edema bilateral lower legs). He exhibits no tenderness or deformity.   Neurological: He is alert and oriented to person, place, and time. He displays normal reflexes. No cranial nerve deficit or sensory deficit. He exhibits normal muscle tone. Coordination normal.   Skin: Skin is warm and dry. Capillary refill takes less than 2 seconds. No rash noted. He is not diaphoretic. No erythema. No pallor.   Psychiatric: He has a normal mood and affect. His behavior is normal. Judgment and thought content normal.   Nursing note and vitals reviewed.        /78   Pulse 60   Temp 98.2 °F (36.8 °C) (Temporal)   Ht 175.3 cm (69\")   Wt 91.6 kg (202 lb)   SpO2 99%   BMI 29.83 kg/m²     Nursing note and vitals reviewed.          Assessment/Plan   Saad was seen today for edema.    Diagnoses and all orders for this visit:    Chronic diastolic congestive heart failure (CMS/HCC)  -     proBNP  -     Comprehensive Metabolic Panel  -     " Compression Knee High Stockings    Bilateral lower extremity edema  -     proBNP  -     Comprehensive Metabolic Panel  -     Compression Knee High Stockings    He was advised to begin wearing compression stockings from the time he wakes up until the time he goes to bed every day.  Continue Lasix 40 mg daily as needed.  On days when Lasix is taking he should lay down with legs elevated above heart for 30 to 60 minutes after taking Lasix dose.    Will notify of lab results by phone once available for review.  Continue cardiology follow-up.    He is scheduled for Medicare wellness visit with Dr. Muse 1 week from now.  Follow-up on leg edema at that time.       JORGE LUIS Vega  05/21/2020  9:28 AM

## 2020-05-22 LAB
ALBUMIN SERPL-MCNC: 4.5 G/DL (ref 3.5–5.2)
ALBUMIN/GLOB SERPL: 1.9 G/DL
ALP SERPL-CCNC: 75 U/L (ref 39–117)
ALT SERPL-CCNC: 38 U/L (ref 1–41)
AST SERPL-CCNC: 32 U/L (ref 1–40)
BILIRUB SERPL-MCNC: 0.7 MG/DL (ref 0.2–1.2)
BUN SERPL-MCNC: 19 MG/DL (ref 8–23)
BUN/CREAT SERPL: 16.7 (ref 7–25)
CALCIUM SERPL-MCNC: 10.1 MG/DL (ref 8.6–10.5)
CHLORIDE SERPL-SCNC: 103 MMOL/L (ref 98–107)
CO2 SERPL-SCNC: 26.5 MMOL/L (ref 22–29)
CREAT SERPL-MCNC: 1.14 MG/DL (ref 0.76–1.27)
GLOBULIN SER CALC-MCNC: 2.4 GM/DL
GLUCOSE SERPL-MCNC: 114 MG/DL (ref 65–99)
NT-PROBNP SERPL-MCNC: 102 PG/ML (ref 0–486)
POTASSIUM SERPL-SCNC: 5.1 MMOL/L (ref 3.5–5.2)
PROT SERPL-MCNC: 6.9 G/DL (ref 6–8.5)
SODIUM SERPL-SCNC: 140 MMOL/L (ref 136–145)

## 2020-05-29 ENCOUNTER — OFFICE VISIT (OUTPATIENT)
Dept: INTERNAL MEDICINE | Facility: CLINIC | Age: 75
End: 2020-05-29

## 2020-05-29 VITALS
RESPIRATION RATE: 18 BRPM | HEART RATE: 60 BPM | DIASTOLIC BLOOD PRESSURE: 80 MMHG | HEIGHT: 69 IN | WEIGHT: 206.25 LBS | BODY MASS INDEX: 30.55 KG/M2 | SYSTOLIC BLOOD PRESSURE: 132 MMHG | OXYGEN SATURATION: 95 %

## 2020-05-29 DIAGNOSIS — J30.9 ALLERGIC RHINITIS, UNSPECIFIED SEASONALITY, UNSPECIFIED TRIGGER: ICD-10-CM

## 2020-05-29 DIAGNOSIS — I73.9 PERIPHERAL VASCULAR DISEASE OF LOWER EXTREMITY (HCC): ICD-10-CM

## 2020-05-29 DIAGNOSIS — Z78.9 STATIN INTOLERANCE: ICD-10-CM

## 2020-05-29 DIAGNOSIS — Z00.00 MEDICARE ANNUAL WELLNESS VISIT, SUBSEQUENT: Primary | ICD-10-CM

## 2020-05-29 DIAGNOSIS — E66.09 CLASS 1 OBESITY DUE TO EXCESS CALORIES WITH SERIOUS COMORBIDITY AND BODY MASS INDEX (BMI) OF 30.0 TO 30.9 IN ADULT: ICD-10-CM

## 2020-05-29 DIAGNOSIS — I25.10 CORONARY ARTERY DISEASE INVOLVING NATIVE CORONARY ARTERY OF NATIVE HEART WITHOUT ANGINA PECTORIS: Chronic | ICD-10-CM

## 2020-05-29 DIAGNOSIS — I10 ESSENTIAL HYPERTENSION: Chronic | ICD-10-CM

## 2020-05-29 DIAGNOSIS — G47.00 INSOMNIA, UNSPECIFIED TYPE: Chronic | ICD-10-CM

## 2020-05-29 DIAGNOSIS — G47.33 SLEEP APNEA, OBSTRUCTIVE: ICD-10-CM

## 2020-05-29 DIAGNOSIS — I50.32 CHRONIC DIASTOLIC CONGESTIVE HEART FAILURE (HCC): ICD-10-CM

## 2020-05-29 DIAGNOSIS — I49.5 SICK SINUS SYNDROME (HCC): ICD-10-CM

## 2020-05-29 DIAGNOSIS — R60.0 BILATERAL LOWER EXTREMITY EDEMA: ICD-10-CM

## 2020-05-29 DIAGNOSIS — E78.5 DYSLIPIDEMIA: ICD-10-CM

## 2020-05-29 PROBLEM — D84.9 IMMUNODEFICIENCY (HCC): Status: RESOLVED | Noted: 2017-01-13 | Resolved: 2020-05-29

## 2020-05-29 PROCEDURE — G0439 PPPS, SUBSEQ VISIT: HCPCS | Performed by: FAMILY MEDICINE

## 2020-05-29 RX ORDER — AZELASTINE 1 MG/ML
1 SPRAY, METERED NASAL 2 TIMES DAILY PRN
Qty: 3 EACH | Refills: 3 | Status: SHIPPED | OUTPATIENT
Start: 2020-05-29 | End: 2020-09-11 | Stop reason: SDUPTHER

## 2020-05-29 RX ORDER — FLUNISOLIDE 0.25 MG/ML
1 SOLUTION NASAL EVERY 12 HOURS
Qty: 3 BOTTLE | Refills: 3 | Status: SHIPPED | OUTPATIENT
Start: 2020-05-29 | End: 2020-06-22 | Stop reason: SDUPTHER

## 2020-05-29 NOTE — PROGRESS NOTES
The ABCs of the Annual Wellness Visit  Subsequent Medicare Wellness Visit    Chief Complaint   Patient presents with   • Medicare Wellness-subsequent     Physical       Subjective   History of Present Illness:  Saad Meier is a 74 y.o. male who presents for a Subsequent Medicare Wellness Visit.    Coronary artery disease associated with sss, diastolic CHF, peripheral vascular disease and hyperlipidemia statin intolerance, followed by cardiology. Continues to have swelling lower extremities bilaterally. Previously recommended to use CPAP for LULU by Dr. Zimmer but he is not wearing. Continues to struggle with insomnia, has had this for years, failed many medicines. Estimates he gets a couple of hours a night. Takes melatonin but at 10 pm approx. Continues medicines for hypertension. On nose sprays for allergies, uses one daily, other as needed, feels allergies are okay at this time.     HEALTH RISK ASSESSMENT    Recent Hospitalizations:  No hospitalization(s) within the last year.    Current Medical Providers:  Patient Care Team:  Evelyn Muse MD as PCP - General (Family Medicine)  Atilio Wall MD as Consulting Physician (Cardiology)  Luis Hollins MD as Consulting Physician (Urology)  Liddle, Susan E., MD as Consulting Physician (Hematology and Oncology)  Gloria Garcia MD as Consulting Physician (General Surgery)  Elvie Alvarez MD as Surgeon (General Surgery)  Jacky Walker MD as Consulting Physician (General Surgery)  Neel Zimmer MD as Consulting Physician (Pulmonary Disease)    Smoking Status:  Social History     Tobacco Use   Smoking Status Former Smoker   • Packs/day: 1.00   • Years: 10.00   • Pack years: 10.00   • Types: Cigarettes   • Start date: 1963   • Last attempt to quit:    • Years since quittin.4   Smokeless Tobacco Never Used       Alcohol Consumption:  Social History     Substance and Sexual Activity   Alcohol Use No       Depression Screen:    PHQ-2/PHQ-9 Depression Screening 5/29/2020   Little interest or pleasure in doing things 0   Feeling down, depressed, or hopeless 0   Trouble falling or staying asleep, or sleeping too much -   Feeling tired or having little energy -   Poor appetite or overeating -   Feeling bad about yourself - or that you are a failure or have let yourself or your family down -   Trouble concentrating on things, such as reading the newspaper or watching television -   Moving or speaking so slowly that other people could have noticed. Or the opposite - being so fidgety or restless that you have been moving around a lot more than usual -   Thoughts that you would be better off dead, or of hurting yourself in some way -   Total Score 0   If you checked off any problems, how difficult have these problems made it for you to do your work, take care of things at home, or get along with other people? -       Fall Risk Screen:  DIONNE Fall Risk Assessment was completed, and patient is at LOW risk for falls.Assessment completed on:4/23/2020    Health Habits and Functional and Cognitive Screening:  Functional & Cognitive Status 5/29/2020   Do you have difficulty preparing food and eating? No   Do you have difficulty bathing yourself, getting dressed or grooming yourself? No   Do you have difficulty using the toilet? No   Do you have difficulty moving around from place to place? No   Do you have trouble with steps or getting out of a bed or a chair? No   Current Diet Well Balanced Diet   Dental Exam Not up to date   Eye Exam Not up to date   Exercise (times per week) 3 times per week   Current Exercise Activities Include Walking   Do you need help using the phone?  No   Are you deaf or do you have serious difficulty hearing?  Yes   Do you need help with transportation? No   Do you need help shopping? No   Do you need help preparing meals?  No   Do you need help with housework?  No   Do you need help with laundry? No   Do you need help  taking your medications? No   Do you need help managing money? No   Do you ever drive or ride in a car without wearing a seat belt? No   Have you felt unusual stress, anger or loneliness in the last month? No   Who do you live with? Alone   If you need help, do you have trouble finding someone available to you? No   Have you been bothered in the last four weeks by sexual problems? No   Do you have difficulty concentrating, remembering or making decisions? -         Does the patient have evidence of cognitive impairment? No    Asprin use counseling:Taking ASA appropriately as indicated    Age-appropriate Screening Schedule:  Refer to the list below for future screening recommendations based on patient's age, sex and/or medical conditions. Orders for these recommended tests are listed in the plan section. The patient has been provided with a written plan.    Health Maintenance   Topic Date Due   • TDAP/TD VACCINES (1 - Tdap) 05/01/2021 (Originally 9/28/1956)   • ZOSTER VACCINE (1 of 2) 06/07/2021 (Originally 9/28/1993)   • INFLUENZA VACCINE  08/01/2020   • LIPID PANEL  03/17/2021   • COLONOSCOPY  01/02/2029          The following portions of the patient's history were reviewed and updated as appropriate: allergies, current medications, past family history, past medical history, past social history, past surgical history and problem list.    Outpatient Medications Prior to Visit   Medication Sig Dispense Refill   • aspirin 81 MG tablet Take 1 tablet by mouth Daily. 90 tablet 3   • cholecalciferol (VITAMIN D3) 1000 units tablet Take 2 tablets by mouth Daily. 30 tablet 2   • coenzyme Q10 100 MG capsule Take 300 mg by mouth Daily.     • ezetimibe (ZETIA) 10 MG tablet Take 1 tablet by mouth Daily. 90 tablet 3   • furosemide (LASIX) 40 MG tablet Take 1 tablet by mouth Daily As Needed (swelling, weight gain >10 lb). 90 tablet 0   • irbesartan (AVAPRO) 300 MG tablet Take 300 mg by mouth As Needed.     • melatonin 1 MG tablet  Take 3 mg by mouth Every Night.     • pantoprazole (PROTONIX) 20 MG EC tablet 1 po in the am 30 minutes before breakfast. 90 tablet 3   • potassium chloride (K-DUR,KLOR-CON) 20 MEQ CR tablet Take 1 tablet by mouth Daily. 90 tablet 3   • rOPINIRole (Requip) 0.5 MG tablet TAKE 1/2 TO 1 TAB PO QHS PRN RESTLESS LEG SYNDROME.Take 1 hour before bedtime. 90 tablet 0   • azelastine (ASTELIN) 0.1 % nasal spray 1 spray into the nostril(s) as directed by provider 2 (Two) Times a Day As Needed for Rhinitis or Allergies. Use in each nostril as directed 1 each 5   • EPINEPHrine (EPIPEN) 0.3 MG/0.3ML solution auto-injector injection      • flunisolide (NASALIDE) 25 MCG/ACT (0.025%) solution nasal spray Inhale 1 spray Every 12 (Twelve) Hours. 1 bottle 5     No facility-administered medications prior to visit.        Patient Active Problem List   Diagnosis   • Arthritis   • Chronic diastolic congestive heart failure (CMS/HCC)   • Acid reflux   • Juvenile rheumatic fever   • Hernia, inguinal, right   • Ventral hernia without obstruction or gangrene   • Insomnia   • Sick sinus syndrome (CMS/HCC)   • Coronary artery disease involving native coronary artery of native heart without angina pectoris   • AV gunnar re-entry tachycardia (CMS/HCC)   • Bilateral renal masses   • Essential hypertension   • Umbilical hernia without obstruction or gangrene   • Dyslipidemia   • Stenosis of right carotid artery   • Peripheral vascular disease of lower extremity (CMS/HCC)   • History of colon cancer   • Recurrent inguinal hernia without obstruction or gangrene   • Lower urinary tract symptoms due to benign prostatic hyperplasia   • Statin intolerance       Advanced Care Planning:  ACP discussion was held with the patient during this visit. Patient has an advance directive (not in EMR), copy requested.    Review of Systems    Compared to one year ago, the patient feels his physical health is the same.  Compared to one year ago, the patient feels his  "mental health is the same.    Reviewed chart for potential of high risk medication in the elderly: yes  Reviewed chart for potential of harmful drug interactions in the elderly:yes    Objective         Vitals:    05/29/20 0852   BP: 132/80   Pulse: 60   Resp: 18   SpO2: 95%   Weight: 93.6 kg (206 lb 4 oz)   Height: 175.3 cm (69.02\")   PainSc:   2       Body mass index is 30.44 kg/m².  Discussed the patient's BMI with him. The BMI is above average; BMI management plan is completed.    Physical Exam   Constitutional: He is oriented to person, place, and time. Vital signs are normal. He appears well-developed and well-nourished. He is active.  Non-toxic appearance. He does not have a sickly appearance. He does not appear ill. No distress. Face mask in place.   Appears stated age. Well groomed.   HENT:   Head: Normocephalic and atraumatic. 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   Right Ear: External ear and ear canal normal. Tympanic membrane is scarred. No middle ear effusion. Decreased hearing is noted.   Left Ear: External ear and ear canal normal. Tympanic membrane is scarred. A middle ear effusion is present. Decreased hearing is noted.   Eyes: Pupils are equal, round, and reactive to light. Conjunctivae, EOM and lids are normal. No scleral icterus.   Neck: Phonation normal. Neck supple.   Cardiovascular: Regular rhythm and normal heart sounds. Bradycardia present.   Pulmonary/Chest: Effort normal and breath sounds normal.   Abdominal: Soft. Bowel sounds are normal. He exhibits no distension. There is no tenderness. A hernia is present. Hernia confirmed positive in the ventral area.   Musculoskeletal: He exhibits no deformity.        Right ankle: He exhibits swelling.        Left ankle: He exhibits swelling.   Compression stockings in place   Neurological: He is alert and oriented to person, place, and time. He displays no tremor. No cranial nerve deficit. Gait normal.   Skin: Skin is warm. No rash noted. He is " not diaphoretic. No cyanosis. No pallor. Nails show no clubbing.   Many brown stuck on appearing lesions on back that follow skin lines. No worrisome appearing lesions.   Psychiatric: He has a normal mood and affect. His speech is normal and behavior is normal. Judgment and thought content normal.   Nursing note and vitals reviewed.      Lab Results   Component Value Date     (H) 05/21/2020    CHLPL 202 (H) 03/17/2020    TRIG 88 03/17/2020    HDL 41 03/17/2020     (H) 03/17/2020    VLDL 17.6 03/17/2020        Assessment/Plan   Medicare Risks and Personalized Health Plan  CMS Preventative Services Quick Reference  Advance Directive Discussion  Cardiovascular risk  Immunizations Discussed/Encouraged (specific immunizations; Td and Shingrix )  Inactivity/Sedentary    The above risks/problems have been discussed with the patient.  Pertinent information has been shared with the patient in the After Visit Summary.  Follow up plans and orders are seen below in the Assessment/Plan Section.    Diagnoses and all orders for this visit:    1. Medicare annual wellness visit, subsequent (Primary)    2. Peripheral vascular disease of lower extremity (CMS/HCC)  Comments:  Continue compression stockings, aspirin. Statin intolerant. Follow up with cardiology.    3. Chronic diastolic congestive heart failure (CMS/[facility])  Comments:  Follow up with cardiology.    4. Sick sinus syndrome (CMS/[facility])  Comments:  Follow up with cardiology.    5. Insomnia, unspecified type  Comments:  Discussed again melatonin, try 6-7 pm instead of 10, can go up to 10 mg. Follow up with Dr. Zimmer's office.    6. Sleep apnea, obstructive  Comments:  Follow up Dr. Zimmer's office. Encouraged CPAP compliance.    7. Essential hypertension  Comments:  Stable, would be better controlled with CPAP compliance. Continue current medicines.    8. Bilateral lower extremity edema  Comments:  Continue compression stockings. Follow up cardiology. Educated  uncontrolled LULU can worsen edema.    9. Coronary artery disease involving native coronary artery of native heart without angina pectoris  Comments:  Follow up with cardiology.    10. Dyslipidemia  Comments:  Continue Zetia, Coq10. Statin intolerant.    11. Statin intolerance  Comments:  AE with Lipitor, Crestor upon chart review.    12. Allergic rhinitis, unspecified seasonality, unspecified trigger  -     azelastine (ASTELIN) 0.1 % nasal spray; 1 spray into the nostril(s) as directed by provider 2 (Two) Times a Day As Needed for Rhinitis or Allergies. Use in each nostril as directed  Dispense: 3 each; Refill: 3  -     flunisolide (NASALIDE) 25 MCG/ACT (0.025%) solution nasal spray; Inhale 1 spray Every 12 (Twelve) Hours.  Dispense: 3 bottle; Refill: 3    13. Class 1 obesity due to excess calories with serious comorbidity and body mass index (BMI) of 30.0 to 30.9 in adult  Comments:  Associated with hypertension, hyperlipidemia.      Follow Up:  Return in about 1 year (around 6/1/2021).     An After Visit Summary and PPPS were given to the patient.

## 2020-05-29 NOTE — PATIENT INSTRUCTIONS
Medicare Wellness  Personal Prevention Plan of Service     Date of Office Visit:  2020  Encounter Provider:  Evelyn Muse MD  Place of Service:  [facility]  Patient Name: Saad Meier  :  1945    As part of the Medicare Wellness portion of your visit today, we are providing you with this personalized preventive plan of services (PPPS). This plan is based upon recommendations of the [place] Preventive Services Task Force (USPSTF) and the Advisory Committee on Immunization Practices (ACIP).    This lists the preventive care services that should be considered, and provides dates of when you are due. Items listed as completed are up-to-date and do not require any further intervention.    Health Maintenance   Topic Date Due   • MEDICARE ANNUAL WELLNESS  2020   • HEPATITIS C SCREENING  2021 (Originally 2016)   • TDAP/TD VACCINES (1 - Tdap) 2021 (Originally 1956)   • ZOSTER VACCINE (1 of 2) 2021 (Originally 1993)   • INFLUENZA VACCINE  2020   • LIPID PANEL  2021   • COLONOSCOPY  2029   • Pneumococcal Vaccine Once at 65 Years Old  Completed       No orders of the defined types were placed in this encounter.      Return in about 1 year (around 2021).

## 2020-06-10 ENCOUNTER — TELEPHONE (OUTPATIENT)
Dept: INTERNAL MEDICINE | Facility: CLINIC | Age: 75
End: 2020-06-10

## 2020-06-10 NOTE — TELEPHONE ENCOUNTER
Madison, nurse case mangement for the patient, called because the patient told her he was having some leg swelling (she has not seen the patient in person, just over the phone). Madison states there are no diuretics listed on his chart, so what can he do for the swelling. Advised the patient has Lasix 40mg qd to take anytime he has swelling. Madison states the patient is a poor historian and thinks he may just not know what he is taking. She then told me the patient told her he takes the Lasix everyday then sometimes every other day. That leads me to believe the patient knows he has Lasix and there may have been a miscommunication. Madison will tell the patient to continue the Lasix everyday until the swelling does down. She did ask about the pulmonology referral. Advised the patient has seen Dr. Zimmer before. Name and phone number given and she will relay this to the patient.

## 2020-06-22 DIAGNOSIS — J30.9 ALLERGIC RHINITIS, UNSPECIFIED SEASONALITY, UNSPECIFIED TRIGGER: ICD-10-CM

## 2020-06-22 RX ORDER — FLUNISOLIDE 0.25 MG/ML
1 SOLUTION NASAL EVERY 12 HOURS
Qty: 3 BOTTLE | Refills: 3 | Status: SHIPPED | OUTPATIENT
Start: 2020-06-22 | End: 2020-06-23 | Stop reason: SDUPTHER

## 2020-06-22 RX ORDER — FLUNISOLIDE 0.25 MG/ML
1 SOLUTION NASAL EVERY 12 HOURS
Qty: 1 BOTTLE | Refills: 0 | Status: SHIPPED | OUTPATIENT
Start: 2020-06-22 | End: 2020-06-22 | Stop reason: SDUPTHER

## 2020-06-23 ENCOUNTER — TELEPHONE (OUTPATIENT)
Dept: INTERNAL MEDICINE | Facility: CLINIC | Age: 75
End: 2020-06-23

## 2020-06-23 DIAGNOSIS — J30.9 ALLERGIC RHINITIS, UNSPECIFIED SEASONALITY, UNSPECIFIED TRIGGER: ICD-10-CM

## 2020-06-23 RX ORDER — FLUNISOLIDE 0.25 MG/ML
1 SOLUTION NASAL EVERY 12 HOURS
Qty: 1 BOTTLE | Refills: 0 | Status: SHIPPED | OUTPATIENT
Start: 2020-06-23 | End: 2020-09-11 | Stop reason: SDUPTHER

## 2020-06-23 NOTE — TELEPHONE ENCOUNTER
Saad Garrido came in downstairs, he states he is totally out of Flunisolide spray and he is not sure how soon he will get it from mailorder. Saad is asking if you will send a refill locally until the other comes in.

## 2020-07-02 RX ORDER — EZETIMIBE 10 MG/1
10 TABLET ORAL DAILY
Qty: 90 TABLET | Refills: 3 | Status: SHIPPED | OUTPATIENT
Start: 2020-07-02 | End: 2020-09-08 | Stop reason: SDUPTHER

## 2020-07-07 ENCOUNTER — OFFICE VISIT (OUTPATIENT)
Dept: PULMONOLOGY | Facility: CLINIC | Age: 75
End: 2020-07-07

## 2020-07-07 VITALS
DIASTOLIC BLOOD PRESSURE: 70 MMHG | WEIGHT: 207 LBS | TEMPERATURE: 97.3 F | RESPIRATION RATE: 16 BRPM | BODY MASS INDEX: 30.66 KG/M2 | SYSTOLIC BLOOD PRESSURE: 112 MMHG | OXYGEN SATURATION: 98 % | HEIGHT: 69 IN | HEART RATE: 64 BPM

## 2020-07-07 DIAGNOSIS — G47.19 EXCESSIVE DAYTIME SLEEPINESS: ICD-10-CM

## 2020-07-07 DIAGNOSIS — G47.33 OSA (OBSTRUCTIVE SLEEP APNEA): Primary | ICD-10-CM

## 2020-07-07 DIAGNOSIS — Z72.821 POOR SLEEP HYGIENE: ICD-10-CM

## 2020-07-07 DIAGNOSIS — G47.00 INSOMNIA, UNSPECIFIED TYPE: ICD-10-CM

## 2020-07-07 PROCEDURE — 99214 OFFICE O/P EST MOD 30 MIN: CPT | Performed by: INTERNAL MEDICINE

## 2020-07-07 RX ORDER — TEMAZEPAM 15 MG/1
15 CAPSULE ORAL NIGHTLY PRN
Qty: 30 CAPSULE | Refills: 2 | Status: SHIPPED | OUTPATIENT
Start: 2020-07-07 | End: 2020-10-09 | Stop reason: SDUPTHER

## 2020-07-07 NOTE — PROGRESS NOTES
"Chief Complaint   Patient presents with   • Follow-up   • Sleeping Problem       Subjective   Saad Meier is a 74 y.o. male.     History of Present Illness   Patient comes in today to follow-up on obstructive sleep apnea.    The patient unfortunately has not been using CPAP and feels that there are multiple reasons for it.  He says that he has back pain which causes him to sleep in a recliner.    He also tends to stay awake till 2 AM or so watching television and after that tries to go to sleep.    He does complain of waking up multiple times throughout the night mostly because of back pain but occasionally because he \"cannot stop thinking\".      The following portions of the patient's history were reviewed and updated as appropriate: allergies, current medications, past family history, past medical history, past social history and past surgical history.    Review of Systems   HENT: Negative for sinus pressure, sneezing and sore throat.    Respiratory: Negative for cough, shortness of breath and wheezing.        Objective   Visit Vitals  /70   Pulse 64   Temp 97.3 °F (36.3 °C)   Resp 16   Ht 175.3 cm (69.02\")   Wt 93.9 kg (207 lb)   SpO2 98%   BMI 30.55 kg/m²       Physical Exam   Constitutional: He is oriented to person, place, and time. He appears well-developed and well-nourished.   HENT:   Head: Atraumatic.   Crowded oropharynx.   Hearing aides noted.    Musculoskeletal:   Gait was slow.    Neurological: He is alert and oriented to person, place, and time.   Psychiatric: He has a normal mood and affect.   Vitals reviewed.      Assessment/Plan   Saad was seen today for follow-up and sleeping problem.    Diagnoses and all orders for this visit:    LULU (obstructive sleep apnea)    Insomnia, unspecified type  -     temazepam (RESTORIL) 15 MG capsule; Take 1 capsule by mouth At Night As Needed for Sleep.    Excessive daytime sleepiness    Poor sleep hygiene           Return in about 3 months (around " 10/7/2020) for Maia/Johnna, ....Also 6-7 mths w/ Dr. Zimmer.    DISCUSSION (if any):  I reviewed the results of last sleep study in detail. I informed him that the apnea hypopnea index was 24 / hr. This was an in lab study. This was done in 2019.    I told the patient that his symptoms are consistent with poorly controlled sleep apnea.    Patient was advised to continue using PAP for at least 4 hours every night.    Patient was advised to call this office with any issues.    Insomnia.    Will get Juan Manuel.    Will start Restoril 15 mg PO QHS.    I spent a total of 26 minutes face to face with this patient. his  pertinent current and previous data, as applicable, were reviewed. Patient's diagnostic studies were also reviewed. More than 16 minutes of the time spent, was spent in counseling about patient's underlying disease process, reviewing any available sleep studies, need to use devices (as applicable) on a regular basis and lifestyle modifications that may positively impact patient's health.       Dictated utilizing Dragon dictation.    This document was electronically signed by Neel Zimmer MD on 07/07/20 at 10:47

## 2020-09-08 ENCOUNTER — OFFICE VISIT (OUTPATIENT)
Dept: INTERNAL MEDICINE | Facility: CLINIC | Age: 75
End: 2020-09-08

## 2020-09-08 VITALS
OXYGEN SATURATION: 99 % | SYSTOLIC BLOOD PRESSURE: 110 MMHG | RESPIRATION RATE: 18 BRPM | WEIGHT: 202 LBS | HEIGHT: 69 IN | BODY MASS INDEX: 29.92 KG/M2 | HEART RATE: 62 BPM | DIASTOLIC BLOOD PRESSURE: 70 MMHG | TEMPERATURE: 98 F

## 2020-09-08 DIAGNOSIS — Z23 NEED FOR INFLUENZA VACCINATION: ICD-10-CM

## 2020-09-08 DIAGNOSIS — L81.9 PIGMENTED SKIN LESION SUSPICIOUS FOR MALIGNANT NEOPLASM: Primary | ICD-10-CM

## 2020-09-08 PROCEDURE — 99213 OFFICE O/P EST LOW 20 MIN: CPT | Performed by: FAMILY MEDICINE

## 2020-09-08 PROCEDURE — 90694 VACC AIIV4 NO PRSRV 0.5ML IM: CPT | Performed by: FAMILY MEDICINE

## 2020-09-08 PROCEDURE — G0008 ADMIN INFLUENZA VIRUS VAC: HCPCS | Performed by: FAMILY MEDICINE

## 2020-09-08 RX ORDER — FUROSEMIDE 40 MG/1
40 TABLET ORAL DAILY PRN
Qty: 90 TABLET | Refills: 0 | Status: SHIPPED | OUTPATIENT
Start: 2020-09-08 | End: 2020-09-11 | Stop reason: SDUPTHER

## 2020-09-08 RX ORDER — EZETIMIBE 10 MG/1
10 TABLET ORAL DAILY
Qty: 90 TABLET | Refills: 3 | Status: SHIPPED | OUTPATIENT
Start: 2020-09-08 | End: 2020-09-11 | Stop reason: SDUPTHER

## 2020-09-08 NOTE — PROGRESS NOTES
"Subjective    Saad Meier is a 74 y.o. male here for:  Chief Complaint   Patient presents with   • Skin Lesion     Large nodule on the left upper arm x 3 months.        History per MA reviewed.    Skin lesion left outer upper arm x3 months.  Patient picks at lesion, causes bleeding.  Has applied DMSO to lesion which he feels helped it \"receed\" somewhat.         The following portions of the patient's history were reviewed and updated as appropriate: allergies, current medications, past family history, past medical history, past social history, past surgical history and problem list.    Review of Systems   Constitutional: Negative for fever.   HENT: Positive for rhinorrhea.        Visit Vitals  /70   Pulse 62   Temp 98 °F (36.7 °C)   Resp 18   Ht 175.3 cm (69.02\")   Wt 91.6 kg (202 lb)   SpO2 99%   BMI 29.82 kg/m²         Objective   Physical Exam   Constitutional: He is oriented to person, place, and time. Vital signs are normal. He appears well-developed and well-nourished. He is active.  Non-toxic appearance. He does not have a sickly appearance. He does not appear ill. No distress. Face mask in place.   HENT:   Head: Normocephalic and atraumatic.   Right Ear: Decreased hearing is noted.   Left Ear: Decreased hearing is noted.   Nose: Nose normal.   Hearing aids in place   Eyes: EOM are normal. No scleral icterus.   Neck: Phonation normal. Neck supple.   Pulmonary/Chest: Effort normal.   Neurological: He is alert and oriented to person, place, and time. He displays no tremor. No cranial nerve deficit.   Skin: Skin is warm. He is not diaphoretic. No cyanosis. No pallor.        Psychiatric: He has a normal mood and affect. His speech is normal and behavior is normal. Judgment and thought content normal. Cognition and memory are normal.   Nursing note and vitals reviewed.         Assessment/Plan     Problem List Items Addressed This Visit     None      Visit Diagnoses     Pigmented skin lesion suspicious " for malignant neoplasm    -  Primary    Relevant Orders    Ambulatory Referral to Dermatology    Need for influenza vaccination        Relevant Orders    Fluad Quad 65+ yrs ([id number]) (Completed)          ·     Evelyn Muse MD

## 2020-09-11 DIAGNOSIS — G47.61 PERIODIC LIMB MOVEMENT SLEEP DISORDER: ICD-10-CM

## 2020-09-11 DIAGNOSIS — J30.9 ALLERGIC RHINITIS, UNSPECIFIED SEASONALITY, UNSPECIFIED TRIGGER: ICD-10-CM

## 2020-09-11 DIAGNOSIS — R12 HEARTBURN: ICD-10-CM

## 2020-09-11 RX ORDER — FUROSEMIDE 40 MG/1
40 TABLET ORAL DAILY PRN
Qty: 90 TABLET | Refills: 0 | Status: SHIPPED | OUTPATIENT
Start: 2020-09-11 | End: 2020-09-30 | Stop reason: SDUPTHER

## 2020-09-11 RX ORDER — AZELASTINE 1 MG/ML
1 SPRAY, METERED NASAL 2 TIMES DAILY PRN
Qty: 3 EACH | Refills: 3 | Status: SHIPPED | OUTPATIENT
Start: 2020-09-11 | End: 2020-10-09 | Stop reason: SDUPTHER

## 2020-09-11 RX ORDER — ROPINIROLE 0.5 MG/1
TABLET, FILM COATED ORAL
Qty: 90 TABLET | Refills: 0 | Status: SHIPPED | OUTPATIENT
Start: 2020-09-11 | End: 2021-01-04 | Stop reason: SDUPTHER

## 2020-09-11 RX ORDER — EZETIMIBE 10 MG/1
10 TABLET ORAL DAILY
Qty: 90 TABLET | Refills: 3 | Status: SHIPPED | OUTPATIENT
Start: 2020-09-11 | End: 2021-02-26 | Stop reason: SDUPTHER

## 2020-09-11 RX ORDER — FLUNISOLIDE 0.25 MG/ML
1 SOLUTION NASAL EVERY 12 HOURS
Qty: 1 BOTTLE | Refills: 0 | Status: SHIPPED | OUTPATIENT
Start: 2020-09-11 | End: 2020-10-09 | Stop reason: SDUPTHER

## 2020-09-11 NOTE — TELEPHONE ENCOUNTER
Caller: Hardeep Saad E    Relationship: Self    Best call back number: [phone]    Medication needed:   Requested Prescriptions     Pending Prescriptions Disp Refills   • flunisolide (NASALIDE) 25 MCG/ACT (0.025%) solution nasal spray 1 bottle 0     Sig: Inhale 1 spray Every 12 (Twelve) Hours.   • azelastine (ASTELIN) 0.1 % nasal spray 3 each 3     Si spray into the nostril(s) as directed by provider 2 (Two) Times a Day As Needed for Rhinitis or Allergies. Use in each nostril as directed   • rOPINIRole (Requip) 0.5 MG tablet 90 tablet 0     Sig: TAKE 1/2 TO 1 TAB PO QHS PRN RESTLESS LEG SYNDROME.Take 1 hour before bedtime.   • ezetimibe (ZETIA) 10 MG tablet 90 tablet 3     Sig: Take 1 tablet by mouth Daily.   • furosemide (LASIX) 40 MG tablet 90 tablet 0     Sig: Take 1 tablet by mouth Daily As Needed (swelling, weight gain >10 lb).       When do you need the refill by: 20    What details did the patient provide when requesting the medication: patient has a three day supply left    Does the patient have less than a 3 day supply:  [x] Yes  [] No    What is the patient's preferred pharmacy: [facility] [address] [place] [address] [phone] [facility] [phone] FX           DELETE AFTER READING TO PATIENT: “Thank you for sharing this information with me. I will send a message to the clinical team. Please allow 48 hours for the clinical staff to follow up on this request.”

## 2020-09-30 ENCOUNTER — OFFICE VISIT (OUTPATIENT)
Dept: CARDIOLOGY | Facility: CLINIC | Age: 75
End: 2020-09-30

## 2020-09-30 VITALS
OXYGEN SATURATION: 98 % | WEIGHT: 203 LBS | TEMPERATURE: 98.6 F | BODY MASS INDEX: 30.07 KG/M2 | DIASTOLIC BLOOD PRESSURE: 58 MMHG | SYSTOLIC BLOOD PRESSURE: 90 MMHG | HEIGHT: 69 IN | HEART RATE: 60 BPM

## 2020-09-30 DIAGNOSIS — I50.32 CHRONIC DIASTOLIC CONGESTIVE HEART FAILURE (HCC): ICD-10-CM

## 2020-09-30 DIAGNOSIS — I10 ESSENTIAL HYPERTENSION: Chronic | ICD-10-CM

## 2020-09-30 DIAGNOSIS — I49.5 SICK SINUS SYNDROME (HCC): ICD-10-CM

## 2020-09-30 DIAGNOSIS — I47.1 AV NODAL RE-ENTRY TACHYCARDIA (HCC): ICD-10-CM

## 2020-09-30 DIAGNOSIS — I65.21 STENOSIS OF RIGHT CAROTID ARTERY: Primary | ICD-10-CM

## 2020-09-30 DIAGNOSIS — I25.10 CORONARY ARTERY DISEASE INVOLVING NATIVE CORONARY ARTERY OF NATIVE HEART WITHOUT ANGINA PECTORIS: Chronic | ICD-10-CM

## 2020-09-30 DIAGNOSIS — I35.1 NONRHEUMATIC AORTIC VALVE INSUFFICIENCY: ICD-10-CM

## 2020-09-30 PROCEDURE — 93280 PM DEVICE PROGR EVAL DUAL: CPT | Performed by: INTERNAL MEDICINE

## 2020-09-30 PROCEDURE — 99214 OFFICE O/P EST MOD 30 MIN: CPT | Performed by: INTERNAL MEDICINE

## 2020-09-30 RX ORDER — FUROSEMIDE 40 MG/1
40 TABLET ORAL DAILY PRN
Qty: 90 TABLET | Refills: 1 | Status: SHIPPED | OUTPATIENT
Start: 2020-09-30 | End: 2021-08-10 | Stop reason: SDUPTHER

## 2020-09-30 NOTE — PROGRESS NOTES
[facility] AT [address], [address]  [place], [place], [id number] [phone]  [email or web address]           OUTPATIENT CLINIC FOLLOW UP NOTE    Patient Care Team:  Patient Care Team:  Evelyn Muse MD as PCP - General (Family Medicine)  Atilio Wall MD as Consulting Physician (Cardiology)  Luis Hollins MD as Consulting Physician (Urology)  Liddle, Susan E., MD as Consulting Physician (Hematology and Oncology)  Gloria Garcia MD as Consulting Physician (General Surgery)  Elvie Alvarez MD as Surgeon (General Surgery)  Jacky Walker MD as Consulting Physician (General Surgery)  Neel Zimmer MD as Consulting Physician (Pulmonary Disease)    Subjective:      Chief Complaint   Patient presents with   • Coronary Artery Disease     HPI:    Saad Meier is a 75 y.o. male.  Cardiac problem list:  1. Sick sinus syndrome status post [facility] [place]ona DC PPM 2004  2. Minimal CAD and normal LVEF by cardiac catheterization 2004  3. 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 Atrial tachycardia  5. Carotid stenosis  6. Colon cancer    The patient presents today for follow-up.      Since the patient was last seen he reports that he has been doing well.  He notes intermittent lower extremity edema that is stable.  He is currently taking his Lasix every other day.  He also reports one episode of lightheadedness.  He denies any chest pain, shortness of breath, palpitations, syncope, orthopnea, or PND.  When he checks his blood pressure at home it is typically around 140 mmHg systolic.  He has rarely taken his irbesartan.    Review of Systems:  Positive for lower extremity edema  Negative for exertional chest pain, dyspnea with exertion, orthopnea, PND, palpitations, lightheadedness, syncope.    PFSH:  Patient Active Problem List   Diagnosis   • Arthritis   • Chronic diastolic congestive heart failure (CMS/HCC)   • Acid reflux   • Juvenile rheumatic fever   •  Hernia, inguinal, right   • Ventral hernia without obstruction or gangrene   • Insomnia   • Sick sinus syndrome (CMS/HCC)   • Coronary artery disease involving native coronary artery of native heart without angina pectoris   • AV gunnar re-entry tachycardia (CMS/HCC)   • Bilateral renal masses   • Essential hypertension   • Umbilical hernia without obstruction or gangrene   • Dyslipidemia   • Stenosis of right carotid artery   • Peripheral vascular disease of lower extremity (CMS/HCC)   • History of colon cancer   • Recurrent inguinal hernia without obstruction or gangrene   • Lower urinary tract symptoms due to benign prostatic hyperplasia   • Statin intolerance         Current Outpatient Medications:   •  aspirin 81 MG tablet, Take 1 tablet by mouth Daily., Disp: 90 tablet, Rfl: 3  •  azelastine (ASTELIN) 0.1 % nasal spray, 1 spray into the nostril(s) as directed by provider 2 (Two) Times a Day As Needed for Rhinitis or Allergies. Use in each nostril as directed, Disp: 3 each, Rfl: 3  •  cholecalciferol (VITAMIN D3) 1000 units tablet, Take 2 tablets by mouth Daily., Disp: 30 tablet, Rfl: 2  •  coenzyme Q10 100 MG capsule, Take 300 mg by mouth Daily., Disp: , Rfl:   •  ezetimibe (ZETIA) 10 MG tablet, Take 1 tablet by mouth Daily., Disp: 90 tablet, Rfl: 3  •  flunisolide (NASALIDE) 25 MCG/ACT (0.025%) solution nasal spray, Inhale 1 spray Every 12 (Twelve) Hours., Disp: 1 bottle, Rfl: 0  •  furosemide (LASIX) 40 MG tablet, Take 1 tablet by mouth Daily As Needed (swelling, weight gain >10 lb)., Disp: 90 tablet, Rfl: 0  •  irbesartan (AVAPRO) 300 MG tablet, Take 300 mg by mouth As Needed., Disp: , Rfl:   •  melatonin 1 MG tablet, Take 3 mg by mouth Every Night., Disp: , Rfl:   •  pantoprazole (PROTONIX) 20 MG EC tablet, 1 po in the am 30 minutes before breakfast., Disp: 90 tablet, Rfl: 3  •  potassium chloride (K-DUR,KLOR-CON) 20 MEQ CR tablet, Take 1 tablet by mouth Daily., Disp: 90 tablet, Rfl: 3  •  rOPINIRole  "(Requip) 0.5 MG tablet, TAKE 1/2 TO 1 TAB PO QHS PRN RESTLESS LEG SYNDROME.Take 1 hour before bedtime., Disp: 90 tablet, Rfl: 0  •  temazepam (RESTORIL) 15 MG capsule, Take 1 capsule by mouth At Night As Needed for Sleep., Disp: 30 capsule, Rfl: 2    Allergies   Allergen Reactions   • Atorvastatin Myalgia   • Ciprofloxacin Diarrhea   • Pravastatin Myalgia        reports that he quit smoking about 47 years ago. His smoking use included cigarettes. He started smoking about 57 years ago. He has a 10.00 pack-year smoking history. He has never used smokeless tobacco.    Family History   Problem Relation Age of Onset   • Lung cancer Mother    • Osteoporosis Mother    • Thyroid disease Mother         mother   • Cancer Mother         Lung Cancer   • Early death Mother         46 yrs.   • Hearing loss Mother         mother   • Vision loss Mother         mother   • Heart disease Mother    • Cancer Father         Prostate Cancer   • Heart disease Father         Pacemaker   • Vision loss Father    • Heart disease Sister    • Heart failure Brother    • Heart disease Brother    • Cancer Sister         Breast Cancer   • Vision loss Brother    • Heart disease Brother    • Arrhythmia Brother    • Heart failure Brother    • Early death Sister          Around 40yrs.   • Early death Maternal Grandmother         Took Mother off.   • Heart disease Maternal Grandmother          Objective:   Blood pressure 90/58, pulse 60, temperature 98.6 °F (37 °C), height 175.3 cm (69\"), weight 92.1 kg (203 lb), SpO2 98 %.  CONSTITUTIONAL: No acute distress, normal affect  RESPIRATORY: Normal effort. Clear to auscultation bilaterally without wheezing or rales.  CARDIOVASCULAR:Regular rate and rhythm with normal S1 and S2. Without gallop or rub.  RIRI at RUSB.  Carotids with normal upstrokes and without bruits.  PERIPHERAL VASCULAR: Normal radial pulses bilaterally.  There is mild lower extremity edema bilaterally.    Labs:    Glucose   Date Value Ref " Range Status   10/04/2018 110 (H) 74 - 98 mg/dL Final     BUN   Date Value Ref Range Status   05/21/2020 19 8 - 23 mg/dL Final   10/04/2018 11 7 - 20 mg/dL Final     Creatinine   Date Value Ref Range Status   05/21/2020 1.14 0.76 - 1.27 mg/dL Final   03/28/2019 1.00 0.60 - 1.30 mg/dL Final     Comment:     Serial Number: [id number]:  [id number]     Sodium   Date Value Ref Range Status   05/21/2020 140 136 - 145 mmol/L Final   10/04/2018 138 137 - 145 mmol/L Final     Potassium   Date Value Ref Range Status   05/21/2020 5.1 3.5 - 5.2 mmol/L Final   10/04/2018 4.3 3.5 - 5.1 mmol/L Final     Chloride   Date Value Ref Range Status   05/21/2020 103 98 - 107 mmol/L Final   10/04/2018 111 (H) 98 - 107 mmol/L Final     CO2   Date Value Ref Range Status   10/04/2018 23.0 (L) 26.0 - 30.0 mmol/L Final     Total CO2   Date Value Ref Range Status   05/21/2020 26.5 22.0 - 29.0 mmol/L Final     Calcium   Date Value Ref Range Status   05/21/2020 10.1 8.6 - 10.5 mg/dL Final   10/04/2018 8.5 8.4 - 10.2 mg/dL Final     Total Protein   Date Value Ref Range Status   09/22/2018 6.5 5.7 - 8.2 g/dL Final     Albumin   Date Value Ref Range Status   05/21/2020 4.50 3.50 - 5.20 g/dL Final   09/22/2018 4.31 3.20 - 4.80 g/dL Final     ALT (SGPT)   Date Value Ref Range Status   05/21/2020 38 1 - 41 U/L Final   09/22/2018 24 7 - 40 U/L Final     AST (SGOT)   Date Value Ref Range Status   05/21/2020 32 1 - 40 U/L Final   09/22/2018 33 0 - 33 U/L Final     Alkaline Phosphatase   Date Value Ref Range Status   05/21/2020 75 39 - 117 U/L Final   09/22/2018 51 25 - 100 U/L Final     Total Bilirubin   Date Value Ref Range Status   05/21/2020 0.7 0.2 - 1.2 mg/dL Final   09/22/2018 0.7 0.3 - 1.2 mg/dL Final     eGFR Non  Am   Date Value Ref Range Status   05/21/2020 62 >60 mL/min/1.73 Final     Comment:     The MDRD GFR formula is only valid for adults with stable  renal function between ages 18 and 70.       eGFR Non  Amer   Date Value Ref  Range Status   10/04/2018 110 >60 mL/min/1.73 Final     A/G Ratio   Date Value Ref Range Status   05/21/2020 1.9 g/dL Final     BUN/Creatinine Ratio   Date Value Ref Range Status   05/21/2020 16.7 7.0 - 25.0 Final   10/04/2018 15.7 6.3 - 21.9 Final     Anion Gap   Date Value Ref Range Status   10/04/2018 8.3 (L) 10.0 - 20.0 mmol/L Final       No results found for: CHOL  Lab Results   Component Value Date    TRIG 88 03/17/2020    TRIG 107 01/08/2019    TRIG 121 03/14/2018     Lab Results   Component Value Date    HDL 41 03/17/2020    HDL 39 (L) 01/08/2019    HDL 46 03/14/2018     No components found for: LDLCALC  Lab Results   Component Value Date    VLDL 17.6 03/17/2020    VLDL 21.4 01/08/2019    VLDL 24.2 03/14/2018     No results found for: LDLHDL    Diagnostic Data:    Procedures    DEVICE INTERROGATION: [place] dual-chamber PPM, Interrogation date 9/30/2020-   RA pacing 94%, RV pacing 1.5%. P wave is 0.7 mV with a threshold of 0.62 V at 0.5 msec and an impedance of 460 ohms. R wave is 6.1 mV with a threshold of 1.37 V at 0.5 msec and an impedance of 630 ohms. Battery voltage is 1.5 years.  Underlying rhythm a sensed/V sensed at 53 bpm.  One high ventricular rate.  No programming changes.    DEVICE INTERROGATION: [place] PPM, Interrogation date 3/9/2020-   RA pacing 91 %, RV pacing 2.7 %. P wave is 0.8 mV with a threshold of 0.8 V at 0.5 msec and an impedance of 480 ohms. R wave is 6.3 mV with a threshold of 1.25 V at 0.5 msec and an impedance of 640 ohms. Battery voltage is 2.78 V.<1% mode switching with the maximum duration being 16 minutes in the maximum rate being 131.      Carotid Duplex 9/2018  Moderate plaque in the right carotid bulb and proximal ICA producing a  50-69% stenosis.    [facility] 2004  - Minimal coronary artery disease and normal left ventricular function     TTE 9/2017  · Left ventricular systolic function is normal. Estimated EF = 60%.  · Left ventricular diastolic dysfunction (grade I a)  consistent with impaired relaxation.  · Trace-to-mild aortic valve regurgitation is present  · Mild tricuspid valve regurgitation is present    Assessment and Plan:   Saad was seen today for coronary artery disease.    Diagnoses and all orders for this visit:    AV gunnar re-entry tachycardia (CMS/HCC)  SSS (sick sinus syndrome) (CMS/HCC)  Atrial tachycardia  -Stable pacemaker interrogation this visit.  -Battery voltage at 1.5 years.  -Repeat interrogation at time of next visit.    Coronary artery disease involving native coronary artery of native heart without angina pectoris, dyslipidemia  -No anginal symptoms.  -Myalgias noted with atorvastatin and pravastatin.  Patient reports trying a third agent with similar symptoms, but is unsure of the name.  - on 3/17/2020.    Chronic diastolic congestive heart failure (CMS/HCC)  -EF 60% on 9/20/2017  -Continue Lasix as needed.  Has been taking it on average every other day.    Essential hypertension  -Stable.  Rarely taking irbesartan    Carotid Stenosis  -50-69% stenosis of the right carotid bulb and proximal ICA in 9/2018  -Repeat carotid duplex US.    Trace to mild aortic regurgitation  -Murmur noted today. Persistent volume retention. Chronic dyspnea  -Repeat transthoracic echocardiogram.    - Return in about 6 months (around 3/30/2021) for Next scheduled follow up with [facility].     Scribed for Atilio Wall MD by Soheila Rodgers, APRN   9/30/2020  16:05 EDT    I, Atilio Wall MD, personally performed the services as scribed by the above named individual. I have made any necessary edits and it is both accurate and complete.     Atilio Wall MD, MSc, [facility]  Interventional Cardiology  [facility] at [facility]

## 2020-10-09 ENCOUNTER — OFFICE VISIT (OUTPATIENT)
Dept: PULMONOLOGY | Facility: CLINIC | Age: 75
End: 2020-10-09

## 2020-10-09 VITALS
RESPIRATION RATE: 16 BRPM | TEMPERATURE: 98 F | HEIGHT: 69 IN | BODY MASS INDEX: 30.81 KG/M2 | HEART RATE: 81 BPM | SYSTOLIC BLOOD PRESSURE: 120 MMHG | DIASTOLIC BLOOD PRESSURE: 76 MMHG | WEIGHT: 208 LBS | OXYGEN SATURATION: 98 %

## 2020-10-09 DIAGNOSIS — J30.9 ALLERGIC RHINITIS, UNSPECIFIED SEASONALITY, UNSPECIFIED TRIGGER: ICD-10-CM

## 2020-10-09 DIAGNOSIS — G47.33 OSA (OBSTRUCTIVE SLEEP APNEA): Primary | ICD-10-CM

## 2020-10-09 DIAGNOSIS — G47.00 INSOMNIA, UNSPECIFIED TYPE: ICD-10-CM

## 2020-10-09 DIAGNOSIS — G47.19 EXCESSIVE DAYTIME SLEEPINESS: ICD-10-CM

## 2020-10-09 PROCEDURE — 99214 OFFICE O/P EST MOD 30 MIN: CPT | Performed by: NURSE PRACTITIONER

## 2020-10-09 RX ORDER — TEMAZEPAM 15 MG/1
15 CAPSULE ORAL NIGHTLY PRN
Qty: 90 CAPSULE | Refills: 0 | Status: SHIPPED | OUTPATIENT
Start: 2020-10-09 | End: 2021-01-07 | Stop reason: SDUPTHER

## 2020-10-09 RX ORDER — FLUNISOLIDE 0.25 MG/ML
1 SOLUTION NASAL EVERY 12 HOURS
Qty: 3 BOTTLE | Refills: 3 | Status: SHIPPED | OUTPATIENT
Start: 2020-10-09 | End: 2022-11-14

## 2020-10-09 RX ORDER — AZELASTINE 1 MG/ML
1 SPRAY, METERED NASAL 2 TIMES DAILY PRN
Qty: 3 EACH | Refills: 3 | Status: SHIPPED | OUTPATIENT
Start: 2020-10-09 | End: 2021-11-03 | Stop reason: SDUPTHER

## 2020-10-09 NOTE — PROGRESS NOTES
"Chief Complaint   Patient presents with   • Follow-up   • Sleeping Problem         Subjective   Saad Meier is a 75 y.o. male.     History of Present Illness   Patient comes back today for follow up of Obstructive Sleep apnea. he doesn't report any issues with the device or mask.     Patient says that he is compliant with his device and using it regularly.    Patient's symptoms of sleep disturbance and daytime sleepiness have been helped greatly with the use of PAP device, as prescribed.     He takes Restoril at night to help him sleep. He states he continues to have difficulty going to sleep but the restoril helps some. He sleeps 3-4 hours a night he estimates.    He states that many things keep him awake including organic foods.     He uses the nasal spray on a regular basis and feels it helps.    The following portions of the patient's history were reviewed and updated as appropriate: allergies, current medications, past family history, past medical history, past social history and past surgical history.    Review of Systems   Constitutional: Negative for chills and fever.   HENT: Negative for rhinorrhea, sinus pressure, sneezing and sore throat.    Respiratory: Negative for cough, chest tightness, shortness of breath and wheezing.    Psychiatric/Behavioral: Positive for sleep disturbance.       Objective   Visit Vitals  /76   Pulse 81   Temp 98 °F (36.7 °C)   Resp 16   Ht 175.3 cm (69\")   Wt 94.3 kg (208 lb)   SpO2 98%   BMI 30.72 kg/m²         Physical Exam  Vitals signs reviewed.   Constitutional:       Appearance: He is well-developed.   HENT:      Head: Atraumatic.      Mouth/Throat:      Comments: Crowded oropharynx. Denture present.  Eyes:      Extraocular Movements: Extraocular movements intact.   Musculoskeletal:      Comments: Gait slow.   Neurological:      Mental Status: He is alert and oriented to person, place, and time.           Assessment/Plan   Saad was seen today for follow-up and " sleeping problem.    Diagnoses and all orders for this visit:    LULU (obstructive sleep apnea)    Excessive daytime sleepiness    Insomnia, unspecified type  -     temazepam (RESTORIL) 15 MG capsule; Take 1 capsule by mouth At Night As Needed for Sleep.    Allergic rhinitis, unspecified seasonality, unspecified trigger  -     flunisolide (NASALIDE) 25 MCG/ACT (0.025%) solution nasal spray; Inhale 1 spray Every 12 (Twelve) Hours.  -     azelastine (ASTELIN) 0.1 % nasal spray; 1 spray into the nostril(s) as directed by provider 2 (Two) Times a Day As Needed for Rhinitis or Allergies. Use in each nostril as directed           Return for keep appt in January.    DISCUSSION (if any):  Continue treatment with CPAP at a pressure of 8. He cannot remember the type of face mask he uses.    Patient's compliance data was reviewed and the compliance is greater than 70%.    Humidification setup, hose and mask care discussed.    Weight loss advised.    Use every night for atleast 4 hours stressed.    He is getting some benefit from Restoril so I will have him continue the medication.    I spent a total of 25 minutes face to face with this patient. his  pertinent current and previous data, as applicable, were reviewed. Patient's diagnostic studies were also reviewed. More than 16 minutes of the time spent, was spent in counseling about patient's underlying disease process and medication used for insomnia.     PDMP was reviewed.    Dictated utilizing Dragon dictation.    This document was electronically signed by JUSTA Weiss October 9, 2020  10:51 EDT

## 2020-10-16 PROCEDURE — U0004 COV-19 TEST NON-CDC HGH THRU: HCPCS | Performed by: NURSE PRACTITIONER

## 2020-10-19 ENCOUNTER — TELEPHONE (OUTPATIENT)
Dept: URGENT CARE | Facility: CLINIC | Age: 75
End: 2020-10-19

## 2020-10-19 DIAGNOSIS — N30.01 ACUTE CYSTITIS WITH HEMATURIA: Primary | ICD-10-CM

## 2020-10-19 RX ORDER — SULFAMETHOXAZOLE AND TRIMETHOPRIM 800; 160 MG/1; MG/1
TABLET ORAL
Qty: 14 TABLET | Refills: 0 | OUTPATIENT
Start: 2020-10-19 | End: 2020-12-01

## 2020-10-26 ENCOUNTER — HOSPITAL ENCOUNTER (OUTPATIENT)
Dept: CARDIOLOGY | Facility: HOSPITAL | Age: 75
Discharge: HOME | End: 2020-10-26

## 2020-10-26 DIAGNOSIS — I65.21 STENOSIS OF RIGHT CAROTID ARTERY: ICD-10-CM

## 2020-10-26 DIAGNOSIS — I35.1 NONRHEUMATIC AORTIC VALVE INSUFFICIENCY: ICD-10-CM

## 2020-10-26 PROCEDURE — 93306 TTE W/DOPPLER COMPLETE: CPT | Performed by: INTERNAL MEDICINE

## 2020-10-26 PROCEDURE — 93306 TTE W/DOPPLER COMPLETE: CPT

## 2020-10-26 PROCEDURE — 93880 EXTRACRANIAL BILAT STUDY: CPT

## 2020-10-26 PROCEDURE — 93880 EXTRACRANIAL BILAT STUDY: CPT | Performed by: INTERNAL MEDICINE

## 2020-10-27 ENCOUNTER — TELEPHONE (OUTPATIENT)
Dept: CARDIOLOGY | Facility: CLINIC | Age: 75
End: 2020-10-27

## 2020-10-27 LAB
ASCENDING AORTA: 3.5 CM
BH CV ECHO MEAS - AO MAX PG (FULL): 4.2 MMHG
BH CV ECHO MEAS - AO MAX PG: 9 MMHG
BH CV ECHO MEAS - AO MEAN PG (FULL): 2 MMHG
BH CV ECHO MEAS - AO MEAN PG: 4 MMHG
BH CV ECHO MEAS - AO ROOT AREA (BSA CORRECTED): 1.9
BH CV ECHO MEAS - AO ROOT AREA: 11.9 CM^2
BH CV ECHO MEAS - AO ROOT DIAM: 3.9 CM
BH CV ECHO MEAS - AO V2 MAX: 146 CM/SEC
BH CV ECHO MEAS - AO V2 MEAN: 98.3 CM/SEC
BH CV ECHO MEAS - AO V2 VTI: 32.7 CM
BH CV ECHO MEAS - ASC AORTA: 3.5 CM
BH CV ECHO MEAS - AVA(I,A): 2.5 CM^2
BH CV ECHO MEAS - AVA(I,D): 2.5 CM^2
BH CV ECHO MEAS - AVA(V,A): 2.6 CM^2
BH CV ECHO MEAS - AVA(V,D): 2.6 CM^2
BH CV ECHO MEAS - BSA(HAYCOCK): 2.1 M^2
BH CV ECHO MEAS - BSA(HAYCOCK): 2.1 M^2
BH CV ECHO MEAS - BSA: 2.1 M^2
BH CV ECHO MEAS - BSA: 2.1 M^2
BH CV ECHO MEAS - BZI_BMI: 29.5 KILOGRAMS/M^2
BH CV ECHO MEAS - BZI_BMI: 29.5 KILOGRAMS/M^2
BH CV ECHO MEAS - BZI_METRIC_HEIGHT: 175.3 CM
BH CV ECHO MEAS - BZI_METRIC_HEIGHT: 175.3 CM
BH CV ECHO MEAS - BZI_METRIC_WEIGHT: 90.7 KG
BH CV ECHO MEAS - BZI_METRIC_WEIGHT: 90.7 KG
BH CV ECHO MEAS - EDV(CUBED): 49.8 ML
BH CV ECHO MEAS - EDV(MOD-SP2): 89 ML
BH CV ECHO MEAS - EDV(MOD-SP4): 64 ML
BH CV ECHO MEAS - EDV(TEICH): 57.4 ML
BH CV ECHO MEAS - EF(CUBED): 70.5 %
BH CV ECHO MEAS - EF(MOD-BP): 61 %
BH CV ECHO MEAS - EF(MOD-SP2): 59.6 %
BH CV ECHO MEAS - EF(MOD-SP4): 62.5 %
BH CV ECHO MEAS - EF(TEICH): 63 %
BH CV ECHO MEAS - ESV(CUBED): 14.7 ML
BH CV ECHO MEAS - ESV(MOD-SP2): 36 ML
BH CV ECHO MEAS - ESV(MOD-SP4): 24 ML
BH CV ECHO MEAS - ESV(TEICH): 21.2 ML
BH CV ECHO MEAS - FS: 33.4 %
BH CV ECHO MEAS - IVS/LVPW: 0.87
BH CV ECHO MEAS - IVSD: 0.92 CM
BH CV ECHO MEAS - LA DIMENSION: 2.9 CM
BH CV ECHO MEAS - LA/AO: 0.74
BH CV ECHO MEAS - LAD MAJOR: 5.6 CM
BH CV ECHO MEAS - LATERAL E/E' RATIO: 5.6
BH CV ECHO MEAS - LV DIASTOLIC VOL/BSA (35-75): 31 ML/M^2
BH CV ECHO MEAS - LV MASS(C)D: 108.9 GRAMS
BH CV ECHO MEAS - LV MASS(C)DI: 52.7 GRAMS/M^2
BH CV ECHO MEAS - LV MAX PG: 4.8 MMHG
BH CV ECHO MEAS - LV MEAN PG: 2 MMHG
BH CV ECHO MEAS - LV SYSTOLIC VOL/BSA (12-30): 11.6 ML/M^2
BH CV ECHO MEAS - LV V1 MAX: 110 CM/SEC
BH CV ECHO MEAS - LV V1 MEAN: 67.8 CM/SEC
BH CV ECHO MEAS - LV V1 VTI: 24 CM
BH CV ECHO MEAS - LVIDD: 3.7 CM
BH CV ECHO MEAS - LVIDS: 2.5 CM
BH CV ECHO MEAS - LVLD AP2: 8.4 CM
BH CV ECHO MEAS - LVLD AP4: 8.1 CM
BH CV ECHO MEAS - LVLS AP2: 7 CM
BH CV ECHO MEAS - LVLS AP4: 6.6 CM
BH CV ECHO MEAS - LVOT AREA (M): 3.5 CM^2
BH CV ECHO MEAS - LVOT AREA: 3.5 CM^2
BH CV ECHO MEAS - LVOT DIAM: 2.1 CM
BH CV ECHO MEAS - LVPWD: 1.1 CM
BH CV ECHO MEAS - MED PEAK E' VEL: 6.9 CM/SEC
BH CV ECHO MEAS - MEDIAL E/E' RATIO: 8.8
BH CV ECHO MEAS - MV A MAX VEL: 101 CM/SEC
BH CV ECHO MEAS - MV DEC SLOPE: 155 CM/SEC^2
BH CV ECHO MEAS - MV DEC TIME: 0.54 SEC
BH CV ECHO MEAS - MV E MAX VEL: 60.7 CM/SEC
BH CV ECHO MEAS - MV E/A: 0.6
BH CV ECHO MEAS - MV P1/2T MAX VEL: 72.8 CM/SEC
BH CV ECHO MEAS - MV P1/2T: 137.6 MSEC
BH CV ECHO MEAS - MVA P1/2T LCG: 3 CM^2
BH CV ECHO MEAS - MVA(P1/2T): 1.6 CM^2
BH CV ECHO MEAS - PA ACC SLOPE: 1003 CM/SEC^2
BH CV ECHO MEAS - PA ACC TIME: 0.1 SEC
BH CV ECHO MEAS - PA PR(ACCEL): 34 MMHG
BH CV ECHO MEAS - PI END-D VEL: 130 CM/SEC
BH CV ECHO MEAS - RAP SYSTOLE: 8 MMHG
BH CV ECHO MEAS - RVSP: 24 MMHG
BH CV ECHO MEAS - SI(AO): 189.1 ML/M^2
BH CV ECHO MEAS - SI(CUBED): 17 ML/M^2
BH CV ECHO MEAS - SI(LVOT): 40.2 ML/M^2
BH CV ECHO MEAS - SI(MOD-SP2): 25.7 ML/M^2
BH CV ECHO MEAS - SI(MOD-SP4): 19.4 ML/M^2
BH CV ECHO MEAS - SI(TEICH): 17.5 ML/M^2
BH CV ECHO MEAS - SV(AO): 390.6 ML
BH CV ECHO MEAS - SV(CUBED): 35.1 ML
BH CV ECHO MEAS - SV(LVOT): 83.1 ML
BH CV ECHO MEAS - SV(MOD-SP2): 53 ML
BH CV ECHO MEAS - SV(MOD-SP4): 40 ML
BH CV ECHO MEAS - SV(TEICH): 36.2 ML
BH CV ECHO MEAS - TAPSE (>1.6): 2.2 CM
BH CV ECHO MEAS - TR MAX PG: 16 MMHG
BH CV ECHO MEAS - TR MAX VEL: 205 CM/SEC
BH CV XLRA - RV BASE: 3.6 CM
BH CV XLRA - RV LENGTH: 5.8 CM
BH CV XLRA - RV MID: 2.9 CM
BH CV XLRA MEAS LEFT DIST CCA EDV: 24.6 CM/SEC
BH CV XLRA MEAS LEFT DIST CCA PSV: 88.4 CM/SEC
BH CV XLRA MEAS LEFT DIST ICA EDV: 28.3 CM/SEC
BH CV XLRA MEAS LEFT DIST ICA PSV: 82.1 CM/SEC
BH CV XLRA MEAS LEFT ICA/CCA RATIO: 0.95
BH CV XLRA MEAS LEFT MID CCA EDV: 30 CM/SEC
BH CV XLRA MEAS LEFT MID CCA PSV: 95.8 CM/SEC
BH CV XLRA MEAS LEFT MID ICA EDV: 25.5 CM/SEC
BH CV XLRA MEAS LEFT MID ICA PSV: 84.1 CM/SEC
BH CV XLRA MEAS LEFT PROX CCA EDV: 20.6 CM/SEC
BH CV XLRA MEAS LEFT PROX CCA PSV: 89.4 CM/SEC
BH CV XLRA MEAS LEFT PROX ECA EDV: 14.3 CM/SEC
BH CV XLRA MEAS LEFT PROX ECA PSV: 106 CM/SEC
BH CV XLRA MEAS LEFT PROX ICA EDV: 19.9 CM/SEC
BH CV XLRA MEAS LEFT PROX ICA PSV: 58.3 CM/SEC
BH CV XLRA MEAS LEFT PROX SCLA EDV: 0 CM/SEC
BH CV XLRA MEAS LEFT PROX SCLA PSV: 56.3 CM/SEC
BH CV XLRA MEAS LEFT VERTEBRAL A EDV: 14.1 CM/SEC
BH CV XLRA MEAS LEFT VERTEBRAL A PSV: 41.8 CM/SEC
BH CV XLRA MEAS RIGHT DIST CCA EDV: 25.4 CM/SEC
BH CV XLRA MEAS RIGHT DIST CCA PSV: 87.7 CM/SEC
BH CV XLRA MEAS RIGHT DIST ICA EDV: 26.7 CM/SEC
BH CV XLRA MEAS RIGHT DIST ICA PSV: 72.7 CM/SEC
BH CV XLRA MEAS RIGHT ICA/CCA RATIO: 2.2
BH CV XLRA MEAS RIGHT MID CCA EDV: 28.1 CM/SEC
BH CV XLRA MEAS RIGHT MID CCA PSV: 87.7 CM/SEC
BH CV XLRA MEAS RIGHT MID ICA EDV: 27.7 CM/SEC
BH CV XLRA MEAS RIGHT MID ICA PSV: 93.7 CM/SEC
BH CV XLRA MEAS RIGHT PROX CCA EDV: 24.8 CM/SEC
BH CV XLRA MEAS RIGHT PROX CCA PSV: 94.3 CM/SEC
BH CV XLRA MEAS RIGHT PROX ECA EDV: 31.4 CM/SEC
BH CV XLRA MEAS RIGHT PROX ECA PSV: 143 CM/SEC
BH CV XLRA MEAS RIGHT PROX ICA EDV: 53.9 CM/SEC
BH CV XLRA MEAS RIGHT PROX ICA PSV: 191 CM/SEC
BH CV XLRA MEAS RIGHT PROX SCLA EDV: 0 CM/SEC
BH CV XLRA MEAS RIGHT PROX SCLA PSV: 117 CM/SEC
BH CV XLRA MEAS RIGHT VERTEBRAL A EDV: 9.8 CM/SEC
BH CV XLRA MEAS RIGHT VERTEBRAL A PSV: 29.5 CM/SEC
LV EF 2D ECHO EST: 60 %

## 2020-10-27 NOTE — TELEPHONE ENCOUNTER
----- Message from JUSTA Zaldivar sent at 10/27/2020  1:02 PM EDT -----  Can you let him know that his carotid duplex and TTE were stable when compared to his prior test. He should continue his current cardiac medications.

## 2020-10-27 NOTE — TELEPHONE ENCOUNTER
Attempted to contact patient regarding carotid duplex and TTE results. No answer, no voicemail available.

## 2020-11-04 DIAGNOSIS — G47.00 INSOMNIA, UNSPECIFIED TYPE: ICD-10-CM

## 2020-11-04 RX ORDER — TEMAZEPAM 15 MG/1
15 CAPSULE ORAL NIGHTLY PRN
Qty: 90 CAPSULE | Refills: 0 | Status: CANCELLED | OUTPATIENT
Start: 2020-11-04

## 2020-11-04 NOTE — TELEPHONE ENCOUNTER
Patient stated that he is needing to get his temazepam, it was sent to [facility] instead of [facility] pharmacy.

## 2020-12-01 PROCEDURE — U0004 COV-19 TEST NON-CDC HGH THRU: HCPCS | Performed by: FAMILY MEDICINE

## 2020-12-08 ENCOUNTER — HOSPITAL ENCOUNTER (OUTPATIENT)
Dept: GENERAL RADIOLOGY | Facility: HOSPITAL | Age: 75
Discharge: HOME | End: 2020-12-08
Admitting: INTERNAL MEDICINE

## 2020-12-08 ENCOUNTER — OFFICE VISIT (OUTPATIENT)
Dept: INTERNAL MEDICINE | Facility: CLINIC | Age: 75
End: 2020-12-08

## 2020-12-08 VITALS
BODY MASS INDEX: 30.36 KG/M2 | TEMPERATURE: 98.9 F | DIASTOLIC BLOOD PRESSURE: 79 MMHG | HEART RATE: 63 BPM | WEIGHT: 205 LBS | RESPIRATION RATE: 17 BRPM | HEIGHT: 69 IN | OXYGEN SATURATION: 99 % | SYSTOLIC BLOOD PRESSURE: 152 MMHG

## 2020-12-08 DIAGNOSIS — M79.18 INTERCOSTAL MUSCLE PAIN: ICD-10-CM

## 2020-12-08 DIAGNOSIS — R07.9 RIGHT-SIDED CHEST PAIN: Primary | ICD-10-CM

## 2020-12-08 DIAGNOSIS — R07.9 RIGHT-SIDED CHEST PAIN: ICD-10-CM

## 2020-12-08 DIAGNOSIS — R10.11 RUQ ABDOMINAL PAIN: ICD-10-CM

## 2020-12-08 PROCEDURE — 99214 OFFICE O/P EST MOD 30 MIN: CPT | Performed by: INTERNAL MEDICINE

## 2020-12-08 PROCEDURE — 71046 X-RAY EXAM CHEST 2 VIEWS: CPT

## 2020-12-08 RX ORDER — CYCLOBENZAPRINE HCL 5 MG
5 TABLET ORAL 2 TIMES DAILY PRN
Qty: 40 TABLET | Refills: 2 | Status: SHIPPED | OUTPATIENT
Start: 2020-12-08 | End: 2021-06-02

## 2020-12-08 NOTE — PROGRESS NOTES
Chief Complaint   Patient presents with   • Abdominal Pain     Right sided pain along lower rib cage-has had some mild tenderness for a couple of months, but this am he had sharp pains       Subjective     History of Present Illness   Saad Meier is a 75 y.o. male presenting for follow up with concerns of right abdominal pain and lower rib pain which is intermittently bothered him at times but was significantly worse yesterday night and around 5 AM today.  Pain woke him up from sleep.  He did not eat anything unusual yesterday.  He recalls eating a pack of cheese.  He does have a known large ventral abdominal hernia.    The following portions of the patient's history were reviewed and updated as appropriate: allergies, current medications, past family history, past medical history, past social history, past surgical history and problem list.    Review of Systems   Constitutional: Negative for chills, fatigue and fever.   HENT: Negative for congestion, ear pain, rhinorrhea, sinus pressure and sore throat.    Eyes: Negative for visual disturbance.   Respiratory: Negative for cough, chest tightness, shortness of breath and wheezing.    Cardiovascular: Negative for chest pain, palpitations and leg swelling.   Gastrointestinal: Negative for abdominal pain, blood in stool, constipation, diarrhea, nausea and vomiting.   Endocrine: Negative for polydipsia and polyuria.   Genitourinary: Negative for dysuria and hematuria.   Musculoskeletal: Negative for arthralgias and back pain.   Skin: Negative for rash.   Neurological: Negative for dizziness, light-headedness, numbness and headaches.   Psychiatric/Behavioral: Negative for dysphoric mood and sleep disturbance. The patient is not nervous/anxious.        Allergies   Allergen Reactions   • Atorvastatin Myalgia   • Ciprofloxacin Diarrhea   • Pravastatin Myalgia       Past Medical History:   Diagnosis Date   • Allergic 25yrs.    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   • BPH (benign prostatic hypertrophy)    • CAD (coronary artery disease) 3/6/2017   • Cancer (CMS/HCC) 2012    colon   • Cardiac arrhythmia    • Chronic diastolic congestive heart failure (CMS/HCC)    • Colon polyp    • Diverticulosis    • Essential hypertension 3/14/2018   • GERD (gastroesophageal reflux disease)    • History of blood transfusion    • History of colonoscopy     Complete   • History of echocardiogram    • History of esophagogastroduodenoscopy     Diagnostic   • History of Holter monitoring    • History of stress test     PT UNSURE OF DATE OR TYPE    • History of stress test     PT STATES \"LONG TIME AGO BUT IT WAS OK\"   • HL (hearing loss)    • Hyperlipidemia    • Infection of kidney    • Iron deficiency    • Obesity    • Poor historian    • Sleep apnea    • Stenosis of right carotid artery    • Visual impairment !(97    Reading Glasses       Social History     Socioeconomic History   • Marital status: Single     Spouse name: Not on file   • Number of children: Not on file   • Years of education: Not on file   • Highest education level: Not on file   Tobacco Use   • Smoking status: Former Smoker     Packs/day: 1.00     Years: 10.00     Pack years: 10.00     Types: Cigarettes     Start date: 1963     Quit date:      Years since quittin.9   • Smokeless tobacco: Never Used   Substance and Sexual Activity   • Alcohol use: No   • Drug use: No   • Sexual activity: Not Currently     Partners: Female   Social History Narrative    Caffeine: 2 servings per day    Patient lives at his home alone        Past Surgical History:   Procedure Laterality Date   • CARDIAC ABLATION  2012   • CARDIAC CATHETERIZATION     • CARDIAC PACEMAKER PLACEMENT     • COLON SURGERY     • COLONOSCOPY  2015   • COLONOSCOPY N/A 2019    Procedure: COLONOSCOPY W/ HOT " BIOPSY POLYPECTOMY X3;  Surgeon: Jacky Walker MD;  Location: [facility] ENDOSCOPY;  Service: Gastroenterology   • CYSTOSCOPY TRANSURETHRAL RESECTION OF PROSTATE N/A 10/3/2018    Procedure: TRANSURETHRAL RESECTION OF PROSTATE;  Surgeon: Bryce Santo MD;  Location: [facility] OR;  Service: Urology   • ENDOSCOPY     • HEMORRHOIDECTOMY  1970   • HERNIA REPAIR      X 5   • INGUINAL HERNIA REPAIR Bilateral    • INGUINAL HERNIA REPAIR Right 2019    Procedure: INGUINAL HERNIA REPAIR;  Surgeon: Jacky Walker MD;  Location: [facility] OR;  Service: General   • LYMPH NODE BIOPSY  2012    large intestine lymph nodes   • TRANSURETHRAL RESECTION OF BLADDER TUMOR N/A 10/3/2018    Procedure: CYSTOSCOPY TRANSURETHRAL RESECTION OF BLADDER TUMOR, COUDE CATHETER PLACEMENT;  Surgeon: Bryce Santo MD;  Location: [facility] OR;  Service: Urology       Family History   Problem Relation Age of Onset   • Lung cancer Mother    • Osteoporosis Mother    • Thyroid disease Mother         mother   • Cancer Mother         Lung Cancer   • Early death Mother         46 yrs.   • Hearing loss Mother         mother   • Vision loss Mother         mother   • Heart disease Mother    • Cancer Father         Prostate Cancer   • Heart disease Father         Pacemaker   • Vision loss Father    • Heart disease Sister    • Heart failure Brother    • Heart disease Brother    • Cancer Sister         Breast Cancer   • Vision loss Brother    • Heart disease Brother    • Arrhythmia Brother    • Heart failure Brother    • Early death Sister          Around 40yrs.   • Early death Maternal Grandmother         Took Mother off.   • Heart disease Maternal Grandmother          Current Outpatient Medications:   •  albuterol sulfate  (90 Base) MCG/ACT inhaler, Inhale 2 puffs Every 4 (Four) Hours As Needed for Wheezing., Disp: 18 g, Rfl: 0  •  aspirin 81 MG tablet, Take 1 tablet by mouth Daily., Disp: 90 tablet, Rfl: 3  •  azelastine (ASTELIN) 0.1  "% nasal spray, 1 spray into the nostril(s) as directed by provider 2 (Two) Times a Day As Needed for Rhinitis or Allergies. Use in each nostril as directed, Disp: 3 each, Rfl: 3  •  cholecalciferol (VITAMIN D3) 1000 units tablet, Take 2 tablets by mouth Daily., Disp: 30 tablet, Rfl: 2  •  coenzyme Q10 100 MG capsule, Take 300 mg by mouth Daily., Disp: , Rfl:   •  ezetimibe (ZETIA) 10 MG tablet, Take 1 tablet by mouth Daily., Disp: 90 tablet, Rfl: 3  •  flunisolide (NASALIDE) 25 MCG/ACT (0.025%) solution nasal spray, Inhale 1 spray Every 12 (Twelve) Hours., Disp: 3 bottle, Rfl: 3  •  furosemide (LASIX) 40 MG tablet, Take 1 tablet by mouth Daily As Needed (swelling)., Disp: 90 tablet, Rfl: 1  •  irbesartan (AVAPRO) 300 MG tablet, Take 300 mg by mouth As Needed., Disp: , Rfl:   •  melatonin 1 MG tablet, Take 3 mg by mouth Every Night., Disp: , Rfl:   •  pantoprazole (PROTONIX) 20 MG EC tablet, 1 po in the am 30 minutes before breakfast., Disp: 90 tablet, Rfl: 3  •  potassium chloride (K-DUR,KLOR-CON) 20 MEQ CR tablet, Take 1 tablet by mouth Daily., Disp: 90 tablet, Rfl: 3  •  predniSONE (DELTASONE) 20 MG tablet, 1 po daily with food, Disp: 5 tablet, Rfl: 0  •  rOPINIRole (Requip) 0.5 MG tablet, TAKE 1/2 TO 1 TAB PO QHS PRN RESTLESS LEG SYNDROME.Take 1 hour before bedtime., Disp: 90 tablet, Rfl: 0  •  temazepam (RESTORIL) 15 MG capsule, Take 1 capsule by mouth At Night As Needed for Sleep., Disp: 90 capsule, Rfl: 0  •  cyclobenzaprine (FLEXERIL) 5 MG tablet, Take 1 tablet by mouth 2 (Two) Times a Day As Needed for Muscle Spasms., Disp: 40 tablet, Rfl: 2    Objective   /79   Pulse 63   Temp 98.9 °F (37.2 °C) (Infrared)   Resp 17   Ht 175.3 cm (69\")   Wt 93 kg (205 lb)   SpO2 99%   BMI 30.27 kg/m²     Physical Exam  Vitals signs and nursing note reviewed.   Constitutional:       Appearance: Normal appearance. He is well-developed.   HENT:      Head: Normocephalic and atraumatic.   Eyes:      Extraocular " Movements: Extraocular movements intact.      Conjunctiva/sclera: Conjunctivae normal.   Neck:      Musculoskeletal: Normal range of motion and neck supple.   Cardiovascular:      Rate and Rhythm: Normal rate and regular rhythm.   Pulmonary:      Effort: Pulmonary effort is normal.      Breath sounds: Normal breath sounds.   Abdominal:      Comments: Large ventral hernia, soft, no redness   Musculoskeletal:      Comments: Mild tenderness between intercostal space on right side lower ribs    Skin:     General: Skin is warm and dry.      Findings: No rash.   Neurological:      General: No focal deficit present.      Mental Status: He is alert and oriented to person, place, and time.   Psychiatric:         Mood and Affect: Mood normal.         Behavior: Behavior normal.         Assessment/Plan   Diagnoses and all orders for this visit:    1. Right-sided chest pain (Primary)  -     XR chest pa and lateral; Future    2. RUQ abdominal pain  -     US Abdomen Complete; Future    3. Intercostal muscle pain  -     cyclobenzaprine (FLEXERIL) 5 MG tablet; Take 1 tablet by mouth 2 (Two) Times a Day As Needed for Muscle Spasms.  Dispense: 40 tablet; Refill: 2          Discussion Summary:  Patient is a 75 y.o. male presenting for follow up with right sided abdominal/chest pain.  Cause seems unclear  Although there is a reproducible tender point along the intercostal space, some symptoms of abdominal pain may suggest gallbladder disease.  Given he has a large ventral hernia, evaluation with US abdomen is reassuring to rule out other causes.      Follow up:  No follow-ups on file.

## 2020-12-08 NOTE — PATIENT INSTRUCTIONS
Chest Wall Pain  Chest wall pain is pain in or around the bones and muscles of your chest. Sometimes, an injury causes this pain. Excessive coughing or overuse of arm and chest muscles may also cause chest wall pain. Sometimes, the cause may not be known. This pain may take several weeks or longer to get better.  Follow these instructions at home:  Managing pain, stiffness, and swelling    · If directed, put ice on the painful area:  ? Put ice in a plastic bag.  ? Place a towel between your skin and the bag.  ? Leave the ice on for 20 minutes, 2-3 times per day.  Activity  · Rest as told by your health care provider.  · Avoid activities that cause pain. These include any activities that use your chest muscles or your abdominal and side muscles to lift heavy items. Ask your health care provider what activities are safe for you.  General instructions    · Take over-the-counter and prescription medicines only as told by your health care provider.  · Do not use any products that contain nicotine or tobacco, such as cigarettes, e-cigarettes, and chewing tobacco. These can delay healing after injury. If you need help quitting, ask your health care provider.  · Keep all follow-up visits as told by your health care provider. This is important.  Contact a health care provider if:  · You have a fever.  · Your chest pain becomes worse.  · You have new symptoms.  Get help right away if:  · You have nausea or vomiting.  · You feel sweaty or light-headed.  · You have a cough with mucus from your lungs (sputum) or you cough up blood.  · You develop shortness of breath.  These symptoms may represent a serious problem that is an emergency. Do not wait to see if the symptoms will go away. Get medical help right away. Call your local emergency services (911 in the [place] Do not drive yourself to the hospital.  Summary  · Chest wall pain is pain in or around the bones and muscles of your chest.  · Depending on the cause, it may be  treated with ice, rest, medicines, and avoiding activities that cause pain.  · Contact a health care provider if you have a fever, worsening chest pain, or new symptoms.  · Get help right away if you feel light-headed or you develop shortness of breath. These symptoms may be an emergency.  This information is not intended to replace advice given to you by your health care provider. Make sure you discuss any questions you have with your health care provider.  Document Revised: 06/20/2019 Document Reviewed: 06/20/2019  Elsevier Patient Education © 2020 [employer]

## 2020-12-17 ENCOUNTER — APPOINTMENT (OUTPATIENT)
Dept: ULTRASOUND IMAGING | Facility: HOSPITAL | Age: 75
End: 2020-12-17

## 2021-01-04 DIAGNOSIS — G47.61 PERIODIC LIMB MOVEMENT SLEEP DISORDER: ICD-10-CM

## 2021-01-04 DIAGNOSIS — R12 HEARTBURN: ICD-10-CM

## 2021-01-04 RX ORDER — ROPINIROLE 0.5 MG/1
TABLET, FILM COATED ORAL
Qty: 90 TABLET | Refills: 3 | Status: SHIPPED | OUTPATIENT
Start: 2021-01-04 | End: 2021-02-26 | Stop reason: SDUPTHER

## 2021-01-04 RX ORDER — PANTOPRAZOLE SODIUM 20 MG/1
TABLET, DELAYED RELEASE ORAL
Qty: 90 TABLET | Refills: 3 | Status: SHIPPED | OUTPATIENT
Start: 2021-01-04 | End: 2021-02-26 | Stop reason: SDUPTHER

## 2021-01-04 NOTE — TELEPHONE ENCOUNTER
Caller: Saad Meier    Relationship: Self    Best call back number: [phone]     Medication needed:   Requested Prescriptions     Pending Prescriptions Disp Refills   • rOPINIRole (Requip) 0.5 MG tablet 90 tablet 0     Sig: TAKE 1/2 TO 1 TAB PO QHS PRN RESTLESS LEG SYNDROME.Take 1 hour before bedtime.   • pantoprazole (PROTONIX) 20 MG EC tablet 90 tablet 3     Si po in the am 30 minutes before breakfast.       When do you need the refill by: ASAP     What details did the patient provide when requesting the medication:   PATIENT CALLED AND STATED THAT INSURANCE SAID HE NEEDS TO CHANGE TO MAIL ORDER PHARMACY AND KEPT SAYING [facility] BUT I COULD NOT FIND A PHARMACY LISTED.  REQUESTING A 90 DAY SUPPLY.      IS  OUT OF  [place]     PATIENT IS REQUESTING A CALL BACK WHEN COMPLETE.     Does the patient have less than a 3 day supply:  [x] Yes  [] No    What is the patient's preferred pharmacy:      PATIENT IS SAYING NEEDS TO GO TO [facility] AND FOR OFFICE TO CALL:   [phone]   [phone]

## 2021-01-07 ENCOUNTER — OFFICE VISIT (OUTPATIENT)
Dept: PULMONOLOGY | Facility: CLINIC | Age: 76
End: 2021-01-07

## 2021-01-07 VITALS
SYSTOLIC BLOOD PRESSURE: 118 MMHG | TEMPERATURE: 97.1 F | BODY MASS INDEX: 31.84 KG/M2 | DIASTOLIC BLOOD PRESSURE: 72 MMHG | HEART RATE: 79 BPM | RESPIRATION RATE: 18 BRPM | OXYGEN SATURATION: 98 % | WEIGHT: 215 LBS | HEIGHT: 69 IN

## 2021-01-07 DIAGNOSIS — G47.00 INSOMNIA, UNSPECIFIED TYPE: ICD-10-CM

## 2021-01-07 DIAGNOSIS — Z72.821 POOR SLEEP HYGIENE: ICD-10-CM

## 2021-01-07 DIAGNOSIS — G47.33 OSA (OBSTRUCTIVE SLEEP APNEA): Primary | ICD-10-CM

## 2021-01-07 PROCEDURE — 99214 OFFICE O/P EST MOD 30 MIN: CPT | Performed by: INTERNAL MEDICINE

## 2021-01-07 RX ORDER — TEMAZEPAM 15 MG/1
30 CAPSULE ORAL NIGHTLY PRN
Qty: 180 CAPSULE | Refills: 2 | Status: SHIPPED | OUTPATIENT
Start: 2021-01-07 | End: 2021-02-22

## 2021-01-07 NOTE — PROGRESS NOTES
"  Chief Complaint   Patient presents with   • Follow-up   • Sleeping Problem       Subjective   Saad Meier is a 75 y.o. male.     History of Present Illness   Patient was evaluated today for follow up of Sleep apnea.     Patient doesn't report any issues with the device. The patient describes no significant issues with his mask either. Patient says that the compliance with the use of the equipment is good.     Patient says that his symptoms of fatigue & daytime sleepiness have been helped only minimally with the use of PAP, as prescribed.     He continues to struggle with going to sleep and also staying asleep.     The following portions of the patient's history were reviewed and updated as appropriate: allergies, current medications, past family history, past medical history, past social history and past surgical history.    Review of Systems   HENT: Negative for sinus pressure, sneezing and sore throat.    Respiratory: Negative for cough, chest tightness, shortness of breath and wheezing.        Objective   Visit Vitals  /72   Pulse 79   Temp 97.1 °F (36.2 °C)   Resp 18   Ht 175.3 cm (69.02\")   Wt 97.5 kg (215 lb)   SpO2 98%   BMI 31.74 kg/m²       Physical Exam  Vitals signs reviewed.   Constitutional:       Appearance: He is well-developed.   HENT:      Head: Atraumatic.      Mouth/Throat:      Comments: Oropharynx was crowded.  Musculoskeletal:      Comments: Gait was normal.   Neurological:      Mental Status: He is alert and oriented to person, place, and time.         Assessment/Plan   Diagnoses and all orders for this visit:    1. LULU (obstructive sleep apnea) (Primary)    2. Insomnia, unspecified type  -     temazepam (RESTORIL) 15 MG capsule; Take 2 capsules by mouth At Night As Needed for Sleep.  Dispense: 180 capsule; Refill: 2    3. Poor sleep hygiene           Return in about 13 weeks (around 4/8/2021) for SleepONNEENA/Johnna, ....Also 6-7 mths w/ Dr. Zimmer.    DISCUSSION (if any):  I " reviewed the results of last sleep study in detail. I informed him that the apnea hypopnea index was 24 / hr. REM AHI was 52/hour.  This was an in lab study. This was done in May 2019.    Sleep hygiene measures were discussed with the patient in great detail.    The patient was told that he will need to follow a strict time to go to bed as well as a fixed time to get out of bed.    Multiple measures were discussed including sleep restriction and he was strongly encouraged to follow other recommendations including avoiding naps, watching TV in the bed etc.    He will need to go to bed at a fixed time.     Due to significant ongoing symptoms, we will increase the Restoril to 30 mg QHS.     If he continues to have issues, we can consider Doxepin.     Side effects of prescribed medication were discussed.    Juan Manuel was reviewed.    Continue treatment with PAP at a pressure of 8.    Patient seems to be compliant with PAP device, based on the available data and his account of improved symptoms.     The patient was once again reminded to continue using the PAP device regularly, every night for atleast 4 hours.    I spent a total of 28 minutes face to face with this patient. his  pertinent current and previous data, as applicable, were reviewed. Patient's diagnostic studies were also reviewed. More than 18 minutes of the time spent, was spent in counseling about patient's underlying disease process, reviewing any available sleep studies, need to use devices (as applicable) on a regular basis and lifestyle modifications that may positively impact patient's health.         Dictated utilizing Dragon dictation.    This document was electronically signed by Neel Zimmer MD on 01/07/21 at 09:45 EST

## 2021-02-22 DIAGNOSIS — G47.00 INSOMNIA, UNSPECIFIED TYPE: Primary | ICD-10-CM

## 2021-02-22 RX ORDER — TEMAZEPAM 15 MG/1
30 CAPSULE ORAL NIGHTLY PRN
Qty: 30 CAPSULE | Refills: 2 | Status: SHIPPED | OUTPATIENT
Start: 2021-02-22 | End: 2021-04-08

## 2021-02-26 DIAGNOSIS — R12 HEARTBURN: ICD-10-CM

## 2021-02-26 DIAGNOSIS — G47.61 PERIODIC LIMB MOVEMENT SLEEP DISORDER: ICD-10-CM

## 2021-02-26 RX ORDER — PANTOPRAZOLE SODIUM 20 MG/1
TABLET, DELAYED RELEASE ORAL
Qty: 90 TABLET | Refills: 3 | Status: SHIPPED | OUTPATIENT
Start: 2021-02-26 | End: 2021-08-10 | Stop reason: SDUPTHER

## 2021-02-26 RX ORDER — EZETIMIBE 10 MG/1
10 TABLET ORAL DAILY
Qty: 90 TABLET | Refills: 3 | Status: SHIPPED | OUTPATIENT
Start: 2021-02-26 | End: 2021-08-10 | Stop reason: SDUPTHER

## 2021-02-26 RX ORDER — UBIDECARENONE 100 MG
300 CAPSULE ORAL DAILY
Qty: 270 CAPSULE | Refills: 3 | Status: SHIPPED | OUTPATIENT
Start: 2021-02-26 | End: 2022-11-14

## 2021-02-26 RX ORDER — ROPINIROLE 0.5 MG/1
TABLET, FILM COATED ORAL
Qty: 90 TABLET | Refills: 3 | Status: SHIPPED | OUTPATIENT
Start: 2021-02-26 | End: 2021-06-02

## 2021-02-26 RX ORDER — IRBESARTAN 300 MG/1
300 TABLET ORAL DAILY
Qty: 90 TABLET | Refills: 3 | Status: SHIPPED | OUTPATIENT
Start: 2021-02-26

## 2021-02-26 RX ORDER — TIZANIDINE 2 MG/1
2 TABLET ORAL NIGHTLY PRN
Qty: 90 TABLET | Refills: 3 | Status: SHIPPED | OUTPATIENT
Start: 2021-02-26 | End: 2021-08-05

## 2021-03-24 ENCOUNTER — TELEPHONE (OUTPATIENT)
Dept: INTERNAL MEDICINE | Facility: CLINIC | Age: 76
End: 2021-03-24

## 2021-03-24 NOTE — TELEPHONE ENCOUNTER
PATIENT STATES THAT HE WOULD LIKE TO CHANGE HIS PRIMARY CARE DOCTOR TO A MALE DOCTOR. THE PATIENT STATES THAT HE DOES NOT GET TO SEE HIS CURRENT PRIMARY CARE DOCTOR BECAUSE SHE IS TOO BUSY. THE PATIENT WOULD LIKE A DOCTOR THAT HAS MORE AVAILABILITY TO SEE HIM. PLEASE CALL PATIENT TO LET HIM KNOW IF HE CAN CHANGE       CALL [phone]

## 2021-04-08 ENCOUNTER — OFFICE VISIT (OUTPATIENT)
Dept: PULMONOLOGY | Facility: CLINIC | Age: 76
End: 2021-04-08

## 2021-04-08 VITALS
HEIGHT: 69 IN | RESPIRATION RATE: 16 BRPM | WEIGHT: 207 LBS | HEART RATE: 62 BPM | SYSTOLIC BLOOD PRESSURE: 128 MMHG | DIASTOLIC BLOOD PRESSURE: 70 MMHG | OXYGEN SATURATION: 97 % | BODY MASS INDEX: 30.66 KG/M2

## 2021-04-08 DIAGNOSIS — G47.00 INSOMNIA, UNSPECIFIED TYPE: Primary | ICD-10-CM

## 2021-04-08 DIAGNOSIS — G47.33 OSA (OBSTRUCTIVE SLEEP APNEA): ICD-10-CM

## 2021-04-08 DIAGNOSIS — Z72.821 POOR SLEEP HYGIENE: ICD-10-CM

## 2021-04-08 PROCEDURE — 99213 OFFICE O/P EST LOW 20 MIN: CPT | Performed by: NURSE PRACTITIONER

## 2021-04-08 RX ORDER — DOXEPIN HYDROCHLORIDE 6 MG/1
6 TABLET ORAL
Qty: 30 TABLET | Refills: 3 | Status: SHIPPED | OUTPATIENT
Start: 2021-04-08 | End: 2021-04-19

## 2021-04-08 NOTE — PROGRESS NOTES
"Chief Complaint   Patient presents with   • Follow-up   • Sleeping Problem         Subjective   Saad Meier is a 75 y.o. male.     History of Present Illness   Patient comes back today for follow up of Obstructive Sleep apnea. he doesn't report any issues with the device or mask.     Patient says that he is compliant with his device and using it regularly.    Patient's symptoms of sleep disturbance and daytime sleepiness have been helped greatly with the use of PAP device, as prescribed.  He reports using the machine every night.  He did not bring the SD card however he thinks he has 1 in the machine.    He states he is still having a great deal of difficulty falling asleep even with the Restoril.  He felt the Restoril initially worked but then it has stopped helping him sleep.  His issue seems to be falling asleep because once he goes to sleep he states he does not have a problem staying asleep.    He reports going to bed between 11 and 1130.  He states that some nights he absolutely cannot go to sleep so he gets back up and watches TV.    The following portions of the patient's history were reviewed and updated as appropriate: allergies, current medications, past family history, past medical history, past social history and past surgical history.    Review of Systems   Constitutional: Negative for chills and fever.   HENT: Negative for rhinorrhea, sinus pressure, sneezing and sore throat.    Respiratory: Negative for cough, shortness of breath and wheezing.    Psychiatric/Behavioral: Positive for sleep disturbance.       Objective   Visit Vitals  /70   Pulse 62   Resp 16   Ht 175.3 cm (69\")   Wt 93.9 kg (207 lb)   SpO2 97%   BMI 30.57 kg/m²         Physical Exam  Vitals reviewed.   Constitutional:       Appearance: He is well-developed.   HENT:      Head: Atraumatic.      Mouth/Throat:      Mouth: Mucous membranes are moist.   Eyes:      Extraocular Movements: Extraocular movements intact.   Abdominal: "      Comments: Obese abdomen.   Musculoskeletal:      Comments: Gait was normal.   Skin:     General: Skin is warm.   Neurological:      Mental Status: He is alert and oriented to person, place, and time.             Assessment/Plan   Diagnoses and all orders for this visit:    1. Insomnia, unspecified type (Primary)    2. LULU (obstructive sleep apnea)    3. Poor sleep hygiene    Other orders  -     Doxepin HCl 6 MG tablet; Take 6 mg by mouth every night at bedtime.  Dispense: 30 tablet; Refill: 3           Return for keep appt in July.    DISCUSSION (if any):  Continue treatment with PAP therapy at the current pressure.    I unfortunately do not have a compliance to review today but we will try to obtain one from his DME company.  We were not able to get a hold of the patient to have him bring in the SD card.    Humidification setup, hose and mask care discussed.    Weight loss advised.    Use every night for at least 4 hours stressed.    For insomnia issues, I will discontinue temazepam since this has not seemed to help and try him on doxepin.  It appears that Silenor is actually approved by his insurance so I will order that at 6 mg nightly.    I have told the patient do not take temazepam and Silenor or doxepin the same night.  He may continue to take melatonin along with Silenor.    I have spent 20 minutes face-to-face with the patient discussing his sleep issues as well as the pathophysiology of obstructive sleep apnea and the importance of using his machine and having proper sleep hygiene.    Dictated utilizing Dragon dictation.    This document was electronically signed by JUSTA Weiss April 8, 2021  14:55 EDT

## 2021-04-19 ENCOUNTER — OFFICE VISIT (OUTPATIENT)
Dept: CARDIOLOGY | Facility: CLINIC | Age: 76
End: 2021-04-19

## 2021-04-19 VITALS
SYSTOLIC BLOOD PRESSURE: 128 MMHG | TEMPERATURE: 96.9 F | HEART RATE: 98 BPM | BODY MASS INDEX: 31.4 KG/M2 | DIASTOLIC BLOOD PRESSURE: 78 MMHG | HEIGHT: 69 IN | WEIGHT: 212 LBS | OXYGEN SATURATION: 97 %

## 2021-04-19 DIAGNOSIS — I47.1 ATRIAL TACHYCARDIA (HCC): ICD-10-CM

## 2021-04-19 DIAGNOSIS — I10 ESSENTIAL HYPERTENSION: ICD-10-CM

## 2021-04-19 DIAGNOSIS — I25.10 CORONARY ARTERY DISEASE INVOLVING NATIVE CORONARY ARTERY OF NATIVE HEART WITHOUT ANGINA PECTORIS: ICD-10-CM

## 2021-04-19 DIAGNOSIS — I65.21 STENOSIS OF RIGHT CAROTID ARTERY: ICD-10-CM

## 2021-04-19 DIAGNOSIS — I49.5 SSS (SICK SINUS SYNDROME) (HCC): ICD-10-CM

## 2021-04-19 DIAGNOSIS — E78.5 DYSLIPIDEMIA: ICD-10-CM

## 2021-04-19 DIAGNOSIS — I50.32 CHRONIC DIASTOLIC CONGESTIVE HEART FAILURE (HCC): ICD-10-CM

## 2021-04-19 DIAGNOSIS — I47.1 AV NODAL RE-ENTRY TACHYCARDIA (HCC): Primary | ICD-10-CM

## 2021-04-19 PROCEDURE — 99214 OFFICE O/P EST MOD 30 MIN: CPT | Performed by: NURSE PRACTITIONER

## 2021-04-19 PROCEDURE — 93280 PM DEVICE PROGR EVAL DUAL: CPT | Performed by: NURSE PRACTITIONER

## 2021-04-19 RX ORDER — TEMAZEPAM 15 MG/1
1 CAPSULE ORAL NIGHTLY
COMMUNITY
Start: 2021-04-13 | End: 2021-07-08 | Stop reason: SDUPTHER

## 2021-04-19 RX ORDER — DOXEPIN HYDROCHLORIDE 3 MG/1
2 TABLET ORAL
COMMUNITY
Start: 2021-04-09 | End: 2021-06-02

## 2021-04-19 NOTE — PROGRESS NOTES
[facility] [address], [address]  [place], [place], [address]    [phone]  [email or web address]           OUTPATIENT CLINIC FOLLOW UP NOTE    Patient Care Team:  Patient Care Team:  Tee Fiore DO as PCP - General (Internal Medicine)  Atilio Wall MD as Consulting Physician (Cardiology)  Luis Hollins MD as Consulting Physician (Urology)  Liddle, Susan E., MD as Consulting Physician (Hematology and Oncology)  Gloria Garcia MD as Consulting Physician (General Surgery)  Elvie Alvarez MD as Consulting Physician (General Surgery)  Jacky Walker MD as Consulting Physician (General Surgery)  Neel Zimmer MD as Consulting Physician (Pulmonary Disease)    Subjective:      Chief Complaint   Patient presents with   • Stenosis of right carotid artery     HPI:    Saad Meier is a 75 y.o. male.  Cardiac problem list:  1. Sick sinus syndrome status post [facility] [place] DC PPM 2004  2. Minimal CAD and normal LVEF by cardiac catheterization 2004  3. 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 Atrial tachycardia  5. Carotid stenosis  6. Colon cancer    The patient presents today for follow-up.       Since the patient was last seen he reports he has been doing well from cardiac standpoint.  He denies chest pain, shortness of breath, lower extremity edema, palpitations, lightheadedness, or syncope.  He is rarely taking irbesartan for elevated BP.    Review of Systems:  Negative for exertional chest pain, dyspnea with exertion, extremity edema, orthopnea, PND, palpitations, lightheadedness, syncope.    PFSH:  Patient Active Problem List   Diagnosis   • Arthritis   • Chronic diastolic congestive heart failure (CMS/HCC)   • Acid reflux   • Juvenile rheumatic fever   • Hernia, inguinal, right   • Ventral hernia without obstruction or gangrene   • Insomnia   • Sick sinus syndrome (CMS/HCC)   • Coronary artery disease involving native coronary artery of native  heart without angina pectoris   • AV gunnar re-entry tachycardia (CMS/HCC)   • Bilateral renal masses   • Essential hypertension   • Umbilical hernia without obstruction or gangrene   • Dyslipidemia   • Stenosis of right carotid artery   • Peripheral vascular disease of lower extremity (CMS/HCC)   • History of colon cancer   • Recurrent inguinal hernia without obstruction or gangrene   • Lower urinary tract symptoms due to benign prostatic hyperplasia   • Statin intolerance         Current Outpatient Medications:   •  albuterol sulfate  (90 Base) MCG/ACT inhaler, Inhale 2 puffs Every 4 (Four) Hours As Needed for Wheezing., Disp: 18 g, Rfl: 0  •  aspirin 81 MG tablet, Take 1 tablet by mouth Daily., Disp: 90 tablet, Rfl: 3  •  azelastine (ASTELIN) 0.1 % nasal spray, 1 spray into the nostril(s) as directed by provider 2 (Two) Times a Day As Needed for Rhinitis or Allergies. Use in each nostril as directed, Disp: 3 each, Rfl: 3  •  cholecalciferol (VITAMIN D3) 1000 units tablet, Take 2 tablets by mouth Daily., Disp: 30 tablet, Rfl: 2  •  coenzyme Q10 100 MG capsule, Take 3 capsules by mouth Daily., Disp: 270 capsule, Rfl: 3  •  cyclobenzaprine (FLEXERIL) 5 MG tablet, Take 1 tablet by mouth 2 (Two) Times a Day As Needed for Muscle Spasms., Disp: 40 tablet, Rfl: 2  •  Doxepin HCl 3 MG tablet, Take 2 tablets by mouth every night at bedtime., Disp: , Rfl:   •  ezetimibe (ZETIA) 10 MG tablet, Take 1 tablet by mouth Daily., Disp: 90 tablet, Rfl: 3  •  flunisolide (NASALIDE) 25 MCG/ACT (0.025%) solution nasal spray, Inhale 1 spray Every 12 (Twelve) Hours., Disp: 3 bottle, Rfl: 3  •  furosemide (LASIX) 40 MG tablet, Take 1 tablet by mouth Daily As Needed (swelling)., Disp: 90 tablet, Rfl: 1  •  irbesartan (AVAPRO) 300 MG tablet, Take 1 tablet by mouth Daily. Indications: High Blood Pressure Disorder, Disp: 90 tablet, Rfl: 3  •  pantoprazole (PROTONIX) 20 MG EC tablet, 1 po in the am 30 minutes before breakfast.   "Indications: Heartburn, Disp: 90 tablet, Rfl: 3  •  potassium chloride (K-DUR,KLOR-CON) 20 MEQ CR tablet, Take 1 tablet by mouth Daily., Disp: 90 tablet, Rfl: 3  •  rOPINIRole (Requip) 0.5 MG tablet, TAKE 1/2 TO 1 TAB PO QHS PRN RESTLESS LEG SYNDROME.Take 1 hour before bedtime., Disp: 90 tablet, Rfl: 3  •  tiZANidine (ZANAFLEX) 2 MG tablet, Take 1 tablet by mouth At Night As Needed for Muscle Spasms., Disp: 90 tablet, Rfl: 3  •  melatonin 3 MG tablet, Take 3 mg by mouth Every Night., Disp: , Rfl:   •  predniSONE (DELTASONE) 20 MG tablet, 1 po daily with food, Disp: 5 tablet, Rfl: 0  •  temazepam (RESTORIL) 15 MG capsule, Take 1 capsule by mouth Every Night., Disp: , Rfl:     Allergies   Allergen Reactions   • Atorvastatin Myalgia   • Ciprofloxacin Diarrhea   • Pravastatin Myalgia        reports that he quit smoking about 48 years ago. His smoking use included cigarettes. He started smoking about 57 years ago. He has a 10.00 pack-year smoking history. He has never used smokeless tobacco.    Family History   Problem Relation Age of Onset   • Lung cancer Mother    • Osteoporosis Mother    • Thyroid disease Mother         mother   • Cancer Mother         Lung Cancer   • Early death Mother         46 yrs.   • Hearing loss Mother         mother   • Vision loss Mother         mother   • Heart disease Mother    • Cancer Father         Prostate Cancer   • Heart disease Father         Pacemaker   • Vision loss Father    • Heart disease Sister    • Heart failure Brother    • Heart disease Brother    • Cancer Sister         Breast Cancer   • Vision loss Brother    • Heart disease Brother    • Arrhythmia Brother    • Heart failure Brother    • Early death Sister          Around 40yrs.   • Early death Maternal Grandmother         Took Mother off.   • Heart disease Maternal Grandmother          Objective:   Blood pressure 128/78, pulse 98, temperature 96.9 °F (36.1 °C), height 175.3 cm (69\"), weight 96.2 kg (212 lb), SpO2 97 " %.  CONSTITUTIONAL: No acute distress, normal affect  RESPIRATORY: Normal effort. Clear to auscultation bilaterally without wheezing or rales.  CARDIOVASCULAR:Regular rate and rhythm with normal S1 and S2. Without gallop or murmur, or rub.  Carotids with normal upstrokes and without bruits.  PERIPHERAL VASCULAR: Normal radial pulses bilaterally.  There is no lower extremity edema bilaterally.    Labs:    Glucose   Date Value Ref Range Status   10/04/2018 110 (H) 74 - 98 mg/dL Final     BUN   Date Value Ref Range Status   05/21/2020 19 8 - 23 mg/dL Final   10/04/2018 11 7 - 20 mg/dL Final     Creatinine   Date Value Ref Range Status   05/21/2020 1.14 0.76 - 1.27 mg/dL Final   03/28/2019 1.00 0.60 - 1.30 mg/dL Final     Comment:     Serial Number: [id number]:  [id number]     Sodium   Date Value Ref Range Status   05/21/2020 140 136 - 145 mmol/L Final   10/04/2018 138 137 - 145 mmol/L Final     Potassium   Date Value Ref Range Status   05/21/2020 5.1 3.5 - 5.2 mmol/L Final   10/04/2018 4.3 3.5 - 5.1 mmol/L Final     Chloride   Date Value Ref Range Status   05/21/2020 103 98 - 107 mmol/L Final   10/04/2018 111 (H) 98 - 107 mmol/L Final     CO2   Date Value Ref Range Status   10/04/2018 23.0 (L) 26.0 - 30.0 mmol/L Final     Total CO2   Date Value Ref Range Status   05/21/2020 26.5 22.0 - 29.0 mmol/L Final     Calcium   Date Value Ref Range Status   05/21/2020 10.1 8.6 - 10.5 mg/dL Final   10/04/2018 8.5 8.4 - 10.2 mg/dL Final     Total Protein   Date Value Ref Range Status   09/22/2018 6.5 5.7 - 8.2 g/dL Final     Albumin   Date Value Ref Range Status   05/21/2020 4.50 3.50 - 5.20 g/dL Final   09/22/2018 4.31 3.20 - 4.80 g/dL Final     ALT (SGPT)   Date Value Ref Range Status   05/21/2020 38 1 - 41 U/L Final   09/22/2018 24 7 - 40 U/L Final     AST (SGOT)   Date Value Ref Range Status   05/21/2020 32 1 - 40 U/L Final   09/22/2018 33 0 - 33 U/L Final     Alkaline Phosphatase   Date Value Ref Range Status   05/21/2020 75  39 - 117 U/L Final   09/22/2018 51 25 - 100 U/L Final     Total Bilirubin   Date Value Ref Range Status   05/21/2020 0.7 0.2 - 1.2 mg/dL Final   09/22/2018 0.7 0.3 - 1.2 mg/dL Final     eGFR Non  Am   Date Value Ref Range Status   05/21/2020 62 >60 mL/min/1.73 Final     Comment:     The MDRD GFR formula is only valid for adults with stable  renal function between ages 18 and 70.       eGFR Non  Amer   Date Value Ref Range Status   10/04/2018 110 >60 mL/min/1.73 Final     A/G Ratio   Date Value Ref Range Status   05/21/2020 1.9 g/dL Final     BUN/Creatinine Ratio   Date Value Ref Range Status   05/21/2020 16.7 7.0 - 25.0 Final   10/04/2018 15.7 6.3 - 21.9 Final     Anion Gap   Date Value Ref Range Status   10/04/2018 8.3 (L) 10.0 - 20.0 mmol/L Final       No results found for: CHOL  Lab Results   Component Value Date    TRIG 88 03/17/2020    TRIG 107 01/08/2019    TRIG 121 03/14/2018     Lab Results   Component Value Date    HDL 41 03/17/2020    HDL 39 (L) 01/08/2019    HDL 46 03/14/2018     No components found for: LDLCALC  Lab Results   Component Value Date    VLDL 17.6 03/17/2020    VLDL 21.4 01/08/2019    VLDL 24.2 03/14/2018     No results found for: LDLHDL    Diagnostic Data:    Procedures    DEVICE INTERROGATION: [place] dual-chamber PPM, Interrogation date 4/19/2021-   RA pacing 83%, RV pacing less than 1%. P wave is 1.3 mV with a threshold of 0.5 V at 0.5 msec and an impedance of 540 ohms. R wave is 6.4 mV with a threshold of 1.0 V at 0.5 msec and an impedance of 660 ohms. Battery voltage is 2.65, 4.5 months.  A Confirm recommended and programmed by device representative.  There were 395 mode switches some more competitive pacing, others were atrial tachycardia then 1% with the longest approximately 9 minutes.      DEVICE INTERROGATION: [place] dual-chamber PPM, Interrogation date 9/30/2020-   RA pacing 94%, RV pacing 1.5%. P wave is 0.7 mV with a threshold of 0.62 V at 0.5 msec and an  impedance of 460 ohms. R wave is 6.1 mV with a threshold of 1.37 V at 0.5 msec and an impedance of 630 ohms. Battery voltage is 1.5 years.  Underlying rhythm a sensed/V sensed at 53 bpm.  One high ventricular rate.  No programming changes.    Carotid duplex 10/26/2020  · Right internal carotid artery stenosis of 50-69%.  · Left internal carotid artery stenosis of 0-49%.  · There is antegrade flow in the vertebral arteries bilaterally.    Carotid Duplex 9/2018  Moderate plaque in the right carotid bulb and proximal ICA producing a  50-69% stenosis.    [facility] 2004  - Minimal coronary artery disease and normal left ventricular function     TTE 10/2020  · Left ventricular systolic function is normal. Estimated left ventricular EF = 60%  · Left ventricular diastolic function is consistent with (grade I) impaired relaxation    TTE 9/2017  · Left ventricular systolic function is normal. Estimated EF = 60%.  · Left ventricular diastolic dysfunction (grade I a) consistent with impaired relaxation.  · Trace-to-mild aortic valve regurgitation is present  · Mild tricuspid valve regurgitation is present    Assessment and Plan:   Saad was seen today for coronary artery disease.    Diagnoses and all orders for this visit:    AV gunnar re-entry tachycardia (CMS/HCC)  SSS (sick sinus syndrome) (CMS/HCC)  Atrial tachycardia  -Stable pacemaker interrogation this visit.  -Approaching GEOVANNA.  -We will have the patient follow-up sooner for a repeat device interrogation.  Per device representative the patient does have home monitoring.    Coronary artery disease involving native coronary artery of native heart without angina pectoris, dyslipidemia  -CCS 0  -Myalgias noted with atorvastatin and pravastatin.  Patient reports trying a third agent with similar symptoms, but is unsure of the name.  -Continue aspirin, Zetia    Chronic diastolic congestive heart failure (CMS/HCC)  -EF 60% on 9/20/2017  -Euvolemic today.  -Continue Lasix as  needed.    Essential hypertension  -Stable, continue irbesartan as needed.    Carotid Stenosis  - stable repeat carotid duplex in 2020.  -Continue to monitor clinically.    Trace to mild aortic regurgitation  -Not noted on most recent transthoracic echocardiogram in 2020.      - Return in about 3 months (around 7/19/2021) for Next scheduled follow up with [facility].     Soheila Rodgers, JUSTA  04/19/21 10:37 EDT

## 2021-05-10 ENCOUNTER — TELEPHONE (OUTPATIENT)
Dept: INTERNAL MEDICINE | Facility: CLINIC | Age: 76
End: 2021-05-10

## 2021-05-10 NOTE — TELEPHONE ENCOUNTER
BRITTANEY WANTS TO KNOW IF HE CAN GOT ON A DIET PROGRAM COVERED BY MEDICARE.  I READ THAT SOMEWHERE.    CALL ME -[phone]

## 2021-06-02 ENCOUNTER — OFFICE VISIT (OUTPATIENT)
Dept: INTERNAL MEDICINE | Facility: CLINIC | Age: 76
End: 2021-06-02

## 2021-06-02 VITALS
RESPIRATION RATE: 16 BRPM | TEMPERATURE: 98.2 F | BODY MASS INDEX: 30.51 KG/M2 | DIASTOLIC BLOOD PRESSURE: 82 MMHG | OXYGEN SATURATION: 98 % | SYSTOLIC BLOOD PRESSURE: 127 MMHG | HEART RATE: 60 BPM | HEIGHT: 69 IN | WEIGHT: 206 LBS

## 2021-06-02 DIAGNOSIS — Z00.00 ENCOUNTER FOR PREVENTIVE HEALTH EXAMINATION: Primary | ICD-10-CM

## 2021-06-02 DIAGNOSIS — R41.3 MEMORY IMPAIRMENT: ICD-10-CM

## 2021-06-02 DIAGNOSIS — R53.81 CHRONIC FATIGUE AND MALAISE: ICD-10-CM

## 2021-06-02 DIAGNOSIS — R53.82 CHRONIC FATIGUE AND MALAISE: ICD-10-CM

## 2021-06-02 DIAGNOSIS — E78.5 DYSLIPIDEMIA: ICD-10-CM

## 2021-06-02 DIAGNOSIS — I25.10 CORONARY ARTERY DISEASE INVOLVING NATIVE CORONARY ARTERY OF NATIVE HEART WITHOUT ANGINA PECTORIS: ICD-10-CM

## 2021-06-02 DIAGNOSIS — K52.9 CHRONIC DIARRHEA: ICD-10-CM

## 2021-06-02 DIAGNOSIS — I10 ESSENTIAL HYPERTENSION: ICD-10-CM

## 2021-06-02 DIAGNOSIS — F51.01 PRIMARY INSOMNIA: ICD-10-CM

## 2021-06-02 DIAGNOSIS — G47.33 SLEEP APNEA, OBSTRUCTIVE: ICD-10-CM

## 2021-06-02 DIAGNOSIS — K43.9 VENTRAL HERNIA WITHOUT OBSTRUCTION OR GANGRENE: ICD-10-CM

## 2021-06-02 DIAGNOSIS — R54 AGE-RELATED PHYSICAL DEBILITY: ICD-10-CM

## 2021-06-02 DIAGNOSIS — I49.5 SICK SINUS SYNDROME (HCC): ICD-10-CM

## 2021-06-02 DIAGNOSIS — A04.9 BACTERIAL INTESTINAL INFECTION, UNSPECIFIED: ICD-10-CM

## 2021-06-02 DIAGNOSIS — R79.9 ABNORMAL FINDING OF BLOOD CHEMISTRY, UNSPECIFIED: ICD-10-CM

## 2021-06-02 PROCEDURE — G0439 PPPS, SUBSEQ VISIT: HCPCS | Performed by: INTERNAL MEDICINE

## 2021-06-02 PROCEDURE — 99214 OFFICE O/P EST MOD 30 MIN: CPT | Performed by: INTERNAL MEDICINE

## 2021-06-02 RX ORDER — TRAZODONE HYDROCHLORIDE 50 MG/1
50 TABLET ORAL NIGHTLY
Qty: 30 TABLET | Refills: 1 | Status: SHIPPED | OUTPATIENT
Start: 2021-06-02 | End: 2021-08-12 | Stop reason: SDUPTHER

## 2021-06-02 NOTE — PATIENT INSTRUCTIONS
Medicare Wellness  Personal Prevention Plan of Service     Date of Office Visit:  2021  Encounter Provider:  Tee Fiore DO  Place of Service:  [facility]  Patient Name: Saad Meier  :  1945    As part of the Medicare Wellness portion of your visit today, we are providing you with this personalized preventive plan of services (PPPS). This plan is based upon recommendations of the [place] Preventive Services Task Force (USPSTF) and the Advisory Committee on Immunization Practices (ACIP).    This lists the preventive care services that should be considered, and provides dates of when you are due. Items listed as completed are up-to-date and do not require any further intervention.    Health Maintenance   Topic Date Due   • TDAP/TD VACCINES (1 - Tdap) Never done   • HEPATITIS C SCREENING  Never done   • LIPID PANEL  2021   • ANNUAL WELLNESS VISIT  2021   • ZOSTER VACCINE (1 of 2) 2021 (Originally 1993)   • INFLUENZA VACCINE  2021   • COLONOSCOPY  2029   • COVID-19 Vaccine  Completed   • Pneumococcal Vaccine 65+  Completed       No orders of the defined types were placed in this encounter.      Return in about 6 weeks (around 2021) for Next scheduled follow up.

## 2021-06-02 NOTE — PROGRESS NOTES
"QUICK REFERENCE INFORMATION:  The ABCs of the Annual Wellness Visit    Subsequent Medicare Wellness Visit    Chief Complaint   Patient presents with   • Medicare Wellness-subsequent     wellness, discuss diet program   • Diarrhea     has been going on for a while   • Insomnia     does not sleep well at night        Subjective   History of Present Illness    Saad Meier is a 75 y.o. male who presents for a Subsequent Wellness Visit. In addition, we addressed the following health issues:    Complains of poor sleep despite his current doses of temazepam.  Melatonin in the past has not been sufficient.  He does continue to use his CPAP which he feels may be \"part of the problem.\"  Falling asleep is the hardest part for him.      He complains of diarrhea over the last month.  He describes intermittent episodes of nonbloody liquid stools.  A couple of times recently (this morning) he noticed some dark discoloration.      He shares that he has difficulty making health meals and is interested in a \"diet program\" that delivers food to the home.      HEALTH RISK ASSESSMENT    1945    Recent Hospitalizations:  No hospitalization(s) within the last year..        Current Medical Providers:  Patient Care Team:  Tee Fiore DO as PCP - General (Internal Medicine)  Atilio Wall MD as Consulting Physician (Cardiology)  Luis Hollins MD as Consulting Physician (Urology)  Liddle, Susan E., MD as Consulting Physician (Hematology and Oncology)  Gloria Garcia MD as Consulting Physician (General Surgery)  Elvie Alvarez MD as Consulting Physician (General Surgery)  Jacky Walker MD as Consulting Physician (General Surgery)  Neel Zimmer MD as Consulting Physician (Pulmonary Disease)        Smoking Status:  Social History     Tobacco Use   Smoking Status Former Smoker   • Packs/day: 1.00   • Years: 10.00   • Pack years: 10.00   • Types: Cigarettes   • Start date: 6/6/1963   • Quit date: 1973   • Years " since quittin.4   Smokeless Tobacco Never Used       Alcohol Consumption:  Social History     Substance and Sexual Activity   Alcohol Use No       Depression Screen:   PHQ-2/PHQ-9 Depression Screening 2021   Little interest or pleasure in doing things 0   Feeling down, depressed, or hopeless 1   Trouble falling or staying asleep, or sleeping too much -   Feeling tired or having little energy -   Poor appetite or overeating -   Feeling bad about yourself - or that you are a failure or have let yourself or your family down -   Trouble concentrating on things, such as reading the newspaper or watching television -   Moving or speaking so slowly that other people could have noticed. Or the opposite - being so fidgety or restless that you have been moving around a lot more than usual -   Thoughts that you would be better off dead, or of hurting yourself in some way -   Total Score 1   If you checked off any problems, how difficult have these problems made it for you to do your work, take care of things at home, or get along with other people? -       Health Habits and Functional and Cognitive Screening:  Functional & Cognitive Status 2021   Do you have difficulty preparing food and eating? No   Do you have difficulty bathing yourself, getting dressed or grooming yourself? No   Do you have difficulty using the toilet? No   Do you have difficulty moving around from place to place? No   Do you have trouble with steps or getting out of a bed or a chair? Yes   Current Diet Limited Junk Food   Dental Exam Not up to date   Eye Exam Not up to date   Exercise (times per week) -   Current Exercise Activities Include Cardiovasular Workout on Exercise Equipment   Do you need help using the phone?  No   Are you deaf or do you have serious difficulty hearing?  Yes   Do you need help with transportation? Yes   Do you need help shopping? No   Do you need help preparing meals?  No   Do you need help with housework?  Yes   Do  you need help with laundry? Yes   Do you need help taking your medications? No   Do you need help managing money? No   Do you ever drive or ride in a car without wearing a seat belt? No   Have you felt unusual stress, anger or loneliness in the last month? No   Who do you live with? Alone   If you need help, do you have trouble finding someone available to you? Yes   Have you been bothered in the last four weeks by sexual problems? No   Do you have difficulty concentrating, remembering or making decisions? Yes           Does the patient have evidence of cognitive impairment? Yes    Aspirin use counseling: Taking ASA appropriately as indicated      Recent Lab Results:  CMP:  Lab Results   Component Value Date     (H) 05/21/2020    BUN 19 05/21/2020    CREATININE 1.14 05/21/2020    EGFRIFNONA 62 05/21/2020    EGFRIFAFRI 76 05/21/2020    BCR 16.7 05/21/2020     05/21/2020    K 5.1 05/21/2020    CO2 26.5 05/21/2020    CALCIUM 10.1 05/21/2020    PROTENTOTREF 6.9 05/21/2020    ALBUMIN 4.50 05/21/2020    LABGLOBREF 2.4 05/21/2020    LABIL2 1.9 05/21/2020    BILITOT 0.7 05/21/2020    ALKPHOS 75 05/21/2020    AST 32 05/21/2020    ALT 38 05/21/2020     Lipid Panel:  Lab Results   Component Value Date    TRIG 88 03/17/2020    HDL 41 03/17/2020    VLDL 17.6 03/17/2020     HbA1c:       Visual Acuity:  No exam data present    Age-appropriate Screening Schedule:  Refer to the list below for future screening recommendations based on patient's age, sex and/or medical conditions. Orders for these recommended tests are listed in the plan section. The patient has been provided with a written plan.    Health Maintenance   Topic Date Due   • TDAP/TD VACCINES (1 - Tdap) Never done   • LIPID PANEL  03/17/2021   • ZOSTER VACCINE (1 of 2) 06/07/2021 (Originally 9/28/1993)   • INFLUENZA VACCINE  08/01/2021   • COLONOSCOPY  01/02/2029          The following portions of the patient's history were reviewed and updated as  appropriate: allergies, current medications, past family history, past medical history, past social history, past surgical history and problem list.    Outpatient Medications Prior to Visit   Medication Sig Dispense Refill   • aspirin 81 MG tablet Take 1 tablet by mouth Daily. 90 tablet 3   • azelastine (ASTELIN) 0.1 % nasal spray 1 spray into the nostril(s) as directed by provider 2 (Two) Times a Day As Needed for Rhinitis or Allergies. Use in each nostril as directed 3 each 3   • cholecalciferol (VITAMIN D3) 1000 units tablet Take 2 tablets by mouth Daily. 30 tablet 2   • coenzyme Q10 100 MG capsule Take 3 capsules by mouth Daily. 270 capsule 3   • ezetimibe (ZETIA) 10 MG tablet Take 1 tablet by mouth Daily. 90 tablet 3   • flunisolide (NASALIDE) 25 MCG/ACT (0.025%) solution nasal spray Inhale 1 spray Every 12 (Twelve) Hours. 3 bottle 3   • furosemide (LASIX) 40 MG tablet Take 1 tablet by mouth Daily As Needed (swelling). 90 tablet 1   • irbesartan (AVAPRO) 300 MG tablet Take 1 tablet by mouth Daily. Indications: High Blood Pressure Disorder 90 tablet 3   • melatonin 3 MG tablet Take 3 mg by mouth Every Night.     • pantoprazole (PROTONIX) 20 MG EC tablet 1 po in the am 30 minutes before breakfast.  Indications: Heartburn 90 tablet 3   • potassium chloride (K-DUR,KLOR-CON) 20 MEQ CR tablet Take 1 tablet by mouth Daily. 90 tablet 3   • temazepam (RESTORIL) 15 MG capsule Take 1 capsule by mouth Every Night.     • tiZANidine (ZANAFLEX) 2 MG tablet Take 1 tablet by mouth At Night As Needed for Muscle Spasms. 90 tablet 3   • rOPINIRole (Requip) 0.5 MG tablet TAKE 1/2 TO 1 TAB PO QHS PRN RESTLESS LEG SYNDROME.Take 1 hour before bedtime. 90 tablet 3   • albuterol sulfate  (90 Base) MCG/ACT inhaler Inhale 2 puffs Every 4 (Four) Hours As Needed for Wheezing. 18 g 0   • cyclobenzaprine (FLEXERIL) 5 MG tablet Take 1 tablet by mouth 2 (Two) Times a Day As Needed for Muscle Spasms. 40 tablet 2   • Doxepin HCl 3 MG  tablet Take 2 tablets by mouth every night at bedtime.     • predniSONE (DELTASONE) 20 MG tablet 1 po daily with food 5 tablet 0     No facility-administered medications prior to visit.       Patient Active Problem List   Diagnosis   • Arthritis   • Chronic diastolic congestive heart failure (CMS/HCC)   • Acid reflux   • Juvenile rheumatic fever   • Hernia, inguinal, right   • Ventral hernia without obstruction or gangrene   • Insomnia   • Sick sinus syndrome (CMS/HCC)   • Coronary artery disease involving native coronary artery of native heart without angina pectoris   • AV gunnar re-entry tachycardia (CMS/HCC)   • Bilateral renal masses   • Essential hypertension   • Umbilical hernia without obstruction or gangrene   • Dyslipidemia   • Stenosis of right carotid artery   • Peripheral vascular disease of lower extremity (CMS/HCC)   • History of colon cancer   • Recurrent inguinal hernia without obstruction or gangrene   • Lower urinary tract symptoms due to benign prostatic hyperplasia   • Statin intolerance       Advance Care Planning:  ACP discussion was held with the patient during this visit. Patient has an advance directive (not in EMR), copy requested.    Identification of Risk Factors:  Risk factors include: Advance Directive Discussion.    Review of Systems   Constitutional: Negative for chills, fatigue and fever.   HENT: Negative for congestion, ear pain, rhinorrhea, sinus pressure and sore throat.    Eyes: Negative for visual disturbance.   Respiratory: Negative for cough, chest tightness, shortness of breath and wheezing.    Cardiovascular: Negative for chest pain, palpitations and leg swelling.   Gastrointestinal: Positive for diarrhea. Negative for abdominal pain, blood in stool, constipation, nausea and vomiting.   Endocrine: Negative for polydipsia and polyuria.   Genitourinary: Negative for dysuria and hematuria.   Musculoskeletal: Negative for arthralgias and back pain.   Skin: Negative for rash.    Neurological: Negative for dizziness, light-headedness, numbness and headaches.   Psychiatric/Behavioral: Positive for sleep disturbance. Negative for dysphoric mood. The patient is not nervous/anxious.        Compared to one year ago, the patient feels his physical health is worse.  Compared to one year ago, the patient feels his mental health is the same.    Objective     Physical Exam  Vitals and nursing note reviewed.   Constitutional:       Appearance: Normal appearance. He is well-developed. He is obese.   HENT:      Head: Normocephalic and atraumatic.      Right Ear: Tympanic membrane and external ear normal.      Left Ear: Tympanic membrane and external ear normal.      Ears:      Comments: Hard of hearing     Nose: Nose normal. No congestion.      Mouth/Throat:      Mouth: Mucous membranes are moist.      Pharynx: No oropharyngeal exudate.   Eyes:      General: No scleral icterus.        Right eye: No discharge.      Extraocular Movements: Extraocular movements intact.      Conjunctiva/sclera: Conjunctivae normal.      Pupils: Pupils are equal, round, and reactive to light.   Neck:      Thyroid: No thyromegaly.      Vascular: No JVD.   Cardiovascular:      Rate and Rhythm: Normal rate and regular rhythm.      Heart sounds: Normal heart sounds. No murmur heard.   No friction rub.   Pulmonary:      Effort: Pulmonary effort is normal. No respiratory distress.      Breath sounds: Normal breath sounds. No stridor. No wheezing.   Abdominal:      General: Bowel sounds are normal. There is no distension.      Palpations: Abdomen is soft.      Tenderness: There is no abdominal tenderness. There is no guarding.   Genitourinary:     Comments: Deferred  Musculoskeletal:         General: No tenderness. Normal range of motion.      Cervical back: Normal range of motion and neck supple.   Lymphadenopathy:      Cervical: No cervical adenopathy.   Skin:     General: Skin is warm and dry.      Findings: No rash.  "  Neurological:      General: No focal deficit present.      Mental Status: He is alert and oriented to person, place, and time.      Cranial Nerves: No cranial nerve deficit.   Psychiatric:         Mood and Affect: Mood normal.         Behavior: Behavior normal.         Thought Content: Thought content normal.         Vitals:    06/02/21 1115   BP: 127/82   Pulse: 60   Resp: 16   Temp: 98.2 °F (36.8 °C)   SpO2: 98%   Weight: 93.4 kg (206 lb)   Height: 175.3 cm (69\")   PainSc: 0-No pain       Patient's Body mass index is 30.42 kg/m². indicating that he is obese (BMI >30). Obesity-related health conditions include the following: obstructive sleep apnea and dyslipidemias. Obesity is unchanged. BMI is is above average; BMI management plan is completed. We discussed portion control and increasing exercise..      Assessment/Plan   Patient Self-Management and Personalized Health Advice  The patient has been provided with information about: exercise and weight management and preventive services including:   · Annual Wellness Visit (AWV).    Visit Diagnoses:    ICD-10-CM ICD-9-CM   1. Encounter for preventive health examination  Z00.00 V70.0   2. Sleep apnea, obstructive  G47.33 327.23   3. Essential hypertension  I10 401.9   4. Coronary artery disease involving native coronary artery of native heart without angina pectoris  I25.10 414.01   5. Chronic fatigue and malaise  R53.82 780.71    R53.81    6. Dyslipidemia  E78.5 272.4   7. Ventral hernia without obstruction or gangrene  K43.9 553.20   8. Sick sinus syndrome (CMS/HCC)  I49.5 427.81   9. Chronic diarrhea  K52.9 787.91   10. Primary insomnia  F51.01 307.42   11. Abnormal finding of blood chemistry, unspecified   R79.9 790.6   12. Bacterial intestinal infection, unspecified   A04.9 008.5   13. Age-related physical debility  R54 797   14. Memory impairment  R41.3 780.93       Discussion Summary:  Patient is a 75 y.o. male who presents for a Subsequent Wellness " Visit.    1. Preventive Health Maintenance  - Baseline labs ordered per above.  - Vaccines reviewed and updated  - Preventive health measures were discussed including: healthy diet with increase in fruits and vegetables, regular exercise at least 3 times a week, safe sex practices, avoidance of drugs, tobacco, and alcohol, and regular seatbelt use.    2. Chronic diarrhea  - has history of partial colectomy, may need colonoscopy.  First evaluate for infectious causes.  Stool studies ordered.     3. Insomnia  - start trial of trazodone nightly.  Stop requip as RLS symptoms have not been bothering him much recently.    - cont temazepam per sleep medicine.     4. HLD  - check lipids    5. Physical debility / impaired memory  - patient would benefit from meals on wheels program to assist with healthy meal options.     6. SSS / CAD  - stable on current cardiac medications.     Orders Placed This Encounter   Procedures   • Clostridium Difficile Toxin, PCR - Stool, Per Rectum     Order Specific Question:   Release to patient     Answer:   Immediate   • Gastrointestinal Panel, PCR - Stool, Per Rectum     Order Specific Question:   Release to patient     Answer:   Immediate   • Comprehensive Metabolic Panel     Order Specific Question:   Release to patient     Answer:   Immediate   • Lipid Panel   • Hepatitis Panel, Acute     Order Specific Question:   Release to patient     Answer:   Immediate   • Hemoglobin A1c     Order Specific Question:   Release to patient     Answer:   Immediate   • TSH     Order Specific Question:   Release to patient     Answer:   Immediate   • Ambulatory Referral to Case Management Value Based Care     Referral Priority:   Routine     Referral Type:   Routine Care     Referral Reason:   Continuity of Care     Requested Specialty:   Population Health     Number of Visits Requested:   1   • CBC & Differential     Order Specific Question:   Manual Differential     Answer:   No       Outpatient  Encounter Medications as of 6/2/2021   Medication Sig Dispense Refill   • aspirin 81 MG tablet Take 1 tablet by mouth Daily. 90 tablet 3   • azelastine (ASTELIN) 0.1 % nasal spray 1 spray into the nostril(s) as directed by provider 2 (Two) Times a Day As Needed for Rhinitis or Allergies. Use in each nostril as directed 3 each 3   • cholecalciferol (VITAMIN D3) 1000 units tablet Take 2 tablets by mouth Daily. 30 tablet 2   • coenzyme Q10 100 MG capsule Take 3 capsules by mouth Daily. 270 capsule 3   • ezetimibe (ZETIA) 10 MG tablet Take 1 tablet by mouth Daily. 90 tablet 3   • flunisolide (NASALIDE) 25 MCG/ACT (0.025%) solution nasal spray Inhale 1 spray Every 12 (Twelve) Hours. 3 bottle 3   • furosemide (LASIX) 40 MG tablet Take 1 tablet by mouth Daily As Needed (swelling). 90 tablet 1   • irbesartan (AVAPRO) 300 MG tablet Take 1 tablet by mouth Daily. Indications: High Blood Pressure Disorder 90 tablet 3   • melatonin 3 MG tablet Take 3 mg by mouth Every Night.     • pantoprazole (PROTONIX) 20 MG EC tablet 1 po in the am 30 minutes before breakfast.  Indications: Heartburn 90 tablet 3   • potassium chloride (K-DUR,KLOR-CON) 20 MEQ CR tablet Take 1 tablet by mouth Daily. 90 tablet 3   • temazepam (RESTORIL) 15 MG capsule Take 1 capsule by mouth Every Night.     • tiZANidine (ZANAFLEX) 2 MG tablet Take 1 tablet by mouth At Night As Needed for Muscle Spasms. 90 tablet 3   • [DISCONTINUED] rOPINIRole (Requip) 0.5 MG tablet TAKE 1/2 TO 1 TAB PO QHS PRN RESTLESS LEG SYNDROME.Take 1 hour before bedtime. 90 tablet 3   • traZODone (DESYREL) 50 MG tablet Take 1 tablet by mouth Every Night. 30 tablet 1   • [DISCONTINUED] albuterol sulfate  (90 Base) MCG/ACT inhaler Inhale 2 puffs Every 4 (Four) Hours As Needed for Wheezing. 18 g 0   • [DISCONTINUED] cyclobenzaprine (FLEXERIL) 5 MG tablet Take 1 tablet by mouth 2 (Two) Times a Day As Needed for Muscle Spasms. 40 tablet 2   • [DISCONTINUED] Doxepin HCl 3 MG tablet Take 2  tablets by mouth every night at bedtime.     • [DISCONTINUED] predniSONE (DELTASONE) 20 MG tablet 1 po daily with food 5 tablet 0     No facility-administered encounter medications on file as of 6/2/2021.       Reviewed use of high risk medication in the elderly: yes  Reviewed for potential of harmful drug interactions in the elderly: yes    Follow Up:  Return in about 6 weeks (around 7/14/2021) for Next scheduled follow up.     An After Visit Summary and PPPS with all of these plans were given to the patient.

## 2021-06-04 ENCOUNTER — EPISODE CHANGES (OUTPATIENT)
Dept: CASE MANAGEMENT | Facility: OTHER | Age: 76
End: 2021-06-04

## 2021-06-04 LAB
ALBUMIN SERPL-MCNC: 4.4 G/DL (ref 3.5–5.2)
ALBUMIN/GLOB SERPL: 2.4 G/DL
ALP SERPL-CCNC: 62 U/L (ref 39–117)
ALT SERPL-CCNC: 27 U/L (ref 1–41)
AST SERPL-CCNC: 27 U/L (ref 1–40)
BASOPHILS # BLD AUTO: 0.05 10*3/MM3 (ref 0–0.2)
BASOPHILS NFR BLD AUTO: 0.9 % (ref 0–1.5)
BILIRUB SERPL-MCNC: 0.8 MG/DL (ref 0–1.2)
BUN SERPL-MCNC: 17 MG/DL (ref 8–23)
BUN/CREAT SERPL: 15.2 (ref 7–25)
CALCIUM SERPL-MCNC: 9.5 MG/DL (ref 8.6–10.5)
CHLORIDE SERPL-SCNC: 108 MMOL/L (ref 98–107)
CHOLEST SERPL-MCNC: 194 MG/DL (ref 0–200)
CO2 SERPL-SCNC: 20.8 MMOL/L (ref 22–29)
CREAT SERPL-MCNC: 1.12 MG/DL (ref 0.76–1.27)
EOSINOPHIL # BLD AUTO: 0.27 10*3/MM3 (ref 0–0.4)
EOSINOPHIL NFR BLD AUTO: 4.9 % (ref 0.3–6.2)
ERYTHROCYTE [DISTWIDTH] IN BLOOD BY AUTOMATED COUNT: 12.4 % (ref 12.3–15.4)
GLOBULIN SER CALC-MCNC: 1.8 GM/DL
GLUCOSE SERPL-MCNC: 101 MG/DL (ref 65–99)
HAV IGM SERPL QL IA: NEGATIVE
HBA1C MFR BLD: 6.1 % (ref 4.8–5.6)
HBV CORE IGM SERPL QL IA: NEGATIVE
HBV SURFACE AG SERPL QL IA: NEGATIVE
HCT VFR BLD AUTO: 43.4 % (ref 37.5–51)
HCV AB S/CO SERPL IA: <0.1 S/CO RATIO (ref 0–0.9)
HDLC SERPL-MCNC: 40 MG/DL (ref 40–60)
HGB BLD-MCNC: 14.6 G/DL (ref 13–17.7)
IMM GRANULOCYTES # BLD AUTO: 0.02 10*3/MM3 (ref 0–0.05)
IMM GRANULOCYTES NFR BLD AUTO: 0.4 % (ref 0–0.5)
LDLC SERPL CALC-MCNC: 138 MG/DL (ref 0–100)
LYMPHOCYTES # BLD AUTO: 1.38 10*3/MM3 (ref 0.7–3.1)
LYMPHOCYTES NFR BLD AUTO: 25.1 % (ref 19.6–45.3)
MCH RBC QN AUTO: 32.7 PG (ref 26.6–33)
MCHC RBC AUTO-ENTMCNC: 33.6 G/DL (ref 31.5–35.7)
MCV RBC AUTO: 97.3 FL (ref 79–97)
MONOCYTES # BLD AUTO: 0.93 10*3/MM3 (ref 0.1–0.9)
MONOCYTES NFR BLD AUTO: 16.9 % (ref 5–12)
NEUTROPHILS # BLD AUTO: 2.84 10*3/MM3 (ref 1.7–7)
NEUTROPHILS NFR BLD AUTO: 51.8 % (ref 42.7–76)
NRBC BLD AUTO-RTO: 0 /100 WBC (ref 0–0.2)
PLATELET # BLD AUTO: 280 10*3/MM3 (ref 140–450)
POTASSIUM SERPL-SCNC: 4.4 MMOL/L (ref 3.5–5.2)
PROT SERPL-MCNC: 6.2 G/DL (ref 6–8.5)
RBC # BLD AUTO: 4.46 10*6/MM3 (ref 4.14–5.8)
SODIUM SERPL-SCNC: 139 MMOL/L (ref 136–145)
TRIGL SERPL-MCNC: 85 MG/DL (ref 0–150)
TSH SERPL DL<=0.005 MIU/L-ACNC: 2.53 UIU/ML (ref 0.27–4.2)
VLDLC SERPL CALC-MCNC: 16 MG/DL (ref 5–40)
WBC # BLD AUTO: 5.49 10*3/MM3 (ref 3.4–10.8)

## 2021-06-04 NOTE — PROGRESS NOTES
Please let the patient know his labs are all in normal range.  A1C remains in a borderline range.  No changes to medications needed at this time.

## 2021-06-07 DIAGNOSIS — A09 INFECTIOUS DIARRHEA IN ADULT PATIENT: Primary | ICD-10-CM

## 2021-06-07 LAB
ADV 40+41 DNA STL QL NAA+NON-PROBE: NOT DETECTED
ASTRO TYP 1-8 RNA STL QL NAA+NON-PROBE: NOT DETECTED
C CAYETANENSIS DNA STL QL NAA+NON-PROBE: NOT DETECTED
C COLI+JEJ+UPSA DNA STL QL NAA+NON-PROBE: NOT DETECTED
C DIF TOX TCDA+TCDB STL QL NAA+NON-PROBE: NOT DETECTED
C DIFF TOX GENS STL QL NAA+PROBE: NEGATIVE
CRYPTOSP DNA STL QL NAA+NON-PROBE: NOT DETECTED
E COLI O157 DNA STL QL NAA+NON-PROBE: ABNORMAL
E HISTOLYT DNA STL QL NAA+NON-PROBE: NOT DETECTED
EAEC PAA PLAS AGGR+AATA ST NAA+NON-PRB: NOT DETECTED
EC STX1+STX2 GENES STL QL NAA+NON-PROBE: NOT DETECTED
EPEC EAE GENE STL QL NAA+NON-PROBE: DETECTED
ETEC LTA+ST1A+ST1B TOX ST NAA+NON-PROBE: NOT DETECTED
G LAMBLIA DNA STL QL NAA+NON-PROBE: NOT DETECTED
NOROVIRUS GI+II RNA STL QL NAA+NON-PROBE: DETECTED
P SHIGELLOIDES DNA STL QL NAA+NON-PROBE: NOT DETECTED
RVA RNA STL QL NAA+NON-PROBE: NOT DETECTED
S ENT+BONG DNA STL QL NAA+NON-PROBE: NOT DETECTED
SAPO I+II+IV+V RNA STL QL NAA+NON-PROBE: NOT DETECTED
SHIGELLA SP+EIEC IPAH ST NAA+NON-PROBE: NOT DETECTED
V CHOL+PARA+VUL DNA STL QL NAA+NON-PROBE: NOT DETECTED
V CHOLERAE DNA STL QL NAA+NON-PROBE: NOT DETECTED
Y ENTEROCOL DNA STL QL NAA+NON-PROBE: NOT DETECTED

## 2021-06-07 RX ORDER — AZITHROMYCIN 250 MG/1
TABLET, FILM COATED ORAL
Qty: 6 TABLET | Refills: 0 | Status: SHIPPED | OUTPATIENT
Start: 2021-06-07 | End: 2021-08-05

## 2021-06-16 ENCOUNTER — TELEPHONE (OUTPATIENT)
Dept: INTERNAL MEDICINE | Facility: CLINIC | Age: 76
End: 2021-06-16

## 2021-06-16 NOTE — TELEPHONE ENCOUNTER
Provider: DARIELA MUÑOZ DO     Caller: BRITTANEY DICK     Relationship to Patient: SELF    Pharmacy: [facility] [address] [place] [address] [phone] / [phone]      Phone Number: [phone]    Reason for Call: PATIENT IS WANTING A CALL BACK TO FURTHER DISCUSS WEIGHT LOSS/CARE MANAGEMENT.     PATIENT IS WANTING TO LOSE WEIGHT AND GET ON A BETTER EATING PLAN; SAYS THAT MEALS ON WHEELS DOESN'T PROVIDE ANY TYPE OF DIET TYPE FOOD BUT MOSTLY DELIVERS PREPARED MEALS. PATIENT SAYS THAT HE MOSTLY EATS FROZEN MEALS.      PATIENT IS WANTING HELP TO COORDINATE WITH MEDICARE FOR A WEIGHT MANAGEMENT PLAN THAT WOULD BENEFIT THE PATIENT IN WEIGHT LOSS AND BETTER NUTRITIONAL EATING HABITS.     PATIENT SAYS HE IS JUST NOT SURE WHAT WILL WORK FOR HIM AND IF HE NEEDS A DOCTOR REFERRAL.       When was the patient last seen: 06-    Characteristics of symptom/severity: N/A    What therapies/medications have you tried: PATIENT HAS LOOKED ON THE INTERNET AND HAS FOUND SOME PLANS BUT NOT SURE IF THEY DELIVER TO HIM OR EXACTLY HOW THOSE PROGRAMS WORKS.   -NUTRISYSTEM (HAS HIGHEST RATING 9.8)   -NOOM  -DIET-TO-GO  -[facility] [facility]  -BISTROMD  -MEDIFAST

## 2021-06-18 NOTE — TELEPHONE ENCOUNTER
Do you have any suggestions for patient or would you like him to come in for another appointment to discuss? Last visit 6/2/21

## 2021-06-18 NOTE — TELEPHONE ENCOUNTER
We can offer referral to nutritionist who might help with developing a plan.  However, diet plans such as javier jorge, weight watchers, and Nutrisystem are all very reasonable to try.  There is good evidence that such plans do help with weight loss.     (I can put a referral to nutritionist if patient desires)

## 2021-07-08 ENCOUNTER — OFFICE VISIT (OUTPATIENT)
Dept: PULMONOLOGY | Facility: CLINIC | Age: 76
End: 2021-07-08

## 2021-07-08 VITALS
HEART RATE: 63 BPM | WEIGHT: 212 LBS | RESPIRATION RATE: 20 BRPM | OXYGEN SATURATION: 96 % | SYSTOLIC BLOOD PRESSURE: 117 MMHG | DIASTOLIC BLOOD PRESSURE: 70 MMHG | HEIGHT: 69 IN | BODY MASS INDEX: 31.4 KG/M2

## 2021-07-08 DIAGNOSIS — G47.00 INSOMNIA, UNSPECIFIED TYPE: ICD-10-CM

## 2021-07-08 DIAGNOSIS — G47.33 OSA (OBSTRUCTIVE SLEEP APNEA): Primary | ICD-10-CM

## 2021-07-08 DIAGNOSIS — G25.81 RESTLESS LEG SYNDROME: ICD-10-CM

## 2021-07-08 PROCEDURE — 99214 OFFICE O/P EST MOD 30 MIN: CPT | Performed by: INTERNAL MEDICINE

## 2021-07-08 RX ORDER — LANOLIN ALCOHOL/MO/W.PET/CERES
3 CREAM (GRAM) TOPICAL NIGHTLY
Qty: 90 TABLET | Refills: 2 | Status: SHIPPED | OUTPATIENT
Start: 2021-07-08 | End: 2021-10-13 | Stop reason: SDUPTHER

## 2021-07-08 RX ORDER — ROPINIROLE 0.5 MG/1
0.5 TABLET, FILM COATED ORAL
Qty: 90 TABLET | Refills: 2 | Status: SHIPPED | OUTPATIENT
Start: 2021-07-08 | End: 2021-11-03 | Stop reason: SDUPTHER

## 2021-07-08 RX ORDER — ROPINIROLE 0.5 MG/1
TABLET, FILM COATED ORAL
COMMUNITY
Start: 2021-06-28 | End: 2021-07-08 | Stop reason: SDUPTHER

## 2021-07-08 RX ORDER — TEMAZEPAM 15 MG/1
30 CAPSULE ORAL NIGHTLY
Qty: 60 CAPSULE | Refills: 2 | Status: SHIPPED | OUTPATIENT
Start: 2021-07-08 | End: 2021-07-31

## 2021-07-08 NOTE — PROGRESS NOTES
"  Chief Complaint   Patient presents with   • Follow-up   • Sleep Apnea       Subjective   Saad Meier is a 75 y.o. male.     History of Present Illness   Patient was evaluated today for follow up of Sleep apnea & insomnia.     Patient doesn't report any issues with the PAP device. The patient describes no significant issues with his mask either. Patient says that the compliance with the use of the equipment is good.     Patient says that his symptoms of fatigue & daytime sleepiness have been helped greatly with the use of PAP, as prescribed.     Patient says that he goes to bed around 11 PM and leaves the bed in the morning around 6 AM.  The patient says that he feels that he sleeps for 4-5 hour per night, with Temezepam.    He was tried on Doxepin but feels that it did not help at all. He restarted Temezepam after 10-14 days or so.    He also has RLS and does take Requip every night.     The following portions of the patient's history were reviewed and updated as appropriate: allergies, current medications, past family history, past medical history, past social history and past surgical history.    Review of Systems   HENT: Negative for postnasal drip, rhinorrhea, sinus pressure and sinus pain.    Respiratory: Negative for cough, chest tightness, shortness of breath and wheezing.    Cardiovascular: Negative for chest pain, palpitations and leg swelling.   Psychiatric/Behavioral: Negative for sleep disturbance.       Objective   Visit Vitals  /70   Pulse 63   Resp 20   Ht 175.3 cm (69.02\")   Wt 96.2 kg (212 lb)   SpO2 96%   BMI 31.29 kg/m²       Physical Exam  Vitals reviewed.   Constitutional:       Appearance: He is well-developed.   HENT:      Head: Atraumatic.      Mouth/Throat:      Comments: Oropharynx was crowded.  Neck:      Comments: Increased adipose tissue noted.  Musculoskeletal:      Comments: Gait was normal.   Neurological:      Mental Status: He is alert and oriented to person, place, and " time.         Assessment/Plan   Diagnoses and all orders for this visit:    1. LULU (obstructive sleep apnea) (Primary)    2. Insomnia, unspecified type  -     temazepam (RESTORIL) 15 MG capsule; Take 2 capsules by mouth Every Night.  Dispense: 60 capsule; Refill: 2    3. Restless leg syndrome    Other orders  -     rOPINIRole (REQUIP) 0.5 MG tablet; Take 1 tablet by mouth every night at bedtime.  Dispense: 90 tablet; Refill: 2  -     melatonin 3 MG tablet; Take 1 tablet by mouth Every Night. At 7 PM.  Dispense: 90 tablet; Refill: 2           Return in about 3 months (around 10/18/2021) for Maia/Johnna, ....Also 7 mths w/ Dr. Zimmer.    DISCUSSION (if any):  I reviewed the results of last sleep study in detail. I informed him that the apnea hypopnea index was 24 / hr. REM AHI was 52/hour.  This was in May 2019.    Continue treatment with CPAP at a pressure of 8, with a full-face mask.    Patient seems to be compliant with PAP device, based on the available data and his account of improved symptoms.     Compliance data will be obtained from the [employer] company.    Sleep hygiene measures were discussed in great detail including need to follow a strict bedtime and to avoid electronic devices in bed and close to bedtime.    he was also asked to avoid caffeinated or alcoholic beverages within 4 to 6 hours of bedtime.    The patient was once again reminded to continue using the PAP device regularly, every night for atleast 4 hours.    Sleep hygiene measures were discussed with the patient in great detail.    The patient was told that he will need to follow a strict time to go to bed as well as a fixed time to get out of bed.    Multiple measures were discussed including sleep restriction and he was strongly encouraged to follow other recommendations including avoiding naps, watching TV in the bed etc.    Due to a fair response to the current therapy, we will continue him on Restoril. He will also be started on  Melatonin 3mg at 7 PM daily.     Side effects of prescribed medication were discussed.    Juan Manuel was reviewed.    We will continue the patient on Requip and adjust the dose according to symptomatic relief.      Dictated utilizing Dragon dictation.    This document was electronically signed by Neel Zimmer MD on 07/08/21 at 13:53 EDT

## 2021-07-28 ENCOUNTER — PATIENT OUTREACH (OUTPATIENT)
Dept: CASE MANAGEMENT | Facility: OTHER | Age: 76
End: 2021-07-28

## 2021-07-28 DIAGNOSIS — G47.00 INSOMNIA, UNSPECIFIED TYPE: ICD-10-CM

## 2021-07-28 NOTE — OUTREACH NOTE
Patient Outreach    SW reached out to pt to follow up on referral regarding pt needing information on Meals on Wheels. SW asked pt how he had been getting his meals thus far. Pt reported that he 'eats out a lot'. Pt reports being able to drive to get food but reported that he is spending too much in eating out and often runs out of money for food. Pt reported not qualifying for Food [place]. SW provided pt with the information on local food yip. No other resources were requested.    SDOH updated and reviewed with the patient during this program:     Food Insecurity: Food Insecurity Present   • Worried About Running Out of Food in the Last Year: Sometimes true   • Ran Out of Food in the Last Year: Sometimes true     MAYTE Marin   , Ambulatory Case Management    7/28/2021 15:16 EDT

## 2021-07-29 DIAGNOSIS — G47.00 INSOMNIA, UNSPECIFIED TYPE: ICD-10-CM

## 2021-07-29 RX ORDER — TEMAZEPAM 15 MG/1
CAPSULE ORAL
Qty: 60 CAPSULE | Refills: 10 | OUTPATIENT
Start: 2021-07-29

## 2021-07-31 RX ORDER — TEMAZEPAM 15 MG/1
CAPSULE ORAL
Qty: 60 CAPSULE | Refills: 0 | Status: SHIPPED | OUTPATIENT
Start: 2021-07-31 | End: 2021-08-10 | Stop reason: SDUPTHER

## 2021-08-04 NOTE — PROGRESS NOTES
Forms reviewed, signed, and faxed back to forms completion.    Patient: Saad Meier    YOB: 1945    Date: 01/15/2019    Primary Care Provider: Evelyn Muse MD    Chief Complaint   Patient presents with   • Follow-up     Inguinal hernia        History: I am seeing the patient in the office today in follow up for an incisional hernia just above the umbilicus and right inguinal hernia.  Patient states this has been present for a long time and he does wear a truss for the right inguinal hernia. Patient denies pain or difficult bowel movements.    He does have a long-standing truss of the right groin region for a large recurrent right inguinal hernia.  He has had a previous right inguinal hernia repaired with mesh in the past, ultrasonography was performed today and showed evidence of a medial recurrence.  He also has a smaller left inguinal hernia that is recurrent in nature, he is also had a previous left inguinal herniorrhaphy with mesh implantation.  He also has had a previous exploration of the abdomen for colon carcinoma and has an incisional hernia located above the umbilicus.    The following portions of the patient's history were reviewed and updated as appropriate: allergies, current medications, past family history, past medical history, past social history, past surgical history and problem list.      Review of Systems   Constitutional: Negative for chills, fever and unexpected weight change.   HENT: Negative for trouble swallowing and voice change.    Eyes: Negative for visual disturbance.   Respiratory: Negative for apnea, cough, chest tightness, shortness of breath and wheezing.    Cardiovascular: Negative for chest pain, palpitations and leg swelling.   Gastrointestinal: Negative for abdominal distention, abdominal pain, anal bleeding, blood in stool, constipation, diarrhea, nausea, rectal pain and vomiting.   Endocrine: Negative for cold intolerance and heat intolerance.   Genitourinary: Negative for difficulty urinating, dysuria, flank  "pain, scrotal swelling and testicular pain.   Musculoskeletal: Negative for back pain, gait problem and joint swelling.   Skin: Negative for color change, rash and wound.   Neurological: Negative for dizziness, syncope, speech difficulty, weakness, numbness and headaches.   Hematological: Negative for adenopathy. Does not bruise/bleed easily.   Psychiatric/Behavioral: Negative for confusion. The patient is not nervous/anxious.        Vital Signs  Vitals:    01/15/19 1413   BP: 130/84   Pulse: 94   Temp: 98.3 °F (36.8 °C)   SpO2: 99%   Weight: 89.4 kg (197 lb)   Height: 177.8 cm (70\")       Allergies:  Allergies   Allergen Reactions   • Ciprofloxacin Diarrhea       Medications:    Current Outpatient Medications:   •  aspirin 81 MG tablet, Take 81 mg by mouth Daily., Disp: , Rfl:   •  Cetirizine HCl 10 MG capsule, , Disp: , Rfl:   •  cholecalciferol (VITAMIN D3) 1000 units tablet, Take 5,000 Units by mouth Daily., Disp: , Rfl:   •  EPINEPHrine (EPIPEN) 0.3 MG/0.3ML solution auto-injector injection, , Disp: , Rfl:   •  furosemide (LASIX) 40 MG tablet, Take 1 tablet by mouth Daily. 40 mg po daily X 5 days and then decrease to prn daily, Disp: 90 tablet, Rfl: 0  •  irbesartan (AVAPRO) 300 MG tablet, Take 1 tablet by mouth Every Night., Disp: 90 tablet, Rfl: 3  •  NON FORMULARY, Take 1 capsule by mouth Daily As Needed. ALISHA'S LEG CRAMP RELIEF, Disp: , Rfl:   •  pantoprazole (PROTONIX) 20 MG EC tablet, 1 po in the am 30 minutes before breakfast. (Patient taking differently: Take 20 mg by mouth Daily. 1 po in the am 30 minutes before breakfast.), Disp: 90 tablet, Rfl: 3  •  potassium chloride (K-DUR,KLOR-CON) 20 MEQ CR tablet, Take 1 tablet by mouth Daily., Disp: 30 tablet, Rfl: 11  •  vitamin B-12 (CYANOCOBALAMIN) 500 MCG tablet, Take 5,000 mcg by mouth Daily., Disp: , Rfl:   •  zolpidem CR (AMBIEN CR) 6.25 MG CR tablet, Take 1 tablet by mouth At Night As Needed for Sleep., Disp: 90 tablet, Rfl: 2    Physical " Exam:   General Appearance:    Alert, cooperative, in no acute distress   Head:    Normocephalic, without obvious abnormality, atraumatic   Lungs:     Clear to auscultation,respirations regular, even and                  unlabored    Heart:    Regular rhythm and normal rate, normal S1 and S2, no            murmur, no gallop, no rub, no click   Abdomen:     Normal bowel sounds, no masses, no organomegaly, soft        non-tender, non-distended, no guarding, no rebound                tenderness, there is evidence of a reducible incisional hernia above the umbilicus, there is bilateral inguinal hernias present on both sides    Extremities:   Moves all extremities well, no edema, no cyanosis, no             redness   Pulses:   Pulses palpable and equal bilaterally   Skin:   No bleeding, bruising or rash       Results Review:   I reviewed the patient's new clinical results.  I reviewed the patient's new imaging results and agree with the interpretation.  I reviewed the patient's other test results and agree with the interpretation     Assessment/Plan     1. Recurrent inguinal hernia without obstruction or gangrene, unspecified laterality    2. Incisional hernia with obstruction but no gangrene      I did have a long and detailed discussion with the patient in the office today.  He does have a fairly large recurrent right inguinal hernia that he wears a truss for and I would like to perform right inguinal herniorrhaphy repair of a recurrent right inguinal hernia, I would like to do this in the Kugel fashion in order to be in the preperitoneal space and avoid the previously placed mesh.  Risk and benefits of operative versus nonoperative intervention of been discussed with the patient, he understands and agrees, and wishes to proceed.  He will need future left inguinal hernia repair and also will need future    incisional herniorrhaphy with mesh implantation.    Electronically signed by Jacky Walker  MD  01/15/19    Portions of this note have been scribed for Jacky Walekr MD by Virgie Daigle 1/15/2019  2:38 PM

## 2021-08-05 ENCOUNTER — HOSPITAL ENCOUNTER (OUTPATIENT)
Dept: GENERAL RADIOLOGY | Facility: HOSPITAL | Age: 76
Discharge: HOME | End: 2021-08-05
Admitting: INTERNAL MEDICINE

## 2021-08-05 ENCOUNTER — OFFICE VISIT (OUTPATIENT)
Dept: INTERNAL MEDICINE | Facility: CLINIC | Age: 76
End: 2021-08-05

## 2021-08-05 VITALS
WEIGHT: 208 LBS | TEMPERATURE: 97.7 F | OXYGEN SATURATION: 98 % | HEIGHT: 69 IN | HEART RATE: 60 BPM | DIASTOLIC BLOOD PRESSURE: 96 MMHG | RESPIRATION RATE: 16 BRPM | SYSTOLIC BLOOD PRESSURE: 153 MMHG | BODY MASS INDEX: 30.81 KG/M2

## 2021-08-05 DIAGNOSIS — M54.2 CERVICALGIA: ICD-10-CM

## 2021-08-05 DIAGNOSIS — R51.9 DAILY HEADACHE: ICD-10-CM

## 2021-08-05 DIAGNOSIS — M62.838 MUSCLE SPASMS OF NECK: ICD-10-CM

## 2021-08-05 DIAGNOSIS — I10 BENIGN ESSENTIAL HYPERTENSION: Primary | ICD-10-CM

## 2021-08-05 PROCEDURE — 72040 X-RAY EXAM NECK SPINE 2-3 VW: CPT

## 2021-08-05 PROCEDURE — 99214 OFFICE O/P EST MOD 30 MIN: CPT | Performed by: INTERNAL MEDICINE

## 2021-08-05 RX ORDER — METHYLPREDNISOLONE 4 MG/1
TABLET ORAL
Qty: 21 TABLET | Refills: 0 | Status: SHIPPED | OUTPATIENT
Start: 2021-08-05 | End: 2021-08-18

## 2021-08-05 RX ORDER — BACLOFEN 10 MG/1
10 TABLET ORAL NIGHTLY PRN
Qty: 30 TABLET | Refills: 3 | Status: SHIPPED | OUTPATIENT
Start: 2021-08-05 | End: 2021-11-03 | Stop reason: SDUPTHER

## 2021-08-05 RX ORDER — AMLODIPINE BESYLATE 5 MG/1
5 TABLET ORAL DAILY
Qty: 30 TABLET | Refills: 4 | Status: SHIPPED | OUTPATIENT
Start: 2021-08-05 | End: 2021-11-03 | Stop reason: SDUPTHER

## 2021-08-05 NOTE — PATIENT INSTRUCTIONS
MyPlate from USDA    MyPlate is an outline of a general healthy diet based on the 2010 Dietary Guidelines for Americans, from the U.S. Department of Agriculture (USDA). It sets guidelines for how much food you should eat from each food group based on your age, sex, and level of physical activity.  What are tips for following MyPlate?  To follow MyPlate recommendations:  · Eat a wide variety of fruits and vegetables, grains, and protein foods.  · Serve smaller portions and eat less food throughout the day.  · Limit portion sizes to avoid overeating.  · Enjoy your food.  · Get at least 150 minutes of exercise every week. This is about 30 minutes each day, 5 or more days per week.  It can be difficult to have every meal look like MyPlate. Think about MyPlate as eating guidelines for an entire day, rather than each individual meal.  Fruits and vegetables  · Make half of your plate fruits and vegetables.  · Eat many different colors of fruits and vegetables each day.  · For a 2,000 calorie daily food plan, eat:  ? 2½ cups of vegetables every day.  ? 2 cups of fruit every day.  · 1 cup is equal to:  ? 1 cup raw or cooked vegetables.  ? 1 cup raw fruit.  ? 1 medium-sized orange, apple, or banana.  ? 1 cup 100% fruit or vegetable juice.  ? 2 cups raw leafy greens, such as lettuce, spinach, or kale.  ? ½ cup dried fruit.  Grains  · One fourth of your plate should be grains.  · Make at least half of the grains you eat each day whole grains.  · For a 2,000 calorie daily food plan, eat 6 oz of grains every day.  · 1 oz is equal to:  ? 1 slice bread.  ? 1 cup cereal.  ? ½ cup cooked rice, cereal, or pasta.  Protein  · One fourth of your plate should be protein.  · Eat a wide variety of protein foods, including meat, poultry, fish, eggs, beans, nuts, and tofu.  · For a 2,000 calorie daily food plan, eat 5½ oz of protein every day.  · 1 oz is equal to:  ? 1 oz meat, poultry, or fish.  ? ¼ cup cooked beans.  ? 1 egg.  ? ½ oz nuts  or seeds.  ? 1 Tbsp peanut butter.  Dairy  · Drink fat-free or low-fat (1%) milk.  · Eat or drink dairy as a side to meals.  · For a 2,000 calorie daily food plan, eat or drink 3 cups of dairy every day.  · 1 cup is equal to:  ? 1 cup milk, yogurt, cottage cheese, or soy milk (soy beverage).  ? 2 oz processed cheese.  ? 1½ oz natural cheese.  Fats, oils, salt, and sugars  · Only small amounts of oils are recommended.  · Avoid foods that are high in calories and low in nutritional value (empty calories), like foods high in fat or added sugars.  · Choose foods that are low in salt (sodium). Choose foods that have less than 140 milligrams (mg) of sodium per serving.  · Drink water instead of sugary drinks. Drink enough water each day to keep your urine pale yellow.  Where to find support  · Work with your health care provider or a nutrition specialist (dietitian) to develop a customized eating plan that is right for you.  · Download an emmy (mobile application) to help you track your daily food intake.  Where to find more information  · Go to ChooseMyPlate.gov for more information.  Summary  · MyPlate is a general guideline for healthy eating from the USDA. It is based on the 2010 Dietary Guidelines for Americans.  · In general, fruits and vegetables should take up ½ of your plate, grains should take up ¼ of your plate, and protein should take up ¼ of your plate.  This information is not intended to replace advice given to you by your health care provider. Make sure you discuss any questions you have with your health care provider.  Document Revised: 05/21/2020 Document Reviewed: 03/19/2018  Elsevier Patient Education © 2021 [employer] Inc[employer]

## 2021-08-05 NOTE — PROGRESS NOTES
"Chief Complaint  Neck Pain (started yesterday movement painful), Headache, and Hypertension    Subjective          Saad Meier presents to [facility]  History of Present Illness  Patient is here to follow-up on his blood pressure which is noted to be elevated, he states he is taking his medications as prescribed, he complains of pain in the upper part of his neck with muscle spasm when he is turning his head and it started yesterday, he also complains of a  headache in the back of his head but he states the headache has been there for a while, he denies any falls or injuries    Objective   Vital Signs:   /96   Pulse 60   Temp 97.7 °F (36.5 °C)   Resp 16   Ht 175.3 cm (69.02\")   Wt 94.3 kg (208 lb)   SpO2 98%   BMI 30.70 kg/m²     Physical Exam  Vitals and nursing note reviewed.   Constitutional:       General: He is not in acute distress.     Appearance: Normal appearance. He is not diaphoretic.   HENT:      Head: Normocephalic and atraumatic.      Right Ear: External ear normal.      Left Ear: External ear normal.      Nose: Nose normal.   Eyes:      Extraocular Movements: Extraocular movements intact.      Conjunctiva/sclera: Conjunctivae normal.   Neck:      Trachea: Trachea normal.   Cardiovascular:      Rate and Rhythm: Normal rate and regular rhythm.      Heart sounds: Normal heart sounds.   Pulmonary:      Effort: Pulmonary effort is normal. No respiratory distress.   Abdominal:      General: Abdomen is flat.      Hernia: A hernia is present.   Musculoskeletal:         General: Tenderness and deformity present.      Cervical back: Neck supple.      Comments: Upper cervical   Muscle spasm   Skin:     General: Skin is warm and dry.      Findings: No erythema.   Neurological:      Mental Status: He is alert and oriented to person, place, and time.      Comments: No gross motor or sensory deficits        Result Review :     Common labs    Common Labsle 6/3/21 " 6/3/21 6/3/21 6/3/21    0915 0915 0915 0915   Glucose  101 (A)     BUN  17     Creatinine  1.12     eGFR Non  Am  64     eGFR African Am  77     Sodium  139     Potassium  4.4     Chloride  108 (A)     Calcium  9.5     Total Protein  6.2     Albumin  4.40     Total Bilirubin  0.8     Alkaline Phosphatase  62     AST (SGOT)  27     ALT (SGPT)  27     WBC 5.49      Hemoglobin 14.6      Hematocrit 43.4      Platelets 280      Total Cholesterol   194    Triglycerides   85    HDL Cholesterol   40    LDL Cholesterol    138 (A)    Hemoglobin A1C    6.10 (A)   (A) Abnormal value       Comments are available for some flowsheets but are not being displayed.                     Assessment and Plan    Diagnoses and all orders for this visit:    1. Benign essential hypertension (Primary)  -     CT Head Without Contrast  -     amLODIPine (Norvasc) 5 MG tablet; Take 1 tablet by mouth Daily.  Dispense: 30 tablet; Refill: 4    2. Cervicalgia  -     XR Spine Cervical 2 or 3 View  -     methylPREDNISolone (MEDROL) 4 MG dose pack; Take as directed on package instructions.  Dispense: 21 tablet; Refill: 0    3. Muscle spasms of neck  -     baclofen (LIORESAL) 10 MG tablet; Take 1 tablet by mouth At Night As Needed for Muscle Spasms.  Dispense: 30 tablet; Refill: 3    4. Daily headache    Plan:  1.  Benign essential hypertension: Will continue current medication and add Norvasc 5 mg p.o. nightly, low-sodium diet advised, Counseled to regularly check BP at home with goal averaging <130/80.   2.cervicalgia: We will obtain x-rays, start Medrol Dosepak  3.  Daily headache: We will obtain CT head  4.  Muscle spasm: As needed muscle relaxant  I spent 30 minutes caring for Saad on this date of service. This time includes time spent by me in the following activities:preparing for the visit, reviewing tests, performing a medically appropriate examination and/or evaluation , counseling and educating the patient/family/caregiver, ordering  medications, tests, or procedures and documenting information in the medical record  Follow Up   Return in about 2 weeks (around 8/19/2021) for dr blue.  Patient was given instructions and counseling regarding his condition or for health maintenance advice. Please see specific information pulled into the AVS if appropriate.

## 2021-08-10 DIAGNOSIS — R12 HEARTBURN: ICD-10-CM

## 2021-08-10 DIAGNOSIS — G47.00 INSOMNIA, UNSPECIFIED TYPE: ICD-10-CM

## 2021-08-10 RX ORDER — FUROSEMIDE 40 MG/1
40 TABLET ORAL DAILY PRN
Qty: 90 TABLET | Refills: 1 | Status: SHIPPED | OUTPATIENT
Start: 2021-08-10 | End: 2021-11-03 | Stop reason: SDUPTHER

## 2021-08-10 RX ORDER — EZETIMIBE 10 MG/1
10 TABLET ORAL DAILY
Qty: 90 TABLET | Refills: 3 | Status: SHIPPED | OUTPATIENT
Start: 2021-08-10 | End: 2021-11-03 | Stop reason: SDUPTHER

## 2021-08-10 RX ORDER — PANTOPRAZOLE SODIUM 20 MG/1
TABLET, DELAYED RELEASE ORAL
Qty: 90 TABLET | Refills: 3 | Status: SHIPPED | OUTPATIENT
Start: 2021-08-10 | End: 2021-11-03 | Stop reason: SDUPTHER

## 2021-08-11 RX ORDER — TEMAZEPAM 15 MG/1
30 CAPSULE ORAL NIGHTLY PRN
Qty: 60 CAPSULE | Refills: 0 | Status: SHIPPED | OUTPATIENT
Start: 2021-08-11 | End: 2021-10-13 | Stop reason: SDUPTHER

## 2021-08-12 ENCOUNTER — TELEPHONE (OUTPATIENT)
Dept: INTERNAL MEDICINE | Facility: CLINIC | Age: 76
End: 2021-08-12

## 2021-08-12 DIAGNOSIS — F51.01 PRIMARY INSOMNIA: ICD-10-CM

## 2021-08-12 RX ORDER — TRAZODONE HYDROCHLORIDE 50 MG/1
50 TABLET ORAL NIGHTLY
Qty: 90 TABLET | Refills: 1 | Status: SHIPPED | OUTPATIENT
Start: 2021-08-12 | End: 2021-11-03 | Stop reason: SDUPTHER

## 2021-08-12 NOTE — TELEPHONE ENCOUNTER
Pt called in and asked if it was possible to have his CT scheduled elsewhere. Pt stated his insurance had called him and didn't want to pay for it to be done at the hospital. Pt stated that they did not give him a reason or an alternative preferred company. Pt asked for a call back to know when it was finished

## 2021-08-17 NOTE — TELEPHONE ENCOUNTER
Patient will check to see how much his insurance will pay out for scan first and if not satisfied he will call to cancel later today.

## 2021-08-18 ENCOUNTER — APPOINTMENT (OUTPATIENT)
Dept: CT IMAGING | Facility: HOSPITAL | Age: 76
End: 2021-08-18

## 2021-08-18 ENCOUNTER — OFFICE VISIT (OUTPATIENT)
Dept: INTERNAL MEDICINE | Facility: CLINIC | Age: 76
End: 2021-08-18

## 2021-08-18 VITALS
SYSTOLIC BLOOD PRESSURE: 132 MMHG | DIASTOLIC BLOOD PRESSURE: 80 MMHG | WEIGHT: 205 LBS | TEMPERATURE: 97.7 F | OXYGEN SATURATION: 99 % | RESPIRATION RATE: 18 BRPM | BODY MASS INDEX: 30.36 KG/M2 | HEIGHT: 69 IN | HEART RATE: 60 BPM

## 2021-08-18 DIAGNOSIS — M50.90 CERVICAL NECK PAIN WITH EVIDENCE OF DISC DISEASE: Primary | ICD-10-CM

## 2021-08-18 PROCEDURE — 99213 OFFICE O/P EST LOW 20 MIN: CPT | Performed by: INTERNAL MEDICINE

## 2021-08-18 NOTE — PATIENT INSTRUCTIONS
Neck Exercises  Ask your health care provider which exercises are safe for you. Do exercises exactly as told by your health care provider and adjust them as directed. It is normal to feel mild stretching, pulling, tightness, or discomfort as you do these exercises. Stop right away if you feel sudden pain or your pain gets worse. Do not begin these exercises until told by your health care provider.  Neck exercises can be important for many reasons. They can improve strength and maintain flexibility in your neck, which will help your upper back and prevent neck pain.  Stretching exercises  Rotation neck stretching    1. Sit in a chair or stand up.  2. Place your feet flat on the floor, shoulder width apart.  3. Slowly turn your head (rotate) to the right until a slight stretch is felt. Turn it all the way to the right so you can look over your right shoulder. Do not tilt or tip your head.  4. Hold this position for 10-30 seconds.  5. Slowly turn your head (rotate) to the left until a slight stretch is felt. Turn it all the way to the left so you can look over your left shoulder. Do not tilt or tip your head.  6. Hold this position for 10-30 seconds.  Repeat __________ times. Complete this exercise __________ times a day.  Neck retraction  1. Sit in a sturdy chair or stand up.  2. Look straight ahead. Do not bend your neck.  3. Use your fingers to push your chin backward (retraction). Do not bend your neck for this movement. Continue to face straight ahead. If you are doing the exercise properly, you will feel a slight sensation in your throat and a stretch at the back of your neck.  4. Hold the stretch for 1-2 seconds.  Repeat __________ times. Complete this exercise __________ times a day.  Strengthening exercises  Neck press  1. Lie on your back on a firm bed or on the floor with a pillow under your head.  2. Use your neck muscles to push your head down on the pillow and straighten your spine.  3. Hold the position  as well as you can. Keep your head facing up (in a neutral position) and your chin tucked.  4. Slowly count to 5 while holding this position.  Repeat __________ times. Complete this exercise __________ times a day.  Isometrics  These are exercises in which you strengthen the muscles in your neck while keeping your neck still (isometrics).  1. Sit in a supportive chair and place your hand on your forehead.  2. Keep your head and face facing straight ahead. Do not flex or extend your neck while doing isometrics.  3. Push forward with your head and neck while pushing back with your hand. Hold for 10 seconds.  4. Do the sequence again, this time putting your hand against the back of your head. Use your head and neck to push backward against the hand pressure.  5. Finally, do the same exercise on either side of your head, pushing sideways against the pressure of your hand.  Repeat __________ times. Complete this exercise __________ times a day.  Prone head lifts  1. Lie face-down (prone position), resting on your elbows so that your chest and upper back are raised.  2. Start with your head facing downward, near your chest. Position your chin either on or near your chest.  3. Slowly lift your head upward. Lift until you are looking straight ahead. Then continue lifting your head as far back as you can comfortably stretch.  4. Hold your head up for 5 seconds. Then slowly lower it to your starting position.  Repeat __________ times. Complete this exercise __________ times a day.  Supine head lifts  1. Lie on your back (supine position), bending your knees to point to the ceiling and keeping your feet flat on the floor.  2. Lift your head slowly off the floor, raising your chin toward your chest.  3. Hold for 5 seconds.  Repeat __________ times. Complete this exercise __________ times a day.  Scapular retraction  1. Stand with your arms at your sides. Look straight ahead.  2. Slowly pull both shoulders (scapulae) backward  and downward (retraction) until you feel a stretch between your shoulder blades in your upper back.  3. Hold for 10-30 seconds.  4. Relax and repeat.  Repeat __________ times. Complete this exercise __________ times a day.  Contact a health care provider if:  · Your neck pain or discomfort gets much worse when you do an exercise.  · Your neck pain or discomfort does not improve within 2 hours after you exercise.  If you have any of these problems, stop exercising right away. Do not do the exercises again unless your health care provider says that you can.  Get help right away if:  · You develop sudden, severe neck pain.  If this happens, stop exercising right away. Do not do the exercises again unless your health care provider says that you can.  This information is not intended to replace advice given to you by your health care provider. Make sure you discuss any questions you have with your health care provider.  Document Revised: 10/16/2019 Document Reviewed: 10/16/2019  Elsevier Patient Education © 2021 [employer]

## 2021-08-18 NOTE — PROGRESS NOTES
Chief Complaint   Patient presents with   • Headache     in the back of head and neck-3 weeks to a month-almost daily, seen Dr Maloney on 8/5       Subjective     History of Present Illness   Saad Meier is a 75 y.o. male presenting for follow up with concerns of a couple of months of neck pain.  Patient shares that he has had a discomfort in the posterior neck which seems to radiate up his head and down to his shoulders.  xrays were recently obtained.  He has been hesitant to take muscle relaxer's due to possible drug interactions.     The following portions of the patient's history were reviewed and updated as appropriate: allergies, current medications, past family history, past medical history, past social history, past surgical history and problem list.    Review of Systems   Constitutional: Negative for chills, fatigue and fever.   HENT: Negative for congestion, ear pain, rhinorrhea, sinus pressure and sore throat.    Eyes: Negative for visual disturbance.   Respiratory: Negative for cough, chest tightness, shortness of breath and wheezing.    Cardiovascular: Negative for chest pain, palpitations and leg swelling.   Gastrointestinal: Negative for abdominal pain, blood in stool, constipation, diarrhea, nausea and vomiting.   Endocrine: Negative for polydipsia and polyuria.   Genitourinary: Negative for dysuria and hematuria.   Musculoskeletal: Positive for neck pain. Negative for arthralgias and back pain.   Skin: Negative for rash.   Neurological: Positive for headaches. Negative for dizziness, light-headedness and numbness.   Psychiatric/Behavioral: Negative for dysphoric mood and sleep disturbance. The patient is not nervous/anxious.        Allergies   Allergen Reactions   • Atorvastatin Myalgia   • Ciprofloxacin Diarrhea   • Pravastatin Myalgia       Past Medical History:   Diagnosis Date   • Allergic 25yrs.    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   • BPH (benign prostatic hypertrophy)    • CAD (coronary artery disease) 3/6/2017   • Cancer (CMS/HCC) 2012    colon   • Cardiac arrhythmia    • Chronic diastolic congestive heart failure (CMS/HCC)    • Colon polyp    • Diverticulosis    • Essential hypertension 3/14/2018   • GERD (gastroesophageal reflux disease)    • History of blood transfusion    • History of colonoscopy     Complete   • History of echocardiogram    • History of esophagogastroduodenoscopy     Diagnostic   • History of Holter monitoring    • History of stress test     PT UNSURE OF DATE OR TYPE    • History of stress test     PT STATES \"LONG TIME AGO BUT IT WAS OK\"   • HL (hearing loss)    • Hyperlipidemia    • Infection of kidney    • Iron deficiency    • Obesity    • Poor historian    • Sleep apnea    • Stenosis of right carotid artery    • Visual impairment !(97    Reading Glasses       Social History     Socioeconomic History   • Marital status: Single     Spouse name: Not on file   • Number of children: Not on file   • Years of education: Not on file   • Highest education level: Not on file   Tobacco Use   • Smoking status: Former Smoker     Packs/day: 1.00     Years: 10.00     Pack years: 10.00     Types: Cigarettes     Start date: 1963     Quit date: 1973     Years since quittin.6   • Smokeless tobacco: Never Used   Vaping Use   • Vaping Use: Never used   Substance and Sexual Activity   • Alcohol use: No   • Drug use: No   • Sexual activity: Not Currently     Partners: Female        Past Surgical History:   Procedure Laterality Date   • CARDIAC ABLATION  2012   • CARDIAC CATHETERIZATION     • CARDIAC PACEMAKER PLACEMENT     • COLON SURGERY     • COLONOSCOPY  2015   • COLONOSCOPY N/A 2019    Procedure: COLONOSCOPY W/ HOT BIOPSY POLYPECTOMY X3;  Surgeon: Jakcy Walker MD;  Location: [facility] ENDOSCOPY;  Service: " Gastroenterology   • CYSTOSCOPY TRANSURETHRAL RESECTION OF PROSTATE N/A 10/3/2018    Procedure: TRANSURETHRAL RESECTION OF PROSTATE;  Surgeon: Bryce Santo MD;  Location: [facility] OR;  Service: Urology   • ENDOSCOPY     • HEMORRHOIDECTOMY  1970   • HERNIA REPAIR      X 5   • INGUINAL HERNIA REPAIR Bilateral    • INGUINAL HERNIA REPAIR Right 2019    Procedure: INGUINAL HERNIA REPAIR;  Surgeon: Jacky Walker MD;  Location: [facility] OR;  Service: General   • LYMPH NODE BIOPSY  2012    large intestine lymph nodes   • TRANSURETHRAL RESECTION OF BLADDER TUMOR N/A 10/3/2018    Procedure: CYSTOSCOPY TRANSURETHRAL RESECTION OF BLADDER TUMOR, COUDE CATHETER PLACEMENT;  Surgeon: Bryce Santo MD;  Location: [facility] OR;  Service: Urology       Family History   Problem Relation Age of Onset   • Lung cancer Mother    • Osteoporosis Mother    • Thyroid disease Mother         mother   • Cancer Mother         Lung Cancer   • Early death Mother         46 yrs.   • Hearing loss Mother         mother   • Vision loss Mother         mother   • Heart disease Mother    • Cancer Father         Prostate Cancer   • Heart disease Father         Pacemaker   • Vision loss Father    • Heart disease Sister    • Heart failure Brother    • Heart disease Brother    • Cancer Sister         Breast Cancer   • Vision loss Brother    • Heart disease Brother    • Arrhythmia Brother    • Heart failure Brother    • Early death Sister          Around 40yrs.   • Early death Maternal Grandmother         Took Mother off.   • Heart disease Maternal Grandmother          Current Outpatient Medications:   •  amLODIPine (Norvasc) 5 MG tablet, Take 1 tablet by mouth Daily., Disp: 30 tablet, Rfl: 4  •  aspirin 81 MG tablet, Take 1 tablet by mouth Daily., Disp: 90 tablet, Rfl: 3  •  azelastine (ASTELIN) 0.1 % nasal spray, 1 spray into the nostril(s) as directed by provider 2 (Two) Times a Day As Needed for Rhinitis or Allergies. Use in each  "nostril as directed, Disp: 3 each, Rfl: 3  •  baclofen (LIORESAL) 10 MG tablet, Take 1 tablet by mouth At Night As Needed for Muscle Spasms., Disp: 30 tablet, Rfl: 3  •  cholecalciferol (VITAMIN D3) 1000 units tablet, Take 2 tablets by mouth Daily., Disp: 30 tablet, Rfl: 2  •  coenzyme Q10 100 MG capsule, Take 3 capsules by mouth Daily., Disp: 270 capsule, Rfl: 3  •  ezetimibe (ZETIA) 10 MG tablet, Take 1 tablet by mouth Daily., Disp: 90 tablet, Rfl: 3  •  flunisolide (NASALIDE) 25 MCG/ACT (0.025%) solution nasal spray, Inhale 1 spray Every 12 (Twelve) Hours., Disp: 3 bottle, Rfl: 3  •  furosemide (LASIX) 40 MG tablet, Take 1 tablet by mouth Daily As Needed (swelling)., Disp: 90 tablet, Rfl: 1  •  irbesartan (AVAPRO) 300 MG tablet, Take 1 tablet by mouth Daily. Indications: High Blood Pressure Disorder, Disp: 90 tablet, Rfl: 3  •  melatonin 3 MG tablet, Take 1 tablet by mouth Every Night. At 7 PM., Disp: 90 tablet, Rfl: 2  •  pantoprazole (PROTONIX) 20 MG EC tablet, 1 po in the am 30 minutes before breakfast.  Indications: Heartburn, Disp: 90 tablet, Rfl: 3  •  potassium chloride (K-DUR,KLOR-CON) 20 MEQ CR tablet, Take 1 tablet by mouth Daily., Disp: 90 tablet, Rfl: 3  •  rOPINIRole (REQUIP) 0.5 MG tablet, Take 1 tablet by mouth every night at bedtime., Disp: 90 tablet, Rfl: 2  •  temazepam (RESTORIL) 15 MG capsule, Take 2 capsules by mouth At Night As Needed for Sleep., Disp: 60 capsule, Rfl: 0  •  traZODone (DESYREL) 50 MG tablet, Take 1 tablet by mouth Every Night., Disp: 90 tablet, Rfl: 1    Objective   /80   Pulse 60   Temp 97.7 °F (36.5 °C)   Resp 18   Ht 175.3 cm (69\")   Wt 93 kg (205 lb)   SpO2 99%   BMI 30.27 kg/m²     Physical Exam  Vitals and nursing note reviewed.   Constitutional:       Appearance: Normal appearance. He is well-developed. He is obese.   HENT:      Head: Normocephalic and atraumatic.   Eyes:      Extraocular Movements: Extraocular movements intact.      Conjunctiva/sclera: " Conjunctivae normal.   Pulmonary:      Effort: Pulmonary effort is normal.   Musculoskeletal:         General: Tenderness (mild at C4/C3 level) present.      Cervical back: Normal range of motion and neck supple.   Skin:     General: Skin is warm and dry.      Findings: No rash.   Neurological:      General: No focal deficit present.      Mental Status: He is alert and oriented to person, place, and time.   Psychiatric:         Mood and Affect: Mood normal.         Behavior: Behavior normal.         Assessment/Plan   Diagnoses and all orders for this visit:    1. Cervical neck pain with evidence of disc disease (Primary)  -     Ambulatory Referral to Physical Therapy Evaluate and treat          Discussion Summary:  Patient is a 75 y.o. male presenting for follow up with cervicalgia due to C3/4 anterolisthesis.  Patient advised to try PT.  He does not have neurologic signs or symptoms.  He was encouraged to try muscle relaxer's at night time for pain control.  Should symptoms worsen, consider further evaluation.          Follow up:  No follow-ups on file.

## 2021-10-13 ENCOUNTER — OFFICE VISIT (OUTPATIENT)
Dept: PULMONOLOGY | Facility: CLINIC | Age: 76
End: 2021-10-13

## 2021-10-13 VITALS
RESPIRATION RATE: 18 BRPM | HEART RATE: 54 BPM | WEIGHT: 206 LBS | BODY MASS INDEX: 30.51 KG/M2 | HEIGHT: 69 IN | SYSTOLIC BLOOD PRESSURE: 112 MMHG | DIASTOLIC BLOOD PRESSURE: 68 MMHG | OXYGEN SATURATION: 96 %

## 2021-10-13 DIAGNOSIS — G47.33 OSA (OBSTRUCTIVE SLEEP APNEA): ICD-10-CM

## 2021-10-13 DIAGNOSIS — Z72.821 POOR SLEEP HYGIENE: ICD-10-CM

## 2021-10-13 DIAGNOSIS — G25.81 RESTLESS LEG SYNDROME: ICD-10-CM

## 2021-10-13 DIAGNOSIS — G47.00 INSOMNIA, UNSPECIFIED TYPE: Primary | ICD-10-CM

## 2021-10-13 PROCEDURE — 99213 OFFICE O/P EST LOW 20 MIN: CPT | Performed by: NURSE PRACTITIONER

## 2021-10-13 RX ORDER — LANOLIN ALCOHOL/MO/W.PET/CERES
3 CREAM (GRAM) TOPICAL NIGHTLY
Qty: 90 TABLET | Refills: 2 | Status: SHIPPED | OUTPATIENT
Start: 2021-10-13 | End: 2022-02-08 | Stop reason: SDUPTHER

## 2021-10-13 RX ORDER — TEMAZEPAM 15 MG/1
30 CAPSULE ORAL NIGHTLY PRN
Qty: 60 CAPSULE | Refills: 2 | Status: SHIPPED | OUTPATIENT
Start: 2021-10-13 | End: 2021-11-03 | Stop reason: SDUPTHER

## 2021-10-13 NOTE — PROGRESS NOTES
"Chief Complaint   Patient presents with   • Follow-up   • Sleeping Problem         Subjective   Saad Meier is a 76 y.o. male.     History of Present Illness   Patient comes back today for follow up of Obstructive Sleep apnea.      Patient says that he is compliant with his device and using it regularly.    Patient's symptoms of sleep disturbance and daytime sleepiness have been helped greatly with the use of PAP device, as prescribed. He feels rested most days upon awakening.     He feels he is sleeping better than he was previously.    He is taking Requip just when he needs it.  He states his legs do not bother him every night so he does not feel that he needs it every night.    He takes temazepam every night.  He states it is definitely helped him to fall asleep.  He is taking melatonin most nights as well.  He takes the melatonin earlier than the temazepam and occasionally forgets to take the melatonin early enough.    He goes to bed at 11 PM and gets up around 6 AM.  He does not get up during the night very often.    The following portions of the patient's history were reviewed and updated as appropriate: allergies, current medications, past family history, past medical history, past social history and past surgical history.      Review of Systems   Constitutional: Negative for chills and fever.   HENT: Negative for rhinorrhea, sinus pressure, sneezing and sore throat.    Respiratory: Negative for cough, shortness of breath and wheezing.    Psychiatric/Behavioral: Positive for sleep disturbance.       Objective   Visit Vitals  /68   Pulse 54   Resp 18   Ht 175.3 cm (69\")   Wt 93.4 kg (206 lb)   SpO2 96%   BMI 30.42 kg/m²         Physical Exam  Vitals reviewed.   Constitutional:       Appearance: He is well-developed.   HENT:      Head: Atraumatic.      Mouth/Throat:      Mouth: Mucous membranes are moist.      Comments: Crowded oropharynx. Denture noted.  Eyes:      Extraocular Movements: Extraocular " movements intact.   Musculoskeletal:      Comments: Gait normal.   Skin:     General: Skin is warm.   Neurological:      Mental Status: He is alert and oriented to person, place, and time.             Assessment/Plan   Diagnoses and all orders for this visit:    1. Insomnia, unspecified type (Primary)  -     temazepam (RESTORIL) 15 MG capsule; Take 2 capsules by mouth At Night As Needed for Sleep.  Dispense: 60 capsule; Refill: 2    2. LULU (obstructive sleep apnea)    3. Restless leg syndrome    4. Poor sleep hygiene    Other orders  -     melatonin 3 MG tablet; Take 1 tablet by mouth Every Night. At 7 PM.  Dispense: 90 tablet; Refill: 2           Return for keep appt in February.    DISCUSSION (if any):  Continue treatment with PAP therapy at the current pressure with a full-face mask.    Unfortunately, there was not a compliance provided for the visit today. I will ask the office staff to request from the DME company.    He has benefited from temazepam and melatonin as he is able to fall asleep easier and stay asleep for longer period of time.  I have asked him to continue using temazepam and melatonin as ordered.    I have asked him to continue good sleep hygiene and maintain a strict go to bed time and get out of bed time.  He has seemed to improve his sleep hygiene.    Humidification setup, hose and mask care discussed.    Weight loss advised.    Use every night for at least 4 hours stressed.    He may continue taking Requip prn since if seems to work for him.     PDMP/Juan Manuel reviewed.         Dictated utilizing Dragon dictation.    This document was electronically signed by JUSTA Weiss October 13, 2021  14:10 EDT

## 2021-10-21 ENCOUNTER — TELEPHONE (OUTPATIENT)
Dept: INTERNAL MEDICINE | Facility: CLINIC | Age: 76
End: 2021-10-21

## 2021-10-21 NOTE — TELEPHONE ENCOUNTER
HUB may inform    Pt will need appointment to discuss.  Will need documentation to get insurance to cover MRI

## 2021-10-21 NOTE — TELEPHONE ENCOUNTER
Caller: Saad Meier    Relationship: Self    Best call back number: [phone]    What is the medical concern/diagnosis: spine issues    What specialty or service is being requested: patient is asking for an MRI    What is the provider, practice or medical service name:     What is the office location:    What is the office phone number:     Any additional details:

## 2021-10-21 NOTE — TELEPHONE ENCOUNTER
PATIENT CALLED IN I RELAYED THE HUB TO READ MESSAGE THE PATIENT  MADE AN APPOINTMENT  FOR 10/27/2021 WITH DOCTOR ANA    Problem: Mobility Impaired (Adult and Pediatric) Goal: *Acute Goals and Plan of Care (Insert Text) Description STG: 
(1.)Ms. Lev Perez will move from supine to sit and sit to supine  with MINIMAL ASSIST within 3 treatment day(s). (2.)Ms. Lev Perez will transfer from bed to chair and chair to bed with CONTACT GUARD ASSIST using the least restrictive device within 3 treatment day(s). (3.)Ms. Lev Perez will ambulate with CONTACT GUARD ASSIST for 50 feet with the least restrictive device within 3 treatment day(s). LTG: 
(1.)Ms. Lev Perez will move from supine to sit and sit to supine  in bed with STAND BY ASSIST within 7 treatment day(s). (2.)Ms. Lev Perez will transfer from bed to chair and chair to bed with STAND BY ASSIST using the least restrictive device within 7 treatment day(s). (3.)Ms. Lev Perez will ambulate with STAND BY ASSIST for 200 feet with the least restrictive device within 7 treatment day(s). (4)Ms. Lev Perez will go up 4 steps min assist with device as needed in 7 days. (5)Ms. Lev Perez will perform HEP with cues and assistance to increase safety on her feet in 7 days. ________________________________________________________________________________________________ Outcome: Progressing Towards Goal 
  
PHYSICAL THERAPY: Daily Note and AM 3/28/2019 INPATIENT: PT Visit Days : 2 [place] / [place] B / Product Type: Medicare /   
  
NAME/AGE/GENDER: Julian Dorsey is a 52 y.o. female PRIMARY DIAGNOSIS: Incisional hernia, without obstruction or gangrene [K43.2] Incarcerated incisional hernia [K43.0] Incarcerated incisional hernia [K43.0] Incarcerated incisional hernia Incarcerated incisional hernia Procedure(s) (LRB): OPEN INCARCERATED INCISIONAL HERNIA REPAIR (N/A) 2 Days Post-Op ICD-10: Treatment Diagnosis:  
 · Generalized Muscle Weakness (M62.81) · Other abnormalities of gait and mobility (R26.89) Precaution/Allergies: 
Grass pollen ASSESSMENT:  
 Ms. Flaca Cardenas presents with general decreased in functional mobility and gait s/p OPEN INCARCERATED [address] on 3/26/19. Pt. Agreeable to get up this am. She reports pain 10 with rest and movement. She got out of bed with assistance and took some steps to the chair with RW. She did some theraputic exercises in the chair for LE's. Stressed to patient the importance of mobility and gait to prevent further issues. Pain limiting gait this am.  Will follow. This section established at most recent assessment PROBLEM LIST (Impairments causing functional limitations): 1. Decreased Strength 2. Decreased ADL/Functional Activities 3. Decreased Transfer Abilities 4. Decreased Ambulation Ability/Technique 5. Decreased Balance 6. Increased Pain 7. Decreased Activity Tolerance 8. Decreased Flexibility/Joint Mobility 9. Decreased [place] with Home Exercise Program 
 INTERVENTIONS PLANNED: (Benefits and precautions of physical therapy have been discussed with the patient.) 1. Balance Exercise 2. Bed Mobility 3. Family Education 4. Gait Training 5. Home Exercise Program (HEP) 6. Therapeutic Activites 7. Therapeutic Exercise/Strengthening 8. Transfer Training TREATMENT PLAN: Frequency/Duration: daily for duration of hospital stay Rehabilitation Potential For Stated Goals: Good RECOMMENDED REHABILITATION/EQUIPMENT: (at time of discharge pending progress): Due to the probability of continued deficits (see above) this patient will likely need continued skilled physical therapy after discharge. Equipment: ? May need a RW   
    
 
 
 
HISTORY:  
History of Present Injury/Illness (Reason for Referral): S/p OPEN INCARCERATED INCISIONAL HERNIA REPAIR 3/26/19 Past Medical History/Comorbidities: Ms. Flaca Cardenas  has a past medical history of Allergic rhinitis, Arthritis, Automatic implantable cardioverter-defibrillator in situ (3/30/2016), CAD in native artery (), Chronic systolic heart failure ([facility] [address] (2013), CKD (chronic kidney disease), Diabetes ([address] Diabetes mellitus ([address] (2010), Dyslipidemia (2013), Eczema, Fibromyalgia, GERD (gastroesophageal reflux disease), Headache, Heart failure ([address] HTN (hypertension) (2010), Hypercholesterolemia, Morbid obesity ([address] Neuropathy, NICM (nonischemic cardiomyopathy) ([address] (2/15/2013), Obesity, Obstructive sleep apnea (adult) (pediatric) (2014), Sciatic pain (2016), SVT (supraventricular tachycardia) ([facility] [address] and Tobacco use disorder (2010). She also has no past medical history of Dementia, Gastrointestinal disorder, Infectious disease, or Unspecified adverse effect of anesthesia. Ms. Ronnell Avina  has a past surgical history that includes hx tonsillectomy; hx implantable cardioverter defibrillator (2013); hx  section; hx hernia repair; pr left heart cath,percutaneous (2013); hx svt ablation (\"years ago\"); hx rotator cuff repair (Right); and hx hysterectomy. Social History/Living Environment:  
Home Environment: Private residence # Steps to Enter: 4 Rails to Enter: No 
One/Two Story Residence: One story Living Alone: No 
Support Systems: Friends \ neighbors Patient Expects to be Discharged to[de-identified] Private residence Current DME Used/Available at Home: Glucometer, CPAP Prior Level of Function/Work/Activity: 
independent Number of Personal Factors/Comorbidities that affect the Plan of Care: 1-2: MODERATE COMPLEXITY EXAMINATION:  
Most Recent Physical Functioning:  
Gross Assessment: 
AROM: Generally decreased, functional(Ue and LE, limited by abdominal pain) Strength: Generally decreased, functional 
         
  
Posture: 
Posture (WDL): Exceptions to [facility] Posture Assessment: Trunk flexion, Rounded shoulders Balance: 
Sitting: Intact; High guard Standing: With support;Pull to stand Bed Mobility: 
Supine to Sit: Moderate assistance Wheelchair Mobility: 
  
Transfers: 
Sit to Stand: Minimum assistance Stand to Sit: Minimum assistance Bed to Chair: Minimum assistance Duration: 15 Minutes Gait: 
  
Speed/Cristy: Delayed Step Length: Left shortened;Right shortened Gait Abnormalities: Decreased step clearance Distance (ft): 5 Feet (ft) Assistive Device: Walker, rolling Ambulation - Level of Assistance: Contact guard assistance;Minimal assistance Interventions: Safety awareness training;Verbal cues Body Structures Involved: 1. Bones 2. Joints 3. Muscles 4. Ligaments Body Functions Affected: 1. Movement Related Activities and Participation Affected: 1. Mobility Number of elements that affect the Plan of Care: 3: MODERATE COMPLEXITY CLINICAL PRESENTATION:  
Presentation: Stable and uncomplicated: LOW COMPLEXITY CLINICAL DECISION MAKIN[address] [place] [address] AM-PAC 6 Clicks Basic Mobility Inpatient Short Form How much difficulty does the patient currently have. .. Unable A Lot A Little None 1. Turning over in bed (including adjusting bedclothes, sheets and blankets)? ? 1   ? 2   ? 3   ? 4  
2. Sitting down on and standing up from a chair with arms ( e.g., wheelchair, bedside commode, etc.)   ? 1   ? 2   ? 3   ? 4  
3. Moving from lying on back to sitting on the side of the bed?   ? 1   ? 2   ? 3   ? 4 How much help from another person does the patient currently need. .. Total A Lot A Little None 4. Moving to and from a bed to a chair (including a wheelchair)? ? 1   ? 2   ? 3   ? 4  
5. Need to walk in hospital room? ? 1   ? 2   ? 3   ? 4  
6. Climbing 3-5 steps with a railing? ? 1   ? 2   ? 3   ? 4  
© , Trustees of [address] [place] [address], under license to [employer]. All rights reserved Score:  Initial: 14 Most Recent: X (Date: -- ) Interpretation of Tool:  Represents activities that are increasingly more difficult (i.e. Bed mobility, Transfers, Gait). Medical Necessity: · Patient is expected to demonstrate progress in strength, range of motion, balance, coordination and functional technique ·  to decrease assistance required with theraputic exercises and functional mobility · . Reason for Services/Other Comments: 
· Patient continues to require present interventions due to patient's inability to perform theraputic exercises and functional mobility · . Use of outcome tool(s) and clinical judgement create a POC that gives a: Clear prediction of patient's progress: LOW COMPLEXITY  
  
 
 
 
TREATMENT:  
(In addition to Assessment/Re-Assessment sessions the following treatments were rendered) Pre-treatment Symptoms/Complaints:  abdominal pain 
Pain: Initial: 10 Post Session:  10 Therapeutic Activity: (  15 Minutes ):  Therapeutic activities including Bed transfers, Chair transfers, Ambulation on level ground and gait to chair  to improve mobility and strength. Required minimal Safety awareness training;Verbal cues to promote static and dynamic balance in standing. Therapeutic Exercise: (10 Minutes):  Exercises per grid below to improve mobility and strength. Required minimal visual, verbal and manual cues to promote proper body alignment, promote proper body posture, promote proper body mechanics and promote proper body breathing techniques. Progressed range and repetitions as indicated. Date: 
3/28 Date: 
 Date: Activity/Exercise Parameters Parameters Parameters Ankle pumps 15 Quad sets 15 Glut sets 15 Hip abduction 10aarom Braces/Orthotics/Lines/Etc:  
· IV 
· denis catheter · drain celeste  
· O2 Device: Nasal cannula Treatment/Session Assessment:   
· Response to Treatment:  Pt. Did a little better, needs to walk but pain limiting. Instructed in importance of mobilizing · Interdisciplinary Collaboration:  
o Registered Nurse 
o Rehabilitation Attendant · After treatment position/precautions: o Up in chair 
o Bed/Chair-wheels locked 
o Bed in low position 
o Call light within reach 
o RN notified · Compliance with Program/Exercises: Will assess as treatment progresses  no questions. · Recommendations/Intent for next treatment session: \"Next visit will focus on advancements to more challenging activities and reduction in assistance provided\". Total Treatment Duration: PT Patient Time In/Time Out Time In: 1135 Time Out: 0522 Asuncion Dunbar, PT

## 2021-10-25 DIAGNOSIS — G47.33 OSA (OBSTRUCTIVE SLEEP APNEA): Primary | ICD-10-CM

## 2021-10-27 ENCOUNTER — OFFICE VISIT (OUTPATIENT)
Dept: INTERNAL MEDICINE | Facility: CLINIC | Age: 76
End: 2021-10-27

## 2021-10-27 VITALS
TEMPERATURE: 98.1 F | WEIGHT: 200 LBS | OXYGEN SATURATION: 97 % | RESPIRATION RATE: 18 BRPM | BODY MASS INDEX: 29.62 KG/M2 | DIASTOLIC BLOOD PRESSURE: 74 MMHG | HEART RATE: 63 BPM | SYSTOLIC BLOOD PRESSURE: 126 MMHG | HEIGHT: 69 IN

## 2021-10-27 DIAGNOSIS — M54.12 CERVICAL RADICULOPATHY: ICD-10-CM

## 2021-10-27 DIAGNOSIS — M54.2 CHRONIC NECK PAIN: ICD-10-CM

## 2021-10-27 DIAGNOSIS — G89.29 CHRONIC NECK PAIN: ICD-10-CM

## 2021-10-27 DIAGNOSIS — M47.812 CERVICAL ARTHRITIS: ICD-10-CM

## 2021-10-27 DIAGNOSIS — Z23 NEED FOR INFLUENZA VACCINATION: Primary | ICD-10-CM

## 2021-10-27 PROCEDURE — 90662 IIV NO PRSV INCREASED AG IM: CPT | Performed by: INTERNAL MEDICINE

## 2021-10-27 PROCEDURE — 99214 OFFICE O/P EST MOD 30 MIN: CPT | Performed by: INTERNAL MEDICINE

## 2021-10-27 PROCEDURE — G0008 ADMIN INFLUENZA VIRUS VAC: HCPCS | Performed by: INTERNAL MEDICINE

## 2021-10-27 NOTE — PROGRESS NOTES
"Chief Complaint   Patient presents with   • Neck Pain     wants to discuss getting MRI   • Sleep Apnea     machine recalled Cpap-told causing cancer wants screened , has not contacted Dr Zimmer office       Subjective     History of Present Illness   Saad Meier is a 76 y.o. male presenting for follow up. He shared concerns about using the [place] CPAP which has been recalled in June. He realized it was recalled when he tried to get filters this month.  He continues to deal with chronic neck pain.  He feels like the \"cartilage between the spinal bones is worn out.\"  He denies any radiating symptoms.  He had a neck xray in August which revealed anterolisthesis of C3 on C4.  He has worsening popping of the joint when looking up.  He feels that his symptoms are worsening over the last couple of months.      The following portions of the patient's history were reviewed and updated as appropriate: allergies, current medications, past family history, past medical history, past social history, past surgical history and problem list.    Review of Systems   Constitutional: Negative for chills, fatigue and fever.   HENT: Negative for congestion, ear pain, rhinorrhea, sinus pressure and sore throat.    Eyes: Negative for visual disturbance.   Respiratory: Negative for cough, chest tightness, shortness of breath and wheezing.    Cardiovascular: Negative for chest pain, palpitations and leg swelling.   Gastrointestinal: Negative for abdominal pain, blood in stool, constipation, diarrhea, nausea and vomiting.   Endocrine: Negative for polydipsia and polyuria.   Genitourinary: Negative for dysuria and hematuria.   Musculoskeletal: Negative for arthralgias and back pain.   Skin: Negative for rash.   Neurological: Negative for dizziness, light-headedness, numbness and headaches.   Psychiatric/Behavioral: Negative for dysphoric mood and sleep disturbance. The patient is not nervous/anxious.        Allergies   Allergen Reactions " Returned call to patient, notifying her of positive BV results and treatment plan as requested by [facility] CNP. Per provider, Flagyl 500mg PO BID x 7 days ordered. Patient advised not to drink alcohol while taking this medication. Patient verbalized an understanding of results and treatment. "  • Atorvastatin Myalgia   • Ciprofloxacin Diarrhea   • Pravastatin Myalgia       Past Medical History:   Diagnosis Date   • Allergic 25yrs.    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   • BPH (benign prostatic hypertrophy)    • CAD (coronary artery disease) 3/6/2017   • Cancer (HCC) 2012    colon   • Cardiac arrhythmia    • Chronic diastolic congestive heart failure (HCC)    • Colon polyp    • Diverticulosis    • Essential hypertension 3/14/2018   • GERD (gastroesophageal reflux disease)    • History of blood transfusion    • History of colonoscopy     Complete   • History of echocardiogram    • History of esophagogastroduodenoscopy     Diagnostic   • History of Holter monitoring    • History of stress test     PT UNSURE OF DATE OR TYPE    • History of stress test     PT STATES \"LONG TIME AGO BUT IT WAS OK\"   • HL (hearing loss)    • Hyperlipidemia    • Infection of kidney    • Iron deficiency    • Obesity    • Poor historian    • Sleep apnea    • Stenosis of right carotid artery    • Visual impairment !(97    Reading Glasses       Social History     Socioeconomic History   • Marital status: Single   Tobacco Use   • Smoking status: Former Smoker     Packs/day: 1.00     Years: 10.00     Pack years: 10.00     Types: Cigarettes     Start date: 1963     Quit date: 1973     Years since quittin.8   • Smokeless tobacco: Never Used   Vaping Use   • Vaping Use: Never used   Substance and Sexual Activity   • Alcohol use: No   • Drug use: No   • Sexual activity: Not Currently     Partners: Female        Past Surgical History:   Procedure Laterality Date   • CARDIAC ABLATION  2012   • CARDIAC CATHETERIZATION     • CARDIAC PACEMAKER PLACEMENT     • COLON SURGERY     • COLONOSCOPY  2015   • COLONOSCOPY N/A 2019    Procedure: COLONOSCOPY W/ HOT BIOPSY POLYPECTOMY X3;  " Surgeon: Jacky Walker MD;  Location: [facility] ENDOSCOPY;  Service: Gastroenterology   • CYSTOSCOPY TRANSURETHRAL RESECTION OF PROSTATE N/A 10/3/2018    Procedure: TRANSURETHRAL RESECTION OF PROSTATE;  Surgeon: Bryce Santo MD;  Location: [facility] OR;  Service: Urology   • ENDOSCOPY     • HEMORRHOIDECTOMY  1970   • HERNIA REPAIR      X 5   • INGUINAL HERNIA REPAIR Bilateral    • INGUINAL HERNIA REPAIR Right 2019    Procedure: INGUINAL HERNIA REPAIR;  Surgeon: Jacky Walker MD;  Location: [facility] OR;  Service: General   • LYMPH NODE BIOPSY  2012    large intestine lymph nodes   • TRANSURETHRAL RESECTION OF BLADDER TUMOR N/A 10/3/2018    Procedure: CYSTOSCOPY TRANSURETHRAL RESECTION OF BLADDER TUMOR, COUDE CATHETER PLACEMENT;  Surgeon: Bryce Santo MD;  Location: [facility] OR;  Service: Urology       Family History   Problem Relation Age of Onset   • Lung cancer Mother    • Osteoporosis Mother    • Thyroid disease Mother         mother   • Cancer Mother         Lung Cancer   • Early death Mother         46 yrs.   • Hearing loss Mother         mother   • Vision loss Mother         mother   • Heart disease Mother    • Cancer Father         Prostate Cancer   • Heart disease Father         Pacemaker   • Vision loss Father    • Heart disease Sister    • Heart failure Brother    • Heart disease Brother    • Cancer Sister         Breast Cancer   • Vision loss Brother    • Heart disease Brother    • Arrhythmia Brother    • Heart failure Brother    • Early death Sister          Around 40yrs.   • Early death Maternal Grandmother         Took Mother off.   • Heart disease Maternal Grandmother          Current Outpatient Medications:   •  amLODIPine (Norvasc) 5 MG tablet, Take 1 tablet by mouth Daily., Disp: 30 tablet, Rfl: 4  •  aspirin 81 MG tablet, Take 1 tablet by mouth Daily., Disp: 90 tablet, Rfl: 3  •  azelastine (ASTELIN) 0.1 % nasal spray, 1 spray into the nostril(s) as directed by provider 2  "(Two) Times a Day As Needed for Rhinitis or Allergies. Use in each nostril as directed, Disp: 3 each, Rfl: 3  •  baclofen (LIORESAL) 10 MG tablet, Take 1 tablet by mouth At Night As Needed for Muscle Spasms., Disp: 30 tablet, Rfl: 3  •  cholecalciferol (VITAMIN D3) 1000 units tablet, Take 2 tablets by mouth Daily., Disp: 30 tablet, Rfl: 2  •  coenzyme Q10 100 MG capsule, Take 3 capsules by mouth Daily., Disp: 270 capsule, Rfl: 3  •  ezetimibe (ZETIA) 10 MG tablet, Take 1 tablet by mouth Daily., Disp: 90 tablet, Rfl: 3  •  flunisolide (NASALIDE) 25 MCG/ACT (0.025%) solution nasal spray, Inhale 1 spray Every 12 (Twelve) Hours., Disp: 3 bottle, Rfl: 3  •  furosemide (LASIX) 40 MG tablet, Take 1 tablet by mouth Daily As Needed (swelling)., Disp: 90 tablet, Rfl: 1  •  irbesartan (AVAPRO) 300 MG tablet, Take 1 tablet by mouth Daily. Indications: High Blood Pressure Disorder, Disp: 90 tablet, Rfl: 3  •  melatonin 3 MG tablet, Take 1 tablet by mouth Every Night. At 7 PM., Disp: 90 tablet, Rfl: 2  •  pantoprazole (PROTONIX) 20 MG EC tablet, 1 po in the am 30 minutes before breakfast.  Indications: Heartburn, Disp: 90 tablet, Rfl: 3  •  potassium chloride (K-DUR,KLOR-CON) 20 MEQ CR tablet, Take 1 tablet by mouth Daily., Disp: 90 tablet, Rfl: 3  •  rOPINIRole (REQUIP) 0.5 MG tablet, Take 1 tablet by mouth every night at bedtime., Disp: 90 tablet, Rfl: 2  •  temazepam (RESTORIL) 15 MG capsule, Take 2 capsules by mouth At Night As Needed for Sleep., Disp: 60 capsule, Rfl: 2  •  traZODone (DESYREL) 50 MG tablet, Take 1 tablet by mouth Every Night., Disp: 90 tablet, Rfl: 1    Objective   /74   Pulse 63   Temp 98.1 °F (36.7 °C)   Resp 18   Ht 175.3 cm (69\")   Wt 90.7 kg (200 lb)   SpO2 97%   BMI 29.53 kg/m²     Physical Exam  Vitals and nursing note reviewed.   Constitutional:       Appearance: Normal appearance. He is well-developed.   HENT:      Head: Normocephalic and atraumatic.   Eyes:      Extraocular Movements: " Extraocular movements intact.      Conjunctiva/sclera: Conjunctivae normal.   Pulmonary:      Effort: Pulmonary effort is normal.   Musculoskeletal:      Cervical back: Normal range of motion and neck supple.   Skin:     General: Skin is warm and dry.      Findings: No rash.   Neurological:      General: No focal deficit present.      Mental Status: He is alert and oriented to person, place, and time.   Psychiatric:         Mood and Affect: Mood normal.         Behavior: Behavior normal.         Assessment/Plan   Diagnoses and all orders for this visit:    1. Need for influenza vaccination (Primary)  -     Fluzone High-Dose 65+yrs ([id number])    2. Cervical radiculopathy    3. Cervical arthritis  -     MRI Cervical Spine Without Contrast; Future    4. Chronic neck pain  -     MRI Cervical Spine Without Contrast; Future          Discussion Summary:  Patient is a 76 y.o. male presenting for follow up.    Flu vaccine given in clinic today.    Cervical radiculopathy/cervical arthritis  -MRI C-spine ordered for further evaluation as symptoms are progressing.  Prior cervical x-rays were concerning for degenerative disc disease.      Sleep apnea  -Patient's CPAP machine is on recall he was advised to follow-up with pulmonology to discuss his options regarding replacement    Follow up:  No follow-ups on file.

## 2021-11-02 DIAGNOSIS — G47.33 OSA (OBSTRUCTIVE SLEEP APNEA): Primary | ICD-10-CM

## 2021-11-03 DIAGNOSIS — I10 BENIGN ESSENTIAL HYPERTENSION: ICD-10-CM

## 2021-11-03 DIAGNOSIS — J30.9 ALLERGIC RHINITIS, UNSPECIFIED SEASONALITY, UNSPECIFIED TRIGGER: ICD-10-CM

## 2021-11-03 DIAGNOSIS — G47.00 INSOMNIA, UNSPECIFIED TYPE: ICD-10-CM

## 2021-11-03 DIAGNOSIS — F51.01 PRIMARY INSOMNIA: ICD-10-CM

## 2021-11-03 DIAGNOSIS — M62.838 MUSCLE SPASMS OF NECK: ICD-10-CM

## 2021-11-03 DIAGNOSIS — R12 HEARTBURN: ICD-10-CM

## 2021-11-03 NOTE — TELEPHONE ENCOUNTER
Caller: [employer] [facility] [place] - [address] - [phone] [facility] [phone] FX    Relationship: Pharmacy    Requested Prescriptions:   Requested Prescriptions     Pending Prescriptions Disp Refills   • amLODIPine (Norvasc) 5 MG tablet 30 tablet 4     Sig: Take 1 tablet by mouth Daily.   • azelastine (ASTELIN) 0.1 % nasal spray 3 each 3     Si spray into the nostril(s) as directed by provider 2 (Two) Times a Day As Needed for Rhinitis or Allergies. Use in each nostril as directed   • baclofen (LIORESAL) 10 MG tablet 30 tablet 3     Sig: Take 1 tablet by mouth At Night As Needed for Muscle Spasms.   • furosemide (LASIX) 40 MG tablet 90 tablet 1     Sig: Take 1 tablet by mouth Daily As Needed (swelling).   • pantoprazole (PROTONIX) 20 MG EC tablet 90 tablet 3     Si po in the am 30 minutes before breakfast.  Indications: Heartburn   • ezetimibe (ZETIA) 10 MG tablet 90 tablet 3     Sig: Take 1 tablet by mouth Daily.   • potassium chloride (K-DUR,KLOR-CON) 20 MEQ CR tablet 90 tablet 3     Sig: Take 1 tablet by mouth Daily.   • rOPINIRole (REQUIP) 0.5 MG tablet 90 tablet 2     Sig: Take 1 tablet by mouth every night at bedtime.   • temazepam (RESTORIL) 15 MG capsule 60 capsule 2     Sig: Take 2 capsules by mouth At Night As Needed for Sleep.   • traZODone (DESYREL) 50 MG tablet 90 tablet 1     Sig: Take 1 tablet by mouth Every Night.        Pharmacy where request should be sent: PHARMACY STATES THAT HE NEEDS THESE MEDICATIONS REFILLED.    Additional details provided by patient:     Best call back number: [phone]    Does the patient have less than a 3 day supply:  [x] Yes  [] No    Belinda Fisher Rep   21 10:22 EDT

## 2021-11-03 NOTE — TELEPHONE ENCOUNTER
Caller: Saad Meier    Relationship: Self    Best call back number: [phone]    What is the best time to reach you: ANYTIME    Who are you requesting to speak with (clinical staff, provider,  specific staff member): CLINICAL STAFF OR PROVIDER    What was the call regarding: PATIENT WOULD LIKE TO DISCUSS A REFERRAL FOR A SLEEP STUDY    Do you require a callback: YES

## 2021-11-09 RX ORDER — POTASSIUM CHLORIDE 20 MEQ/1
20 TABLET, EXTENDED RELEASE ORAL DAILY
Qty: 90 TABLET | Refills: 3 | Status: SHIPPED | OUTPATIENT
Start: 2021-11-09 | End: 2021-12-22 | Stop reason: SDUPTHER

## 2021-11-09 RX ORDER — AZELASTINE 1 MG/ML
1 SPRAY, METERED NASAL 2 TIMES DAILY PRN
Qty: 3 EACH | Refills: 3 | Status: SHIPPED | OUTPATIENT
Start: 2021-11-09 | End: 2022-04-19 | Stop reason: SDUPTHER

## 2021-11-09 RX ORDER — TRAZODONE HYDROCHLORIDE 50 MG/1
50 TABLET ORAL NIGHTLY
Qty: 90 TABLET | Refills: 1 | Status: SHIPPED | OUTPATIENT
Start: 2021-11-09 | End: 2022-07-20 | Stop reason: SDUPTHER

## 2021-11-09 RX ORDER — FUROSEMIDE 40 MG/1
40 TABLET ORAL DAILY PRN
Qty: 90 TABLET | Refills: 1 | Status: SHIPPED | OUTPATIENT
Start: 2021-11-09 | End: 2022-05-25

## 2021-11-09 RX ORDER — PANTOPRAZOLE SODIUM 20 MG/1
TABLET, DELAYED RELEASE ORAL
Qty: 90 TABLET | Refills: 3 | Status: SHIPPED | OUTPATIENT
Start: 2021-11-09 | End: 2022-05-27 | Stop reason: SDUPTHER

## 2021-11-09 RX ORDER — BACLOFEN 10 MG/1
10 TABLET ORAL NIGHTLY PRN
Qty: 30 TABLET | Refills: 3 | Status: SHIPPED | OUTPATIENT
Start: 2021-11-09

## 2021-11-09 RX ORDER — TEMAZEPAM 15 MG/1
30 CAPSULE ORAL NIGHTLY PRN
Qty: 60 CAPSULE | Refills: 1 | Status: SHIPPED | OUTPATIENT
Start: 2021-11-09 | End: 2022-02-08 | Stop reason: SDUPTHER

## 2021-11-09 RX ORDER — ROPINIROLE 0.5 MG/1
0.5 TABLET, FILM COATED ORAL
Qty: 90 TABLET | Refills: 2 | Status: SHIPPED | OUTPATIENT
Start: 2021-11-09

## 2021-11-09 RX ORDER — EZETIMIBE 10 MG/1
10 TABLET ORAL DAILY
Qty: 90 TABLET | Refills: 3 | Status: SHIPPED | OUTPATIENT
Start: 2021-11-09 | End: 2022-11-14

## 2021-11-09 RX ORDER — AMLODIPINE BESYLATE 5 MG/1
5 TABLET ORAL DAILY
Qty: 30 TABLET | Refills: 4 | Status: SHIPPED | OUTPATIENT
Start: 2021-11-09

## 2021-11-23 ENCOUNTER — APPOINTMENT (OUTPATIENT)
Dept: MRI IMAGING | Facility: HOSPITAL | Age: 76
End: 2021-11-23

## 2021-12-22 RX ORDER — POTASSIUM CHLORIDE 20 MEQ/1
20 TABLET, EXTENDED RELEASE ORAL DAILY
Qty: 90 TABLET | Refills: 3 | Status: SHIPPED | OUTPATIENT
Start: 2021-12-22 | End: 2022-01-05 | Stop reason: SDUPTHER

## 2021-12-22 NOTE — TELEPHONE ENCOUNTER
Incoming Refill Request      Medication requested (name and dose): KLOR-CON M20 20mEg K    Pharmacy where request should be sent: [facility]    Additional details provided by patient: COMPLETELY OUT OF MEDICATION    Best call back number: [phone]    Does the patient have less than a 3 day supply:  [x] Yes  [] No    Belinda Bingham Rep  12/22/21, 12:20 EST

## 2021-12-23 ENCOUNTER — TELEPHONE (OUTPATIENT)
Dept: INTERNAL MEDICINE | Facility: CLINIC | Age: 76
End: 2021-12-23

## 2022-01-05 RX ORDER — POTASSIUM CHLORIDE 20 MEQ/1
20 TABLET, EXTENDED RELEASE ORAL DAILY
Qty: 90 TABLET | Refills: 3 | Status: SHIPPED | OUTPATIENT
Start: 2022-01-05

## 2022-01-05 NOTE — TELEPHONE ENCOUNTER
Caller: Saad Meier    Relationship: Self    Best call back number: [phone]    Requested Prescriptions:   Requested Prescriptions     Pending Prescriptions Disp Refills   • potassium chloride (K-DUR,KLOR-CON) 20 MEQ CR tablet 90 tablet 3     Sig: Take 1 tablet by mouth Daily.        Pharmacy where request should be sent: [facility] [address] [place] [address] [phone] [facility] [phone] FX     Additional details provided by patient: COMPLETELY OUT    Does the patient have less than a 3 day supply:  [x] Yes  [] No    Belinda Ramirez Rep   01/05/22 11:26 EST

## 2022-01-21 ENCOUNTER — OFFICE VISIT (OUTPATIENT)
Dept: INTERNAL MEDICINE | Facility: CLINIC | Age: 77
End: 2022-01-21

## 2022-01-21 VITALS
OXYGEN SATURATION: 100 % | HEART RATE: 56 BPM | WEIGHT: 209 LBS | HEIGHT: 69 IN | DIASTOLIC BLOOD PRESSURE: 78 MMHG | BODY MASS INDEX: 30.96 KG/M2 | RESPIRATION RATE: 16 BRPM | TEMPERATURE: 97.9 F | SYSTOLIC BLOOD PRESSURE: 134 MMHG

## 2022-01-21 DIAGNOSIS — R73.03 BORDERLINE DIABETES: ICD-10-CM

## 2022-01-21 DIAGNOSIS — M47.892 OTHER OSTEOARTHRITIS OF SPINE, CERVICAL REGION: Primary | ICD-10-CM

## 2022-01-21 LAB
BUN SERPL-MCNC: 26 MG/DL (ref 8–23)
BUN/CREAT SERPL: 26.5 (ref 7–25)
CALCIUM SERPL-MCNC: 9.2 MG/DL (ref 8.6–10.5)
CHLORIDE SERPL-SCNC: 106 MMOL/L (ref 98–107)
CO2 SERPL-SCNC: 26.4 MMOL/L (ref 22–29)
CREAT SERPL-MCNC: 0.98 MG/DL (ref 0.76–1.27)
GLUCOSE SERPL-MCNC: 111 MG/DL (ref 65–99)
HBA1C MFR BLD: 6.4 % (ref 4.8–5.6)
POTASSIUM SERPL-SCNC: 4.3 MMOL/L (ref 3.5–5.2)
SODIUM SERPL-SCNC: 140 MMOL/L (ref 136–145)

## 2022-01-21 PROCEDURE — 99214 OFFICE O/P EST MOD 30 MIN: CPT | Performed by: INTERNAL MEDICINE

## 2022-01-21 NOTE — PROGRESS NOTES
Chief Complaint   Patient presents with   • Back Pain     discuss getting MRI done outside of [facility]-pt has a pace maker       Subjective     History of Present Illness   Saad Meier is a 76 y.o. male presenting for follow up on chronic medical problems.  Patient shares that he has not been watching his diet as carefully and has been limited with exercise due to the cold weather.  As a result, he has gained about 9 pounds.  As far as his chronic neck pain, pain persists.  He was unable to obtain an MRI due to pacemaker.  He was also concerned about the cost of the imaging study as well as the difficulty with going to [place] for the test.  His neck pains persist as compared to 3 months ago.    The following portions of the patient's history were reviewed and updated as appropriate: allergies, current medications, past family history, past medical history, past social history, past surgical history and problem list.    Review of Systems   Constitutional: Negative for chills, fatigue and fever.   HENT: Negative for congestion, ear pain, rhinorrhea, sinus pressure and sore throat.    Eyes: Negative for visual disturbance.   Respiratory: Negative for cough, chest tightness, shortness of breath and wheezing.    Cardiovascular: Negative for chest pain, palpitations and leg swelling.   Gastrointestinal: Negative for abdominal pain, blood in stool, constipation, diarrhea, nausea and vomiting.   Endocrine: Negative for polydipsia and polyuria.   Genitourinary: Negative for dysuria and hematuria.   Musculoskeletal: Positive for neck pain. Negative for arthralgias and back pain.   Skin: Negative for rash.   Neurological: Negative for dizziness, light-headedness, numbness and headaches.   Psychiatric/Behavioral: Negative for dysphoric mood and sleep disturbance. The patient is not nervous/anxious.        Allergies   Allergen Reactions   • Atorvastatin Myalgia   • Ciprofloxacin Diarrhea   • Pravastatin Myalgia       Past  "Medical History:   Diagnosis Date   • Allergic 25yrs.    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   • BPH (benign prostatic hypertrophy)    • CAD (coronary artery disease) 3/6/2017   • Cancer (HCC) 2012    colon   • Cardiac arrhythmia    • Chronic diastolic congestive heart failure (HCC)    • Colon polyp    • Diverticulosis    • Essential hypertension 3/14/2018   • GERD (gastroesophageal reflux disease)    • History of blood transfusion    • History of colonoscopy     Complete   • History of echocardiogram    • History of esophagogastroduodenoscopy     Diagnostic   • History of Holter monitoring    • History of stress test     PT UNSURE OF DATE OR TYPE    • History of stress test     PT STATES \"LONG TIME AGO BUT IT WAS OK\"   • HL (hearing loss)    • Hyperlipidemia    • Infection of kidney    • Iron deficiency    • Obesity    • Poor historian    • Sleep apnea    • Stenosis of right carotid artery    • Visual impairment !(97    Reading Glasses       Social History     Socioeconomic History   • Marital status: Single   Tobacco Use   • Smoking status: Former Smoker     Packs/day: 1.00     Years: 10.00     Pack years: 10.00     Types: Cigarettes     Start date: 1963     Quit date:      Years since quittin.0   • Smokeless tobacco: Never Used   Vaping Use   • Vaping Use: Never used   Substance and Sexual Activity   • Alcohol use: No   • Drug use: No   • Sexual activity: Not Currently     Partners: Female        Past Surgical History:   Procedure Laterality Date   • CARDIAC ABLATION  2012   • CARDIAC CATHETERIZATION     • CARDIAC PACEMAKER PLACEMENT     • COLON SURGERY     • COLONOSCOPY  2015   • COLONOSCOPY N/A 2019    Procedure: COLONOSCOPY W/ HOT BIOPSY POLYPECTOMY X3;  Surgeon: Jacky Walker MD;  Location: [facility] ENDOSCOPY;  Service: Gastroenterology "   • CYSTOSCOPY TRANSURETHRAL RESECTION OF PROSTATE N/A 10/3/2018    Procedure: TRANSURETHRAL RESECTION OF PROSTATE;  Surgeon: Bryce Santo MD;  Location: [facility] OR;  Service: Urology   • ENDOSCOPY     • HEMORRHOIDECTOMY  1970   • HERNIA REPAIR      X 5   • INGUINAL HERNIA REPAIR Bilateral    • INGUINAL HERNIA REPAIR Right 2019    Procedure: INGUINAL HERNIA REPAIR;  Surgeon: Jacky Walker MD;  Location: [facility] OR;  Service: General   • LYMPH NODE BIOPSY  2012    large intestine lymph nodes   • TRANSURETHRAL RESECTION OF BLADDER TUMOR N/A 10/3/2018    Procedure: CYSTOSCOPY TRANSURETHRAL RESECTION OF BLADDER TUMOR, COUDE CATHETER PLACEMENT;  Surgeon: Bryce Santo MD;  Location: [facility] OR;  Service: Urology       Family History   Problem Relation Age of Onset   • Lung cancer Mother    • Osteoporosis Mother    • Thyroid disease Mother         mother   • Cancer Mother         Lung Cancer   • Early death Mother         46 yrs.   • Hearing loss Mother         mother   • Vision loss Mother         mother   • Heart disease Mother    • Cancer Father         Prostate Cancer   • Heart disease Father         Pacemaker   • Vision loss Father    • Heart disease Sister    • Heart failure Brother    • Heart disease Brother    • Cancer Sister         Breast Cancer   • Vision loss Brother    • Heart disease Brother    • Arrhythmia Brother    • Heart failure Brother    • Early death Sister          Around 40yrs.   • Early death Maternal Grandmother         Took Mother off.   • Heart disease Maternal Grandmother          Current Outpatient Medications:   •  amLODIPine (Norvasc) 5 MG tablet, Take 1 tablet by mouth Daily., Disp: 30 tablet, Rfl: 4  •  aspirin 81 MG tablet, Take 1 tablet by mouth Daily., Disp: 90 tablet, Rfl: 3  •  azelastine (ASTELIN) 0.1 % nasal spray, 1 spray into the nostril(s) as directed by provider 2 (Two) Times a Day As Needed for Rhinitis or Allergies. Use in each nostril as  "directed, Disp: 3 each, Rfl: 3  •  baclofen (LIORESAL) 10 MG tablet, Take 1 tablet by mouth At Night As Needed for Muscle Spasms., Disp: 30 tablet, Rfl: 3  •  cholecalciferol (VITAMIN D3) 1000 units tablet, Take 2 tablets by mouth Daily., Disp: 30 tablet, Rfl: 2  •  coenzyme Q10 100 MG capsule, Take 3 capsules by mouth Daily., Disp: 270 capsule, Rfl: 3  •  ezetimibe (ZETIA) 10 MG tablet, Take 1 tablet by mouth Daily., Disp: 90 tablet, Rfl: 3  •  flunisolide (NASALIDE) 25 MCG/ACT (0.025%) solution nasal spray, Inhale 1 spray Every 12 (Twelve) Hours., Disp: 3 bottle, Rfl: 3  •  furosemide (LASIX) 40 MG tablet, Take 1 tablet by mouth Daily As Needed (swelling)., Disp: 90 tablet, Rfl: 1  •  irbesartan (AVAPRO) 300 MG tablet, Take 1 tablet by mouth Daily. Indications: High Blood Pressure Disorder, Disp: 90 tablet, Rfl: 3  •  melatonin 3 MG tablet, Take 1 tablet by mouth Every Night. At 7 PM., Disp: 90 tablet, Rfl: 2  •  pantoprazole (PROTONIX) 20 MG EC tablet, 1 po in the am 30 minutes before breakfast.  Indications: Heartburn, Disp: 90 tablet, Rfl: 3  •  potassium chloride (K-DUR,KLOR-CON) 20 MEQ CR tablet, Take 1 tablet by mouth Daily., Disp: 90 tablet, Rfl: 3  •  rOPINIRole (REQUIP) 0.5 MG tablet, Take 1 tablet by mouth every night at bedtime., Disp: 90 tablet, Rfl: 2  •  temazepam (RESTORIL) 15 MG capsule, Take 2 capsules by mouth At Night As Needed for Sleep., Disp: 60 capsule, Rfl: 1  •  traZODone (DESYREL) 50 MG tablet, Take 1 tablet by mouth Every Night., Disp: 90 tablet, Rfl: 1    Objective   /78   Pulse 56   Temp 97.9 °F (36.6 °C)   Resp 16   Ht 175.3 cm (69\")   Wt 94.8 kg (209 lb)   SpO2 100%   BMI 30.86 kg/m²     Physical Exam  Vitals and nursing note reviewed.   Constitutional:       Appearance: Normal appearance. He is well-developed.   HENT:      Head: Normocephalic and atraumatic.   Eyes:      Extraocular Movements: Extraocular movements intact.      Conjunctiva/sclera: Conjunctivae normal. "   Pulmonary:      Effort: Pulmonary effort is normal.   Musculoskeletal:      Cervical back: Normal range of motion and neck supple.   Skin:     General: Skin is warm and dry.      Findings: No rash.   Neurological:      General: No focal deficit present.      Mental Status: He is alert and oriented to person, place, and time.   Psychiatric:         Mood and Affect: Mood normal.         Behavior: Behavior normal.         Assessment/Plan   Diagnoses and all orders for this visit:    1. Other osteoarthritis of spine, cervical region (Primary)  -     CT cervical spine w contrast; Future  -     Basic metabolic panel    2. Borderline diabetes  -     Hemoglobin A1c          Discussion Summary:  Patient is a 76 y.o. male presenting for follow up.    Chronic neck pain due to degenerative disc disease  - Patient needs further evaluation with CT imaging rather than MRI.  Renal function has been stable.  Will obtain BMP prior to CT imaging with contrast.    Borderline diabetes  - Last A1c was 6.1.  A1c added to blood work this morning.    Follow up:  Return in about 6 months (around 7/21/2022) for Next scheduled follow up.

## 2022-02-08 ENCOUNTER — OFFICE VISIT (OUTPATIENT)
Dept: PULMONOLOGY | Facility: CLINIC | Age: 77
End: 2022-02-08

## 2022-02-08 VITALS
WEIGHT: 211.2 LBS | RESPIRATION RATE: 18 BRPM | HEART RATE: 56 BPM | DIASTOLIC BLOOD PRESSURE: 78 MMHG | OXYGEN SATURATION: 97 % | SYSTOLIC BLOOD PRESSURE: 118 MMHG | HEIGHT: 69 IN | BODY MASS INDEX: 31.28 KG/M2

## 2022-02-08 DIAGNOSIS — G47.00 INSOMNIA, UNSPECIFIED TYPE: ICD-10-CM

## 2022-02-08 DIAGNOSIS — Z72.821 POOR SLEEP HYGIENE: ICD-10-CM

## 2022-02-08 DIAGNOSIS — G47.33 OSA (OBSTRUCTIVE SLEEP APNEA): Primary | ICD-10-CM

## 2022-02-08 PROCEDURE — 99214 OFFICE O/P EST MOD 30 MIN: CPT | Performed by: INTERNAL MEDICINE

## 2022-02-08 RX ORDER — TEMAZEPAM 15 MG/1
30 CAPSULE ORAL NIGHTLY PRN
Qty: 60 CAPSULE | Refills: 1 | Status: SHIPPED | OUTPATIENT
Start: 2022-02-08 | End: 2022-05-11 | Stop reason: SDUPTHER

## 2022-02-08 RX ORDER — LANOLIN ALCOHOL/MO/W.PET/CERES
3 CREAM (GRAM) TOPICAL NIGHTLY
Qty: 90 TABLET | Refills: 2 | Status: SHIPPED | OUTPATIENT
Start: 2022-02-08 | End: 2022-05-11 | Stop reason: SDUPTHER

## 2022-02-08 NOTE — PROGRESS NOTES
"  Chief Complaint   Patient presents with   • Follow-up   • Sleeping Problem       Subjective   Saad Meier is a 76 y.o. male.     History of Present Illness   Still complains of insomnia.    Says that he only uses Restoril, when \"I feel like it\".     Says that he uses the CPAP, \"some every night\".     The following portions of the patient's history were reviewed and updated as appropriate: allergies, current medications, past family history, past medical history, past social history and past surgical history.    Review of Systems   Constitutional: Negative for chills and fever.   HENT: Positive for rhinorrhea. Negative for sinus pressure, sneezing and sore throat.    Respiratory: Positive for wheezing. Negative for cough and shortness of breath.    Psychiatric/Behavioral: Positive for sleep disturbance.       Objective   Visit Vitals  /78   Pulse 56   Resp 18   Ht 175.3 cm (69\")   Wt 95.8 kg (211 lb 3.2 oz)   SpO2 97%   BMI 31.19 kg/m²       Physical Exam  Vitals reviewed.   Constitutional:       Appearance: He is well-developed.   HENT:      Head: Atraumatic.      Mouth/Throat:      Comments: Oropharynx was crowded.  Neck:      Comments: Increased adipose tissue noted.  Musculoskeletal:      Comments: Gait was normal.   Neurological:      Mental Status: He is alert and oriented to person, place, and time.           Assessment/Plan   Diagnoses and all orders for this visit:    1. LULU (obstructive sleep apnea) (Primary)    2. Insomnia, unspecified type  -     temazepam (RESTORIL) 15 MG capsule; Take 2 capsules by mouth At Night As Needed for Sleep.  Dispense: 60 capsule; Refill: 1    3. Poor sleep hygiene    Other orders  -     melatonin 3 MG tablet; Take 1 tablet by mouth Every Night. At 7 PM.  Dispense: 90 tablet; Refill: 2           Return in about 3 months (around 5/8/2022) for SleepONLY/Johnna.    DISCUSSION (if any):  I had a long discussion with the patient once again and told him that most of his " issues are due to noncompliance with previously discussed sleep hygiene measures and also the fact that he does not take the medications as explained earlier.    Continue treatment with CPAP.    Patient seems to be compliant with PAP device, based on the available data and his account of improved symptoms.     Sleep hygiene measures were discussed in great detail including need to follow a strict bedtime and to avoid electronic devices in bed and close to bedtime.    he was also asked to avoid caffeinated or alcoholic beverages within 4 to 6 hours of bedtime.    The patient was once again reminded to continue using the PAP device regularly, every night for atleast 4 hours.    Sleep hygiene measures were discussed with the patient in great detail.    The patient was told that he will need to follow a strict time to go to bed as well as a fixed time to get out of bed.    Multiple measures were discussed including sleep restriction and he was strongly encouraged to follow other recommendations including avoiding naps, watching TV in the bed etc.    Due to ongoing symptoms, I have asked him to take Melatonin and Restoril, as recommended, which is Melatonin at 8 PM and Restoril at 10 PM.      Side effects of prescribed medication were discussed.    Juan Manuel was reviewed.    I spent a total of 34 minutes face to face with this patient. Part of this time was spent in counseling patient about the pathophysiology of underlying disease process, reviewing any available sleep studies, need to use devices (as applicable) on a regular basis and lifestyle modifications that may positively impact patient's health.  Time also includes documentation in electronic health records    Dictated utilizing Dragon dictation.    This document was electronically signed by Neel Zimmer MD on 02/08/22 at 14:26 EST

## 2022-02-11 ENCOUNTER — TELEPHONE (OUTPATIENT)
Dept: INTERNAL MEDICINE | Facility: CLINIC | Age: 77
End: 2022-02-11

## 2022-02-11 NOTE — TELEPHONE ENCOUNTER
Caller: Saad Meier    Relationship: Self    Best call back number: [phone]    What orders are you requesting (i.e. lab or imaging): XRAY CERVICAL SPINE    In what timeframe would the patient need to come in:     Where will you receive your lab/imaging services: DUE TO COST A LOCATION OTHER THAT [facility]    Additional notes:

## 2022-02-15 NOTE — TELEPHONE ENCOUNTER
Spoke with pt and let him know that he needed to find a facility do do his imaging if he was looking for cheaper.

## 2022-03-02 ENCOUNTER — TELEPHONE (OUTPATIENT)
Dept: INTERNAL MEDICINE | Facility: CLINIC | Age: 77
End: 2022-03-02

## 2022-03-03 NOTE — TELEPHONE ENCOUNTER
If the symptoms are tolerable then he can wait until that time.  If he has uncontrolled pain then searching somewhere else may make sense.

## 2022-03-05 DIAGNOSIS — M47.892 OTHER OSTEOARTHRITIS OF SPINE, CERVICAL REGION: Primary | ICD-10-CM

## 2022-03-07 DIAGNOSIS — M54.12 CERVICAL RADICULOPATHY: Primary | ICD-10-CM

## 2022-03-07 NOTE — TELEPHONE ENCOUNTER
Pt is okay with waiting-talked to [facility].    Pt needs BUN/Creat oredered before having contrast.

## 2022-04-06 ENCOUNTER — TELEPHONE (OUTPATIENT)
Dept: INTERNAL MEDICINE | Facility: CLINIC | Age: 77
End: 2022-04-06

## 2022-04-06 NOTE — TELEPHONE ENCOUNTER
----- Message from Tee Fiore DO sent at 4/6/2022  3:35 PM EDT -----  Please have patient follow up to discuss CT imaging results.

## 2022-04-19 ENCOUNTER — OFFICE VISIT (OUTPATIENT)
Dept: INTERNAL MEDICINE | Facility: CLINIC | Age: 77
End: 2022-04-19

## 2022-04-19 VITALS
DIASTOLIC BLOOD PRESSURE: 84 MMHG | SYSTOLIC BLOOD PRESSURE: 141 MMHG | RESPIRATION RATE: 17 BRPM | TEMPERATURE: 97.5 F | HEIGHT: 69 IN | BODY MASS INDEX: 31.1 KG/M2 | OXYGEN SATURATION: 97 % | WEIGHT: 210 LBS | HEART RATE: 56 BPM

## 2022-04-19 DIAGNOSIS — J30.9 ALLERGIC RHINITIS, UNSPECIFIED SEASONALITY, UNSPECIFIED TRIGGER: ICD-10-CM

## 2022-04-19 DIAGNOSIS — R91.1 LUNG NODULE: ICD-10-CM

## 2022-04-19 DIAGNOSIS — M54.12 CERVICAL RADICULOPATHY: Primary | ICD-10-CM

## 2022-04-19 DIAGNOSIS — M47.812 CERVICAL ARTHRITIS: ICD-10-CM

## 2022-04-19 PROCEDURE — 99214 OFFICE O/P EST MOD 30 MIN: CPT | Performed by: INTERNAL MEDICINE

## 2022-04-19 RX ORDER — MELOXICAM 7.5 MG/1
7.5 TABLET ORAL DAILY
Qty: 30 TABLET | Refills: 5 | Status: SHIPPED | OUTPATIENT
Start: 2022-04-19

## 2022-04-19 RX ORDER — AZELASTINE 1 MG/ML
1 SPRAY, METERED NASAL 2 TIMES DAILY PRN
Qty: 3 EACH | Refills: 3 | Status: SHIPPED | OUTPATIENT
Start: 2022-04-19 | End: 2022-06-23 | Stop reason: SDUPTHER

## 2022-04-19 NOTE — PROGRESS NOTES
Chief Complaint   Patient presents with   • Follow-up     Go over CT results-neck/lumbar pain       Subjective     History of Present Illness   Saad Meier is a 76 y.o. male presenting for follow up on recent CT results. Results presented pulmonary nodule as well as significant arthritic changes to C spine.     Smoked from age 14 to 25 1ppd. But has quit since then.  Has some congestion and cough, has not been using his CPAP as regularly as he should recently.  One of his parents possibly had lung disease, details he cannot recall.     The following portions of the patient's history were reviewed and updated as appropriate: allergies, current medications, past family history, past medical history, past social history, past surgical history and problem list.    Review of Systems   Constitutional: Negative for chills, fatigue and fever.   HENT: Negative for congestion, ear pain, rhinorrhea, sinus pressure and sore throat.    Eyes: Negative for visual disturbance.   Respiratory: Negative for cough, chest tightness, shortness of breath and wheezing.    Cardiovascular: Negative for chest pain, palpitations and leg swelling.   Gastrointestinal: Negative for abdominal pain, blood in stool, constipation, diarrhea, nausea and vomiting.   Endocrine: Negative for polydipsia and polyuria.   Genitourinary: Negative for dysuria and hematuria.   Musculoskeletal: Negative for arthralgias and back pain.   Skin: Negative for rash.   Neurological: Negative for dizziness, light-headedness, numbness and headaches.   Psychiatric/Behavioral: Negative for dysphoric mood and sleep disturbance. The patient is not nervous/anxious.        Allergies   Allergen Reactions   • Atorvastatin Myalgia   • Ciprofloxacin Diarrhea   • Pravastatin Myalgia       Past Medical History:   Diagnosis Date   • Allergic 25yrs.    Dust,pollenwool,mold,feathers,dog hair,cat hair,plantain,fe,   • Anxiety    • Arthritis    • Asthma 11-   • AV gunnar  "re-entry tachycardia (HCC) 3/6/2017   • BPH (benign prostatic hypertrophy)    • CAD (coronary artery disease) 3/6/2017   • Cancer (HCC) 2012    colon   • Cardiac arrhythmia    • Chronic diastolic congestive heart failure (HCC)    • Colon polyp    • Diverticulosis    • Essential hypertension 3/14/2018   • GERD (gastroesophageal reflux disease)    • History of blood transfusion    • History of colonoscopy     Complete   • History of echocardiogram    • History of esophagogastroduodenoscopy     Diagnostic   • History of Holter monitoring    • History of stress test     PT UNSURE OF DATE OR TYPE    • History of stress test     PT STATES \"LONG TIME AGO BUT IT WAS OK\"   • HL (hearing loss)    • Hyperlipidemia    • Infection of kidney    • Iron deficiency    • Obesity    • Poor historian    • Primary central sleep apnea Use CPAP   • Sleep apnea    • Stenosis of right carotid artery    • Visual impairment !(97    Reading Glasses       Social History     Socioeconomic History   • Marital status: Single   Tobacco Use   • Smoking status: Former Smoker     Packs/day: 1.00     Years: 10.00     Pack years: 10.00     Types: Cigarettes     Start date: 1963     Quit date:      Years since quittin.3   • Smokeless tobacco: Never Used   Vaping Use   • Vaping Use: Never used   Substance and Sexual Activity   • Alcohol use: No   • Drug use: No   • Sexual activity: Not Currently     Partners: Female        Past Surgical History:   Procedure Laterality Date   • CARDIAC ABLATION  2012   • CARDIAC CATHETERIZATION     • CARDIAC PACEMAKER PLACEMENT     • COLON SURGERY     • COLONOSCOPY  2015   • COLONOSCOPY N/A 2019    Procedure: COLONOSCOPY W/ HOT BIOPSY POLYPECTOMY X3;  Surgeon: Jacky Walker MD;  Location: [facility] ENDOSCOPY;  Service: Gastroenterology   • CYSTOSCOPY TRANSURETHRAL RESECTION OF PROSTATE N/A 10/3/2018    Procedure: TRANSURETHRAL RESECTION OF PROSTATE;  Surgeon: Bryce Santo " MD Atilio;  Location: [facility] OR;  Service: Urology   • ENDOSCOPY     • HEMORRHOIDECTOMY  1970   • HERNIA REPAIR      X 5   • INGUINAL HERNIA REPAIR Bilateral    • INGUINAL HERNIA REPAIR Right 2019    Procedure: INGUINAL HERNIA REPAIR;  Surgeon: Jacky Walker MD;  Location: [facility] OR;  Service: General   • LYMPH NODE BIOPSY  2012    large intestine lymph nodes   • TRANSURETHRAL RESECTION OF BLADDER TUMOR N/A 10/3/2018    Procedure: CYSTOSCOPY TRANSURETHRAL RESECTION OF BLADDER TUMOR, COUDE CATHETER PLACEMENT;  Surgeon: Bryce Santo MD;  Location: [facility] OR;  Service: Urology       Family History   Problem Relation Age of Onset   • Lung cancer Mother    • Osteoporosis Mother    • Thyroid disease Mother         mother   • Cancer Mother         Lung Cancer   • Early death Mother         46 yrs.   • Hearing loss Mother         mother   • Vision loss Mother         mother   • Heart disease Mother    • Cancer Father         Prostate Cancer   • Heart disease Father         Pacemaker   • Vision loss Father    • Heart disease Sister    • Heart failure Brother    • Heart disease Brother    • Cancer Sister         Breast Cancer   • Vision loss Brother    • Heart disease Brother    • Arrhythmia Brother    • Heart failure Brother    • Early death Sister          Around 40yrs.   • Early death Maternal Grandmother         Took Mother off.   • Heart disease Maternal Grandmother    • Heart failure Maternal Grandmother          Current Outpatient Medications:   •  amLODIPine (Norvasc) 5 MG tablet, Take 1 tablet by mouth Daily., Disp: 30 tablet, Rfl: 4  •  aspirin 81 MG tablet, Take 1 tablet by mouth Daily., Disp: 90 tablet, Rfl: 3  •  azelastine (ASTELIN) 0.1 % nasal spray, 1 spray into the nostril(s) as directed by provider 2 (Two) Times a Day As Needed for Rhinitis or Allergies. Use in each nostril as directed, Disp: 3 each, Rfl: 3  •  baclofen (LIORESAL) 10 MG tablet, Take 1 tablet by mouth At Night As  "Needed for Muscle Spasms., Disp: 30 tablet, Rfl: 3  •  cholecalciferol (VITAMIN D3) 1000 units tablet, Take 2 tablets by mouth Daily., Disp: 30 tablet, Rfl: 2  •  coenzyme Q10 100 MG capsule, Take 3 capsules by mouth Daily., Disp: 270 capsule, Rfl: 3  •  ezetimibe (ZETIA) 10 MG tablet, Take 1 tablet by mouth Daily., Disp: 90 tablet, Rfl: 3  •  flunisolide (NASALIDE) 25 MCG/ACT (0.025%) solution nasal spray, Inhale 1 spray Every 12 (Twelve) Hours., Disp: 3 bottle, Rfl: 3  •  furosemide (LASIX) 40 MG tablet, Take 1 tablet by mouth Daily As Needed (swelling)., Disp: 90 tablet, Rfl: 1  •  irbesartan (AVAPRO) 300 MG tablet, Take 1 tablet by mouth Daily. Indications: High Blood Pressure Disorder, Disp: 90 tablet, Rfl: 3  •  melatonin 3 MG tablet, Take 1 tablet by mouth Every Night. At 7 PM., Disp: 90 tablet, Rfl: 2  •  pantoprazole (PROTONIX) 20 MG EC tablet, 1 po in the am 30 minutes before breakfast.  Indications: Heartburn, Disp: 90 tablet, Rfl: 3  •  potassium chloride (K-DUR,KLOR-CON) 20 MEQ CR tablet, Take 1 tablet by mouth Daily., Disp: 90 tablet, Rfl: 3  •  rOPINIRole (REQUIP) 0.5 MG tablet, Take 1 tablet by mouth every night at bedtime., Disp: 90 tablet, Rfl: 2  •  temazepam (RESTORIL) 15 MG capsule, Take 2 capsules by mouth At Night As Needed for Sleep., Disp: 60 capsule, Rfl: 1  •  traZODone (DESYREL) 50 MG tablet, Take 1 tablet by mouth Every Night., Disp: 90 tablet, Rfl: 1  •  meloxicam (Mobic) 7.5 MG tablet, Take 1 tablet by mouth Daily., Disp: 30 tablet, Rfl: 5    Objective   /84   Pulse 56   Temp 97.5 °F (36.4 °C)   Resp 17   Ht 175.3 cm (69\")   Wt 95.3 kg (210 lb)   SpO2 97%   BMI 31.01 kg/m²     Physical Exam  Vitals and nursing note reviewed.   Constitutional:       Appearance: Normal appearance. He is well-developed. He is obese.   HENT:      Head: Normocephalic and atraumatic.   Eyes:      Extraocular Movements: Extraocular movements intact.      Conjunctiva/sclera: Conjunctivae normal. "   Pulmonary:      Effort: Pulmonary effort is normal.   Musculoskeletal:      Cervical back: Normal range of motion and neck supple.   Skin:     General: Skin is warm and dry.      Findings: No rash.   Neurological:      General: No focal deficit present.      Mental Status: He is alert and oriented to person, place, and time.   Psychiatric:         Mood and Affect: Mood normal.         Behavior: Behavior normal.         Assessment/Plan   Diagnoses and all orders for this visit:    1. Cervical radiculopathy (Primary)    2. Allergic rhinitis, unspecified seasonality, unspecified trigger  -     azelastine (ASTELIN) 0.1 % nasal spray; 1 spray into the nostril(s) as directed by provider 2 (Two) Times a Day As Needed for Rhinitis or Allergies. Use in each nostril as directed  Dispense: 3 each; Refill: 3    3. Cervical arthritis  -     meloxicam (Mobic) 7.5 MG tablet; Take 1 tablet by mouth Daily.  Dispense: 30 tablet; Refill: 5    4. Lung nodule  -     CT Chest With Contrast; Future          Discussion Summary:  Patient is a 76 y.o. male presenting for follow up.    Cervical radiculopathy / Arthritis  - CT C spine reviewed, start meloxicam for pain control    Lung nodule  - incidental finding however such a nodule can be found in malignancy.  Follow up with CT chest 3 months from CT C spine.     Allergic rhinitis  - stable on azelastine     Follow up:  Return in about 3 months (around 7/19/2022) for Next scheduled follow up.

## 2022-04-19 NOTE — PATIENT INSTRUCTIONS
Neck Exercises  Ask your health care provider which exercises are safe for you. Do exercises exactly as told by your health care provider and adjust them as directed. It is normal to feel mild stretching, pulling, tightness, or discomfort as you do these exercises. Stop right away if you feel sudden pain or your pain gets worse. Do not begin these exercises until told by your health care provider.  Neck exercises can be important for many reasons. They can improve strength and maintain flexibility in your neck, which will help your upper back and prevent neck pain.  Stretching exercises  Rotation neck stretching    Sit in a chair or stand up.  Place your feet flat on the floor, shoulder width apart.  Slowly turn your head (rotate) to the right until a slight stretch is felt. Turn it all the way to the right so you can look over your right shoulder. Do not tilt or tip your head.  Hold this position for 10-30 seconds.  Slowly turn your head (rotate) to the left until a slight stretch is felt. Turn it all the way to the left so you can look over your left shoulder. Do not tilt or tip your head.  Hold this position for 10-30 seconds.  Repeat __________ times. Complete this exercise __________ times a day.  Neck retraction  Sit in a sturdy chair or stand up.  Look straight ahead. Do not bend your neck.  Use your fingers to push your chin backward (retraction). Do not bend your neck for this movement. Continue to face straight ahead. If you are doing the exercise properly, you will feel a slight sensation in your throat and a stretch at the back of your neck.  Hold the stretch for 1-2 seconds.  Repeat __________ times. Complete this exercise __________ times a day.  Strengthening exercises  Neck press  Lie on your back on a firm bed or on the floor with a pillow under your head.  Use your neck muscles to push your head down on the pillow and straighten your spine.  Hold the position as well as you can. Keep your head  facing up (in a neutral position) and your chin tucked.  Slowly count to 5 while holding this position.  Repeat __________ times. Complete this exercise __________ times a day.  Isometrics  These are exercises in which you strengthen the muscles in your neck while keeping your neck still (isometrics).  Sit in a supportive chair and place your hand on your forehead.  Keep your head and face facing straight ahead. Do not flex or extend your neck while doing isometrics.  Push forward with your head and neck while pushing back with your hand. Hold for 10 seconds.  Do the sequence again, this time putting your hand against the back of your head. Use your head and neck to push backward against the hand pressure.  Finally, do the same exercise on either side of your head, pushing sideways against the pressure of your hand.  Repeat __________ times. Complete this exercise __________ times a day.  Prone head lifts  Lie face-down (prone position), resting on your elbows so that your chest and upper back are raised.  Start with your head facing downward, near your chest. Position your chin either on or near your chest.  Slowly lift your head upward. Lift until you are looking straight ahead. Then continue lifting your head as far back as you can comfortably stretch.  Hold your head up for 5 seconds. Then slowly lower it to your starting position.  Repeat __________ times. Complete this exercise __________ times a day.  Supine head lifts  Lie on your back (supine position), bending your knees to point to the ceiling and keeping your feet flat on the floor.  Lift your head slowly off the floor, raising your chin toward your chest.  Hold for 5 seconds.  Repeat __________ times. Complete this exercise __________ times a day.  Scapular retraction  Stand with your arms at your sides. Look straight ahead.  Slowly pull both shoulders (scapulae) backward and downward (retraction) until you feel a stretch between your shoulder blades in  your upper back.  Hold for 10-30 seconds.  Relax and repeat.  Repeat __________ times. Complete this exercise __________ times a day.  Contact a health care provider if:  Your neck pain or discomfort gets much worse when you do an exercise.  Your neck pain or discomfort does not improve within 2 hours after you exercise.  If you have any of these problems, stop exercising right away. Do not do the exercises again unless your health care provider says that you can.  Get help right away if:  You develop sudden, severe neck pain.  If this happens, stop exercising right away. Do not do the exercises again unless your health care provider says that you can.  This information is not intended to replace advice given to you by your health care provider. Make sure you discuss any questions you have with your health care provider.  Document Revised: 10/16/2019 Document Reviewed: 10/16/2019  Elsevier Patient Education © 2021 [employer]

## 2022-05-11 ENCOUNTER — OFFICE VISIT (OUTPATIENT)
Dept: PULMONOLOGY | Facility: CLINIC | Age: 77
End: 2022-05-11

## 2022-05-11 VITALS
WEIGHT: 205 LBS | HEART RATE: 76 BPM | BODY MASS INDEX: 30.36 KG/M2 | HEIGHT: 69 IN | RESPIRATION RATE: 18 BRPM | DIASTOLIC BLOOD PRESSURE: 80 MMHG | OXYGEN SATURATION: 97 % | SYSTOLIC BLOOD PRESSURE: 112 MMHG

## 2022-05-11 DIAGNOSIS — G47.19 EXCESSIVE DAYTIME SLEEPINESS: ICD-10-CM

## 2022-05-11 DIAGNOSIS — G47.33 OSA (OBSTRUCTIVE SLEEP APNEA): Primary | ICD-10-CM

## 2022-05-11 DIAGNOSIS — G47.00 INSOMNIA, UNSPECIFIED TYPE: ICD-10-CM

## 2022-05-11 PROCEDURE — 99213 OFFICE O/P EST LOW 20 MIN: CPT | Performed by: NURSE PRACTITIONER

## 2022-05-11 RX ORDER — LANOLIN ALCOHOL/MO/W.PET/CERES
3 CREAM (GRAM) TOPICAL NIGHTLY
Qty: 90 TABLET | Refills: 2 | Status: SHIPPED | OUTPATIENT
Start: 2022-05-11

## 2022-05-11 RX ORDER — TEMAZEPAM 15 MG/1
30 CAPSULE ORAL NIGHTLY PRN
Qty: 60 CAPSULE | Refills: 1 | Status: SHIPPED | OUTPATIENT
Start: 2022-05-11 | End: 2022-06-23 | Stop reason: SDUPTHER

## 2022-05-11 NOTE — PROGRESS NOTES
"Chief Complaint   Patient presents with   • Follow-up   • Sleeping Problem         Subjective   Saad Meier is a 76 y.o. male.     History of Present Illness   Patient comes back today for follow up of Obstructive Sleep apnea.     Patient says that he is compliant with his device and using it regularly.    Patient's symptoms of sleep disturbance and daytime sleepiness have been helped greatly with the use of PAP device, as prescribed.     The patient states he registered machine and it was not affected in the recall. He is not sure if he even has a zoila machine.    He states he has not changed supplies as his mask fits fine. He says he has not been using the machine due to the recall.     He takes restoril at night to help him sleep. He states he does not take it every night.       The following portions of the patient's history were reviewed and updated as appropriate: allergies, current medications, past family history, past medical history, past social history and past surgical history.      Review of Systems   HENT: Negative for sinus pressure.    Respiratory: Negative for cough.    Psychiatric/Behavioral: Positive for sleep disturbance.   All other systems reviewed and are negative.      Objective   Visit Vitals  /80   Pulse 76   Resp 18   Ht 175.3 cm (69\")   Wt 93 kg (205 lb)   SpO2 97%   BMI 30.27 kg/m²       Physical Exam  Vitals reviewed.   Constitutional:       Appearance: He is well-developed.   HENT:      Head: Atraumatic.      Mouth/Throat:      Mouth: Mucous membranes are moist.      Comments: Crowded oropharynx. Dentures noted.  Eyes:      Extraocular Movements: Extraocular movements intact.   Musculoskeletal:      Comments: Gait slow.   Skin:     General: Skin is warm.   Neurological:      Mental Status: He is alert and oriented to person, place, and time.         Assessment & Plan   Diagnoses and all orders for this visit:    1. LULU (obstructive sleep apnea) (Primary)    2. Insomnia, " unspecified type  -     temazepam (RESTORIL) 15 MG capsule; Take 2 capsules by mouth At Night As Needed for Sleep.  Dispense: 60 capsule; Refill: 1    3. Excessive daytime sleepiness    Other orders  -     melatonin 3 MG tablet; Take 1 tablet by mouth Every Night. At 7 PM.  Dispense: 90 tablet; Refill: 2           Return in about 4 months (around 9/11/2022) for Recheck, For Dr. Zimmer, Sleep ONLY.    DISCUSSION (if any):  I reviewed his last sleep study which shows moderate LULU with severe LULU in REM was 50/hour.    Continue treatment with Pap therapy at the current pressure.     Unfortunately, I have no compliance today to discuss with the patient today.     Humidification setup, hose and mask care discussed.    Weight loss advised.    Use every night for at least 4 hours stressed.    He states a spot was found on his lung when he had CT of neck so CT chest has been ordered by PCP.     Juan Manuel reviewed. Continue Restoril at night PRN.      Dictated utilizing Dragon dictation.    This document was electronically signed by JUSTA Weiss May 11, 2022  14:39 EDT

## 2022-05-25 RX ORDER — FUROSEMIDE 40 MG/1
TABLET ORAL
Qty: 90 TABLET | Refills: 0 | Status: SHIPPED | OUTPATIENT
Start: 2022-05-25

## 2022-05-27 DIAGNOSIS — R12 HEARTBURN: ICD-10-CM

## 2022-05-27 RX ORDER — PANTOPRAZOLE SODIUM 20 MG/1
TABLET, DELAYED RELEASE ORAL
Qty: 90 TABLET | Refills: 3 | Status: SHIPPED | OUTPATIENT
Start: 2022-05-27 | End: 2022-06-23 | Stop reason: SDUPTHER

## 2022-05-27 NOTE — TELEPHONE ENCOUNTER
Incoming Refill Request      Medication requested (name and dose): pantoprazole (PROTONIX) 20 MG EC tablet    Pharmacy where request should be sent: [facility] [place]     Additional details provided by patient: IS OUT AND IS PLANNING TO GO OUT OF TOWN.    Best call back number: [phone] (H)    Does the patient have less than a 3 day supply:  [x] Yes  [] No    Belinda CASON Rep  05/27/22, 10:46 EDT

## 2022-05-27 NOTE — TELEPHONE ENCOUNTER
Rx Refill Note  Requested Prescriptions     Pending Prescriptions Disp Refills   • pantoprazole (PROTONIX) 20 MG EC tablet 90 tablet 3     Si po in the am 30 minutes before breakfast.  Indications: Heartburn      Last office visit with prescribing clinician: 2022      Next office visit with prescribing clinician: 2022            Argenis Parekh LPN  22, 12:27 EDT

## 2022-06-16 ENCOUNTER — TELEPHONE (OUTPATIENT)
Dept: INTERNAL MEDICINE | Facility: CLINIC | Age: 77
End: 2022-06-16

## 2022-06-16 NOTE — TELEPHONE ENCOUNTER
SANDHYA IS CALLING TO TELL YOU THAT THEY HAVE BEEN SENDING A FAX TO UPDATE THE  PHARMACY AND GET THE MAHAD NUMBER FOR DR PEREZ.  PLEASE PUT THE MAHAD NUMBER OF THE RETURN FAX. THE RETURN FAX NUMBER IS ON THE FAX SHE JUST SENT.  PLEASE CALL SANDHYA ONLY IF YOU DID NOT GET THE FAX.

## 2022-06-23 DIAGNOSIS — R12 HEARTBURN: ICD-10-CM

## 2022-06-23 DIAGNOSIS — J30.9 ALLERGIC RHINITIS, UNSPECIFIED SEASONALITY, UNSPECIFIED TRIGGER: ICD-10-CM

## 2022-06-23 DIAGNOSIS — G47.00 INSOMNIA, UNSPECIFIED TYPE: ICD-10-CM

## 2022-06-23 RX ORDER — PANTOPRAZOLE SODIUM 20 MG/1
TABLET, DELAYED RELEASE ORAL
Qty: 90 TABLET | Refills: 3 | Status: SHIPPED | OUTPATIENT
Start: 2022-06-23 | End: 2022-07-06

## 2022-06-23 RX ORDER — AZELASTINE 1 MG/ML
1 SPRAY, METERED NASAL 2 TIMES DAILY PRN
Qty: 3 EACH | Refills: 3 | Status: SHIPPED | OUTPATIENT
Start: 2022-06-23

## 2022-06-23 RX ORDER — TEMAZEPAM 15 MG/1
30 CAPSULE ORAL NIGHTLY PRN
Qty: 60 CAPSULE | Refills: 2 | Status: SHIPPED | OUTPATIENT
Start: 2022-06-23

## 2022-07-06 ENCOUNTER — TELEPHONE (OUTPATIENT)
Dept: INTERNAL MEDICINE | Facility: CLINIC | Age: 77
End: 2022-07-06

## 2022-07-06 DIAGNOSIS — K21.9 GASTROESOPHAGEAL REFLUX DISEASE WITHOUT ESOPHAGITIS: Primary | ICD-10-CM

## 2022-07-06 RX ORDER — FAMOTIDINE 20 MG/1
20 TABLET, FILM COATED ORAL NIGHTLY PRN
Qty: 30 TABLET | Refills: 5 | Status: SHIPPED | OUTPATIENT
Start: 2022-07-06 | End: 2023-02-13 | Stop reason: SDUPTHER

## 2022-07-06 NOTE — TELEPHONE ENCOUNTER
Pt is requesting a different medicine other than pantoprazole. He states he is experiencing brain fog, which he states is a side effect of the medication. If anything else is able to be called in, please send to [facility]. Please call and advise patient.

## 2022-07-12 ENCOUNTER — TELEPHONE (OUTPATIENT)
Dept: INTERNAL MEDICINE | Facility: CLINIC | Age: 77
End: 2022-07-12

## 2022-07-12 NOTE — TELEPHONE ENCOUNTER
Caller: Saad Meier    Relationship: Self    Best call back number: [phone]    What orders are you requesting (i.e. lab or imaging): CT SCAN OF LUNGS    In what timeframe would the patient need to come in: ASAP    Where will you receive your lab/imaging services: [facility]    Additional notes:

## 2022-07-12 NOTE — TELEPHONE ENCOUNTER
Mr. Meier is scheduled for the CT Chest for:    Wednesday, July 27, 2022 at 9:30 AM - Arrive at 9 AM  [facility] [place] [facility] [address] [place] [address]  [phone]    Prior to this appointment, please visit the Diagnostic Center (downstairs of Dr. Fiore’s office) to have labs drawn. You must have completed this lab test before your CT. Please complete this as soon as possible.    On the day of your appointment, please do not eat or drink anything 4 hours prior to your scan. Also, please bring your insurance cards and photo ID. Registration will be through the main entrance of [facility] [place].     I have mailed him a letter to include this information, as his phone does not accept calls to unknown numbers.     Hub to deliver message.

## 2022-07-12 NOTE — TELEPHONE ENCOUNTER
Patient called concerning the CT scan for his lung nodule.  His phone will not receive phone but he will call us back tomorrow 7/13/2022 to get an update.  He would like the order done at [facility] [place].

## 2022-07-20 ENCOUNTER — OFFICE VISIT (OUTPATIENT)
Dept: INTERNAL MEDICINE | Facility: CLINIC | Age: 77
End: 2022-07-20

## 2022-07-20 VITALS
SYSTOLIC BLOOD PRESSURE: 121 MMHG | BODY MASS INDEX: 30.51 KG/M2 | TEMPERATURE: 98 F | HEIGHT: 69 IN | WEIGHT: 206 LBS | HEART RATE: 55 BPM | DIASTOLIC BLOOD PRESSURE: 77 MMHG | RESPIRATION RATE: 17 BRPM | OXYGEN SATURATION: 98 %

## 2022-07-20 DIAGNOSIS — R91.1 LUNG NODULE: Primary | ICD-10-CM

## 2022-07-20 DIAGNOSIS — K21.9 GASTROESOPHAGEAL REFLUX DISEASE WITHOUT ESOPHAGITIS: ICD-10-CM

## 2022-07-20 DIAGNOSIS — F51.01 PRIMARY INSOMNIA: ICD-10-CM

## 2022-07-20 PROCEDURE — 99214 OFFICE O/P EST MOD 30 MIN: CPT | Performed by: INTERNAL MEDICINE

## 2022-07-20 RX ORDER — TRAZODONE HYDROCHLORIDE 50 MG/1
50 TABLET ORAL NIGHTLY
Qty: 30 TABLET | Refills: 1 | Status: ON HOLD | OUTPATIENT
Start: 2022-07-20 | End: 2022-08-10

## 2022-07-20 NOTE — PROGRESS NOTES
Chief Complaint   Patient presents with   • Insomnia     Having a lot of trouble sleeping, he is afraid of becoming addicted.  Doesn't want to take every night-wants something to take as needed       Subjective     History of Present Illness   Saad Meier is a 76 y.o. male presenting for follow up  He shares that he is on a medication that helps him sleep but he is afraid that he is becoming addicted to it as if he does not take the medication, he is unable to sleep.  He otherwise has been sleeping well and wakes up well rested.  He does continue to follow the sleep clinic which has been prescribing him temazepam.  He was confused initially when I asked about trazodone which was still on his list.  Upon clarification, it seems that he is not on trazodone at this time.     GERD symptoms are well controlled    Patient has appointment for CT scan to follow-up on lung [place] at [facility] later this month.    The following portions of the patient's history were reviewed and updated as appropriate: allergies, current medications, past family history, past medical history, past social history, past surgical history and problem list.    Review of Systems   Constitutional: Negative for chills, fatigue and fever.   HENT: Negative for congestion, ear pain, rhinorrhea, sinus pressure and sore throat.    Eyes: Negative for visual disturbance.   Respiratory: Negative for cough, chest tightness, shortness of breath and wheezing.    Cardiovascular: Negative for chest pain, palpitations and leg swelling.   Gastrointestinal: Negative for abdominal pain, blood in stool, constipation, diarrhea, nausea and vomiting.   Endocrine: Negative for polydipsia and polyuria.   Genitourinary: Negative for dysuria and hematuria.   Musculoskeletal: Negative for arthralgias and back pain.   Skin: Negative for rash.   Neurological: Negative for dizziness, light-headedness, numbness and headaches.   Psychiatric/Behavioral: Positive  "for sleep disturbance. Negative for dysphoric mood. The patient is not nervous/anxious.        Allergies   Allergen Reactions   • Atorvastatin Myalgia   • Ciprofloxacin Diarrhea   • Pravastatin Myalgia       Past Medical History:   Diagnosis Date   • Allergic 25yrs.    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   • BPH (benign prostatic hypertrophy)    • CAD (coronary artery disease) 3/6/2017   • Cancer (HCC) 2012    colon   • Cardiac arrhythmia    • Chronic diastolic congestive heart failure (HCC)    • Colon polyp    • Diverticulosis    • Essential hypertension 3/14/2018   • GERD (gastroesophageal reflux disease)    • History of blood transfusion    • History of colonoscopy     Complete   • History of echocardiogram    • History of esophagogastroduodenoscopy     Diagnostic   • History of Holter monitoring    • History of stress test     PT UNSURE OF DATE OR TYPE    • History of stress test     PT STATES \"LONG TIME AGO BUT IT WAS OK\"   • HL (hearing loss)    • Hyperlipidemia    • Infection of kidney    • Iron deficiency    • Obesity    • Poor historian    • Primary central sleep apnea Use CPAP   • Sleep apnea    • Stenosis of right carotid artery    • Visual impairment !(97    Reading Glasses       Social History     Socioeconomic History   • Marital status: Single   Tobacco Use   • Smoking status: Former Smoker     Packs/day: 1.00     Years: 10.00     Pack years: 10.00     Types: Cigarettes     Start date: 1963     Quit date:      Years since quittin.5   • Smokeless tobacco: Never Used   Vaping Use   • Vaping Use: Never used   Substance and Sexual Activity   • Alcohol use: No   • Drug use: No   • Sexual activity: Not Currently     Partners: Female        Past Surgical History:   Procedure Laterality Date   • CARDIAC ABLATION  2012   • CARDIAC CATHETERIZATION     • " CARDIAC PACEMAKER PLACEMENT     • COLON SURGERY     • COLONOSCOPY  2015   • COLONOSCOPY N/A 2019    Procedure: COLONOSCOPY W/ HOT BIOPSY POLYPECTOMY X3;  Surgeon: Jacky Walker MD;  Location: [facility] ENDOSCOPY;  Service: Gastroenterology   • CYSTOSCOPY TRANSURETHRAL RESECTION OF PROSTATE N/A 10/3/2018    Procedure: TRANSURETHRAL RESECTION OF PROSTATE;  Surgeon: Bryce Santo MD;  Location: [facility] OR;  Service: Urology   • ENDOSCOPY     • HEMORRHOIDECTOMY  1970   • HERNIA REPAIR      X 5   • INGUINAL HERNIA REPAIR Bilateral    • INGUINAL HERNIA REPAIR Right 2019    Procedure: INGUINAL HERNIA REPAIR;  Surgeon: Jacky Walker MD;  Location: [facility] OR;  Service: General   • LYMPH NODE BIOPSY  2012    large intestine lymph nodes   • TRANSURETHRAL RESECTION OF BLADDER TUMOR N/A 10/3/2018    Procedure: CYSTOSCOPY TRANSURETHRAL RESECTION OF BLADDER TUMOR, COUDE CATHETER PLACEMENT;  Surgeon: Bryce Santo MD;  Location: [facility] OR;  Service: Urology       Family History   Problem Relation Age of Onset   • Lung cancer Mother    • Osteoporosis Mother    • Thyroid disease Mother         mother   • Cancer Mother         Lung Cancer   • Early death Mother         46 yrs.   • Hearing loss Mother         mother   • Vision loss Mother         mother   • Heart disease Mother    • Cancer Father         Prostate Cancer   • Heart disease Father         Pacemaker   • Vision loss Father    • Heart disease Sister    • Heart failure Brother    • Heart disease Brother    • Cancer Sister         Breast Cancer   • Vision loss Brother    • Heart disease Brother    • Arrhythmia Brother    • Heart failure Brother    • Early death Sister          Around 40yrs.   • Early death Maternal Grandmother         Took Mother off.   • Heart disease Maternal Grandmother    • Heart failure Maternal Grandmother          Current Outpatient Medications:   •  amLODIPine (Norvasc) 5 MG tablet, Take 1 tablet by mouth  Daily., Disp: 30 tablet, Rfl: 4  •  aspirin 81 MG tablet, Take 1 tablet by mouth Daily., Disp: 90 tablet, Rfl: 3  •  azelastine (ASTELIN) 0.1 % nasal spray, 1 spray into the nostril(s) as directed by provider 2 (Two) Times a Day As Needed for Rhinitis or Allergies. Use in each nostril as directed, Disp: 3 each, Rfl: 3  •  baclofen (LIORESAL) 10 MG tablet, Take 1 tablet by mouth At Night As Needed for Muscle Spasms., Disp: 30 tablet, Rfl: 3  •  cholecalciferol (VITAMIN D3) 1000 units tablet, Take 2 tablets by mouth Daily., Disp: 30 tablet, Rfl: 2  •  coenzyme Q10 100 MG capsule, Take 3 capsules by mouth Daily., Disp: 270 capsule, Rfl: 3  •  ezetimibe (ZETIA) 10 MG tablet, Take 1 tablet by mouth Daily., Disp: 90 tablet, Rfl: 3  •  famotidine (Pepcid) 20 MG tablet, Take 1 tablet by mouth At Night As Needed for Heartburn., Disp: 30 tablet, Rfl: 5  •  flunisolide (NASALIDE) 25 MCG/ACT (0.025%) solution nasal spray, Inhale 1 spray Every 12 (Twelve) Hours., Disp: 3 bottle, Rfl: 3  •  furosemide (LASIX) 40 MG tablet, TAKE 1 TABLET BY MOUTH ONCE DAILY AS NEEDED FOR  SWELLING, Disp: 90 tablet, Rfl: 0  •  irbesartan (AVAPRO) 300 MG tablet, Take 1 tablet by mouth Daily. Indications: High Blood Pressure Disorder, Disp: 90 tablet, Rfl: 3  •  melatonin 3 MG tablet, Take 1 tablet by mouth Every Night. At 7 PM., Disp: 90 tablet, Rfl: 2  •  meloxicam (Mobic) 7.5 MG tablet, Take 1 tablet by mouth Daily., Disp: 30 tablet, Rfl: 5  •  potassium chloride (K-DUR,KLOR-CON) 20 MEQ CR tablet, Take 1 tablet by mouth Daily., Disp: 90 tablet, Rfl: 3  •  rOPINIRole (REQUIP) 0.5 MG tablet, Take 1 tablet by mouth every night at bedtime., Disp: 90 tablet, Rfl: 2  •  temazepam (RESTORIL) 15 MG capsule, Take 2 capsules by mouth At Night As Needed for Sleep., Disp: 60 capsule, Rfl: 2  •  traZODone (DESYREL) 50 MG tablet, Take 1 tablet by mouth Every Night., Disp: 30 tablet, Rfl: 1    Objective   /77   Pulse 55   Temp 98 °F (36.7 °C)   Resp 17   " Ht 175.3 cm (69\")   Wt 93.4 kg (206 lb)   SpO2 98%   BMI 30.42 kg/m²     Physical Exam  Vitals and nursing note reviewed.   Constitutional:       Appearance: Normal appearance. He is well-developed.   HENT:      Head: Normocephalic and atraumatic.   Eyes:      Extraocular Movements: Extraocular movements intact.      Conjunctiva/sclera: Conjunctivae normal.   Pulmonary:      Effort: Pulmonary effort is normal.   Musculoskeletal:      Cervical back: Normal range of motion and neck supple.   Skin:     General: Skin is warm and dry.      Findings: No rash.   Neurological:      General: No focal deficit present.      Mental Status: He is alert and oriented to person, place, and time.   Psychiatric:         Mood and Affect: Mood normal.         Behavior: Behavior normal.         Assessment & Plan   Diagnoses and all orders for this visit:    1. Lung nodule (Primary)    2. Primary insomnia  -     traZODone (DESYREL) 50 MG tablet; Take 1 tablet by mouth Every Night.  Dispense: 30 tablet; Refill: 1    3. Gastroesophageal reflux disease without esophagitis          Discussion Summary:  Patient is a 76 y.o. male presenting for follow up.     Lung nodule  -Follow-up in 1 month after CT imaging is obtained from [facility].  Patient remains asymptomatic with regards to having a lung nodule.    Primary insomnia  - Patient actually has good control with temazepam however he is afraid of \"being addicted\".  Due to his preference, we will try trazodone 50 mg nightly for the next week and see how his symptoms are managed.  Otherwise, it is reasonable for him to continue taking temazepam for long-term.    GERD  - Well-controlled on Pepcid rather than pantoprazole.  (Medication changes were made a month prior when patient shared he was uncomfortable with PPI medication).    Follow up:  Return in 1 month (on 8/20/2022) for Medicare Wellness.     "

## 2022-07-21 LAB
BUN SERPL-MCNC: 24 MG/DL (ref 8–23)
BUN/CREAT SERPL: 21.6 (ref 7–25)
CALCIUM SERPL-MCNC: 9.5 MG/DL (ref 8.6–10.5)
CHLORIDE SERPL-SCNC: 109 MMOL/L (ref 98–107)
CO2 SERPL-SCNC: 24.9 MMOL/L (ref 22–29)
CREAT SERPL-MCNC: 1.11 MG/DL (ref 0.76–1.27)
EGFRCR SERPLBLD CKD-EPI 2021: 68 ML/MIN/1.73
GLUCOSE SERPL-MCNC: 73 MG/DL (ref 65–99)
POTASSIUM SERPL-SCNC: 4.6 MMOL/L (ref 3.5–5.2)
SODIUM SERPL-SCNC: 145 MMOL/L (ref 136–145)

## 2022-08-08 ENCOUNTER — OFFICE VISIT (OUTPATIENT)
Dept: CARDIOLOGY | Facility: CLINIC | Age: 77
End: 2022-08-08

## 2022-08-08 VITALS
WEIGHT: 205.2 LBS | BODY MASS INDEX: 30.39 KG/M2 | OXYGEN SATURATION: 96 % | HEIGHT: 69 IN | DIASTOLIC BLOOD PRESSURE: 84 MMHG | HEART RATE: 55 BPM | SYSTOLIC BLOOD PRESSURE: 114 MMHG

## 2022-08-08 DIAGNOSIS — I50.32 CHRONIC DIASTOLIC CONGESTIVE HEART FAILURE: ICD-10-CM

## 2022-08-08 DIAGNOSIS — I49.5 SSS (SICK SINUS SYNDROME): ICD-10-CM

## 2022-08-08 DIAGNOSIS — I47.1 AV NODAL RE-ENTRY TACHYCARDIA: ICD-10-CM

## 2022-08-08 DIAGNOSIS — Z45.010 PACEMAKER AT END OF BATTERY LIFE: Primary | ICD-10-CM

## 2022-08-08 PROCEDURE — 99214 OFFICE O/P EST MOD 30 MIN: CPT | Performed by: INTERNAL MEDICINE

## 2022-08-08 PROCEDURE — 93280 PM DEVICE PROGR EVAL DUAL: CPT | Performed by: INTERNAL MEDICINE

## 2022-08-08 PROCEDURE — 93000 ELECTROCARDIOGRAM COMPLETE: CPT | Performed by: INTERNAL MEDICINE

## 2022-08-08 RX ORDER — PANTOPRAZOLE SODIUM 20 MG/1
20 TABLET, DELAYED RELEASE ORAL DAILY
COMMUNITY
Start: 2022-08-02 | End: 2022-11-14

## 2022-08-08 NOTE — H&P (VIEW-ONLY)
[facility] AT [address], [address]  [place], [place], [address] [phone]  WWW.[facility]           OUTPATIENT CLINIC FOLLOW UP NOTE    Patient Care Team:  Patient Care Team:  Tee Fiore DO as PCP - General (Internal Medicine)  Atilio Wall MD as Consulting Physician (Cardiology)  Luis Hollins MD as Consulting Physician (Urology)  Liddle, Susan E., MD as Consulting Physician (Hematology and Oncology)  Gloria Garcia MD as Consulting Physician (General Surgery)  Elvie Alvarez MD as Consulting Physician (General Surgery)  Jacky Walker MD as Consulting Physician (General Surgery)  Neel Zimmer MD as Consulting Physician (Pulmonary Disease)    Subjective:      Chief Complaint   Patient presents with   • Coronary Artery Disease   • AV gunnar re-entry tachycardia      HPI:    Saad Meier is a 76 y.o. male.  Cardiac problem list:  1. Sick sinus syndrome status post St Jona DC PPM 2004  2. Minimal CAD and normal LVEF by cardiac catheterization 2004  3. 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 Atrial tachycardia  5. Carotid stenosis  6. Colon cancer    The patient presents today for follow-up.       Since the patient was last seen he reports he has been doing well from cardiac standpoint.  He denies chest pain, shortness of breath, lower extremity edema, palpitations, lightheadedness, or syncope.      Review of Systems:  As noted in the HPI    PFSH:  Patient Active Problem List   Diagnosis   • Arthritis   • Chronic diastolic congestive heart failure (HCC)   • Acid reflux   • Juvenile rheumatic fever   • Hernia, inguinal, right   • Ventral hernia without obstruction or gangrene   • Insomnia   • Sick sinus syndrome (HCC)   • Coronary artery disease involving native coronary artery of native heart without angina pectoris   • AV gunnar re-entry tachycardia (HCC)   • Bilateral renal masses   • Essential hypertension   • Umbilical  hernia without obstruction or gangrene   • Dyslipidemia   • Stenosis of right carotid artery   • Peripheral vascular disease of lower extremity (HCC)   • History of colon cancer   • Recurrent inguinal hernia without obstruction or gangrene   • Lower urinary tract symptoms due to benign prostatic hyperplasia   • Statin intolerance         Current Outpatient Medications:   •  amLODIPine (Norvasc) 5 MG tablet, Take 1 tablet by mouth Daily., Disp: 30 tablet, Rfl: 4  •  azelastine (ASTELIN) 0.1 % nasal spray, 1 spray into the nostril(s) as directed by provider 2 (Two) Times a Day As Needed for Rhinitis or Allergies. Use in each nostril as directed, Disp: 3 each, Rfl: 3  •  baclofen (LIORESAL) 10 MG tablet, Take 1 tablet by mouth At Night As Needed for Muscle Spasms., Disp: 30 tablet, Rfl: 3  •  cholecalciferol (VITAMIN D3) 1000 units tablet, Take 2 tablets by mouth Daily., Disp: 30 tablet, Rfl: 2  •  coenzyme Q10 100 MG capsule, Take 3 capsules by mouth Daily., Disp: 270 capsule, Rfl: 3  •  ezetimibe (ZETIA) 10 MG tablet, Take 1 tablet by mouth Daily., Disp: 90 tablet, Rfl: 3  •  famotidine (Pepcid) 20 MG tablet, Take 1 tablet by mouth At Night As Needed for Heartburn., Disp: 30 tablet, Rfl: 5  •  flunisolide (NASALIDE) 25 MCG/ACT (0.025%) solution nasal spray, Inhale 1 spray Every 12 (Twelve) Hours., Disp: 3 bottle, Rfl: 3  •  furosemide (LASIX) 40 MG tablet, TAKE 1 TABLET BY MOUTH ONCE DAILY AS NEEDED FOR  SWELLING, Disp: 90 tablet, Rfl: 0  •  irbesartan (AVAPRO) 300 MG tablet, Take 1 tablet by mouth Daily. Indications: High Blood Pressure Disorder, Disp: 90 tablet, Rfl: 3  •  melatonin 3 MG tablet, Take 1 tablet by mouth Every Night. At 7 PM., Disp: 90 tablet, Rfl: 2  •  meloxicam (Mobic) 7.5 MG tablet, Take 1 tablet by mouth Daily., Disp: 30 tablet, Rfl: 5  •  pantoprazole (PROTONIX) 20 MG EC tablet, Take 1 tablet by mouth Every Other Day., Disp: , Rfl:   •  potassium chloride (K-DUR,KLOR-CON) 20 MEQ CR tablet, Take  "1 tablet by mouth Daily., Disp: 90 tablet, Rfl: 3  •  rOPINIRole (REQUIP) 0.5 MG tablet, Take 1 tablet by mouth every night at bedtime., Disp: 90 tablet, Rfl: 2  •  temazepam (RESTORIL) 15 MG capsule, Take 2 capsules by mouth At Night As Needed for Sleep., Disp: 60 capsule, Rfl: 2  •  traZODone (DESYREL) 50 MG tablet, Take 1 tablet by mouth Every Night., Disp: 30 tablet, Rfl: 1  •  aspirin 81 MG tablet, Take 1 tablet by mouth Daily., Disp: 90 tablet, Rfl: 3    Allergies   Allergen Reactions   • Atorvastatin Myalgia   • Ciprofloxacin Diarrhea   • Pravastatin Myalgia        reports that he quit smoking about 49 years ago. His smoking use included cigarettes. He started smoking about 59 years ago. He has a 10.00 pack-year smoking history. He has never used smokeless tobacco.        Objective:   Blood pressure 114/84, pulse 55, height 175.3 cm (69\"), weight 93.1 kg (205 lb 3.2 oz), SpO2 96 %.  CONSTITUTIONAL: No acute distress, normal affect  RESPIRATORY: Normal effort. Clear to auscultation bilaterally without wheezing or rales.  CARDIOVASCULAR:Regular rate and rhythm with normal S1 and S2. Without gallop or murmur, or rub.  Carotids with normal upstrokes and without bruits.  No significant lower extremity swelling  PERIPHERAL VASCULAR: Normal radial pulse.      Labs:      Lab Results   Component Value Date    TRIG 85 06/03/2021    TRIG 88 03/17/2020    TRIG 107 01/08/2019     Lab Results   Component Value Date    HDL 40 06/03/2021    HDL 41 03/17/2020    HDL 39 (L) 01/08/2019     No components found for: LDLCALC  Lab Results   Component Value Date    VLDL 16 06/03/2021    VLDL 17.6 03/17/2020    VLDL 21.4 01/08/2019     No results found for: LDLHDL    Diagnostic Data:      ECG 12 Lead    Date/Time: 8/8/2022 10:14 AM  Performed by: Atilio Wall MD  Authorized by: Atilio Wall MD   Comparison: compared with previous ECG from 9/25/2018  Comparison to previous ECG: Formally AV paced, now A-paced " V-sensed              DEVICE INTERROGATION: Saint Jude, Interrogation date 8/8/2022- RA pacing 69%, RV pacing 1.4%. Threshold and impedances are acceptable acceptable.  Less than 1% mode switching, longest 15 minutes.  Battery voltage is reached GEOVANNA August 22, 2021, reached EOL May 28, 2022.    Carotid Duplex 9/2018  Moderate plaque in the right carotid bulb and proximal ICA producing a  50-69% stenosis.    Carotid duplex 10/26/2020  · Right internal carotid artery stenosis of 50-69%.  · Left internal carotid artery stenosis of 0-49%.  · There is antegrade flow in the vertebral arteries bilaterally.    [facility] 2004  - Minimal coronary artery disease and normal left ventricular function       Results for orders placed during the hospital encounter of 10/26/20    Adult Transthoracic Echo Complete W/ Cont if Necessary Per Protocol    Interpretation Summary  · Left ventricular systolic function is normal. Estimated left ventricular EF = 60%  · Left ventricular diastolic function is consistent with (grade I) impaired relaxation.      Assessment and Plan:     AV gunnar re-entry tachycardia   Sick sinus syndrome  Atrial tachycardia  -Patient has not followed up in the office since April 2021  -Pacemaker has reached end of service as of May 28, 2022  -Setting up a generator change    Coronary artery disease involving native coronary artery of native heart without angina pectoris  Dyslipidemia  -CCS 0  -Myalgias noted with atorvastatin and pravastatin.  Patient reports trying a third agent with similar symptoms, but is unsure of the name.  -Continue aspirin, Zetia    Chronic diastolic congestive heart failure   -Lasix as needed.    Essential hypertension  -Continue irbesartan as needed.    Carotid Stenosis  -Stable repeat carotid duplex in 2020.  -Continue to monitor clinically.    Trace to mild aortic regurgitation  -Not noted on most recent transthoracic echocardiogram in 2020.      - Return in about 1 year (around 8/8/2023)  for Next scheduled follow-up with an ECG and device interrogation.     Atilio Wall MD  08/08/22 11:04 EDT

## 2022-08-08 NOTE — PROGRESS NOTES
[facility] AT [address], [address]  [place], [place], [address] [phone]  WWW.[facility]           OUTPATIENT CLINIC FOLLOW UP NOTE    Patient Care Team:  Patient Care Team:  Tee Fiore DO as PCP - General (Internal Medicine)  Atilio Wall MD as Consulting Physician (Cardiology)  Luis Hollins MD as Consulting Physician (Urology)  Liddle, Susan E., MD as Consulting Physician (Hematology and Oncology)  Gloria Garcia MD as Consulting Physician (General Surgery)  Elvie Alvarez MD as Consulting Physician (General Surgery)  Jacky Walker MD as Consulting Physician (General Surgery)  Neel Zimmer MD as Consulting Physician (Pulmonary Disease)    Subjective:      Chief Complaint   Patient presents with   • Coronary Artery Disease   • AV gunnar re-entry tachycardia      HPI:    Saad Meier is a 76 y.o. male.  Cardiac problem list:  1. Sick sinus syndrome status post St Jona DC PPM 2004  2. Minimal CAD and normal LVEF by cardiac catheterization 2004  3. 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 Atrial tachycardia  5. Carotid stenosis  6. Colon cancer    The patient presents today for follow-up.       Since the patient was last seen he reports he has been doing well from cardiac standpoint.  He denies chest pain, shortness of breath, lower extremity edema, palpitations, lightheadedness, or syncope.      Review of Systems:  As noted in the HPI    PFSH:  Patient Active Problem List   Diagnosis   • Arthritis   • Chronic diastolic congestive heart failure (HCC)   • Acid reflux   • Juvenile rheumatic fever   • Hernia, inguinal, right   • Ventral hernia without obstruction or gangrene   • Insomnia   • Sick sinus syndrome (HCC)   • Coronary artery disease involving native coronary artery of native heart without angina pectoris   • AV gunnar re-entry tachycardia (HCC)   • Bilateral renal masses   • Essential hypertension   • Umbilical  hernia without obstruction or gangrene   • Dyslipidemia   • Stenosis of right carotid artery   • Peripheral vascular disease of lower extremity (HCC)   • History of colon cancer   • Recurrent inguinal hernia without obstruction or gangrene   • Lower urinary tract symptoms due to benign prostatic hyperplasia   • Statin intolerance         Current Outpatient Medications:   •  amLODIPine (Norvasc) 5 MG tablet, Take 1 tablet by mouth Daily., Disp: 30 tablet, Rfl: 4  •  azelastine (ASTELIN) 0.1 % nasal spray, 1 spray into the nostril(s) as directed by provider 2 (Two) Times a Day As Needed for Rhinitis or Allergies. Use in each nostril as directed, Disp: 3 each, Rfl: 3  •  baclofen (LIORESAL) 10 MG tablet, Take 1 tablet by mouth At Night As Needed for Muscle Spasms., Disp: 30 tablet, Rfl: 3  •  cholecalciferol (VITAMIN D3) 1000 units tablet, Take 2 tablets by mouth Daily., Disp: 30 tablet, Rfl: 2  •  coenzyme Q10 100 MG capsule, Take 3 capsules by mouth Daily., Disp: 270 capsule, Rfl: 3  •  ezetimibe (ZETIA) 10 MG tablet, Take 1 tablet by mouth Daily., Disp: 90 tablet, Rfl: 3  •  famotidine (Pepcid) 20 MG tablet, Take 1 tablet by mouth At Night As Needed for Heartburn., Disp: 30 tablet, Rfl: 5  •  flunisolide (NASALIDE) 25 MCG/ACT (0.025%) solution nasal spray, Inhale 1 spray Every 12 (Twelve) Hours., Disp: 3 bottle, Rfl: 3  •  furosemide (LASIX) 40 MG tablet, TAKE 1 TABLET BY MOUTH ONCE DAILY AS NEEDED FOR  SWELLING, Disp: 90 tablet, Rfl: 0  •  irbesartan (AVAPRO) 300 MG tablet, Take 1 tablet by mouth Daily. Indications: High Blood Pressure Disorder, Disp: 90 tablet, Rfl: 3  •  melatonin 3 MG tablet, Take 1 tablet by mouth Every Night. At 7 PM., Disp: 90 tablet, Rfl: 2  •  meloxicam (Mobic) 7.5 MG tablet, Take 1 tablet by mouth Daily., Disp: 30 tablet, Rfl: 5  •  pantoprazole (PROTONIX) 20 MG EC tablet, Take 1 tablet by mouth Every Other Day., Disp: , Rfl:   •  potassium chloride (K-DUR,KLOR-CON) 20 MEQ CR tablet, Take  "1 tablet by mouth Daily., Disp: 90 tablet, Rfl: 3  •  rOPINIRole (REQUIP) 0.5 MG tablet, Take 1 tablet by mouth every night at bedtime., Disp: 90 tablet, Rfl: 2  •  temazepam (RESTORIL) 15 MG capsule, Take 2 capsules by mouth At Night As Needed for Sleep., Disp: 60 capsule, Rfl: 2  •  traZODone (DESYREL) 50 MG tablet, Take 1 tablet by mouth Every Night., Disp: 30 tablet, Rfl: 1  •  aspirin 81 MG tablet, Take 1 tablet by mouth Daily., Disp: 90 tablet, Rfl: 3    Allergies   Allergen Reactions   • Atorvastatin Myalgia   • Ciprofloxacin Diarrhea   • Pravastatin Myalgia        reports that he quit smoking about 49 years ago. His smoking use included cigarettes. He started smoking about 59 years ago. He has a 10.00 pack-year smoking history. He has never used smokeless tobacco.        Objective:   Blood pressure 114/84, pulse 55, height 175.3 cm (69\"), weight 93.1 kg (205 lb 3.2 oz), SpO2 96 %.  CONSTITUTIONAL: No acute distress, normal affect  RESPIRATORY: Normal effort. Clear to auscultation bilaterally without wheezing or rales.  CARDIOVASCULAR:Regular rate and rhythm with normal S1 and S2. Without gallop or murmur, or rub.  Carotids with normal upstrokes and without bruits.  No significant lower extremity swelling  PERIPHERAL VASCULAR: Normal radial pulse.      Labs:      Lab Results   Component Value Date    TRIG 85 06/03/2021    TRIG 88 03/17/2020    TRIG 107 01/08/2019     Lab Results   Component Value Date    HDL 40 06/03/2021    HDL 41 03/17/2020    HDL 39 (L) 01/08/2019     No components found for: LDLCALC  Lab Results   Component Value Date    VLDL 16 06/03/2021    VLDL 17.6 03/17/2020    VLDL 21.4 01/08/2019     No results found for: LDLHDL    Diagnostic Data:      ECG 12 Lead    Date/Time: 8/8/2022 10:14 AM  Performed by: Atilio Wall MD  Authorized by: Atilio Wall MD   Comparison: compared with previous ECG from 9/25/2018  Comparison to previous ECG: Formally AV paced, now A-paced " V-sensed              DEVICE INTERROGATION: Saint Jude, Interrogation date 8/8/2022- RA pacing 69%, RV pacing 1.4%. Threshold and impedances are acceptable acceptable.  Less than 1% mode switching, longest 15 minutes.  Battery voltage is reached GEOVANNA August 22, 2021, reached EOL May 28, 2022.    Carotid Duplex 9/2018  Moderate plaque in the right carotid bulb and proximal ICA producing a  50-69% stenosis.    Carotid duplex 10/26/2020  · Right internal carotid artery stenosis of 50-69%.  · Left internal carotid artery stenosis of 0-49%.  · There is antegrade flow in the vertebral arteries bilaterally.    [facility] 2004  - Minimal coronary artery disease and normal left ventricular function       Results for orders placed during the hospital encounter of 10/26/20    Adult Transthoracic Echo Complete W/ Cont if Necessary Per Protocol    Interpretation Summary  · Left ventricular systolic function is normal. Estimated left ventricular EF = 60%  · Left ventricular diastolic function is consistent with (grade I) impaired relaxation.      Assessment and Plan:     AV gunnar re-entry tachycardia   Sick sinus syndrome  Atrial tachycardia  -Patient has not followed up in the office since April 2021  -Pacemaker has reached end of service as of May 28, 2022  -Setting up a generator change    Coronary artery disease involving native coronary artery of native heart without angina pectoris  Dyslipidemia  -CCS 0  -Myalgias noted with atorvastatin and pravastatin.  Patient reports trying a third agent with similar symptoms, but is unsure of the name.  -Continue aspirin, Zetia    Chronic diastolic congestive heart failure   -Lasix as needed.    Essential hypertension  -Continue irbesartan as needed.    Carotid Stenosis  -Stable repeat carotid duplex in 2020.  -Continue to monitor clinically.    Trace to mild aortic regurgitation  -Not noted on most recent transthoracic echocardiogram in 2020.      - Return in about 1 year (around 8/8/2023)  for Next scheduled follow-up with an ECG and device interrogation.     Atilio Wall MD  08/08/22 11:04 EDT

## 2022-08-09 PROBLEM — Z45.010 PACEMAKER AT END OF BATTERY LIFE: Status: ACTIVE | Noted: 2022-08-09

## 2022-08-10 ENCOUNTER — HOSPITAL ENCOUNTER (OUTPATIENT)
Facility: HOSPITAL | Age: 77
Discharge: HOME | End: 2022-08-10
Attending: INTERNAL MEDICINE | Admitting: INTERNAL MEDICINE

## 2022-08-10 ENCOUNTER — TELEPHONE (OUTPATIENT)
Dept: INTERNAL MEDICINE | Facility: CLINIC | Age: 77
End: 2022-08-10

## 2022-08-10 VITALS
TEMPERATURE: 97 F | HEIGHT: 69 IN | WEIGHT: 204 LBS | BODY MASS INDEX: 30.21 KG/M2 | DIASTOLIC BLOOD PRESSURE: 89 MMHG | OXYGEN SATURATION: 96 % | RESPIRATION RATE: 9 BRPM | SYSTOLIC BLOOD PRESSURE: 142 MMHG | HEART RATE: 60 BPM

## 2022-08-10 DIAGNOSIS — Z45.010 PACEMAKER AT END OF BATTERY LIFE: ICD-10-CM

## 2022-08-10 PROBLEM — K40.91 RECURRENT INGUINAL HERNIA WITHOUT OBSTRUCTION OR GANGRENE: Status: RESOLVED | Noted: 2019-01-15 | Resolved: 2022-08-10

## 2022-08-10 LAB
ANION GAP SERPL CALCULATED.3IONS-SCNC: 10 MMOL/L (ref 5–15)
BASOPHILS # BLD AUTO: 0.07 10*3/MM3 (ref 0–0.2)
BASOPHILS NFR BLD AUTO: 1.3 % (ref 0–1.5)
BUN SERPL-MCNC: 23 MG/DL (ref 8–23)
BUN/CREAT SERPL: 22.8 (ref 7–25)
CALCIUM SPEC-SCNC: 8.9 MG/DL (ref 8.6–10.5)
CHLORIDE SERPL-SCNC: 108 MMOL/L (ref 98–107)
CO2 SERPL-SCNC: 21 MMOL/L (ref 22–29)
CREAT SERPL-MCNC: 1.01 MG/DL (ref 0.76–1.27)
DEPRECATED RDW RBC AUTO: 45.4 FL (ref 37–54)
EGFRCR SERPLBLD CKD-EPI 2021: 76.1 ML/MIN/1.73
EOSINOPHIL # BLD AUTO: 0.37 10*3/MM3 (ref 0–0.4)
EOSINOPHIL NFR BLD AUTO: 6.7 % (ref 0.3–6.2)
ERYTHROCYTE [DISTWIDTH] IN BLOOD BY AUTOMATED COUNT: 12.8 % (ref 12.3–15.4)
GLUCOSE SERPL-MCNC: 119 MG/DL (ref 65–99)
HCT VFR BLD AUTO: 44.1 % (ref 37.5–51)
HGB BLD-MCNC: 14.8 G/DL (ref 13–17.7)
IMM GRANULOCYTES # BLD AUTO: 0.02 10*3/MM3 (ref 0–0.05)
IMM GRANULOCYTES NFR BLD AUTO: 0.4 % (ref 0–0.5)
LYMPHOCYTES # BLD AUTO: 1.52 10*3/MM3 (ref 0.7–3.1)
LYMPHOCYTES NFR BLD AUTO: 27.7 % (ref 19.6–45.3)
MAGNESIUM SERPL-MCNC: 2.1 MG/DL (ref 1.6–2.4)
MCH RBC QN AUTO: 32.3 PG (ref 26.6–33)
MCHC RBC AUTO-ENTMCNC: 33.6 G/DL (ref 31.5–35.7)
MCV RBC AUTO: 96.3 FL (ref 79–97)
MONOCYTES # BLD AUTO: 1.22 10*3/MM3 (ref 0.1–0.9)
MONOCYTES NFR BLD AUTO: 22.2 % (ref 5–12)
NEUTROPHILS NFR BLD AUTO: 2.29 10*3/MM3 (ref 1.7–7)
NEUTROPHILS NFR BLD AUTO: 41.7 % (ref 42.7–76)
NRBC BLD AUTO-RTO: 0 /100 WBC (ref 0–0.2)
PLAT MORPH BLD: NORMAL
PLATELET # BLD AUTO: 245 10*3/MM3 (ref 140–450)
PMV BLD AUTO: 10.7 FL (ref 6–12)
POTASSIUM SERPL-SCNC: 4.1 MMOL/L (ref 3.5–5.2)
RBC # BLD AUTO: 4.58 10*6/MM3 (ref 4.14–5.8)
RBC MORPH BLD: NORMAL
SODIUM SERPL-SCNC: 139 MMOL/L (ref 136–145)
WBC MORPH BLD: NORMAL
WBC NRBC COR # BLD: 5.49 10*3/MM3 (ref 3.4–10.8)

## 2022-08-10 PROCEDURE — 80048 BASIC METABOLIC PNL TOTAL CA: CPT | Performed by: INTERNAL MEDICINE

## 2022-08-10 PROCEDURE — 83735 ASSAY OF MAGNESIUM: CPT | Performed by: PHYSICIAN ASSISTANT

## 2022-08-10 PROCEDURE — 25010000002 CEFAZOLIN IN DEXTROSE 2-4 GM/100ML-% SOLUTION: Performed by: INTERNAL MEDICINE

## 2022-08-10 PROCEDURE — 33228 REMV&REPLC PM GEN DUAL LEAD: CPT | Performed by: INTERNAL MEDICINE

## 2022-08-10 PROCEDURE — 25010000002 MIDAZOLAM PER 1 MG: Performed by: INTERNAL MEDICINE

## 2022-08-10 PROCEDURE — 25010000002 FENTANYL CITRATE (PF) 50 MCG/ML SOLUTION: Performed by: INTERNAL MEDICINE

## 2022-08-10 PROCEDURE — 0 LIDOCAINE 1 % SOLUTION: Performed by: INTERNAL MEDICINE

## 2022-08-10 PROCEDURE — 85025 COMPLETE CBC W/AUTO DIFF WBC: CPT | Performed by: INTERNAL MEDICINE

## 2022-08-10 PROCEDURE — S0260 H&P FOR SURGERY: HCPCS | Performed by: INTERNAL MEDICINE

## 2022-08-10 PROCEDURE — 25010000002 ONDANSETRON PER 1 MG: Performed by: INTERNAL MEDICINE

## 2022-08-10 PROCEDURE — 99152 MOD SED SAME PHYS/QHP 5/>YRS: CPT | Performed by: INTERNAL MEDICINE

## 2022-08-10 PROCEDURE — 85007 BL SMEAR W/DIFF WBC COUNT: CPT | Performed by: INTERNAL MEDICINE

## 2022-08-10 PROCEDURE — C1785 PMKR, DUAL, RATE-RESP: HCPCS | Performed by: INTERNAL MEDICINE

## 2022-08-10 DEVICE — GEN PM ASSURITY MRI DR RF PM2272: Type: IMPLANTABLE DEVICE | Status: FUNCTIONAL

## 2022-08-10 RX ORDER — LIDOCAINE HYDROCHLORIDE 10 MG/ML
INJECTION, SOLUTION INFILTRATION; PERINEURAL AS NEEDED
Status: DISCONTINUED | OUTPATIENT
Start: 2022-08-10 | End: 2022-08-10 | Stop reason: HOSPADM

## 2022-08-10 RX ORDER — SODIUM CHLORIDE 0.9 % (FLUSH) 0.9 %
3 SYRINGE (ML) INJECTION EVERY 12 HOURS SCHEDULED
Status: DISCONTINUED | OUTPATIENT
Start: 2022-08-10 | End: 2022-08-10 | Stop reason: HOSPADM

## 2022-08-10 RX ORDER — ACETAMINOPHEN 325 MG/1
650 TABLET ORAL EVERY 6 HOURS SCHEDULED
Qty: 40 TABLET | Refills: 0 | Status: SHIPPED | OUTPATIENT
Start: 2022-08-10 | End: 2022-08-15

## 2022-08-10 RX ORDER — FENTANYL CITRATE 50 UG/ML
INJECTION, SOLUTION INTRAMUSCULAR; INTRAVENOUS AS NEEDED
Status: DISCONTINUED | OUTPATIENT
Start: 2022-08-10 | End: 2022-08-10 | Stop reason: HOSPADM

## 2022-08-10 RX ORDER — ACETAMINOPHEN 325 MG/1
650 TABLET ORAL EVERY 4 HOURS PRN
Status: DISCONTINUED | OUTPATIENT
Start: 2022-08-10 | End: 2022-08-10 | Stop reason: HOSPADM

## 2022-08-10 RX ORDER — BUPIVACAINE HYDROCHLORIDE 5 MG/ML
INJECTION, SOLUTION EPIDURAL; INTRACAUDAL AS NEEDED
Status: DISCONTINUED | OUTPATIENT
Start: 2022-08-10 | End: 2022-08-10 | Stop reason: HOSPADM

## 2022-08-10 RX ORDER — SODIUM CHLORIDE 9 MG/ML
INJECTION, SOLUTION INTRAVENOUS CONTINUOUS PRN
Status: COMPLETED | OUTPATIENT
Start: 2022-08-10 | End: 2022-08-10

## 2022-08-10 RX ORDER — MIDAZOLAM HYDROCHLORIDE 1 MG/ML
INJECTION INTRAMUSCULAR; INTRAVENOUS AS NEEDED
Status: DISCONTINUED | OUTPATIENT
Start: 2022-08-10 | End: 2022-08-10 | Stop reason: HOSPADM

## 2022-08-10 RX ORDER — ONDANSETRON 2 MG/ML
4 INJECTION INTRAMUSCULAR; INTRAVENOUS EVERY 6 HOURS PRN
Status: DISCONTINUED | OUTPATIENT
Start: 2022-08-10 | End: 2022-08-10 | Stop reason: HOSPADM

## 2022-08-10 RX ORDER — IBUPROFEN 600 MG/1
600 TABLET ORAL EVERY 6 HOURS SCHEDULED
Status: DISCONTINUED | OUTPATIENT
Start: 2022-08-10 | End: 2022-08-10 | Stop reason: HOSPADM

## 2022-08-10 RX ORDER — NITROGLYCERIN 0.4 MG/1
0.4 TABLET SUBLINGUAL
Status: DISCONTINUED | OUTPATIENT
Start: 2022-08-10 | End: 2022-08-10 | Stop reason: HOSPADM

## 2022-08-10 RX ORDER — ONDANSETRON 2 MG/ML
INJECTION INTRAMUSCULAR; INTRAVENOUS AS NEEDED
Status: DISCONTINUED | OUTPATIENT
Start: 2022-08-10 | End: 2022-08-10 | Stop reason: HOSPADM

## 2022-08-10 RX ORDER — IBUPROFEN 600 MG/1
600 TABLET ORAL EVERY 6 HOURS SCHEDULED
Qty: 20 TABLET | Refills: 0 | Status: SHIPPED | OUTPATIENT
Start: 2022-08-10 | End: 2022-08-15

## 2022-08-10 RX ORDER — CEFAZOLIN SODIUM 2 G/100ML
2 INJECTION, SOLUTION INTRAVENOUS ONCE
Status: COMPLETED | OUTPATIENT
Start: 2022-08-10 | End: 2022-08-10

## 2022-08-10 RX ORDER — ACETAMINOPHEN 325 MG/1
650 TABLET ORAL EVERY 6 HOURS
Status: DISCONTINUED | OUTPATIENT
Start: 2022-08-10 | End: 2022-08-10 | Stop reason: HOSPADM

## 2022-08-10 RX ORDER — SODIUM CHLORIDE 0.9 % (FLUSH) 0.9 %
10 SYRINGE (ML) INJECTION AS NEEDED
Status: DISCONTINUED | OUTPATIENT
Start: 2022-08-10 | End: 2022-08-10 | Stop reason: HOSPADM

## 2022-08-10 RX ADMIN — CEFAZOLIN SODIUM 2 G: 2 INJECTION, SOLUTION INTRAVENOUS at 11:55

## 2022-08-10 NOTE — TELEPHONE ENCOUNTER
Patient did not complete  CT  ordered on 8/5/2021. This order is too old to be used and has been cancelled.

## 2022-08-10 NOTE — OP NOTE
Cardiac Electrophysiology Procedure Note      [facility] at [facility]    PROCEDURE: PACEMAKER GENERATOR REPLACEMENT //     OPERATION PERFORMED:     1. Explantation of Saint Jona model 2210 pulse generator with serial number [id number] implanted on November 2, 2010  2. Testing of retained pacing leads:        A. Atrial lead Saint Jona model 1488 TC, 46 cm, DOI [id number] implanted on September 3, 2004.        B. Right ventricular lead Saint Jona model 1488 TC, 52 cm, [place] [id number] implanted on September 3, 2004.    3. Implantation of Saint Jona dual-chamber permanent pacemaker model PM 2272 pulse generator with serial number [id number].    ATTENDING SURGEON: Philip Castillo, DO    MODERATE SEDATION FOR PROCEDURE:    Moderate sedation was given during this procedure.    I supervised and directed RN to administer this sedation.  This staff member also monitored the patient's hemodynamic and respiratory status and response to these medications.  Please see the full detailed procedure report generated by the electrophysiology laboratory staff.  The patient tolerated moderate sedation well.  There were no complications regarding sedation.  The total dose of fentanyl was 150 mcg and the total dose of midazolam was 4 mg.  The total dose of Brevital was 40 mg.  First sedation was administered at 1156 and continued through 1234 .    ESTIMATED BLOOD LOSS: less than 20cc    COMPLICATIONS: None    TIME OUT: Time out was completed with verification of the correct patient identity, procedure to be performed, procedure site and implanted equipment.    INDICATION FOR PROCEDURE:  Briefly,Saad Meier is a 76 y.o. male with a history of sinus node dysfunction who was initially implanted with a dual chamber pacemaker on September 3 of 2004.  The patient was recently seen in our device clinic at which time the patient's device was discovered to be at the elective replacement interval.  The patient was deemed  appropriate for a generator replacement.    The patient was able to give written informed consent after revisiting the key portions of the risk versus benefit profile of the procedure.  This discussion was framed by our lengthy conversations  (please see our detailed notes).  Patient verbalized strong understanding of this discussion and a strong desire to proceed with the procedure.  Please note that this detailed informed consent process utilized mutual and shared decision making process between all parties involved, principally the physician and patient, but also potentially with input from the patient's selected family and friends.    PROCEDURE AND FINDINGS:  The patient was brought to the electrophysiology suite in a post absorptive state.  Informed consent was obtained prior to the procedure and confirmed.  Intravenous prophylactic antibiotics were administered and confirmed to be completely infused prior to the start of the procedure.  After the site of implantation was prepped and draped in the usual sterile fashion and after adequate anesthesia was given, the skin was infiltrated with 1% lidocaine and 0.5% bupivicaine 50/50 mixture.  The skin was incised with a #10 scalpel.  Blunt and electrosurgical dissection was carried out to the pulse generator pocket.  The leads were carefully isolated with blunt and electrosurgical dissection.  Careful attention was paid not to damage the leads.  The pulse generator was explanted and the pocket was copiously irrigated with antibiotic containing normal saline and subsequently observed.  Once adequate hemostasis was confirmed within the pocket, the leads were detached from the pulse generator.  The leads were tested for adequate sensing, impedances and pacing thresholds.  The lead tips for all leads were cleaned and dried thoroughly.  The leads were attached to the appropriate ports on the new pulse generator.  Tug testing was performed on all connections.  The device  and leads were placed within the pocket such that the coiled redundant leads were posterior to the pulse generator.  Once again, the device was tested for adequate sensing, impedances and pacing thresholds.  The pulse generator was then sutured to the fascia in a medial location within the pocket using non absorbable suture.  The pocket was closed with two layers of suture using 2-0 then 3-0 Vicryl, followed by a layer of surgical adhesive and finally a sterile occlusive dressing.                     MEASURED DEVICE DATA:    Atrial lead                   sensin.6 mV                   impedance:           450 ohms                   Threshold:            0.6 volts at 0.5 ms    RV lead                   sensin mV                   pacing impedance:    590 ohms                   Threshold:                  1.25 volts at 0.5 ms                                                        PROGRAMMED PARAMETERS:    Mode:                                 DDDR  Lower Rate:                       60 pulses per minute  Upper Sensor Rate:          130 pulses per minute  Upper Tracking Rate:      130 pulses per minute  Mode Switch Rate:           170 bpm      CONCLUSION:  Successful pacemaker generator change.      Patient is to follow up with our clinic in approximately one week and then myself in 3 months.    No lovenox or heparin at any dose is to be given.    FOR THE PATIENT    Please do not lift more than 10 pounds or abduct the shoulder joint / or raise the arm above the shoulder for 6 weeks after device was implanted (this does not apply to subcutaneous ICDs).    Avoid activities that involve heavy lifting or rough contact that could result in blows to your implant site and to allow your incision time to heal.    No shower or getting device incision wet for 2 days post-operatively.    Keep wound exposed to air unless otherwise instructed, the surgical glue is the bandage.    No  creams, lotions, or powders to incision.    Please avoid allowing bra strap or suspenders to lay over incision until completely healed.    Avoid hot tubs or pools until incision completely healed.    No driving for 24 hours post-operatively after device implant.    Call your doctor if you have any swelling, redness or discharge around your incision, notice anything unusual or unexpected, or you develop a fever that does not go away in two or three days.    Call your doctor if you hear any beeping sounds / vibratory alerts from your device as this indicates your device needs to be checked immediately.    Carry your Medical Device ID Card with you at all times.  Please call our office () with any questions about the device or the wounds.            Philip Castillo DO, FACC, RS  Cardiac Electrophysiologist  [facility] / [facility]

## 2022-08-10 NOTE — INTERVAL H&P NOTE
H&P reviewed. The patient was examined and there are no changes to the H&P.       EP Pre-Procedure Report  Cardiovascular Laboratory  [facility]    Patient:  Saad Meier  :  1945  PCP:  Tee Fiore DO  PHONE:  [phone]    DATE: 8/10/2022      MEDICATIONS:  Prior to Admission medications    Medication Sig Start Date End Date Taking? Authorizing Provider   amLODIPine (Norvasc) 5 MG tablet Take 1 tablet by mouth Daily. 21  Yes Tee Fiore DO   azelastine (ASTELIN) 0.1 % nasal spray 1 spray into the nostril(s) as directed by provider 2 (Two) Times a Day As Needed for Rhinitis or Allergies. Use in each nostril as directed 22  Yes Tee Fiore DO   cholecalciferol (VITAMIN D3) 1000 units tablet Take 2 tablets by mouth Daily. 7/3/19  Yes Josy Rapp APRN   coenzyme Q10 100 MG capsule Take 3 capsules by mouth Daily. 21  Yes Evelyn Muse MD   ezetimibe (ZETIA) 10 MG tablet Take 1 tablet by mouth Daily. 21  Yes Tee Fiore DO   flunisolide (NASALIDE) 25 MCG/ACT (0.025%) solution nasal spray Inhale 1 spray Every 12 (Twelve) Hours. 10/9/20  Yes Johnna Ireland APRN   furosemide (LASIX) 40 MG tablet TAKE 1 TABLET BY MOUTH ONCE DAILY AS NEEDED FOR  SWELLING 22  Yes Atilio Wall MD   irbesartan (AVAPRO) 300 MG tablet Take 1 tablet by mouth Daily. Indications: High Blood Pressure Disorder 21  Yes Evelyn Muse MD   pantoprazole (PROTONIX) 20 MG EC tablet Take 20 mg by mouth Daily. 22  Yes Michael Mcdonald MD   potassium chloride (K-DUR,KLOR-CON) 20 MEQ CR tablet Take 1 tablet by mouth Daily. 22  Yes Tee Fiore DO   rOPINIRole (REQUIP) 0.5 MG tablet Take 1 tablet by mouth every night at bedtime. 21  Yes Tee Fiore, DO   temazepam (RESTORIL) 15 MG capsule Take 2 capsules by mouth At Night As Needed for Sleep. 22  Yes Johnna Ireland APRN   aspirin  81 MG tablet Take 1 tablet by mouth Daily. 12/19/19   Atilio Wall MD   baclofen (LIORESAL) 10 MG tablet Take 1 tablet by mouth At Night As Needed for Muscle Spasms. 11/9/21   Tee Fiore DO   famotidine (Pepcid) 20 MG tablet Take 1 tablet by mouth At Night As Needed for Heartburn. 7/6/22   Tee Fiore DO   melatonin 3 MG tablet Take 1 tablet by mouth Every Night. At 7 PM. 5/11/22   Johnna Ireland APRN   meloxicam (Mobic) 7.5 MG tablet Take 1 tablet by mouth Daily. 4/19/22   Tee Fiore DO   traZODone (DESYREL) 50 MG tablet Take 1 tablet by mouth Every Night. 7/20/22 8/10/22  Tee Fiore DO       Past medical & surgical history, social and family history reviewed in the electronic medical record.    Physical Exam:    Vitals:   Vitals:    08/10/22 0817   BP: (!) 167/105   Pulse: 56   Resp:    Temp:    SpO2: 98%          08/10/22  0814      Weight: 92.5 kg (204 lb)      Body mass index is 30.13 kg/m².    GENERAL: No apparent distress.  No significant changes since last exam.  CHEST: Clear to auscultation bilaterally no stridor no wheeze.  CV: S1, S2, Regular without Murmurs, Rubs or Gallops  EXTREMITIES: No edema.        Results from last 7 days   Lab Units 08/10/22  0819   SODIUM mmol/L 139   POTASSIUM mmol/L 4.1   CHLORIDE mmol/L 108*   CO2 mmol/L 21.0*   BUN mg/dL 23   CREATININE mg/dL 1.01   GLUCOSE mg/dL 119*   CALCIUM mg/dL 8.9     Results from last 7 days   Lab Units 08/10/22  0819   WBC 10*3/mm3 5.49   HEMOGLOBIN g/dL 14.8   HEMATOCRIT % 44.1   PLATELETS 10*3/mm3 245     Estimated Creatinine Clearance: 67.7 mL/min (by C-G formula based on SCr of 1.01 mg/dL).    IMPRESSION:PPM past GEOVANNA      PLAN:  Procedure to perform: PPM generator change        Electronically signed by JORGE LUIS Muniz, 08/10/22, 9:39 AM EDT.

## 2022-08-17 ENCOUNTER — OFFICE VISIT (OUTPATIENT)
Dept: CARDIOLOGY | Facility: CLINIC | Age: 77
End: 2022-08-17

## 2022-08-17 DIAGNOSIS — I49.5 SICK SINUS SYNDROME: Primary | ICD-10-CM

## 2022-08-17 PROCEDURE — 99024 POSTOP FOLLOW-UP VISIT: CPT | Performed by: INTERNAL MEDICINE

## 2022-08-17 NOTE — PROGRESS NOTES
2022    Saad Meier, : 1945    WOUND CHECK    B/P:   Pulse:     Patient has fever: [] Temperature if indicated:     Wound Location: Left infraclavicular    Dressing Removed [x]        Old Dressing Appearance:  Clean, dry [x]                 Old, bloody drainage []                            Moist, serous drainage []                Moist, thick yellow/green drainage []       Wound Appearance: Redness []                  Drainage []                  Culture obtained []        Color: Clear     Consistency: None     Amount: none         Gloves used, wound cleansed with sterile 4x4 and peroxide [x]        MD notified [] MD orders:     Antibiotic started []  If checked, type   Other:     Appointment for follow-up scheduled for 3 months post procedure []    Future Appointments   Date Time Provider Department Center   2022 10:30 AM WOUND CHECK [facility]   2022 11:15 AM Tee Fiore DO [facility]   2022  2:15 PM Neel Zimmer MD [facility]   2022  3:00 PM Philip Castillo DO [facility]   2023 11:30 AM Atilio Wall MD [facility]           Evelyn Dean MA, 22      MD Signature:______________________________ Completed By/Date:

## 2022-08-26 ENCOUNTER — OFFICE VISIT (OUTPATIENT)
Dept: INTERNAL MEDICINE | Facility: CLINIC | Age: 77
End: 2022-08-26

## 2022-08-26 VITALS
HEART RATE: 60 BPM | OXYGEN SATURATION: 97 % | TEMPERATURE: 98 F | BODY MASS INDEX: 30.36 KG/M2 | SYSTOLIC BLOOD PRESSURE: 131 MMHG | WEIGHT: 205 LBS | DIASTOLIC BLOOD PRESSURE: 83 MMHG | RESPIRATION RATE: 16 BRPM | HEIGHT: 69 IN

## 2022-08-26 DIAGNOSIS — I50.32 CHRONIC DIASTOLIC CONGESTIVE HEART FAILURE: ICD-10-CM

## 2022-08-26 DIAGNOSIS — M54.12 CERVICAL RADICULOPATHY: ICD-10-CM

## 2022-08-26 DIAGNOSIS — E78.5 DYSLIPIDEMIA: Primary | ICD-10-CM

## 2022-08-26 DIAGNOSIS — Z23 NEED FOR SHINGLES VACCINE: ICD-10-CM

## 2022-08-26 DIAGNOSIS — I47.1 AV NODAL RE-ENTRY TACHYCARDIA: ICD-10-CM

## 2022-08-26 DIAGNOSIS — I49.5 SICK SINUS SYNDROME: ICD-10-CM

## 2022-08-26 DIAGNOSIS — Z23 NEED FOR TDAP VACCINATION: ICD-10-CM

## 2022-08-26 DIAGNOSIS — I25.10 CORONARY ARTERY DISEASE INVOLVING NATIVE CORONARY ARTERY OF NATIVE HEART WITHOUT ANGINA PECTORIS: ICD-10-CM

## 2022-08-26 DIAGNOSIS — I10 ESSENTIAL HYPERTENSION: ICD-10-CM

## 2022-08-26 PROCEDURE — G0439 PPPS, SUBSEQ VISIT: HCPCS | Performed by: INTERNAL MEDICINE

## 2022-08-26 PROCEDURE — 1125F AMNT PAIN NOTED PAIN PRSNT: CPT | Performed by: INTERNAL MEDICINE

## 2022-08-26 PROCEDURE — 99397 PER PM REEVAL EST PAT 65+ YR: CPT | Performed by: INTERNAL MEDICINE

## 2022-08-26 PROCEDURE — 1159F MED LIST DOCD IN RCRD: CPT | Performed by: INTERNAL MEDICINE

## 2022-08-26 PROCEDURE — 1170F FXNL STATUS ASSESSED: CPT | Performed by: INTERNAL MEDICINE

## 2022-08-26 NOTE — PATIENT INSTRUCTIONS
Medicare Wellness  Personal Prevention Plan of Service     Date of Office Visit:    Encounter Provider:  Tee Fiore DO  Place of Service:  [facility]  Patient Name: Saad Meier  :  1945    As part of the Medicare Wellness portion of your visit today, we are providing you with this personalized preventive plan of services (PPPS). This plan is based upon recommendations of the [place] Preventive Services Task Force (USPSTF) and the Advisory Committee on Immunization Practices (ACIP).    This lists the preventive care services that should be considered, and provides dates of when you are due. Items listed as completed are up-to-date and do not require any further intervention.    Health Maintenance   Topic Date Due    TDAP/TD VACCINES (1 - Tdap) Never done    ZOSTER VACCINE (1 of 2) Never done    ANNUAL WELLNESS VISIT  2022    LIPID PANEL  2022    COVID-19 Vaccine (4 - Booster for Moderna series) 2022 (Originally 2022)    INFLUENZA VACCINE  10/01/2022    COLONOSCOPY  2029    HEPATITIS C SCREENING  Completed    Pneumococcal Vaccine 65+  Completed       Orders Placed This Encounter   Procedures    Lipid Panel       Return in about 6 months (around 2023) for Next scheduled follow up.

## 2022-08-26 NOTE — PROGRESS NOTES
QUICK REFERENCE INFORMATION:  The ABCs of the Annual Wellness Visit    Subsequent Medicare Wellness Visit    Chief Complaint   Patient presents with   • Medicare Wellness-subsequent     With preventive visit        Subjective   History of Present Illness    Saad Meier is a 76 y.o. male who presents for a Subsequent Wellness Visit. In addition, we addressed the following health issues:    Meloxicam helps with his chronic neck pain.  Heart issues have been stable as he follows with cardiology.  PM battery was recently changed without any issues.  Patient feels well overall and is happy with his medication regimen. Trazodone helps with sleep along with restoril per sleep clinic.      HEALTH RISK ASSESSMENT    1945    Recent Hospitalizations:  No hospitalization(s) within the last year..        Current Medical Providers:  Patient Care Team:  Tee Fiore DO as PCP - General (Internal Medicine)  Atilio Wall MD as Consulting Physician (Cardiology)  Luis Hollins MD as Consulting Physician (Urology)  Liddle, Susan E., MD as Consulting Physician (Hematology and Oncology)  Gloria Garcia MD as Consulting Physician (General Surgery)  Elvie Alvarez MD as Consulting Physician (General Surgery)  Jacky Walker MD as Consulting Physician (General Surgery)  Neel Zimmer MD as Consulting Physician (Pulmonary Disease)        Smoking Status:  Social History     Tobacco Use   Smoking Status Former Smoker   • Packs/day: 1.00   • Years: 10.00   • Pack years: 10.00   • Types: Cigarettes   • Start date: 1963   • Quit date:    • Years since quittin.6   Smokeless Tobacco Never Used       Alcohol Consumption:  Social History     Substance and Sexual Activity   Alcohol Use No       Depression Screen:   PHQ-2/PHQ-9 Depression Screening 2022   Retired PHQ-9 Total Score -   Retired Total Score -   Little Interest or Pleasure in Doing Things 0-->not at all   Feeling Down, Depressed or  Hopeless 0-->not at all   PHQ-9: Brief Depression Severity Measure Score 0       Health Habits and Functional and Cognitive Screening:  Functional & Cognitive Status 8/26/2022   Do you have difficulty preparing food and eating? No   Do you have difficulty bathing yourself, getting dressed or grooming yourself? No   Do you have difficulty using the toilet? No   Do you have difficulty moving around from place to place? No   Do you have trouble with steps or getting out of a bed or a chair? No   Current Diet Well Balanced Diet   Dental Exam Not up to date   Eye Exam Up to date   Exercise (times per week) -   Current Exercise Activities Include -   Do you need help using the phone?  No   Are you deaf or do you have serious difficulty hearing?  Yes   Do you need help with transportation? No   Do you need help shopping? No   Do you need help preparing meals?  No   Do you need help with housework?  Yes   Do you need help with laundry? Yes   Do you need help taking your medications? No   Do you need help managing money? No   Do you ever drive or ride in a car without wearing a seat belt? No   Have you felt unusual stress, anger or loneliness in the last month? No   Who do you live with? Alone   If you need help, do you have trouble finding someone available to you? Yes   Have you been bothered in the last four weeks by sexual problems? No   Do you have difficulty concentrating, remembering or making decisions? No           Does the patient have evidence of cognitive impairment? No    Aspirin use counseling: Taking ASA appropriately as indicated      Recent Lab Results:  CMP:  Lab Results   Component Value Date    BUN 23 08/10/2022    CREATININE 1.01 08/10/2022    EGFRIFNONA 74 01/21/2022    EGFRIFAFRI 90 01/21/2022    BCR 22.8 08/10/2022     08/10/2022    K 4.1 08/10/2022    CO2 21.0 (L) 08/10/2022    CALCIUM 8.9 08/10/2022    PROTENTOTREF 6.2 06/03/2021    ALBUMIN 4.40 06/03/2021    LABGLOBREF 1.8 06/03/2021     LABIL2 2.4 06/03/2021    BILITOT 0.8 06/03/2021    ALKPHOS 62 06/03/2021    AST 27 06/03/2021    ALT 27 06/03/2021     Lipid Panel:  Lab Results   Component Value Date    TRIG 85 06/03/2021    HDL 40 06/03/2021    VLDL 16 06/03/2021     HbA1c:  Lab Results   Component Value Date    HGBA1C 6.40 (H) 01/21/2022       Visual Acuity:  No exam data present    Age-appropriate Screening Schedule:  Refer to the list below for future screening recommendations based on patient's age, sex and/or medical conditions. Orders for these recommended tests are listed in the plan section. The patient has been provided with a written plan.    Health Maintenance   Topic Date Due   • TDAP/TD VACCINES (1 - Tdap) Never done   • ZOSTER VACCINE (1 of 2) Never done   • LIPID PANEL  06/03/2022   • INFLUENZA VACCINE  10/01/2022   • COLONOSCOPY  01/02/2029          The following portions of the patient's history were reviewed and updated as appropriate: allergies, current medications, past family history, past medical history, past social history, past surgical history and problem list.    Outpatient Medications Prior to Visit   Medication Sig Dispense Refill   • amLODIPine (Norvasc) 5 MG tablet Take 1 tablet by mouth Daily. 30 tablet 4   • aspirin 81 MG tablet Take 1 tablet by mouth Daily. 90 tablet 3   • azelastine (ASTELIN) 0.1 % nasal spray 1 spray into the nostril(s) as directed by provider 2 (Two) Times a Day As Needed for Rhinitis or Allergies. Use in each nostril as directed 3 each 3   • baclofen (LIORESAL) 10 MG tablet Take 1 tablet by mouth At Night As Needed for Muscle Spasms. 30 tablet 3   • cholecalciferol (VITAMIN D3) 1000 units tablet Take 2 tablets by mouth Daily. 30 tablet 2   • coenzyme Q10 100 MG capsule Take 3 capsules by mouth Daily. 270 capsule 3   • ezetimibe (ZETIA) 10 MG tablet Take 1 tablet by mouth Daily. 90 tablet 3   • famotidine (Pepcid) 20 MG tablet Take 1 tablet by mouth At Night As Needed for Heartburn. 30 tablet  5   • flunisolide (NASALIDE) 25 MCG/ACT (0.025%) solution nasal spray Inhale 1 spray Every 12 (Twelve) Hours. 3 bottle 3   • furosemide (LASIX) 40 MG tablet TAKE 1 TABLET BY MOUTH ONCE DAILY AS NEEDED FOR  SWELLING 90 tablet 0   • irbesartan (AVAPRO) 300 MG tablet Take 1 tablet by mouth Daily. Indications: High Blood Pressure Disorder 90 tablet 3   • melatonin 3 MG tablet Take 1 tablet by mouth Every Night. At 7 PM. 90 tablet 2   • meloxicam (Mobic) 7.5 MG tablet Take 1 tablet by mouth Daily. 30 tablet 5   • pantoprazole (PROTONIX) 20 MG EC tablet Take 20 mg by mouth Daily.     • potassium chloride (K-DUR,KLOR-CON) 20 MEQ CR tablet Take 1 tablet by mouth Daily. 90 tablet 3   • rOPINIRole (REQUIP) 0.5 MG tablet Take 1 tablet by mouth every night at bedtime. 90 tablet 2   • temazepam (RESTORIL) 15 MG capsule Take 2 capsules by mouth At Night As Needed for Sleep. 60 capsule 2     No facility-administered medications prior to visit.       Patient Active Problem List   Diagnosis   • Arthritis   • Chronic diastolic congestive heart failure (HCC)   • Acid reflux   • Juvenile rheumatic fever   • Insomnia   • Sick sinus syndrome (HCC)   • Coronary artery disease involving native coronary artery of native heart without angina pectoris   • AV gunnar re-entry tachycardia (HCC)   • Bilateral renal masses   • Essential hypertension   • Umbilical hernia without obstruction or gangrene   • Dyslipidemia   • Stenosis of right carotid artery   • Peripheral vascular disease of lower extremity (HCC)   • History of colon cancer   • Statin intolerance   • Pacemaker at end of battery life       Advance Care Planning:  ACP discussion was held with the patient during this visit. Patient does not have an advance directive, information provided.    Identification of Risk Factors:  Risk factors include: Advance Directive Discussion  Cardiovascular risk  Lung Cancer Risk.    Review of Systems   Constitutional: Negative for chills, fatigue and  fever.   HENT: Negative for congestion, ear pain, rhinorrhea, sinus pressure and sore throat.    Eyes: Negative for visual disturbance.   Respiratory: Negative for cough, chest tightness, shortness of breath and wheezing.    Cardiovascular: Negative for chest pain, palpitations and leg swelling.   Gastrointestinal: Negative for abdominal pain, blood in stool, constipation, diarrhea, nausea and vomiting.   Endocrine: Negative for polydipsia and polyuria.   Genitourinary: Negative for dysuria and hematuria.   Musculoskeletal: Negative for arthralgias and back pain.   Skin: Negative for rash.   Neurological: Negative for dizziness, light-headedness, numbness and headaches.   Psychiatric/Behavioral: Negative for dysphoric mood and sleep disturbance. The patient is not nervous/anxious.        Compared to one year ago, the patient feels his physical health is the same.  Compared to one year ago, the patient feels his mental health is the same.    Objective     Physical Exam  Vitals and nursing note reviewed.   Constitutional:       Appearance: Normal appearance. He is well-developed.   HENT:      Head: Normocephalic and atraumatic.      Right Ear: Tympanic membrane and external ear normal.      Left Ear: Tympanic membrane and external ear normal.      Nose: Nose normal. No congestion.      Mouth/Throat:      Mouth: Mucous membranes are moist.      Pharynx: No oropharyngeal exudate.   Eyes:      General: No scleral icterus.        Right eye: No discharge.      Pupils: Pupils are equal, round, and reactive to light.   Neck:      Thyroid: No thyromegaly.      Vascular: No JVD.   Cardiovascular:      Rate and Rhythm: Normal rate and regular rhythm.      Heart sounds: Normal heart sounds. No murmur heard.    No friction rub.      Comments: PM in place  Pulmonary:      Effort: Pulmonary effort is normal. No respiratory distress.      Breath sounds: Normal breath sounds. No stridor. No wheezing.   Abdominal:      General: Bowel  "sounds are normal. There is no distension.      Palpations: Abdomen is soft.      Tenderness: There is no abdominal tenderness. There is no guarding.   Genitourinary:     Comments: Deferred  Musculoskeletal:         General: No tenderness. Normal range of motion.      Cervical back: Normal range of motion and neck supple.   Lymphadenopathy:      Cervical: No cervical adenopathy.   Skin:     General: Skin is warm and dry.      Findings: No rash.   Neurological:      Mental Status: He is alert and oriented to person, place, and time.      Cranial Nerves: No cranial nerve deficit.   Psychiatric:         Mood and Affect: Mood normal.         Behavior: Behavior normal.         Thought Content: Thought content normal.         Vitals:    08/26/22 1122   BP: 131/83   Pulse: 60   Resp: 16   Temp: 98 °F (36.7 °C)   SpO2: 97%   Weight: 93 kg (205 lb)   Height: 175.3 cm (69\")   PainSc:   2   PainLoc: Abdomen  Comment: pacemaker battery replacement.       BMI is >= 30 and <35. (Class 1 Obesity). The following options were offered after discussion;: weight loss educational material (shared in after visit summary) and exercise counseling/recommendations      Assessment & Plan   Patient Self-Management and Personalized Health Advice  The patient has been provided with information about: diet, exercise and designing advance directives and preventive services including:   · Annual Wellness Visit (AWV).    Visit Diagnoses:    ICD-10-CM ICD-9-CM   1. Dyslipidemia  E78.5 272.4   2. Sick sinus syndrome (HCC)  I49.5 427.81   3. AV gunnar re-entry tachycardia (HCC)  I47.1 427.89   4. Chronic diastolic congestive heart failure (HCC)  I50.32 428.32     428.0   5. Essential hypertension  I10 401.9   6. Coronary artery disease involving native coronary artery of native heart without angina pectoris  I25.10 414.01   7. Cervical radiculopathy  M54.12 723.4       Discussion Summary:  Patient is a 76 y.o. male who presents for a Subsequent Wellness " Visit.    1. Preventive Health Maintenance  - Baseline labs ordered per above.  - Vaccines reviewed and updated  - Preventive health measures were discussed including: healthy diet with increase in fruits and vegetables, regular exercise at least 3 times a week, safe sex practices, avoidance of drugs, tobacco, and alcohol, and regular seatbelt use.    2. Hypertension / CAD / diastolic CHF  - stable on current cardiac medications.      3. SSS s/p PM  - recently had battery replaced, doing well, avoiding heavy lifting post surgically.     4. Cervical Radiculopathy / Chronic Pain  - controlled recently.     5. HLD  - follow up lipids, pt does have statin intolerance.     6. Insomnia  - fair control with restoril and trazodone.         No orders of the defined types were placed in this encounter.      Outpatient Encounter Medications as of 8/26/2022   Medication Sig Dispense Refill   • amLODIPine (Norvasc) 5 MG tablet Take 1 tablet by mouth Daily. 30 tablet 4   • aspirin 81 MG tablet Take 1 tablet by mouth Daily. 90 tablet 3   • azelastine (ASTELIN) 0.1 % nasal spray 1 spray into the nostril(s) as directed by provider 2 (Two) Times a Day As Needed for Rhinitis or Allergies. Use in each nostril as directed 3 each 3   • baclofen (LIORESAL) 10 MG tablet Take 1 tablet by mouth At Night As Needed for Muscle Spasms. 30 tablet 3   • cholecalciferol (VITAMIN D3) 1000 units tablet Take 2 tablets by mouth Daily. 30 tablet 2   • coenzyme Q10 100 MG capsule Take 3 capsules by mouth Daily. 270 capsule 3   • ezetimibe (ZETIA) 10 MG tablet Take 1 tablet by mouth Daily. 90 tablet 3   • famotidine (Pepcid) 20 MG tablet Take 1 tablet by mouth At Night As Needed for Heartburn. 30 tablet 5   • flunisolide (NASALIDE) 25 MCG/ACT (0.025%) solution nasal spray Inhale 1 spray Every 12 (Twelve) Hours. 3 bottle 3   • furosemide (LASIX) 40 MG tablet TAKE 1 TABLET BY MOUTH ONCE DAILY AS NEEDED FOR  SWELLING 90 tablet 0   • irbesartan (AVAPRO) 300  MG tablet Take 1 tablet by mouth Daily. Indications: High Blood Pressure Disorder 90 tablet 3   • melatonin 3 MG tablet Take 1 tablet by mouth Every Night. At 7 PM. 90 tablet 2   • meloxicam (Mobic) 7.5 MG tablet Take 1 tablet by mouth Daily. 30 tablet 5   • pantoprazole (PROTONIX) 20 MG EC tablet Take 20 mg by mouth Daily.     • potassium chloride (K-DUR,KLOR-CON) 20 MEQ CR tablet Take 1 tablet by mouth Daily. 90 tablet 3   • rOPINIRole (REQUIP) 0.5 MG tablet Take 1 tablet by mouth every night at bedtime. 90 tablet 2   • temazepam (RESTORIL) 15 MG capsule Take 2 capsules by mouth At Night As Needed for Sleep. 60 capsule 2     No facility-administered encounter medications on file as of 8/26/2022.       Reviewed use of high risk medication in the elderly: yes  Reviewed for potential of harmful drug interactions in the elderly: yes    Follow Up:  No follow-ups on file.     An After Visit Summary and PPPS with all of these plans were given to the patient.

## 2022-08-29 ENCOUNTER — TELEPHONE (OUTPATIENT)
Dept: CARDIOLOGY | Facility: CLINIC | Age: 77
End: 2022-08-29

## 2022-08-29 NOTE — TELEPHONE ENCOUNTER
Patient called and left voicemail, I returned call. No answer. I left a voicemail for a return call.

## 2022-09-24 PROCEDURE — 93294 REM INTERROG EVL PM/LDLS PM: CPT | Performed by: INTERNAL MEDICINE

## 2022-09-24 PROCEDURE — 93296 REM INTERROG EVL PM/IDS: CPT | Performed by: INTERNAL MEDICINE

## 2022-10-21 ENCOUNTER — OFFICE VISIT (OUTPATIENT)
Dept: INTERNAL MEDICINE | Facility: CLINIC | Age: 77
End: 2022-10-21

## 2022-10-21 VITALS
OXYGEN SATURATION: 93 % | SYSTOLIC BLOOD PRESSURE: 142 MMHG | DIASTOLIC BLOOD PRESSURE: 78 MMHG | HEART RATE: 70 BPM | HEIGHT: 69 IN | WEIGHT: 202 LBS | BODY MASS INDEX: 29.92 KG/M2 | TEMPERATURE: 99.5 F

## 2022-10-21 DIAGNOSIS — R05.1 ACUTE COUGH: Primary | ICD-10-CM

## 2022-10-21 DIAGNOSIS — I50.32 CHRONIC DIASTOLIC CONGESTIVE HEART FAILURE: ICD-10-CM

## 2022-10-21 DIAGNOSIS — I25.10 CORONARY ARTERY DISEASE INVOLVING NATIVE CORONARY ARTERY OF NATIVE HEART WITHOUT ANGINA PECTORIS: Chronic | ICD-10-CM

## 2022-10-21 LAB
CHOLEST SERPL-MCNC: 199 MG/DL (ref 0–200)
EXPIRATION DATE: NORMAL
FLUAV AG UPPER RESP QL IA.RAPID: NOT DETECTED
FLUBV AG UPPER RESP QL IA.RAPID: NOT DETECTED
HBA1C MFR BLD: 6.5 % (ref 4.8–5.6)
HDLC SERPL-MCNC: 46 MG/DL (ref 40–60)
INTERNAL CONTROL: NORMAL
LDLC SERPL CALC-MCNC: 141 MG/DL (ref 0–100)
Lab: NORMAL
SARS-COV-2 AG UPPER RESP QL IA.RAPID: NOT DETECTED
TRIGL SERPL-MCNC: 63 MG/DL (ref 0–150)
VLDLC SERPL CALC-MCNC: 12 MG/DL (ref 5–40)

## 2022-10-21 PROCEDURE — 87428 SARSCOV & INF VIR A&B AG IA: CPT | Performed by: INTERNAL MEDICINE

## 2022-10-21 PROCEDURE — 99214 OFFICE O/P EST MOD 30 MIN: CPT | Performed by: INTERNAL MEDICINE

## 2022-10-21 RX ORDER — DOXYCYCLINE HYCLATE 100 MG/1
100 CAPSULE ORAL 2 TIMES DAILY
Qty: 14 CAPSULE | Refills: 0 | Status: SHIPPED | OUTPATIENT
Start: 2022-10-21 | End: 2022-11-14

## 2022-10-21 NOTE — PROGRESS NOTES
"Subjective  Saad Meier is a 77 y.o. male    HPI about 1 week history of cough with productive sputum without associated fever or chills.  Non-smoker has tried OTC meds without much relief.  He does use a steroid no spray denies headaches.  He has a history of coronary artery disease and diastolic congestive heart failure.  Denies however any orthopnea or dyspnea on exertion.  He is compliant with his meds denies symptoms suggestive of angina  The following portions of the patient's history were reviewed and updated as appropriate: allergies, current medications, past family history, past medical history, past social history, past surgical history, and problem list.     Review of Systems   Constitutional: Negative.  Negative for activity change, appetite change, fatigue and fever.   HENT: Negative for congestion, ear discharge, ear pain and trouble swallowing.    Eyes: Negative for photophobia and visual disturbance.   Respiratory: Positive for cough and shortness of breath.    Cardiovascular: Negative for chest pain and palpitations.   Gastrointestinal: Negative for abdominal distention, abdominal pain, constipation, diarrhea, nausea and vomiting.   Endocrine: Negative.    Genitourinary: Negative for dysuria, hematuria and urgency.   Musculoskeletal: Positive for arthralgias. Negative for back pain, joint swelling and myalgias.   Skin: Negative for color change and rash.   Allergic/Immunologic: Negative.    Neurological: Negative for dizziness, weakness, light-headedness and headaches.   Hematological: Negative for adenopathy. Does not bruise/bleed easily.   Psychiatric/Behavioral: Negative for agitation, confusion and dysphoric mood. The patient is not nervous/anxious.        Visit Vitals  /78   Pulse 70   Temp 99.5 °F (37.5 °C) (Infrared)   Ht 175.3 cm (69\")   Wt 91.6 kg (202 lb)   SpO2 93%   BMI 29.83 kg/m²       Objective  Physical Exam  Constitutional:       General: He is not in acute distress.   "   Appearance: He is well-developed.   HENT:      Nose: Nose normal.   Eyes:      General: No scleral icterus.     Conjunctiva/sclera: Conjunctivae normal.   Neck:      Thyroid: No thyromegaly.      Trachea: No tracheal deviation.   Cardiovascular:      Rate and Rhythm: Normal rate and regular rhythm.      Heart sounds: No murmur heard.    No friction rub.   Pulmonary:      Effort: No respiratory distress.      Breath sounds: Wheezing and rhonchi present. No rales.   Abdominal:      General: There is no distension.      Palpations: Abdomen is soft. There is no mass.      Tenderness: There is no abdominal tenderness. There is no guarding.   Musculoskeletal:         General: No deformity. Normal range of motion.   Lymphadenopathy:      Cervical: No cervical adenopathy.   Skin:     General: Skin is warm and dry.      Findings: No erythema or rash.   Neurological:      Mental Status: He is alert and oriented to person, place, and time.      Cranial Nerves: No cranial nerve deficit.      Coordination: Coordination normal.      Deep Tendon Reflexes: Reflexes are normal and symmetric.   Psychiatric:         Behavior: Behavior normal.         Thought Content: Thought content normal.         Judgment: Judgment normal.         Diagnoses and all orders for this visit:    Acute cough with copious purulent sputum production.  No fever O2 sat okay.  Empiric antibiotic therapy with doxycycline will hold off on imaging studies for now.  COVID and flu test are negative  -     POCT SARS-CoV-2 Antigen [place] + Flu    Chronic diastolic congestive heart failure (HCC) without evidence of fluid overload.  Continue with Lasix    Coronary artery disease involving native coronary artery of native heart without angina pectoris.  Stable angina free continue with current meds

## 2022-11-14 ENCOUNTER — OFFICE VISIT (OUTPATIENT)
Dept: CARDIOLOGY | Facility: CLINIC | Age: 77
End: 2022-11-14

## 2022-11-14 VITALS
WEIGHT: 200 LBS | HEART RATE: 60 BPM | HEIGHT: 69 IN | SYSTOLIC BLOOD PRESSURE: 130 MMHG | DIASTOLIC BLOOD PRESSURE: 80 MMHG | OXYGEN SATURATION: 96 % | BODY MASS INDEX: 29.62 KG/M2

## 2022-11-14 DIAGNOSIS — I10 ESSENTIAL HYPERTENSION: Chronic | ICD-10-CM

## 2022-11-14 DIAGNOSIS — I47.1 AV NODAL RE-ENTRY TACHYCARDIA: ICD-10-CM

## 2022-11-14 DIAGNOSIS — I49.5 SICK SINUS SYNDROME: Primary | ICD-10-CM

## 2022-11-14 DIAGNOSIS — D17.20 LIPOMA OF SHOULDER: ICD-10-CM

## 2022-11-14 DIAGNOSIS — Z95.0 PRESENCE OF CARDIAC PACEMAKER: ICD-10-CM

## 2022-11-14 PROBLEM — Z45.010 PACEMAKER AT END OF BATTERY LIFE: Status: RESOLVED | Noted: 2022-08-09 | Resolved: 2022-11-14

## 2022-11-14 PROCEDURE — 99213 OFFICE O/P EST LOW 20 MIN: CPT | Performed by: PHYSICIAN ASSISTANT

## 2022-11-14 PROCEDURE — 93280 PM DEVICE PROGR EVAL DUAL: CPT | Performed by: PHYSICIAN ASSISTANT

## 2022-11-14 NOTE — PROGRESS NOTES
Encounter Date:11/14/2022      Patient ID: Saad Meier is a 77 y.o. male.    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       • Sick sinus syndrome (HCC) 07/22/2016     Priority: High     Note Last Updated: 11/14/2022     · status post [employer] pacemaker, 2004.   · PACEMAKER CHECK:  01/11/2016 Pacing and sensing thresholds and lead impedances are within normal limits.  Battery voltage is 2.95.  · Pacemaker generator change, 8/10/2022: [employer] dual-chamber permanent pacemaker.  Retained leads     • Presence of cardiac pacemaker 11/14/2022     Priority: Medium   • Coronary artery disease involving native coronary artery of native heart without angina pectoris 03/06/2017     Priority: Medium     Note Last Updated: 3/6/2017     Minimal coronary artery disease and normal left ventricular function by cardiac catheterization, 2004.      • Peripheral vascular disease of lower extremity ([facility]) 10/10/2018     Priority: Low     Note Last Updated: 10/10/2018     · 6/14/16 doppler of bilateral lower extremities suggestive of mild peripheral vascular disease worse on right.     • Stenosis of right carotid artery 09/26/2018     Priority: Low   • Dyslipidemia 07/16/2018     Priority: Low   • Essential hypertension 03/14/2018     Priority: Low   • Lipoma of shoulder 11/14/2022     Note Last Updated: 11/14/2022     About 3 cm. Posterior shoulder     • Statin intolerance 05/29/2020     Note Last Updated: 5/29/2020     Has tried and failed multiple statins, myalgias.     • History of colon cancer 12/20/2018   • Umbilical hernia without obstruction or gangrene 03/14/2018   • Bilateral renal masses 01/04/2018   • Insomnia 06/10/2016   • Arthritis  "05/31/2016   • Chronic diastolic congestive heart failure (HCC) 05/31/2016   • Acid reflux 05/31/2016   • History of Juvenile rheumatic fever 05/31/2016               History of Present Illness  Patient presents today for follow-up with a history of sick sinus syndrome with recent permanent pacemaker generator change, history of ablation of AVNRT.  Returns today for initial follow-up after pacemaker generator change.  He has done very well.  He has no complaint of exertional chest pain or dyspnea denies orthopnea, PND, claudication, lower extreme edema.  Has had no awareness of tachyarrhythmias, he denies dizziness or syncope.  He does not check his blood pressure at home.  He comes today with a complaint of a \"lump\" on his back which has been there 2 to 3 years but is causing him concern.  It is not tender.  He is compliant with his current medical regimen reports no significant adverse side effects.    Allergies   Allergen Reactions   • Atorvastatin Myalgia   • Ciprofloxacin Diarrhea   • Pravastatin Myalgia       Current Outpatient Medications   Medication Instructions   • amLODIPine (NORVASC) 5 mg, Oral, Daily   • azelastine (ASTELIN) 0.1 % nasal spray 1 spray, Nasal, 2 Times Daily PRN, Use in each nostril as directed   • baclofen (LIORESAL) 10 mg, Oral, Nightly PRN   • famotidine (PEPCID) 20 mg, Oral, Nightly PRN   • furosemide (LASIX) 40 MG tablet TAKE 1 TABLET BY MOUTH ONCE DAILY AS NEEDED FOR  SWELLING   • irbesartan (AVAPRO) 300 mg, Oral, Daily   • melatonin 3 mg, Oral, Nightly, At 7 PM.   • meloxicam (MOBIC) 7.5 mg, Oral, Daily   • potassium chloride (K-DUR,KLOR-CON) 20 MEQ CR tablet 20 mEq, Oral, Daily   • rOPINIRole (REQUIP) 0.5 mg, Oral, Every Night at Bedtime   • temazepam (RESTORIL) 30 mg, Oral, Nightly PRN       .    Objective:     /80 (BP Location: Left arm, Patient Position: Sitting)   Pulse 60   Ht 175.3 cm (69\")   Wt 90.7 kg (200 lb)   SpO2 96%   BMI 29.53 kg/m²    Body mass index is " 29.53 kg/m².     Vitals reviewed.   Constitutional:       Appearance: Well-developed.   Pulmonary:      Effort: Pulmonary effort is normal. No respiratory distress.      Breath sounds: Normal breath sounds. No wheezing. No rales.      Comments: Bases clear  Chest:      Chest wall: Not tender to palpatation.   Cardiovascular:      Normal rate. Regular rhythm.      Murmurs: There is no murmur.      No gallop. No click. No rub.   Pulses:     Intact distal pulses.   Edema:     Peripheral edema absent.   Musculoskeletal: Normal range of motion.       Lab Review:       Procedures               Assessment:      Diagnosis Plan   1. Sick sinus syndrome (HCC)   normal functioning dual-chamber permanent pacemaker with normal lead parameters      2. Presence of cardiac pacemaker    This patient's Cardiac Implanted Electronic Device was manually interrogated and reprogrammed during the patient encounter today.  Iterative programming changes were manually made to determine the sensing threshold, pacing threshold, lead impedance as well as underlying cardiac rhythm.  These programming changes were not limited to but included some or all of the following when appropriate: pacing mode, programmed AV delays, blanking periods, and refractory periods.  Data obtained as a result of these manual programing changes informed the patient's CIED permanent programming.        3. Essential hypertension   well managed, continue current medical regimen      4. AV gunnar re-entry tachycardia (HCC)   no evidence of recurrent AVNRT      5. Lipoma of shoulder   I have assured him that this is a benign lesion but recommend follow-up with either primary care or dermatology for definitive care.        Plan:     Stable cardiac status.  Continue current medications.   in 6 months, sooner as needed.  Thank you for allowing us to participate in the care of your patient.     Electronically signed by JORGE LUIS Muniz, 11/14/22, 3:25 PM EST.

## 2022-12-23 NOTE — TELEPHONE ENCOUNTER
CALLED PATIENT TO INFORM - NO ANSWER NO  @ [phone]    Subjective     Marvin Vilchis is a 20 y.o. male who presents with Sinus Problem (Nasal congestion, cough, diarrhea, sore throat, x 2 days)            Sinus Problem  Associated symptoms include congestion, coughing and a sore throat. Pertinent negatives include no chills, ear pain, headaches, neck pain or shortness of breath. States has been experiencing nasal congestion, runny nose, postnasal drainage, sore throat, cough x2 days.  [facility] he is visiting from [place] for the holidays.  Has been taking various multiple over-the-counter decongestant medications which have not helped with symptoms.  Denies nausea, vomiting but does have mild diarrhea.  Mother is present and is not ill.    PMH:  has no past medical history on file.  MEDS: No current outpatient medications on file.  ALLERGIES:   Allergies   Allergen Reactions    Augmentin      SURGHX: History reviewed. No pertinent surgical history.  SOCHX:    FH: Family history was reviewed, no pertinent findings to report      Review of Systems   Constitutional:  Positive for malaise/fatigue. Negative for chills and fever.   HENT:  Positive for congestion and sore throat. Negative for ear pain and sinus pain.    Eyes:  Negative for discharge and redness.   Respiratory:  Positive for cough. Negative for shortness of breath and wheezing.    Cardiovascular: Negative.    Gastrointestinal:  Positive for diarrhea. Negative for abdominal pain, constipation, nausea and vomiting.   Musculoskeletal:  Negative for myalgias and neck pain.   Skin:  Negative for itching and rash.   Neurological:  Negative for dizziness, weakness and headaches.   Endo/Heme/Allergies:  Negative for environmental allergies.   All other systems reviewed and are negative.           Objective     /70   Pulse 95   Temp (!) 38.1 °C (100.5 °F)   Resp 18   Ht 1.829 m (6')   Wt 81.6 kg (180 lb)   SpO2 95%   BMI 24.41 kg/m²      Physical Exam  Vitals reviewed.   Constitutional:       General: He is  awake. He is not in acute distress.     Appearance: Normal appearance. He is well-developed. He is not ill-appearing, toxic-appearing or diaphoretic.   HENT:      Head: Normocephalic.      Right Ear: Ear canal and external ear normal. A middle ear effusion is present.      Left Ear: Ear canal and external ear normal. A middle ear effusion is present.      Nose: Congestion and rhinorrhea present.      Mouth/Throat:      Lips: Pink.      Mouth: Mucous membranes are dry.      Pharynx: Uvula midline. Posterior oropharyngeal erythema present. No pharyngeal swelling, oropharyngeal exudate or uvula swelling.      Tonsils: No tonsillar exudate or tonsillar abscesses.   Eyes:      Conjunctiva/sclera: Conjunctivae normal.   Cardiovascular:      Rate and Rhythm: Normal rate.   Pulmonary:      Effort: Pulmonary effort is normal.      Breath sounds: Normal breath sounds and air entry.   Musculoskeletal:      Cervical back: Normal range of motion.   Skin:     General: Skin is warm and dry.   Neurological:      Mental Status: He is alert and oriented to person, place, and time.   Psychiatric:         Attention and Perception: Attention normal.         Mood and Affect: Mood normal.         Speech: Speech normal.         Behavior: Behavior normal. Behavior is cooperative.                           Assessment & Plan        1. Fever, unspecified fever cause    - POCT Influenza A/B  - POCT Rapid Strep A  - POCT SARS-COV Antigen DANISH (Symptomatic only)  - acetaminophen (TYLENOL) tablet 1,000 mg    2. Nasal congestion with rhinorrhea    - POCT Influenza A/B  - POCT SARS-COV Antigen DANISH (Symptomatic only)    3. Sore throat    - POCT Influenza A/B  - POCT Rapid Strep A  - POCT SARS-COV Antigen DANISH (Symptomatic only)    4. Influenza A    - oseltamivir (TAMIFLU) 75 MG Cap; Take 1 Capsule by mouth 2 times a day.  Dispense: 10 Capsule; Refill: 0      -Maintain hydration/water intake  -May use over the counter Ibuprofen/Tylenol as needed for  fever  -May use humidifier/vaporizer at home as needed for dry cough/nasal passages  -Gargle salt water or throat lozenges as needed for throat irritation/dry cough  -Get rest  -May use daily longer acting antihistamine as needed (no decongestant) for any post nasal drainage   -May use saline nasal spray/Flonase as needed to flush any nasal congestion/post nasal drainage   -May use over-the-counter Imodium as needed for diarrhea  -Recommend liquid to bland diet until stomach settles  -Monitor for fevers, worse cough, phlegm, shortness of breath, wheeze, chest tightness- need re-evaluation

## 2022-12-24 PROCEDURE — 93294 REM INTERROG EVL PM/LDLS PM: CPT | Performed by: INTERNAL MEDICINE

## 2022-12-24 PROCEDURE — 93296 REM INTERROG EVL PM/IDS: CPT | Performed by: INTERNAL MEDICINE

## 2023-02-13 ENCOUNTER — TELEPHONE (OUTPATIENT)
Dept: INTERNAL MEDICINE | Facility: CLINIC | Age: 78
End: 2023-02-13
Payer: MEDICARE

## 2023-02-13 DIAGNOSIS — K21.9 GASTROESOPHAGEAL REFLUX DISEASE WITHOUT ESOPHAGITIS: ICD-10-CM

## 2023-02-13 RX ORDER — FAMOTIDINE 20 MG/1
20 TABLET, FILM COATED ORAL NIGHTLY PRN
Qty: 30 TABLET | Refills: 5 | Status: SHIPPED | OUTPATIENT
Start: 2023-02-13

## 2023-02-13 NOTE — TELEPHONE ENCOUNTER
Incoming Refill Request      Medication requested (name and dose):FAMOTIDINE 20MG    Pharmacy where request should be sent: [facility]    Additional details provided by patient:     Best call back number: [phone]    Does the patient have less than a 3 day supply:  [] Yes  [x] No    Belinda Schaefer Rep  02/13/23, 14:06 EST

## 2023-02-13 NOTE — TELEPHONE ENCOUNTER
Rx Refill Note  Requested Prescriptions     Pending Prescriptions Disp Refills   • famotidine (Pepcid) 20 MG tablet 30 tablet 5     Sig: Take 1 tablet by mouth At Night As Needed for Heartburn.      Last office visit with prescribing clinician: 8/26/2022   Last telemedicine visit with prescribing clinician: 2/28/2023   Next office visit with prescribing clinician: 2/28/2023                         Would you like a call back once the refill request has been completed: [] Yes [] No    If the office needs to give you a call back, can they leave a voicemail: [] Yes [] No    Mariposa Flanagan LPN  02/13/23, 14:12 EST

## 2023-02-28 ENCOUNTER — OFFICE VISIT (OUTPATIENT)
Dept: INTERNAL MEDICINE | Facility: CLINIC | Age: 78
End: 2023-02-28
Payer: MEDICARE

## 2023-02-28 VITALS
BODY MASS INDEX: 31.55 KG/M2 | DIASTOLIC BLOOD PRESSURE: 82 MMHG | HEART RATE: 60 BPM | HEIGHT: 69 IN | WEIGHT: 213 LBS | SYSTOLIC BLOOD PRESSURE: 132 MMHG | RESPIRATION RATE: 17 BRPM | OXYGEN SATURATION: 97 % | TEMPERATURE: 97.2 F

## 2023-02-28 DIAGNOSIS — K21.9 GASTROESOPHAGEAL REFLUX DISEASE WITHOUT ESOPHAGITIS: ICD-10-CM

## 2023-02-28 DIAGNOSIS — I50.32 CHRONIC DIASTOLIC CONGESTIVE HEART FAILURE: ICD-10-CM

## 2023-02-28 DIAGNOSIS — D17.21 BENIGN LIPOMATOUS NEOPLASM OF SKIN, SUBCU OF RIGHT ARM: ICD-10-CM

## 2023-02-28 DIAGNOSIS — I25.10 CORONARY ARTERY DISEASE INVOLVING NATIVE CORONARY ARTERY OF NATIVE HEART WITHOUT ANGINA PECTORIS: ICD-10-CM

## 2023-02-28 DIAGNOSIS — R73.03 BORDERLINE DIABETES: Primary | ICD-10-CM

## 2023-02-28 LAB
EXPIRATION DATE: NORMAL
HBA1C MFR BLD: 6.7 %
Lab: NORMAL

## 2023-02-28 PROCEDURE — 83036 HEMOGLOBIN GLYCOSYLATED A1C: CPT | Performed by: INTERNAL MEDICINE

## 2023-02-28 PROCEDURE — 3044F HG A1C LEVEL LT 7.0%: CPT | Performed by: INTERNAL MEDICINE

## 2023-02-28 PROCEDURE — 99214 OFFICE O/P EST MOD 30 MIN: CPT | Performed by: INTERNAL MEDICINE

## 2023-02-28 NOTE — PATIENT INSTRUCTIONS
Diabetes Mellitus and Nutrition, Adult  When you have diabetes, or diabetes mellitus, it is very important to have healthy eating habits because your blood sugar (glucose) levels are greatly affected by what you eat and drink. Eating healthy foods in the right amounts, at about the same times every day, can help you:  Manage your blood glucose.  Lower your risk of heart disease.  Improve your blood pressure.  Reach or maintain a healthy weight.  What can affect my meal plan?  Every person with diabetes is different, and each person has different needs for a meal plan. Your health care provider may recommend that you work with a dietitian to make a meal plan that is best for you. Your meal plan may vary depending on factors such as:  The calories you need.  The medicines you take.  Your weight.  Your blood glucose, blood pressure, and cholesterol levels.  Your activity level.  Other health conditions you have, such as heart or kidney disease.  How do carbohydrates affect me?  Carbohydrates, also called carbs, affect your blood glucose level more than any other type of food. Eating carbs raises the amount of glucose in your blood.  It is important to know how many carbs you can safely have in each meal. This is different for every person. Your dietitian can help you calculate how many carbs you should have at each meal and for each snack.  How does alcohol affect me?  Alcohol can cause a decrease in blood glucose (hypoglycemia), especially if you use insulin or take certain diabetes medicines by mouth. Hypoglycemia can be a life-threatening condition. Symptoms of hypoglycemia, such as sleepiness, dizziness, and confusion, are similar to symptoms of having too much alcohol.  Do not drink alcohol if:  Your health care provider tells you not to drink.  You are pregnant, may be pregnant, or are planning to become pregnant.  If you drink alcohol:  Limit how much you have to:  0-1 drink a day for women.  0-2 drinks a day  "for men.  Know how much alcohol is in your drink. In the [place] one drink equals one 12 oz bottle of beer (355 mL), one 5 oz glass of wine (148 mL), or one 1½ oz glass of hard liquor (44 mL).  Keep yourself hydrated with water, diet soda, or unsweetened iced tea. Keep in mind that regular soda, juice, and other mixers may contain a lot of sugar and must be counted as carbs.  What are tips for following this plan?  Reading food labels  Start by checking the serving size on the Nutrition Facts label of packaged foods and drinks. The number of calories and the amount of carbs, fats, and other nutrients listed on the label are based on one serving of the item. Many items contain more than one serving per package.  Check the total grams (g) of carbs in one serving.  Check the number of grams of saturated fats and trans fats in one serving. Choose foods that have a low amount or none of these fats.  Check the number of milligrams (mg) of salt (sodium) in one serving. Most people should limit total sodium intake to less than 2,300 mg per day.  Always check the nutrition information of foods labeled as \"low-fat\" or \"nonfat.\" These foods may be higher in added sugar or refined carbs and should be avoided.  Talk to your dietitian to identify your daily goals for nutrients listed on the label.  Shopping  Avoid buying canned, pre-made, or processed foods. These foods tend to be high in fat, sodium, and added sugar.  Shop around the outside edge of the grocery store. This is where you will most often find fresh fruits and vegetables, bulk grains, fresh meats, and fresh dairy products.  Cooking  Use low-heat cooking methods, such as baking, instead of high-heat cooking methods, such as deep frying.  Cook using healthy oils, such as olive, canola, or sunflower oil.  Avoid cooking with butter, cream, or high-fat meats.  Meal planning  Eat meals and snacks regularly, preferably at the same times every day. Avoid going long periods of " time without eating.  Eat foods that are high in fiber, such as fresh fruits, vegetables, beans, and whole grains.  Eat 4-6 oz (112-168 g) of lean protein each day, such as lean meat, chicken, fish, eggs, or tofu. One ounce (oz) (28 g) of lean protein is equal to:  1 oz (28 g) of meat, chicken, or fish.  1 egg.  ¼ cup (62 g) of tofu.  Eat some foods each day that contain healthy fats, such as avocado, nuts, seeds, and fish.  What foods should I eat?  Fruits  Berries. Apples. Oranges. Peaches. Apricots. Plums. Grapes. Mangoes. Papayas. Pomegranates. Kiwi. Cherries.  Vegetables  Leafy greens, including lettuce, spinach, kale, chard, cong greens, mustard greens, and cabbage. Beets. Cauliflower. Broccoli. Carrots. Green beans. Tomatoes. Peppers. Onions. Cucumbers. [place] sprouts.  Grains  Whole grains, such as whole-wheat or whole-grain bread, crackers, tortillas, cereal, and pasta. Unsweetened oatmeal. Quinoa. Brown or wild rice.  Meats and other proteins  Seafood. Poultry without skin. Lean cuts of poultry and beef. Tofu. Nuts. Seeds.  Dairy  Low-fat or fat-free dairy products such as milk, yogurt, and cheese.  The items listed above may not be a complete list of foods and beverages you can eat and drink. Contact a dietitian for more information.  What foods should I avoid?  Fruits  Fruits canned with syrup.  Vegetables  Canned vegetables. Frozen vegetables with butter or cream sauce.  Grains  Refined white flour and flour products such as bread, pasta, snack foods, and cereals. Avoid all processed foods.  Meats and other proteins  Fatty cuts of meat. Poultry with skin. Breaded or fried meats. Processed meat. Avoid saturated fats.  Dairy  Full-fat yogurt, cheese, or milk.  Beverages  Sweetened drinks, such as soda or iced tea.  The items listed above may not be a complete list of foods and beverages you should avoid. Contact a dietitian for more information.  Questions to ask a health care provider  Do I need to  meet with a certified diabetes care and ?  Do I need to meet with a dietitian?  What number can I call if I have questions?  When are the best times to check my blood glucose?  Where to find more information:  American Diabetes Association: diabetes.org  Academy of Nutrition and Dietetics: eatright.org  National [place] of Diabetes and Digestive and Kidney Diseases: niddk.nih.gov  Association of Diabetes Care & Education Specialists: diabeteseducator.org  Summary  It is important to have healthy eating habits because your blood sugar (glucose) levels are greatly affected by what you eat and drink. It is important to use alcohol carefully.  A healthy meal plan will help you manage your blood glucose and lower your risk of heart disease.  Your health care provider may recommend that you work with a dietitian to make a meal plan that is best for you.  This information is not intended to replace advice given to you by your health care provider. Make sure you discuss any questions you have with your health care provider.  Document Revised: 07/21/2021 Document Reviewed: 07/21/2021  Elsevier Patient Education © 2022 Elsevier Inc.

## 2023-03-01 ENCOUNTER — TELEPHONE (OUTPATIENT)
Dept: INTERNAL MEDICINE | Facility: CLINIC | Age: 78
End: 2023-03-01
Payer: MEDICARE

## 2023-03-01 NOTE — TELEPHONE ENCOUNTER
Patient did not complete CT cervical spine  ordered on 02/21/2022. This order is too old to be used and has been cancelled.

## 2023-03-25 PROCEDURE — 93294 REM INTERROG EVL PM/LDLS PM: CPT | Performed by: INTERNAL MEDICINE

## 2023-03-25 PROCEDURE — 93296 REM INTERROG EVL PM/IDS: CPT | Performed by: INTERNAL MEDICINE

## 2023-05-16 DIAGNOSIS — M62.838 MUSCLE SPASMS OF NECK: ICD-10-CM

## 2023-05-16 RX ORDER — BACLOFEN 10 MG/1
TABLET ORAL
Qty: 30 TABLET | Refills: 0 | Status: SHIPPED | OUTPATIENT
Start: 2023-05-16 | End: 2023-05-22 | Stop reason: SDUPTHER

## 2023-05-16 NOTE — TELEPHONE ENCOUNTER
Incoming Refill Request       Medication requested (name and dose): BACLOFEN 10MG      Pharmacy where request should be sent: [facility]    Additional details provided by patient: PT IS OUT    Best call back number: [phone]    Does the patient have less than a 3 day supply:  [x] Yes  [] No    Belinda Meza Rep  05/16/23, 12:38 EDT

## 2023-05-16 NOTE — TELEPHONE ENCOUNTER
Rx Refill Note  Requested Prescriptions     Pending Prescriptions Disp Refills    baclofen (LIORESAL) 10 MG tablet [Pharmacy Med Name: Baclofen 10 MG Oral Tablet] 30 tablet 0     Sig: TAKE 1 TABLET BY MOUTH AT NIGHT AS NEEDED FOR MUSCLE SPASM      Last office visit with prescribing clinician: 8/5/2021   Last telemedicine visit with prescribing clinician: 3/1/2023   Next office visit with prescribing clinician: Visit date not found                         Would you like a call back once the refill request has been completed: [] Yes [] No    If the office needs to give you a call back, can they leave a voicemail: [] Yes [] No    Belinda Neely Rep  05/16/23, 13:29 EDT

## 2023-05-22 ENCOUNTER — TELEPHONE (OUTPATIENT)
Dept: INTERNAL MEDICINE | Facility: CLINIC | Age: 78
End: 2023-05-22
Payer: MEDICARE

## 2023-05-22 DIAGNOSIS — M62.838 MUSCLE SPASMS OF NECK: ICD-10-CM

## 2023-05-22 RX ORDER — BACLOFEN 10 MG/1
10 TABLET ORAL NIGHTLY PRN
Qty: 90 TABLET | Refills: 1 | Status: SHIPPED | OUTPATIENT
Start: 2023-05-22

## 2023-05-22 NOTE — TELEPHONE ENCOUNTER
PATIENT NEEDS REFILL OF BACLOFEN 10MG TAB. HIS MEDICATION BOTTLE IS EMPTY. REFILL NEEDED AS HE IS OUT OF THE MEDICATION.

## 2023-07-27 ENCOUNTER — TELEPHONE (OUTPATIENT)
Dept: INTERNAL MEDICINE | Facility: CLINIC | Age: 78
End: 2023-07-27
Payer: MEDICARE

## 2023-07-27 DIAGNOSIS — I25.10 CORONARY ARTERY DISEASE INVOLVING NATIVE CORONARY ARTERY OF NATIVE HEART WITHOUT ANGINA PECTORIS: Chronic | ICD-10-CM

## 2023-07-27 DIAGNOSIS — I50.32 CHRONIC DIASTOLIC CONGESTIVE HEART FAILURE: Primary | ICD-10-CM

## 2023-07-27 RX ORDER — POTASSIUM CHLORIDE 20 MEQ/1
20 TABLET, EXTENDED RELEASE ORAL DAILY
Qty: 90 TABLET | Refills: 0 | Status: SHIPPED | OUTPATIENT
Start: 2023-07-27

## 2023-07-27 NOTE — TELEPHONE ENCOUNTER
Rx sent. Patient appointment made per Dr. Fiore's last note, with [place] due to Dr. Fiore had no openings until Nov 2023.

## 2023-08-03 ENCOUNTER — OFFICE VISIT (OUTPATIENT)
Age: 78
End: 2023-08-03
Payer: MEDICARE

## 2023-08-03 VITALS
BODY MASS INDEX: 32.26 KG/M2 | DIASTOLIC BLOOD PRESSURE: 80 MMHG | SYSTOLIC BLOOD PRESSURE: 130 MMHG | HEIGHT: 69 IN | HEART RATE: 61 BPM | OXYGEN SATURATION: 97 % | WEIGHT: 217.8 LBS | TEMPERATURE: 98.7 F

## 2023-08-03 DIAGNOSIS — L98.9 ARM LESION: Primary | ICD-10-CM

## 2023-08-07 ENCOUNTER — OFFICE VISIT (OUTPATIENT)
Dept: CARDIOLOGY | Facility: CLINIC | Age: 78
End: 2023-08-07
Payer: MEDICARE

## 2023-08-07 VITALS
HEIGHT: 69 IN | DIASTOLIC BLOOD PRESSURE: 86 MMHG | SYSTOLIC BLOOD PRESSURE: 124 MMHG | BODY MASS INDEX: 31.99 KG/M2 | WEIGHT: 216 LBS | OXYGEN SATURATION: 96 % | HEART RATE: 76 BPM

## 2023-08-07 DIAGNOSIS — Z95.0 PRESENCE OF CARDIAC PACEMAKER: ICD-10-CM

## 2023-08-07 DIAGNOSIS — I49.5 SICK SINUS SYNDROME: Primary | ICD-10-CM

## 2023-08-07 DIAGNOSIS — I10 ESSENTIAL HYPERTENSION: Chronic | ICD-10-CM

## 2023-08-07 DIAGNOSIS — I47.1 AV NODAL RE-ENTRY TACHYCARDIA: ICD-10-CM

## 2023-08-07 PROCEDURE — 1159F MED LIST DOCD IN RCRD: CPT | Performed by: PHYSICIAN ASSISTANT

## 2023-08-07 PROCEDURE — 99213 OFFICE O/P EST LOW 20 MIN: CPT | Performed by: PHYSICIAN ASSISTANT

## 2023-08-07 PROCEDURE — 3074F SYST BP LT 130 MM HG: CPT | Performed by: PHYSICIAN ASSISTANT

## 2023-08-07 PROCEDURE — 93280 PM DEVICE PROGR EVAL DUAL: CPT | Performed by: PHYSICIAN ASSISTANT

## 2023-08-07 PROCEDURE — 3079F DIAST BP 80-89 MM HG: CPT | Performed by: PHYSICIAN ASSISTANT

## 2023-08-07 PROCEDURE — 1160F RVW MEDS BY RX/DR IN RCRD: CPT | Performed by: PHYSICIAN ASSISTANT

## 2023-08-07 NOTE — PROGRESS NOTES
Encounter Date:08/07/2023      Patient ID: Saad Meier is a 77 y.o. male.    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        Sick sinus syndrome 07/22/2016     Priority: High     Note Last Updated: 11/14/2022     status post [employer] pacemaker, 2004.   PACEMAKER CHECK:  01/11/2016 Pacing and sensing thresholds and lead impedances are within normal limits.  Battery voltage is 2.95.  Pacemaker generator change, 8/10/2022: [employer] dual-chamber permanent pacemaker.  Retained leads      Presence of cardiac pacemaker 11/14/2022     Priority: Medium    Coronary artery disease involving native coronary artery of native heart without angina pectoris 03/06/2017     Priority: Medium     Note Last Updated: 3/6/2017     Minimal coronary artery disease and normal left ventricular function by cardiac catheterization, 2004.       Peripheral vascular disease of lower extremity 10/10/2018     Priority: Low     Note Last Updated: 10/10/2018     6/14/16 doppler of bilateral lower extremities suggestive of mild peripheral vascular disease worse on right.      Stenosis of right carotid artery 09/26/2018     Priority: Low    Dyslipidemia 07/16/2018     Priority: Low    Essential hypertension 03/14/2018     Priority: Low    Lipoma of shoulder 11/14/2022     Note Last Updated: 11/14/2022     About 3 cm. Posterior shoulder      Statin intolerance 05/29/2020     Note Last Updated: 5/29/2020     Has tried and failed multiple statins, myalgias.      History of colon cancer 12/20/2018    Umbilical hernia without obstruction or gangrene 03/14/2018    Bilateral renal masses 01/04/2018    Insomnia 06/10/2016    Arthritis 05/31/2016    Chronic diastolic  "congestive heart failure 05/31/2016    Acid reflux 05/31/2016    History of Juvenile rheumatic fever 05/31/2016               History of Present Illness  Patient presents today for follow-up with a history of sick sinus syndrome with a dual-chamber permanent pacemaker and a history of AVNRT status post ablation.  He returns a scheduled follow-up.  He is doing very well.  He has no awareness of palpitations, no dizziness no syncope.  He checks his blood pressure occasionally at home but does not remember what he is averaging.  He states compliance current medical regimen reports no significant adverse side effects.    Allergies   Allergen Reactions    Atorvastatin Myalgia    Ciprofloxacin Diarrhea    Pravastatin Myalgia       Current Outpatient Medications   Medication Instructions    amLODIPine (NORVASC) 5 mg, Oral, Daily    azelastine (ASTELIN) 0.1 % nasal spray 1 spray, Nasal, 2 Times Daily PRN, Use in each nostril as directed    baclofen (LIORESAL) 10 mg, Oral, Nightly PRN    famotidine (PEPCID) 20 mg, Oral, Nightly PRN    furosemide (LASIX) 40 MG tablet TAKE 1 TABLET BY MOUTH ONCE DAILY AS NEEDED FOR  SWELLING    melatonin 3 mg, Oral, Nightly, At 7 PM.    meloxicam (MOBIC) 7.5 mg, Oral, Daily    metFORMIN (GLUCOPHAGE) 500 mg, Oral, Daily With Breakfast    potassium chloride (K-DUR,KLOR-CON) 20 MEQ CR tablet 20 mEq, Oral, Daily    rOPINIRole (REQUIP) 0.5 mg, Oral, Every Night at Bedtime    temazepam (RESTORIL) 30 mg, Oral, Nightly PRN       .    Objective:     /86 (BP Location: Left arm, Patient Position: Sitting, Cuff Size: Adult)   Pulse 76   Ht 175.3 cm (69\")   Wt 98 kg (216 lb)   SpO2 96%   BMI 31.90 kg/mý    Body mass index is 31.9 kg/mý.     Constitutional:       Appearance: Well-developed.   Pulmonary:      Effort: Pulmonary effort is normal. No respiratory distress.      Breath sounds: Normal breath sounds. No wheezing. No rales.      Comments: Bases clear  Chest:      Chest wall: Not tender to " palpatation.   Cardiovascular:      Normal rate. Regular rhythm.      Murmurs: There is no murmur.      No gallop.  No click. No rub.   Pulses:     Intact distal pulses.   Edema:     Peripheral edema absent.   Musculoskeletal: Normal range of motion.     Lab Review:                           Procedures               Assessment:      Diagnosis Plan   1. Sick sinus syndrome  Normal functioning dual-chamber permanent pacemaker.  74% right atrial pacing and less than 1% RV pacing.  Normal lead thresholds.  About 10 years of battery life remaining.  Mode switching less than 1% the longest 12 seconds      2. AV gunnar re-entry tachycardia  Known recurrence since ablation      3. Presence of cardiac pacemaker    This patient's Cardiac Implanted Electronic Device was manually interrogated and reprogrammed during the patient encounter today.  Iterative programming changes were manually made to determine the sensing threshold, pacing threshold, lead impedance as well as underlying cardiac rhythm.  These programming changes were not limited to but included some or all of the following when appropriate: pacing mode, programmed AV delays, blanking periods, and refractory periods.  Data obtained as a result of these manual programing changes informed the patient's CIED permanent programming.          4. Essential hypertension  Appears well managed on current medical regimen        Plan:     Stable cardiac status.  Continue current medications.   in 6 months, sooner as needed.  Thank you for allowing us to participate in the care of your patient.     Electronically signed by JORGE LUIS Nichols, 08/07/23, 4:57 PM EDT.

## 2023-08-07 NOTE — LETTER
August 7, 2023     Tee Fiore DO  [address]  [facility] [address]    Patient: Saad Meier   YOB: 1945   Date of Visit: 8/7/2023     Dear Tee Fiore DO:       Thank you for referring Saad Meier to me for evaluation. Below are the relevant portions of my assessment and plan of care.    If you have questions, please do not hesitate to call me. I look forward to following Saad along with you.         Sincerely,        Philip Castillo DO        CC: No Recipients    Luis Medellin PA  08/07/23 [id number]  Sign when Signing Visit          Encounter Date:08/07/2023      Patient ID: Saad Meier is a 77 y.o. male.    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        Sick sinus syndrome 07/22/2016     Priority: High     Note Last Updated: 11/14/2022     status post [employer] pacemaker, 2004.   PACEMAKER CHECK:  01/11/2016 Pacing and sensing thresholds and lead impedances are within normal limits.  Battery voltage is 2.95.  Pacemaker generator change, 8/10/2022: [employer] dual-chamber permanent pacemaker.  Retained leads      Presence of cardiac pacemaker 11/14/2022     Priority: Medium    Coronary artery disease involving native coronary artery of native heart without angina pectoris 03/06/2017     Priority: Medium     Note Last Updated: 3/6/2017     Minimal coronary artery disease and normal left ventricular function by cardiac catheterization, 2004.       Peripheral vascular disease of lower extremity 10/10/2018     Priority: Low     Note Last Updated: 10/10/2018     6/14/16 doppler of bilateral lower extremities suggestive of mild peripheral vascular disease worse on right.      Stenosis  of right carotid artery 09/26/2018     Priority: Low    Dyslipidemia 07/16/2018     Priority: Low    Essential hypertension 03/14/2018     Priority: Low    Lipoma of shoulder 11/14/2022     Note Last Updated: 11/14/2022     About 3 cm. Posterior shoulder      Statin intolerance 05/29/2020     Note Last Updated: 5/29/2020     Has tried and failed multiple statins, myalgias.      History of colon cancer 12/20/2018    Umbilical hernia without obstruction or gangrene 03/14/2018    Bilateral renal masses 01/04/2018    Insomnia 06/10/2016    Arthritis 05/31/2016    Chronic diastolic congestive heart failure 05/31/2016    Acid reflux 05/31/2016    History of Juvenile rheumatic fever 05/31/2016               History of Present Illness  Patient presents today for follow-up with a history of sick sinus syndrome with a dual-chamber permanent pacemaker and a history of AVNRT status post ablation.  He returns a scheduled follow-up.  He is doing very well.  He has no awareness of palpitations, no dizziness no syncope.  He checks his blood pressure occasionally at home but does not remember what he is averaging.  He states compliance current medical regimen reports no significant adverse side effects.    Allergies   Allergen Reactions    Atorvastatin Myalgia    Ciprofloxacin Diarrhea    Pravastatin Myalgia       Current Outpatient Medications   Medication Instructions    amLODIPine (NORVASC) 5 mg, Oral, Daily    azelastine (ASTELIN) 0.1 % nasal spray 1 spray, Nasal, 2 Times Daily PRN, Use in each nostril as directed    baclofen (LIORESAL) 10 mg, Oral, Nightly PRN    famotidine (PEPCID) 20 mg, Oral, Nightly PRN    furosemide (LASIX) 40 MG tablet TAKE 1 TABLET BY MOUTH ONCE DAILY AS NEEDED FOR  SWELLING    melatonin 3 mg, Oral, Nightly, At 7 PM.    meloxicam (MOBIC) 7.5 mg, Oral, Daily    metFORMIN (GLUCOPHAGE) 500 mg, Oral, Daily With Breakfast    potassium chloride (K-DUR,KLOR-CON) 20 MEQ CR tablet 20 mEq,  "Oral, Daily    rOPINIRole (REQUIP) 0.5 mg, Oral, Every Night at Bedtime    temazepam (RESTORIL) 30 mg, Oral, Nightly PRN       .    Objective:     /86 (BP Location: Left arm, Patient Position: Sitting, Cuff Size: Adult)   Pulse 76   Ht 175.3 cm (69\")   Wt 98 kg (216 lb)   SpO2 96%   BMI 31.90 kg/mý    Body mass index is 31.9 kg/mý.     Constitutional:       Appearance: Well-developed.   Pulmonary:      Effort: Pulmonary effort is normal. No respiratory distress.      Breath sounds: Normal breath sounds. No wheezing. No rales.      Comments: Bases clear  Chest:      Chest wall: Not tender to palpatation.   Cardiovascular:      Normal rate. Regular rhythm.      Murmurs: There is no murmur.      No gallop.  No click. No rub.   Pulses:     Intact distal pulses.   Edema:     Peripheral edema absent.   Musculoskeletal: Normal range of motion.     Lab Review:                           Procedures               Assessment:      Diagnosis Plan   1. Sick sinus syndrome  Normal functioning dual-chamber permanent pacemaker.  74% right atrial pacing and less than 1% RV pacing.  Normal lead thresholds.  About 10 years of battery life remaining.  Mode switching less than 1% the longest 12 seconds      2. AV gunnar re-entry tachycardia  Known recurrence since ablation      3. Presence of cardiac pacemaker    This patient's Cardiac Implanted Electronic Device was manually interrogated and reprogrammed during the patient encounter today.  Iterative programming changes were manually made to determine the sensing threshold, pacing threshold, lead impedance as well as underlying cardiac rhythm.  These programming changes were not limited to but included some or all of the following when appropriate: pacing mode, programmed AV delays, blanking periods, and refractory periods.  Data obtained as a result of these manual programing changes informed the patient's CIED permanent programming.          4. Essential hypertension  " Appears well managed on current medical regimen        Plan:     Stable cardiac status.  Continue current medications.   in 6 months, sooner as needed.  Thank you for allowing us to participate in the care of your patient.     Electronically signed by JORGE LUIS Nichols, 08/07/23, 4:57 PM EDT.

## 2023-08-09 ENCOUNTER — OFFICE VISIT (OUTPATIENT)
Dept: INTERNAL MEDICINE | Facility: CLINIC | Age: 78
End: 2023-08-09
Payer: MEDICARE

## 2023-08-09 VITALS
WEIGHT: 214 LBS | OXYGEN SATURATION: 96 % | HEIGHT: 69 IN | HEART RATE: 60 BPM | DIASTOLIC BLOOD PRESSURE: 70 MMHG | SYSTOLIC BLOOD PRESSURE: 124 MMHG | RESPIRATION RATE: 17 BRPM | TEMPERATURE: 97.3 F | BODY MASS INDEX: 31.7 KG/M2

## 2023-08-09 DIAGNOSIS — K21.9 GASTROESOPHAGEAL REFLUX DISEASE WITHOUT ESOPHAGITIS: ICD-10-CM

## 2023-08-09 DIAGNOSIS — R41.89 COGNITIVE DECLINE: ICD-10-CM

## 2023-08-09 DIAGNOSIS — I10 ESSENTIAL HYPERTENSION: ICD-10-CM

## 2023-08-09 DIAGNOSIS — H91.93 BILATERAL HEARING LOSS, UNSPECIFIED HEARING LOSS TYPE: ICD-10-CM

## 2023-08-09 DIAGNOSIS — I49.5 SICK SINUS SYNDROME: ICD-10-CM

## 2023-08-09 DIAGNOSIS — R73.03 BORDERLINE DIABETES: Primary | ICD-10-CM

## 2023-08-09 DIAGNOSIS — I50.32 CHRONIC DIASTOLIC CONGESTIVE HEART FAILURE: ICD-10-CM

## 2023-08-09 LAB
EXPIRATION DATE: NORMAL
HBA1C MFR BLD: 6.6 %
Lab: NORMAL

## 2023-08-09 NOTE — PATIENT INSTRUCTIONS
- Get blood work done downstairs in the next day or so.  (Need to be fasting 8 hours for blood work)    - Continue your medications as is.  We will check with your pharmacy.     - We will keep checking glucose numbers every 6 months.      - Shantell may call to see if you need assistance at home.

## 2023-08-09 NOTE — PROGRESS NOTES
Chief Complaint   Patient presents with    Prediabetes     A1c check       Subjective     History of Present Illness   Saad Meier is a 77 y.o. male presenting for follow up. Took metformin for a short time but believes he may have stopped this medication inadvertently.  Patient is otherwise doing quite well with his current medication regimen.  Sleep and GERD symptoms have been well controlled.    The following portions of the patient's history were reviewed and updated as appropriate: allergies, current medications, past family history, past medical history, past social history, past surgical history and problem list.    Review of Systems   Constitutional:  Negative for chills, fatigue and fever.   HENT:  Negative for congestion, ear pain, rhinorrhea, sinus pressure and sore throat.    Eyes:  Negative for visual disturbance.   Respiratory:  Negative for cough, chest tightness, shortness of breath and wheezing.    Cardiovascular:  Negative for chest pain, palpitations and leg swelling.   Gastrointestinal:  Negative for abdominal pain, blood in stool, constipation, diarrhea, nausea and vomiting.   Endocrine: Negative for polydipsia and polyuria.   Genitourinary:  Negative for dysuria and hematuria.   Musculoskeletal:  Negative for arthralgias and back pain.   Skin:  Negative for rash.   Neurological:  Negative for dizziness, light-headedness, numbness and headaches.   Psychiatric/Behavioral:  Negative for dysphoric mood and sleep disturbance. The patient is not nervous/anxious.      Allergies   Allergen Reactions    Atorvastatin Myalgia    Ciprofloxacin Diarrhea    Pravastatin Myalgia       Past Medical History:   Diagnosis Date    Allergic 25yrs.    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    Cancer 07/2012    colon    Cardiac arrhythmia     Chronic diastolic congestive heart failure     Colon polyp      Diverticulosis     Essential hypertension 2018    History of blood transfusion     HL (hearing loss)     Infection of kidney     Iron deficiency     Obesity     Poor historian     Primary central sleep apnea Use CPAP    Sleep apnea     Stenosis of right carotid artery     Visual impairment !(97    Reading Glasses       Social History     Socioeconomic History    Marital status: Single   Tobacco Use    Smoking status: Former     Packs/day: 1.00     Years: 10.00     Pack years: 10.00     Types: Cigarettes     Start date: 1963     Quit date: 1973     Years since quittin.6    Smokeless tobacco: Never   Vaping Use    Vaping Use: Never used   Substance and Sexual Activity    Alcohol use: No    Drug use: No    Sexual activity: Not Currently     Partners: Female        Past Surgical History:   Procedure Laterality Date    CARDIAC ABLATION  2012    CARDIAC CATHETERIZATION      CARDIAC ELECTROPHYSIOLOGY PROCEDURE N/A 8/10/2022    Procedure: PPM generator change - dual;  Surgeon: Philip Castillo DO;  [facility] EP INVASIVE LOCATION;  Service: Cardiology;  Laterality: N/A;    CARDIAC PACEMAKER PLACEMENT      COLON SURGERY      COLONOSCOPY  2015    COLONOSCOPY N/A 2019    Procedure: COLONOSCOPY W/ HOT BIOPSY POLYPECTOMY X3;  Surgeon: Jacky Walker MD;  Location: [facility] ENDOSCOPY;  Service: Gastroenterology    CYSTOSCOPY TRANSURETHRAL RESECTION OF PROSTATE N/A 10/03/2018    Procedure: TRANSURETHRAL RESECTION OF PROSTATE;  Surgeon: Bryce Santo MD;  Location: [facility] OR;  Service: Urology    ENDOSCOPY      HEMORRHOIDECTOMY  1970    HERNIA REPAIR      X 5    INGUINAL HERNIA REPAIR Bilateral     INGUINAL HERNIA REPAIR Right 2019    Procedure: INGUINAL HERNIA REPAIR;  Surgeon: Jacky Walker MD;  Location: [facility] OR;  Service: General    LYMPH NODE BIOPSY  2012    large intestine lymph nodes    OTHER SURGICAL HISTORY      ppm generaotr change 08/10/2022 per  Dr. Castillo    TRANSURETHRAL RESECTION OF BLADDER TUMOR N/A 10/03/2018    Procedure: CYSTOSCOPY TRANSURETHRAL RESECTION OF BLADDER TUMOR, COUDE CATHETER PLACEMENT;  Surgeon: Bryce Santo MD;  Location: [facility];  Service: Urology       Family History   Problem Relation Age of Onset    Lung cancer Mother     Osteoporosis Mother     Thyroid disease Mother         mother    Cancer Mother         Lung Cancer    Early death Mother         46 yrs.    Hearing loss Mother         mother    Vision loss Mother         mother    Heart disease Mother     Cancer Father         Prostate Cancer    Heart disease Father         Pacemaker    Vision loss Father     Heart disease Sister     Cancer Sister         Breast Cancer    Early death Sister          Around 40yrs.    Vision loss Brother     Heart disease Brother     Arrhythmia Brother     Heart failure Brother     Early death Maternal Grandmother         Took Mother off.    Heart disease Maternal Grandmother     Heart failure Maternal Grandmother          Current Outpatient Medications:     amLODIPine (Norvasc) 5 MG tablet, Take 1 tablet by mouth Daily., Disp: 30 tablet, Rfl: 4    azelastine (ASTELIN) 0.1 % nasal spray, 1 spray into the nostril(s) as directed by provider 2 (Two) Times a Day As Needed for Rhinitis or Allergies. Use in each nostril as directed, Disp: 3 each, Rfl: 3    baclofen (LIORESAL) 10 MG tablet, Take 1 tablet by mouth At Night As Needed for Muscle Spasms., Disp: 90 tablet, Rfl: 1    famotidine (PEPCID) 20 MG tablet, TAKE 1 TABLET BY MOUTH AT NIGHT AS NEEDED FOR HEARTBURN, Disp: 90 tablet, Rfl: 0    furosemide (LASIX) 40 MG tablet, TAKE 1 TABLET BY MOUTH ONCE DAILY AS NEEDED FOR  SWELLING, Disp: 90 tablet, Rfl: 0    melatonin 3 MG tablet, Take 1 tablet by mouth Every Night. At 7 PM., Disp: 90 tablet, Rfl: 2    meloxicam (Mobic) 7.5 MG tablet, Take 1 tablet by mouth Daily., Disp: 30 tablet, Rfl: 5    potassium chloride (K-DUR,KLOR-CON) 20 MEQ CR  "tablet, Take 1 tablet by mouth Daily., Disp: 90 tablet, Rfl: 0    rOPINIRole (REQUIP) 0.5 MG tablet, Take 1 tablet by mouth every night at bedtime., Disp: 90 tablet, Rfl: 2    temazepam (RESTORIL) 15 MG capsule, Take 2 capsules by mouth At Night As Needed for Sleep., Disp: 60 capsule, Rfl: 2    Objective   /70   Pulse 60   Temp 97.3 øF (36.3 øC)   Resp 17   Ht 175.3 cm (69\")   Wt 97.1 kg (214 lb)   SpO2 96%   BMI 31.60 kg/mý     Physical Exam  Vitals and nursing note reviewed.   Constitutional:       Appearance: Normal appearance. He is well-developed.   HENT:      Head: Normocephalic and atraumatic.   Eyes:      Extraocular Movements: Extraocular movements intact.      Conjunctiva/sclera: Conjunctivae normal.   Pulmonary:      Effort: Pulmonary effort is normal.   Musculoskeletal:      Cervical back: Normal range of motion and neck supple.   Skin:     General: Skin is warm and dry.      Findings: No rash.   Neurological:      General: No focal deficit present.      Mental Status: He is alert and oriented to person, place, and time.   Psychiatric:         Mood and Affect: Mood normal.         Behavior: Behavior normal.     .  Results for orders placed or performed in visit on 08/09/23   POC Glycosylated Hemoglobin (Hb A1C)    Specimen: Blood   Result Value Ref Range    Hemoglobin A1C 6.6 %    Lot Number [id number]     Expiration Date 03/22/2025           Assessment & Plan   Diagnoses and all orders for this visit:    1. Borderline diabetes (Primary)  -     POC Glycosylated Hemoglobin (Hb A1C)    2. Chronic diastolic congestive heart failure  -     CBC & Differential  -     Comprehensive Metabolic Panel  -     Lipid Panel    3. Gastroesophageal reflux disease without esophagitis    4. Essential hypertension    5. Sick sinus syndrome    6. Cognitive decline    7. Bilateral hearing loss, unspecified hearing loss type          Discussion Summary:  Patient is a 77 y.o. male presenting for follow " up.    Borderline diabetes  - Stable control with current A1c patient advised to continue monitoring his diet.    Chronic diastolic congestive heart failure  -Patient is due for annual labs.  CBC, CMP, and lipids have been ordered.  Continue current cardiac medication regimen.    GERD  - Controlled on PPI.    Essential hypertension/SSS  - Stable on current medications.    Cognitive decline  - Patient at times appears confused about his medications.  We will avoid polypharmacy and in the future, assistance may be needed from family or others.    Follow up:  Return in about 6 months (around 2/9/2024) for Next scheduled follow up, Medicare Wellness.

## 2023-08-11 NOTE — PROGRESS NOTES
Patient: Saad Meier    YOB: 1945    Date: 08/14/2023    Primary Care Provider: Tee Fiore DO    Chief Complaint   Patient presents with    Skin Lesion       SUBJECTIVE:    History of present illness:  Patient is here for evaluation of a skin lesion on his right arm. Onset was 2 months ago. He states that it has increased in size.  It does cause him discomfort, dull in nature, nonradiating, associated with a palpable mass, present over the past several months, located superior to the right antecubital position.    The following portions of the patient's history were reviewed and updated as appropriate: allergies, current medications, past family history, past medical history, past social history, past surgical history and problem list.      Review of Systems   Constitutional:  Negative for chills, fever and unexpected weight change.   HENT:  Negative for trouble swallowing and voice change.    Eyes:  Negative for visual disturbance.   Respiratory:  Negative for apnea, cough, chest tightness, shortness of breath and wheezing.    Cardiovascular:  Negative for chest pain, palpitations and leg swelling.   Gastrointestinal:  Negative for abdominal distention, abdominal pain, anal bleeding, blood in stool, constipation, diarrhea, nausea, rectal pain and vomiting.   Endocrine: Negative for cold intolerance and heat intolerance.   Genitourinary:  Negative for difficulty urinating, dysuria, flank pain, scrotal swelling and testicular pain.   Musculoskeletal:  Negative for back pain, gait problem and joint swelling.   Skin:  Negative for color change, rash and wound.   Neurological:  Negative for dizziness, syncope, speech difficulty, weakness, numbness and headaches.   Hematological:  Negative for adenopathy. Does not bruise/bleed easily.   Psychiatric/Behavioral:  Negative for confusion. The patient is not nervous/anxious.      Allergies:  Allergies   Allergen Reactions    Atorvastatin Myalgia     Ciprofloxacin Diarrhea    Pravastatin Myalgia       Medications:    Current Outpatient Medications:     amLODIPine (Norvasc) 5 MG tablet, Take 1 tablet by mouth Daily., Disp: 30 tablet, Rfl: 4    azelastine (ASTELIN) 0.1 % nasal spray, 1 spray into the nostril(s) as directed by provider 2 (Two) Times a Day As Needed for Rhinitis or Allergies. Use in each nostril as directed, Disp: 3 each, Rfl: 3    baclofen (LIORESAL) 10 MG tablet, Take 1 tablet by mouth At Night As Needed for Muscle Spasms., Disp: 90 tablet, Rfl: 1    famotidine (PEPCID) 20 MG tablet, TAKE 1 TABLET BY MOUTH AT NIGHT AS NEEDED FOR HEARTBURN, Disp: 90 tablet, Rfl: 0    furosemide (LASIX) 40 MG tablet, TAKE 1 TABLET BY MOUTH ONCE DAILY AS NEEDED FOR  SWELLING, Disp: 90 tablet, Rfl: 0    melatonin 3 MG tablet, Take 1 tablet by mouth Every Night. At 7 PM., Disp: 90 tablet, Rfl: 2    meloxicam (Mobic) 7.5 MG tablet, Take 1 tablet by mouth Daily., Disp: 30 tablet, Rfl: 5    potassium chloride (K-DUR,KLOR-CON) 20 MEQ CR tablet, Take 1 tablet by mouth Daily., Disp: 90 tablet, Rfl: 0    rOPINIRole (REQUIP) 0.5 MG tablet, Take 1 tablet by mouth every night at bedtime., Disp: 90 tablet, Rfl: 2    temazepam (RESTORIL) 15 MG capsule, Take 2 capsules by mouth At Night As Needed for Sleep., Disp: 60 capsule, Rfl: 2    History:  Past Medical History:   Diagnosis Date    Allergic 25yrs.    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    Cancer 07/2012    colon    Cardiac arrhythmia     Chronic diastolic congestive heart failure     Colon polyp     Diverticulosis     Essential hypertension 03/14/2018    History of blood transfusion     HL (hearing loss)     Infection of kidney     Iron deficiency     Obesity     Poor historian     Primary central sleep apnea Use CPAP    Sleep apnea     Stenosis of right carotid artery     Visual impairment !(97    Reading  Glasses       Past Surgical History:   Procedure Laterality Date    CARDIAC ABLATION  2012    CARDIAC CATHETERIZATION      CARDIAC ELECTROPHYSIOLOGY PROCEDURE N/A 8/10/2022    Procedure: PPM generator change - dual;  Surgeon: Philip Castillo DO;  Location:  [facility];  Service: Cardiology;  Laterality: N/A;    CARDIAC PACEMAKER PLACEMENT      COLON SURGERY      COLONOSCOPY  2015    COLONOSCOPY N/A 2019    Procedure: COLONOSCOPY W/ HOT BIOPSY POLYPECTOMY X3;  Surgeon: Jacky Walker MD;  Location: [facility] END[facility]COPY;  Service: Gastroenterology    CYSTOSCOPY TRANSURETHRAL RESECTION OF PROSTATE N/A 10/03/2018    Procedure: TRANSURETHRAL RESECTION OF PROSTATE;  Surgeon: Bryce Santo MD;  Location: [facility] OR;  Service: Urology    ENDOSCOPY      HEMORRHOIDECTOMY  1970    HERNIA REPAIR      X 5    INGUINAL HERNIA REPAIR Bilateral     INGUINAL HERNIA REPAIR Right 2019    Procedure: INGUINAL HERNIA REPAIR;  Surgeon: Jacky Walker MD;  Location: [facility] OR;  Service: General    LYMPH NODE BIOPSY  2012    large intestine lymph nodes    OTHER SURGICAL HISTORY      ppm generaotr change 08/10/2022 per Dr. Castillo    TRANSURETHRAL RESECTION OF BLADDER TUMOR N/A 10/03/2018    Procedure: CYSTOSCOPY TRANSURETHRAL RESECTION OF BLADDER TUMOR, COUDE CATHETER PLACEMENT;  Surgeon: Bryce Santo MD;  Location: [facility] OR;  Service: Urology       Family History   Problem Relation Age of Onset    Lung cancer Mother     Osteoporosis Mother     Thyroid disease Mother         mother    Cancer Mother         Lung Cancer    Early death Mother         46 yrs.    Hearing loss Mother         mother    Vision loss Mother         mother    Heart disease Mother     Cancer Father         Prostate Cancer    Heart disease Father         Pacemaker    Vision loss Father     Heart disease Sister     Cancer Sister         Breast Cancer    Early death Sister          Around 40yrs.     "Vision loss Brother     Heart disease Brother     Arrhythmia Brother     Heart failure Brother     Early death Maternal Grandmother         Took Mother off.    Heart disease Maternal Grandmother     Heart failure Maternal Grandmother        Social History     Tobacco Use    Smoking status: Former     Packs/day: 1.00     Years: 10.00     Pack years: 10.00     Types: Cigarettes     Start date: 1963     Quit date: 1973     Years since quittin.6    Smokeless tobacco: Never   Vaping Use    Vaping Use: Never used   Substance Use Topics    Alcohol use: No    Drug use: No        OBJECTIVE:    Vital Signs:   Vitals:    23 0905   BP: 128/64   Pulse: 71   Resp: 16   Temp: 98.6 øF (37 øC)   TempSrc: Temporal   SpO2: 95%   Weight: 98.2 kg (216 lb 6.4 oz)   Height: 175.3 cm (69\")       Physical Exam:   General Appearance:    Alert, cooperative, in no acute distress   Head:    Normocephalic, without obvious abnormality, atraumatic   Eyes:            Lids and lashes normal, conjunctivae and sclerae normal, no   icterus, no pallor, corneas clear, PERRLA   Ears:    Ears appear intact with no abnormalities noted   Throat:   No oral lesions, no thrush, oral mucosa moist   Neck:   No adenopathy, supple, trachea midline, no thyromegaly, no   carotid bruit, no JVD   Lungs:     Clear to auscultation,respirations regular, even and                  unlabored    Heart:    Regular rhythm and normal rate, normal S1 and S2, no            murmur, no gallop, no rub, no click   Chest Wall:    No abnormalities observed   Abdomen:     Normal bowel sounds, no masses, no organomegaly, soft        non-tender, non-distended, no guarding, no rebound                tenderness   Extremities:   Moves all extremities well, no edema, no cyanosis, no             redness   Pulses:   Pulses palpable and equal bilaterally   Skin:   No bleeding, bruising or rash, small lipoma versus lymph node palpable superior to the right antecubital position "   Lymph nodes:   No palpable adenopathy   Neurologic:   Cranial nerves 2 - 12 grossly intact, sensation intact, DTR       present and equal bilaterally     Results Review:   I reviewed the patient's new clinical results.  I reviewed the patient's new imaging results and agree with the interpretation.  I reviewed the patient's other test results and agree with the interpretation    Review of Systems was reviewed and confirmed as accurate as documented by the MA.    ASSESSMENT/PLAN:    1. Mass of right upper extremity        Patient will need to undergo local excision, risk and benefits of operative versus nonoperative intervention were discussed with the patient, he understands and agrees, and wishes to proceed.    I discussed the patients findings and my recommendations with patient        Electronically signed by Jacky Walker MD  08/15/23

## 2023-08-14 ENCOUNTER — OFFICE VISIT (OUTPATIENT)
Dept: SURGERY | Facility: CLINIC | Age: 78
End: 2023-08-14
Payer: MEDICARE

## 2023-08-14 VITALS
DIASTOLIC BLOOD PRESSURE: 64 MMHG | BODY MASS INDEX: 32.05 KG/M2 | TEMPERATURE: 98.6 F | HEART RATE: 71 BPM | HEIGHT: 69 IN | RESPIRATION RATE: 16 BRPM | OXYGEN SATURATION: 95 % | WEIGHT: 216.4 LBS | SYSTOLIC BLOOD PRESSURE: 128 MMHG

## 2023-08-14 DIAGNOSIS — R22.31 MASS OF RIGHT UPPER EXTREMITY: Primary | ICD-10-CM

## 2023-08-15 LAB
ALBUMIN SERPL-MCNC: 4.4 G/DL (ref 3.5–5.2)
ALBUMIN/GLOB SERPL: 2.4 G/DL
ALP SERPL-CCNC: 55 U/L (ref 39–117)
ALT SERPL-CCNC: 45 U/L (ref 1–41)
AST SERPL-CCNC: 30 U/L (ref 1–40)
BASOPHILS # BLD AUTO: 0.08 10*3/MM3 (ref 0–0.2)
BASOPHILS NFR BLD AUTO: 1.3 % (ref 0–1.5)
BILIRUB SERPL-MCNC: 0.7 MG/DL (ref 0–1.2)
BUN SERPL-MCNC: 22 MG/DL (ref 8–23)
BUN/CREAT SERPL: 19.3 (ref 7–25)
CALCIUM SERPL-MCNC: 9.5 MG/DL (ref 8.6–10.5)
CHLORIDE SERPL-SCNC: 106 MMOL/L (ref 98–107)
CHOLEST SERPL-MCNC: 204 MG/DL (ref 0–200)
CO2 SERPL-SCNC: 25.1 MMOL/L (ref 22–29)
CREAT SERPL-MCNC: 1.14 MG/DL (ref 0.76–1.27)
EGFRCR SERPLBLD CKD-EPI 2021: 65.4 ML/MIN/1.73
EOSINOPHIL # BLD AUTO: 0.49 10*3/MM3 (ref 0–0.4)
EOSINOPHIL NFR BLD AUTO: 8.2 % (ref 0.3–6.2)
ERYTHROCYTE [DISTWIDTH] IN BLOOD BY AUTOMATED COUNT: 12.9 % (ref 12.3–15.4)
GLOBULIN SER CALC-MCNC: 1.8 GM/DL
GLUCOSE SERPL-MCNC: 136 MG/DL (ref 65–99)
HCT VFR BLD AUTO: 44.4 % (ref 37.5–51)
HDLC SERPL-MCNC: 40 MG/DL (ref 40–60)
HGB BLD-MCNC: 14.9 G/DL (ref 13–17.7)
IMM GRANULOCYTES # BLD AUTO: 0.03 10*3/MM3 (ref 0–0.05)
IMM GRANULOCYTES NFR BLD AUTO: 0.5 % (ref 0–0.5)
LDLC SERPL CALC-MCNC: 146 MG/DL (ref 0–100)
LYMPHOCYTES # BLD AUTO: 1.4 10*3/MM3 (ref 0.7–3.1)
LYMPHOCYTES NFR BLD AUTO: 23.4 % (ref 19.6–45.3)
MCH RBC QN AUTO: 32 PG (ref 26.6–33)
MCHC RBC AUTO-ENTMCNC: 33.6 G/DL (ref 31.5–35.7)
MCV RBC AUTO: 95.3 FL (ref 79–97)
MONOCYTES # BLD AUTO: 1.14 10*3/MM3 (ref 0.1–0.9)
MONOCYTES NFR BLD AUTO: 19.1 % (ref 5–12)
NEUTROPHILS # BLD AUTO: 2.84 10*3/MM3 (ref 1.7–7)
NEUTROPHILS NFR BLD AUTO: 47.5 % (ref 42.7–76)
NRBC BLD AUTO-RTO: 0 /100 WBC (ref 0–0.2)
PLATELET # BLD AUTO: 273 10*3/MM3 (ref 140–450)
POTASSIUM SERPL-SCNC: 4.4 MMOL/L (ref 3.5–5.2)
PROT SERPL-MCNC: 6.2 G/DL (ref 6–8.5)
RBC # BLD AUTO: 4.66 10*6/MM3 (ref 4.14–5.8)
SODIUM SERPL-SCNC: 142 MMOL/L (ref 136–145)
TRIGL SERPL-MCNC: 98 MG/DL (ref 0–150)
VLDLC SERPL CALC-MCNC: 18 MG/DL (ref 5–40)
WBC # BLD AUTO: 5.98 10*3/MM3 (ref 3.4–10.8)

## 2023-08-16 NOTE — PROGRESS NOTES
Labs reviewed.  Results are stable.  Cholesterol and glucose levels are just slightly worse than the year before.

## 2023-08-22 ENCOUNTER — TELEPHONE (OUTPATIENT)
Dept: SURGERY | Facility: CLINIC | Age: 78
End: 2023-08-22

## 2023-08-24 NOTE — PROGRESS NOTES
Patient: Saad Meier    YOB: 1945    Date: 08/25/2023    Primary Care Provider: Tee Fiore DO    Chief Complaint   Patient presents with    Skin Lesion     Right arm       SUBJECTIVE:    History of present illness:  The patient is in the office today for evaluation and treatment of   a skin lesion on his right arm. He was seen by Dr. Walker 8/14/23 for this complaint and had an excision scheduled. He forgot what day it was and came in today because he didn't remember when his appointment was. He is not having any acute issues or changes in the lesion.     The following portions of the patient's history were reviewed and updated as appropriate: allergies, current medications, past family history, past medical history, past social history, past surgical history and problem list.      Review of Systems   Constitutional:  Negative for chills, fever and unexpected weight change.   HENT:  Negative for trouble swallowing and voice change.    Eyes:  Negative for visual disturbance.   Respiratory:  Negative for apnea, cough, chest tightness, shortness of breath and wheezing.    Cardiovascular:  Negative for chest pain, palpitations and leg swelling.   Gastrointestinal:  Negative for abdominal distention, abdominal pain, anal bleeding, blood in stool, constipation, diarrhea, nausea, rectal pain and vomiting.   Endocrine: Negative for cold intolerance and heat intolerance.   Genitourinary:  Negative for difficulty urinating, dysuria, flank pain, scrotal swelling and testicular pain.   Musculoskeletal:  Negative for back pain, gait problem and joint swelling.   Skin:  Positive for color change. Negative for rash and wound.   Neurological:  Negative for dizziness, syncope, speech difficulty, weakness, numbness and headaches.   Hematological:  Negative for adenopathy. Does not bruise/bleed easily.   Psychiatric/Behavioral:  Negative for confusion. The patient is not nervous/anxious.       Allergies:  Allergies   Allergen Reactions    Atorvastatin Myalgia    Ciprofloxacin Diarrhea    Pravastatin Myalgia       Medications:    Current Outpatient Medications:     amLODIPine (Norvasc) 5 MG tablet, Take 1 tablet by mouth Daily., Disp: 30 tablet, Rfl: 4    azelastine (ASTELIN) 0.1 % nasal spray, 1 spray into the nostril(s) as directed by provider 2 (Two) Times a Day As Needed for Rhinitis or Allergies. Use in each nostril as directed, Disp: 3 each, Rfl: 3    baclofen (LIORESAL) 10 MG tablet, Take 1 tablet by mouth At Night As Needed for Muscle Spasms., Disp: 90 tablet, Rfl: 1    famotidine (PEPCID) 20 MG tablet, TAKE 1 TABLET BY MOUTH AT NIGHT AS NEEDED FOR HEARTBURN, Disp: 90 tablet, Rfl: 0    furosemide (LASIX) 40 MG tablet, TAKE 1 TABLET BY MOUTH ONCE DAILY AS NEEDED FOR  SWELLING, Disp: 90 tablet, Rfl: 0    melatonin 3 MG tablet, Take 1 tablet by mouth Every Night. At 7 PM., Disp: 90 tablet, Rfl: 2    meloxicam (Mobic) 7.5 MG tablet, Take 1 tablet by mouth Daily., Disp: 30 tablet, Rfl: 5    potassium chloride (K-DUR,KLOR-CON) 20 MEQ CR tablet, Take 1 tablet by mouth Daily., Disp: 90 tablet, Rfl: 0    rOPINIRole (REQUIP) 0.5 MG tablet, Take 1 tablet by mouth every night at bedtime., Disp: 90 tablet, Rfl: 2    temazepam (RESTORIL) 15 MG capsule, Take 2 capsules by mouth At Night As Needed for Sleep., Disp: 60 capsule, Rfl: 2    History:  Past Medical History:   Diagnosis Date    Allergic 25yrs.    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    Cancer 07/2012    colon    Cardiac arrhythmia     Chronic diastolic congestive heart failure     Colon polyp     Diverticulosis     Essential hypertension 03/14/2018    History of blood transfusion     HL (hearing loss)     Infection of kidney     Iron deficiency     Obesity     Poor historian     Primary central sleep apnea Use CPAP    Sleep apnea      Stenosis of right carotid artery     Visual impairment !(97    Reading Glasses       Past Surgical History:   Procedure Laterality Date    CARDIAC ABLATION  03/2012    CARDIAC CATHETERIZATION  2004    CARDIAC ELECTROPHYSIOLOGY PROCEDURE N/A 8/10/2022    Procedure: PPM generator change - dual;  Surgeon: Philip Castillo DO;  Location:  [facility];  Service: Cardiology;  Laterality: N/A;    CARDIAC PACEMAKER PLACEMENT  2004    COLON SURGERY  2012    COLONOSCOPY  07/2015    COLONOSCOPY N/A 01/02/2019    Procedure: COLONOSCOPY W/ HOT BIOPSY POLYPECTOMY X3;  Surgeon: Jacky Walker MD;  Location: [facility] ENDOSCOPY;  Service: Gastroenterology    CYSTOSCOPY TRANSURETHRAL RESECTION OF PROSTATE N/A 10/03/2018    Procedure: TRANSURETHRAL RESECTION OF PROSTATE;  Surgeon: Bryce Santo MD;  Location: [facility] OR;  Service: Urology    ENDOSCOPY      HEMORRHOIDECTOMY  1970    HERNIA REPAIR      X 5    INGUINAL HERNIA REPAIR Bilateral     INGUINAL HERNIA REPAIR Right 01/22/2019    Procedure: INGUINAL HERNIA REPAIR;  Surgeon: Jacky Walker MD;  Location: [facility] OR;  Service: General    LYMPH NODE BIOPSY  07/02/2012    large intestine lymph nodes    OTHER SURGICAL HISTORY      ppm generaotr change 08/10/2022 per Dr. Castillo    TRANSURETHRAL RESECTION OF BLADDER TUMOR N/A 10/03/2018    Procedure: CYSTOSCOPY TRANSURETHRAL RESECTION OF BLADDER TUMOR, COUDE CATHETER PLACEMENT;  Surgeon: Bryce Santo MD;  Location: [facility] OR;  Service: Urology       Family History   Problem Relation Age of Onset    Lung cancer Mother     Osteoporosis Mother     Thyroid disease Mother         mother    Cancer Mother         Lung Cancer    Early death Mother         46 yrs.    Hearing loss Mother         mother    Vision loss Mother         mother    Heart disease Mother     Cancer Father         Prostate Cancer    Heart disease Father         Pacemaker    Vision loss Father     Heart disease Sister     Cancer Sister          "Breast Cancer    Early death Sister          Around 40yrs.    Vision loss Brother     Heart disease Brother     Arrhythmia Brother     Heart failure Brother     Early death Maternal Grandmother         Took Mother off.    Heart disease Maternal Grandmother     Heart failure Maternal Grandmother        Social History     Tobacco Use    Smoking status: Former     Packs/day: 1.00     Years: 10.00     Pack years: 10.00     Types: Cigarettes     Start date: 1963     Quit date: 1973     Years since quittin.6    Smokeless tobacco: Never   Vaping Use    Vaping Use: Never used   Substance Use Topics    Alcohol use: No    Drug use: No        OBJECTIVE:    Vital Signs:   Vitals:    23 0926   BP: 128/82   Pulse: 56   Temp: 98 øF (36.7 øC)   SpO2: 95%   Weight: 96.6 kg (213 lb)   Height: 175.3 cm (69\")       Physical Exam:   General Appearance:    Alert, cooperative, in no acute distress   Head:    Normocephalic, without obvious abnormality, atraumatic   Eyes:            Lids and lashes normal, conjunctivae and sclerae normal, no   icterus, no pallor, corneas clear, PERRLA   Ears:    Ears appear intact with no abnormalities noted   Throat:   No oral lesions, no thrush, oral mucosa moist   Neck:   No adenopathy, supple, trachea midline, no thyromegaly, no   carotid bruit, no JVD   Lungs:     Clear to auscultation,respirations regular, even and                  unlabored    Heart:    Regular rhythm and normal rate, normal S1 and S2, no            murmur, no gallop, no rub, no click   Chest Wall:    No abnormalities observed   Abdomen:     Normal bowel sounds, no masses, no organomegaly, soft        non-tender, non-distended, no guarding, no rebound                tenderness   Extremities:   Moves all extremities well, no edema, no cyanosis, no             redness   Pulses:   Pulses palpable and equal bilaterally   Skin:   No bleeding, bruising or rash   Lymph nodes:   No palpable adenopathy   Neurologic:   " Cranial nerves 2 - 12 grossly intact, sensation intact, DTR       present and equal bilaterally     Results Review:   None    Review of Systems was reviewed and confirmed as accurate as documented by the MA.    ASSESSMENT/PLAN:    1. Skin lesion of right arm      I saw the patient in office today because he could not remember when his appointment was with Dr. Walker. He is having no acute complaints relating to his lesion. I have told the patient to keep his existing appointment for excision with Dr. Walker. He was given an appointment card today to help him remember. He expresses understanding and has no further questions today.    I discussed the patients findings and my recommendations with patient        Electronically signed by Evette Hopkins DO  08/25/23

## 2023-08-25 ENCOUNTER — OFFICE VISIT (OUTPATIENT)
Dept: SURGERY | Facility: CLINIC | Age: 78
End: 2023-08-25
Payer: MEDICARE

## 2023-08-25 VITALS
TEMPERATURE: 98 F | SYSTOLIC BLOOD PRESSURE: 128 MMHG | HEART RATE: 56 BPM | DIASTOLIC BLOOD PRESSURE: 82 MMHG | OXYGEN SATURATION: 95 % | HEIGHT: 69 IN | BODY MASS INDEX: 31.55 KG/M2 | WEIGHT: 213 LBS

## 2023-08-25 DIAGNOSIS — L98.9 SKIN LESION OF RIGHT ARM: Primary | ICD-10-CM

## 2023-08-25 PROCEDURE — 99213 OFFICE O/P EST LOW 20 MIN: CPT | Performed by: STUDENT IN AN ORGANIZED HEALTH CARE EDUCATION/TRAINING PROGRAM

## 2023-08-25 PROCEDURE — 1159F MED LIST DOCD IN RCRD: CPT | Performed by: STUDENT IN AN ORGANIZED HEALTH CARE EDUCATION/TRAINING PROGRAM

## 2023-08-25 PROCEDURE — 3074F SYST BP LT 130 MM HG: CPT | Performed by: STUDENT IN AN ORGANIZED HEALTH CARE EDUCATION/TRAINING PROGRAM

## 2023-08-25 PROCEDURE — 3079F DIAST BP 80-89 MM HG: CPT | Performed by: STUDENT IN AN ORGANIZED HEALTH CARE EDUCATION/TRAINING PROGRAM

## 2023-08-25 PROCEDURE — 1160F RVW MEDS BY RX/DR IN RCRD: CPT | Performed by: STUDENT IN AN ORGANIZED HEALTH CARE EDUCATION/TRAINING PROGRAM

## 2023-09-06 ENCOUNTER — TELEPHONE (OUTPATIENT)
Dept: SURGERY | Facility: CLINIC | Age: 78
End: 2023-09-06
Payer: MEDICARE

## 2023-09-11 NOTE — PROGRESS NOTES
Location: Right antecubital lipoma excision    Procedure: Excision of right antecubital lipoma      I recommend excision. Procedure and the risks and benefits were explained including bleeding and infection. The patient understands these and wishes to proceed.     The patient was brought to the procedure room. Consent and time out were performed. The area was prepped and draped in the usual fashion. 1% lidocaine with epinephrine was infused locally. An incision was made over the lesion. Full thickness excision was performed. The lesion size was 4 cm. The wound was closed in layers with interrupted simple vicryl and Nylon for the skin. Wound closure size was 4 cm.  This was excised down to the muscle layer itself.  There were no complications and the patient tolerated the procedure well. Hemostasis was well controlled with pressure and there was minimal blood loss. Wound instructions were given.

## 2023-09-13 ENCOUNTER — PROCEDURE VISIT (OUTPATIENT)
Dept: SURGERY | Facility: CLINIC | Age: 78
End: 2023-09-13
Payer: MEDICARE

## 2023-09-13 VITALS
WEIGHT: 217 LBS | BODY MASS INDEX: 32.14 KG/M2 | HEART RATE: 56 BPM | SYSTOLIC BLOOD PRESSURE: 136 MMHG | HEIGHT: 69 IN | DIASTOLIC BLOOD PRESSURE: 74 MMHG | OXYGEN SATURATION: 94 %

## 2023-09-13 DIAGNOSIS — L98.9 SKIN LESION OF RIGHT ARM: Primary | ICD-10-CM

## 2023-09-14 LAB — REF LAB TEST METHOD: NORMAL

## 2023-09-19 NOTE — PROGRESS NOTES
"Patient: Saad Meier    YOB: 1945    Date: 09/20/2023    Primary Care Provider: Tee Fiore DO    Chief Complaint   Patient presents with    Post-op Follow-up     Excision R arm       History of present illness:  I saw the patient in the office today as a followup from their recent right arm mass excision, the pathology report did show a lipoma.  They state that they have done well and are having no problems.    Vital Signs:  Vitals:    09/20/23 1258   BP: 118/82   Pulse: 66   Temp: 98 °F (36.7 °C)   SpO2: 96%   Weight: 98.4 kg (217 lb)   Height: 175.3 cm (69\")       Physical Exam:   General Appearance:    Alert, cooperative, in no acute distress, wound clean dry without infection   Abdomen:     no masses, no organomegaly, soft non-tender, non-distended, no guarding, wounds are well healed   Chest:      Clear to ausculation       Assessment / Plan:    1. Post-operative state        I did discuss the situation with the patient today in the office and they have done well from their recent mass excision, I don't think that the patient needs any further intervention and I need to see them back only if they have further problems. Pathology report was reviewed with the patient in the office.    Electronically signed by Jacky Walker MD  09/20/23                    "

## 2023-09-20 ENCOUNTER — OFFICE VISIT (OUTPATIENT)
Dept: SURGERY | Facility: CLINIC | Age: 78
End: 2023-09-20
Payer: MEDICARE

## 2023-09-20 VITALS
BODY MASS INDEX: 32.14 KG/M2 | WEIGHT: 217 LBS | HEIGHT: 69 IN | OXYGEN SATURATION: 96 % | TEMPERATURE: 98 F | SYSTOLIC BLOOD PRESSURE: 118 MMHG | HEART RATE: 66 BPM | DIASTOLIC BLOOD PRESSURE: 82 MMHG

## 2023-09-20 DIAGNOSIS — Z98.890 POST-OPERATIVE STATE: Primary | ICD-10-CM

## 2023-09-23 PROCEDURE — 93296 REM INTERROG EVL PM/IDS: CPT | Performed by: INTERNAL MEDICINE

## 2023-09-23 PROCEDURE — 93294 REM INTERROG EVL PM/LDLS PM: CPT | Performed by: INTERNAL MEDICINE

## 2023-10-10 DIAGNOSIS — M47.812 CERVICAL ARTHRITIS: ICD-10-CM

## 2023-10-10 DIAGNOSIS — K21.9 GASTROESOPHAGEAL REFLUX DISEASE WITHOUT ESOPHAGITIS: ICD-10-CM

## 2023-10-10 RX ORDER — FAMOTIDINE 20 MG/1
TABLET, FILM COATED ORAL
Qty: 90 TABLET | Refills: 0 | Status: SHIPPED | OUTPATIENT
Start: 2023-10-10

## 2023-10-10 RX ORDER — MELOXICAM 7.5 MG/1
7.5 TABLET ORAL DAILY
Qty: 90 TABLET | Refills: 0 | OUTPATIENT
Start: 2023-10-10

## 2023-10-21 DIAGNOSIS — M47.812 CERVICAL ARTHRITIS: ICD-10-CM

## 2023-10-23 ENCOUNTER — TELEPHONE (OUTPATIENT)
Dept: INTERNAL MEDICINE | Facility: CLINIC | Age: 78
End: 2023-10-23
Payer: MEDICARE

## 2023-10-23 DIAGNOSIS — K21.9 GASTROESOPHAGEAL REFLUX DISEASE WITHOUT ESOPHAGITIS: ICD-10-CM

## 2023-10-23 DIAGNOSIS — M47.812 CERVICAL ARTHRITIS: ICD-10-CM

## 2023-10-23 RX ORDER — MELOXICAM 7.5 MG/1
7.5 TABLET ORAL DAILY
Qty: 90 TABLET | Refills: 0 | Status: SHIPPED | OUTPATIENT
Start: 2023-10-23

## 2023-10-23 RX ORDER — FAMOTIDINE 20 MG/1
20 TABLET, FILM COATED ORAL DAILY
Qty: 90 TABLET | Refills: 0 | Status: SHIPPED | OUTPATIENT
Start: 2023-10-23

## 2023-10-23 RX ORDER — MELOXICAM 7.5 MG/1
7.5 TABLET ORAL DAILY
Qty: 90 TABLET | Refills: 0 | OUTPATIENT
Start: 2023-10-23

## 2023-10-23 NOTE — TELEPHONE ENCOUNTER
Incoming Refill Request      Medication requested (name and dose):     famotidine (PEPCID) 20 MG tablet   meloxicam (Mobic) 7.5 MG tablet     Pharmacy where request should be sent: [facility]    Additional details provided by patient: COMPLETELY OUT OF FAMOTIDINE. HAS SOME LEFT OF MELOXICAM.    Best call back number: [phone]    Does the patient have less than a 3 day supply:  [x] Yes  [] No    Belinda Cash Rep  10/23/23, 11:30 EDT

## 2023-11-08 ENCOUNTER — OFFICE VISIT (OUTPATIENT)
Dept: INTERNAL MEDICINE | Facility: CLINIC | Age: 78
End: 2023-11-08
Payer: MEDICARE

## 2023-11-08 VITALS
TEMPERATURE: 99.3 F | BODY MASS INDEX: 32.79 KG/M2 | HEART RATE: 61 BPM | SYSTOLIC BLOOD PRESSURE: 108 MMHG | WEIGHT: 221.4 LBS | HEIGHT: 69 IN | OXYGEN SATURATION: 98 % | DIASTOLIC BLOOD PRESSURE: 70 MMHG

## 2023-11-08 DIAGNOSIS — I50.32 CHRONIC DIASTOLIC CONGESTIVE HEART FAILURE: ICD-10-CM

## 2023-11-08 DIAGNOSIS — K21.9 GASTROESOPHAGEAL REFLUX DISEASE WITHOUT ESOPHAGITIS: Primary | ICD-10-CM

## 2023-11-08 DIAGNOSIS — Z23 NEED FOR TDAP VACCINATION: ICD-10-CM

## 2023-11-08 DIAGNOSIS — I10 ESSENTIAL HYPERTENSION: ICD-10-CM

## 2023-11-08 DIAGNOSIS — H91.93 BILATERAL HEARING LOSS, UNSPECIFIED HEARING LOSS TYPE: ICD-10-CM

## 2023-11-08 DIAGNOSIS — R73.03 BORDERLINE DIABETES: ICD-10-CM

## 2023-11-08 DIAGNOSIS — I49.5 SICK SINUS SYNDROME: ICD-10-CM

## 2023-11-08 PROCEDURE — 96160 PT-FOCUSED HLTH RISK ASSMT: CPT | Performed by: INTERNAL MEDICINE

## 2023-11-08 PROCEDURE — 3078F DIAST BP <80 MM HG: CPT | Performed by: INTERNAL MEDICINE

## 2023-11-08 PROCEDURE — 99397 PER PM REEVAL EST PAT 65+ YR: CPT | Performed by: INTERNAL MEDICINE

## 2023-11-08 PROCEDURE — 3074F SYST BP LT 130 MM HG: CPT | Performed by: INTERNAL MEDICINE

## 2023-11-08 PROCEDURE — G0439 PPPS, SUBSEQ VISIT: HCPCS | Performed by: INTERNAL MEDICINE

## 2023-11-08 RX ORDER — TETANUS TOXOID, REDUCED DIPHTHERIA TOXOID AND ACELLULAR PERTUSSIS VACCINE, ADSORBED 5; 2.5; 8; 8; 2.5 [IU]/.5ML; [IU]/.5ML; UG/.5ML; UG/.5ML; UG/.5ML
0.5 SUSPENSION INTRAMUSCULAR ONCE
Qty: 0.5 ML | Refills: 0 | Status: SHIPPED | OUTPATIENT
Start: 2023-11-08 | End: 2023-11-08

## 2023-11-08 NOTE — PATIENT INSTRUCTIONS
Medicare Wellness  Personal Prevention Plan of Service     Date of Office Visit:    Encounter Provider:  Tee Fiore DO  Place of Service:  [facility]  Patient Name: Saad Meier  :  1945    As part of the Medicare Wellness portion of your visit today, we are providing you with this personalized preventive plan of services (PPPS). This plan is based upon recommendations of the [place] Preventive Services Task Force (USPSTF) and the Advisory Committee on Immunization Practices (ACIP).    This lists the preventive care services that should be considered, and provides dates of when you are due. Items listed as completed are up-to-date and do not require any further intervention.    Health Maintenance   Topic Date Due    TDAP/TD VACCINES (1 - Tdap) Never done    ZOSTER VACCINE (1 of 2) Never done    ANNUAL WELLNESS VISIT  2023    BMI FOLLOWUP  2023    LIPID PANEL  08/15/2024    COLORECTAL CANCER SCREENING  2029    COVID-19 Vaccine  Completed    INFLUENZA VACCINE  Completed    Pneumococcal Vaccine 65+  Completed       No orders of the defined types were placed in this encounter.      Return in about 6 months (around 2024) for Next scheduled follow up.

## 2023-11-11 ENCOUNTER — APPOINTMENT (OUTPATIENT)
Dept: CT IMAGING | Facility: HOSPITAL | Age: 78
End: 2023-11-11
Payer: MEDICARE

## 2023-11-11 ENCOUNTER — HOSPITAL ENCOUNTER (EMERGENCY)
Facility: HOSPITAL | Age: 78
Discharge: HOME | End: 2023-11-11
Attending: EMERGENCY MEDICINE
Payer: MEDICARE

## 2023-11-11 VITALS
TEMPERATURE: 98.4 F | HEIGHT: 69 IN | RESPIRATION RATE: 18 BRPM | HEART RATE: 65 BPM | SYSTOLIC BLOOD PRESSURE: 172 MMHG | WEIGHT: 210 LBS | BODY MASS INDEX: 31.1 KG/M2 | DIASTOLIC BLOOD PRESSURE: 99 MMHG | OXYGEN SATURATION: 94 %

## 2023-11-11 DIAGNOSIS — S16.1XXA STRAIN OF NECK MUSCLE, INITIAL ENCOUNTER: Primary | ICD-10-CM

## 2023-11-11 PROCEDURE — 72125 CT NECK SPINE W/O DYE: CPT

## 2023-11-11 PROCEDURE — 99284 EMERGENCY DEPT VISIT MOD MDM: CPT

## 2023-11-11 RX ORDER — LIDOCAINE 50 MG/G
1 PATCH TOPICAL EVERY 24 HOURS
Qty: 6 EACH | Refills: 0 | Status: SHIPPED | OUTPATIENT
Start: 2023-11-11

## 2023-11-11 RX ORDER — CYCLOBENZAPRINE HCL 10 MG
10 TABLET ORAL 3 TIMES DAILY PRN
Qty: 15 TABLET | Refills: 0 | Status: SHIPPED | OUTPATIENT
Start: 2023-11-11

## 2023-11-11 RX ORDER — LIDOCAINE 50 MG/G
1 PATCH TOPICAL ONCE
Status: DISCONTINUED | OUTPATIENT
Start: 2023-11-11 | End: 2023-11-11 | Stop reason: HOSPADM

## 2023-11-11 RX ORDER — PREDNISONE 20 MG/1
20 TABLET ORAL DAILY
Qty: 5 TABLET | Refills: 0 | Status: SHIPPED | OUTPATIENT
Start: 2023-11-11

## 2023-11-11 RX ORDER — HYDROCODONE BITARTRATE AND ACETAMINOPHEN 5; 325 MG/1; MG/1
1 TABLET ORAL ONCE
Status: COMPLETED | OUTPATIENT
Start: 2023-11-11 | End: 2023-11-11

## 2023-11-11 RX ADMIN — LIDOCAINE 1 PATCH: 50 PATCH TOPICAL at 07:17

## 2023-11-11 RX ADMIN — HYDROCODONE BITARTRATE AND ACETAMINOPHEN 1 TABLET: 5; 325 TABLET ORAL at 07:17

## 2023-11-11 NOTE — ED PROVIDER NOTES
TRIAGE CHIEF COMPLAINT:     Nursing and triage notes reviewed    Chief Complaint   Patient presents with    Neck Pain      HPI: Saad Meier is a 78 y.o. male who presents to the emergency department complaining of neck discomfort.  Patient states that for the past several weeks he has been having pain in his neck but it has been worse over the past few days.  He states that the pain is in the middle and on both the sides.  He states that the pain is significantly worse if he tries to turn his head.  He states he can barely move because it hurts.  He denies fevers, chills, infection.  He has not had a cough or nasal congestion.  He denies any history of trauma.  He denies any radiation of the discomfort.  He denies any numbness, tingling, or weakness in his extremities.    REVIEW OF SYSTEMS: All other systems reviewed and are negative     PAST MEDICAL HISTORY:   Past Medical History:   Diagnosis Date    Allergic 25yrs.    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    Cancer 07/2012    colon    Cardiac arrhythmia     Chronic diastolic congestive heart failure     Colon polyp     Diverticulosis     Essential hypertension 03/14/2018    History of blood transfusion     HL (hearing loss)     Infection of kidney     Iron deficiency     Obesity     Poor historian     Primary central sleep apnea Use CPAP    Sleep apnea     Stenosis of right carotid artery     Visual impairment !(97    Reading Glasses        FAMILY HISTORY:   Family History   Problem Relation Age of Onset    Lung cancer Mother     Osteoporosis Mother     Thyroid disease Mother         mother    Cancer Mother         Lung Cancer    Early death Mother         46 yrs.    Hearing loss Mother         mother    Vision loss Mother         mother    Heart disease Mother     Cancer Father         Prostate Cancer    Heart disease Father         Pacemaker     Vision loss Father     Heart disease Sister     Cancer Sister         Breast Cancer    Early death Sister          Around 40yrs.    Vision loss Brother     Heart disease Brother     Arrhythmia Brother     Heart failure Brother     Early death Maternal Grandmother         Took Mother off.    Heart disease Maternal Grandmother     Heart failure Maternal Grandmother         SOCIAL HISTORY:   Social History     Socioeconomic History    Marital status: Single   Tobacco Use    Smoking status: Former     Packs/day: 1.00     Years: 10.00     Additional pack years: 0.00     Total pack years: 10.00     Types: Cigarettes     Start date: 1963     Quit date: 1973     Years since quittin.8    Smokeless tobacco: Never   Vaping Use    Vaping Use: Never used   Substance and Sexual Activity    Alcohol use: No    Drug use: No    Sexual activity: Not Currently     Partners: Female        SURGICAL HISTORY:   Past Surgical History:   Procedure Laterality Date    CARDIAC ABLATION  2012    CARDIAC CATHETERIZATION      CARDIAC ELECTROPHYSIOLOGY PROCEDURE N/A 8/10/2022    Procedure: PPM generator change - dual;  Surgeon: Phiilp Castillo DO;  Location: [facility] INVASIVE LOCATION;  Service: Cardiology;  Laterality: N/A;    CARDIAC PACEMAKER PLACEMENT      COLON SURGERY      COLONOSCOPY  2015    COLONOSCOPY N/A 2019    Procedure: COLONOSCOPY W/ HOT BIOPSY POLYPECTOMY X3;  Surgeon: Jacky Walker MD;  Location: [facility] ENDOSCOPY;  Service: Gastroenterology    CYSTOSCOPY TRANSURETHRAL RESECTION OF PROSTATE N/A 10/03/2018    Procedure: TRANSURETHRAL RESECTION OF PROSTATE;  Surgeon: Bryce Santo MD;  Location: [facility] OR;  Service: Urology    ENDOSCOPY      HEMORRHOIDECTOMY  1970    HERNIA REPAIR      X 5    INGUINAL HERNIA REPAIR Bilateral     INGUINAL HERNIA REPAIR Right 2019    Procedure: INGUINAL HERNIA REPAIR;  Surgeon: Jacky Walker MD;  Location: [facility] OR;  Service: General     LYMPH NODE BIOPSY  07/02/2012    large intestine lymph nodes    OTHER SURGICAL HISTORY      ppm generaotr change 08/10/2022 per Dr. Castillo    TRANSURETHRAL RESECTION OF BLADDER TUMOR N/A 10/03/2018    Procedure: CYSTOSCOPY TRANSURETHRAL RESECTION OF BLADDER TUMOR, COUDE CATHETER PLACEMENT;  Surgeon: Bryce Santo MD;  Location: [facility];  Service: Urology        CURRENT MEDICATIONS:      Medication List        ASK your doctor about these medications      amLODIPine 5 MG tablet  Commonly known as: Norvasc  Take 1 tablet by mouth Daily.     azelastine 0.1 % nasal spray  Commonly known as: ASTELIN  1 spray into the nostril(s) as directed by provider 2 (Two) Times a Day As Needed for Rhinitis or Allergies. Use in each nostril as directed     baclofen 10 MG tablet  Commonly known as: LIORESAL  Take 1 tablet by mouth At Night As Needed for Muscle Spasms.     famotidine 20 MG tablet  Commonly known as: PEPCID  Take 1 tablet by mouth Daily.     furosemide 40 MG tablet  Commonly known as: LASIX  TAKE 1 TABLET BY MOUTH ONCE DAILY AS NEEDED FOR  SWELLING     melatonin 3 MG tablet  Take 1 tablet by mouth Every Night. At 7 PM.     meloxicam 7.5 MG tablet  Commonly known as: Mobic  Take 1 tablet by mouth Daily.     potassium chloride 20 MEQ CR tablet  Commonly known as: K-DUR,KLOR-CON  Take 1 tablet by mouth Daily.     rOPINIRole 0.5 MG tablet  Commonly known as: REQUIP  Take 1 tablet by mouth every night at bedtime.     temazepam 15 MG capsule  Commonly known as: RESTORIL  Take 2 capsules by mouth At Night As Needed for Sleep.               ALLERGIES: Atorvastatin, Ciprofloxacin, and Pravastatin     PHYSICAL EXAM:   VITAL SIGNS:   Vitals:    11/11/23 0552   BP: (!) 167/109   Pulse: 67   Resp: 18   Temp: 98.4 °F (36.9 °C)   SpO2: 97%      CONSTITUTIONAL: Awake, oriented, appears mildly uncomfortable but nontoxic  HENT: Atraumatic, normocephalic, oral mucosa pink and moist, airway patent. Nares patent without drainage.  External ears normal.   EYES: Conjunctivae clear   NECK: Trachea midline, no obvious bony abnormality of the cervical spine.  There is significant discomfort with turning the head from side to side or abducting from side to side.  Mild tenderness to palpation primarily in the paraspinal musculature.  CARDIOVASCULAR: Normal heart rate, Normal rhythm, No murmurs, rubs, gallops   PULMONARY/CHEST: Clear to auscultation, no rhonchi, wheezes, or rales. Symmetrical breath sounds.  ABDOMINAL: Nondistended, soft, nontender - no rebound or guarding.   NEUROLOGIC: Nonfocal, moving all four extremities, no gross sensory or motor deficits.   EXTREMITIES: No clubbing, cyanosis, or edema   SKIN: Warm, Dry, No erythema, No rash     ED COURSE / MEDICAL DECISION MAKING:   Saad Meier is a 78 y.o. male who presents to the emergency department for evaluation of neck discomfort.  Patient is nondistressed on arrival in the emergency department.  Vital signs are stable.  Physical examination does reveal some tenderness and pain with range of motion.    Differential diagnosis includes fracture, contusion, radicular pain, herniated disc, muscle spasm among other etiologies.    CT scan of the cervical spine was ordered for further evaluation of the patient's presentation.    Diagnostic information from other sources: Chart review    Interventions: Norco, lidocaine patch    Narrative: Patient presents the emergency department with neck discomfort.  Examination does show reproducible symptoms with tenderness and range of motion.  Patient has had no infectious-like symptoms and have a low suspicion for a condition such as meningitis.  Given the reproducibility of symptoms I have a low suspicion for any atypical ACS.  I believe this is a musculoskeletal problem.  Will obtain imaging for further evaluation.  CT scan of the cervical spine per radiology interpretation reveals degenerative changes without acute process.  Patient does have  some improvement after symptomatic management in the emergency department.  There is also some significant muscle spasm primarily of the left trapezius muscle.  We will continue symptomatic management with return precautions.  Will refer to orthopedics for further evaluation.  Patient was comfortable with this plan.  Did discuss all results with the patient.      DECISION TO DISCHARGE/ADMIT: see ED care timeline     FINAL IMPRESSION:   1 --cervical strain  2 --   3 --     Electronically signed by: Shana Hanna MD, 11/11/2023 07:31 Shana Martinez MD  11/11/23 0811

## 2023-12-13 ENCOUNTER — TRANSCRIBE ORDERS (OUTPATIENT)
Dept: ADMINISTRATIVE | Facility: HOSPITAL | Age: 78
End: 2023-12-13
Payer: MEDICARE

## 2023-12-13 DIAGNOSIS — I65.21 CAROTID STENOSIS, RIGHT: Primary | ICD-10-CM

## 2024-01-09 DIAGNOSIS — K21.9 GASTROESOPHAGEAL REFLUX DISEASE WITHOUT ESOPHAGITIS: ICD-10-CM

## 2024-01-09 RX ORDER — FAMOTIDINE 20 MG/1
TABLET, FILM COATED ORAL
Qty: 90 TABLET | Refills: 1 | Status: SHIPPED | OUTPATIENT
Start: 2024-01-09

## 2024-01-15 ENCOUNTER — HOSPITAL ENCOUNTER (OUTPATIENT)
Dept: CT IMAGING | Facility: HOSPITAL | Age: 79
Discharge: HOME | End: 2024-01-15
Admitting: INTERNAL MEDICINE
Payer: MEDICARE

## 2024-01-15 DIAGNOSIS — I65.21 CAROTID STENOSIS, RIGHT: ICD-10-CM

## 2024-01-15 PROCEDURE — 70498 CT ANGIOGRAPHY NECK: CPT

## 2024-01-15 PROCEDURE — 25510000001 IOPAMIDOL 61 % SOLUTION: Performed by: INTERNAL MEDICINE

## 2024-01-15 RX ADMIN — IOPAMIDOL 100 ML: 612 INJECTION, SOLUTION INTRAVENOUS at 17:04

## 2024-01-19 ENCOUNTER — TELEPHONE (OUTPATIENT)
Dept: SURGERY | Facility: CLINIC | Age: 79
End: 2024-01-19
Payer: MEDICARE

## 2024-02-05 ENCOUNTER — OFFICE VISIT (OUTPATIENT)
Dept: CARDIOLOGY | Facility: CLINIC | Age: 79
End: 2024-02-05
Payer: MEDICARE

## 2024-02-05 VITALS
SYSTOLIC BLOOD PRESSURE: 166 MMHG | BODY MASS INDEX: 32.5 KG/M2 | OXYGEN SATURATION: 95 % | HEIGHT: 69 IN | HEART RATE: 73 BPM | DIASTOLIC BLOOD PRESSURE: 86 MMHG | WEIGHT: 219.4 LBS

## 2024-02-05 DIAGNOSIS — I47.19 AV NODAL RE-ENTRY TACHYCARDIA: ICD-10-CM

## 2024-02-05 DIAGNOSIS — Z95.0 PRESENCE OF CARDIAC PACEMAKER: ICD-10-CM

## 2024-02-05 DIAGNOSIS — I10 ESSENTIAL HYPERTENSION: Chronic | ICD-10-CM

## 2024-02-05 DIAGNOSIS — I49.5 SICK SINUS SYNDROME: Primary | ICD-10-CM

## 2024-02-05 PROCEDURE — 3079F DIAST BP 80-89 MM HG: CPT | Performed by: PHYSICIAN ASSISTANT

## 2024-02-05 PROCEDURE — 99213 OFFICE O/P EST LOW 20 MIN: CPT | Performed by: PHYSICIAN ASSISTANT

## 2024-02-05 PROCEDURE — 3077F SYST BP >= 140 MM HG: CPT | Performed by: PHYSICIAN ASSISTANT

## 2024-02-05 PROCEDURE — 93280 PM DEVICE PROGR EVAL DUAL: CPT | Performed by: PHYSICIAN ASSISTANT

## 2024-02-05 PROCEDURE — 1159F MED LIST DOCD IN RCRD: CPT | Performed by: PHYSICIAN ASSISTANT

## 2024-02-05 PROCEDURE — 1160F RVW MEDS BY RX/DR IN RCRD: CPT | Performed by: PHYSICIAN ASSISTANT

## 2024-02-05 RX ORDER — TAMSULOSIN HYDROCHLORIDE 0.4 MG/1
CAPSULE ORAL
COMMUNITY
End: 2024-02-05 | Stop reason: ALTCHOICE

## 2024-02-05 RX ORDER — PANTOPRAZOLE SODIUM 20 MG/1
TABLET, DELAYED RELEASE ORAL
COMMUNITY
End: 2024-02-05 | Stop reason: ALTCHOICE

## 2024-02-05 RX ORDER — IRBESARTAN 300 MG/1
TABLET ORAL
COMMUNITY
End: 2024-02-05 | Stop reason: ALTCHOICE

## 2024-02-05 RX ORDER — ROSUVASTATIN CALCIUM 10 MG/1
1 TABLET, COATED ORAL DAILY
COMMUNITY
Start: 2024-01-08

## 2024-02-05 RX ORDER — EZETIMIBE 10 MG/1
TABLET ORAL
COMMUNITY
End: 2024-02-05 | Stop reason: ALTCHOICE

## 2024-02-05 RX ORDER — ATORVASTATIN CALCIUM 40 MG/1
TABLET, FILM COATED ORAL
COMMUNITY
End: 2024-02-05 | Stop reason: ALTCHOICE

## 2024-02-05 RX ORDER — PRAVASTATIN SODIUM 20 MG
TABLET ORAL
COMMUNITY
End: 2024-02-05

## 2024-02-05 RX ORDER — HYDROCODONE BITARTRATE AND ACETAMINOPHEN 7.5; 325 MG/1; MG/1
TABLET ORAL
COMMUNITY
End: 2024-02-05

## 2024-02-05 RX ORDER — LOSARTAN POTASSIUM 25 MG/1
TABLET ORAL
COMMUNITY
End: 2024-02-05 | Stop reason: ALTCHOICE

## 2024-02-05 RX ORDER — AMLODIPINE BESYLATE 5 MG/1
5 TABLET ORAL DAILY
Qty: 90 TABLET | Refills: 3 | Status: SHIPPED | OUTPATIENT
Start: 2024-02-05

## 2024-02-05 NOTE — PROGRESS NOTES
Encounter Date:02/05/2024      Patient ID: Saad Meier is a 78 y.o. male.    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        Sick sinus syndrome 07/22/2016     Priority: High     Note Last Updated: 11/14/2022     status post [employer] pacemaker, 2004.   PACEMAKER CHECK:  01/11/2016 Pacing and sensing thresholds and lead impedances are within normal limits.  Battery voltage is 2.95.  Pacemaker generator change, 8/10/2022: [employer] dual-chamber permanent pacemaker.  Retained leads      Presence of cardiac pacemaker 11/14/2022     Priority: Medium    Coronary artery disease involving native coronary artery of native heart without angina pectoris 03/06/2017     Priority: Medium     Note Last Updated: 3/6/2017     Minimal coronary artery disease and normal left ventricular function by cardiac catheterization, 2004.       Chronic diastolic congestive heart failure 05/31/2016     Priority: Medium    Peripheral vascular disease of lower extremity 10/10/2018     Priority: Low     Note Last Updated: 10/10/2018     6/14/16 doppler of bilateral lower extremities suggestive of mild peripheral vascular disease worse on right.      Stenosis of right carotid artery 09/26/2018     Priority: Low    Dyslipidemia 07/16/2018     Priority: Low    Essential hypertension 03/14/2018     Priority: Low    Lipoma of shoulder 11/14/2022     Note Last Updated: 11/14/2022     About 3 cm. Posterior shoulder      Statin intolerance 05/29/2020     Note Last Updated: 5/29/2020     Has tried and failed multiple statins, myalgias.      History of colon cancer 12/20/2018    Umbilical hernia without obstruction or gangrene 03/14/2018    Bilateral renal masses  "01/04/2018    Insomnia 06/10/2016    Arthritis 05/31/2016    Acid reflux 05/31/2016    History of Juvenile rheumatic fever 05/31/2016               History of Present Illness  Patient presents today for follow-up with a history of sinus node dysfunction, AV node reentry tachycardia, dual-chamber permanent pacemaker.  Returns today for scheduled electrophysiology follow-up.  He has no complaints of palpitations or dizziness.  He does not check his blood pressure at home.  He is a moderately poor historian.      Allergies   Allergen Reactions    Atorvastatin Myalgia    Ciprofloxacin Diarrhea    Pravastatin Myalgia         Current Outpatient Medications:     azelastine (ASTELIN) 0.1 % nasal spray, 1 spray into the nostril(s) as directed by provider 2 (Two) Times a Day As Needed for Rhinitis or Allergies. Use in each nostril as directed, Disp: 3 each, Rfl: 3    baclofen (LIORESAL) 10 MG tablet, Take 1 tablet by mouth At Night As Needed for Muscle Spasms., Disp: 90 tablet, Rfl: 1    famotidine (PEPCID) 20 MG tablet, TAKE 1 TABLET BY MOUTH AT NIGHT AS NEEDED FOR HEART BURN, Disp: 90 tablet, Rfl: 1    lidocaine (LIDODERM) 5 %, Place 1 patch on the skin as directed by provider Daily. Remove & Discard patch within 12 hours or as directed by MD, Disp: 6 each, Rfl: 0    rosuvastatin (CRESTOR) 10 MG tablet, Take 1 tablet by mouth Daily., Disp: , Rfl:     temazepam (RESTORIL) 15 MG capsule, Take 2 capsules by mouth At Night As Needed for Sleep., Disp: 60 capsule, Rfl: 2    .    Objective:     /86 (BP Location: Left arm, Patient Position: Sitting, Cuff Size: Adult)   Pulse 73   Ht 175.3 cm (69\")   Wt 99.5 kg (219 lb 6.4 oz)   SpO2 95%   BMI 32.40 kg/m²    Body mass index is 32.4 kg/m².     Constitutional:       Appearance: Well-developed.   Pulmonary:      Effort: Pulmonary effort is normal. No respiratory distress.      Breath sounds: Normal breath sounds. No wheezing. No rales.      Comments: Bases clear  Chest:      " Chest wall: Not tender to palpatation.   Cardiovascular:      Normal rate. Regular rhythm.      Murmurs: There is no murmur.      No gallop.  No click. No rub.   Pulses:     Intact distal pulses.   Edema:     Peripheral edema absent.   Musculoskeletal: Normal range of motion.       Lab Review:     Lab Results   Component Value Date    GLUCOSE 136 (H) 08/15/2023    BUN 22 08/15/2023    CREATININE 1.14 08/15/2023    EGFRRESULT 65.4 08/15/2023    EGFR 76.1 08/10/2022    BCR 19.3 08/15/2023    K 4.4 08/15/2023    CO2 25.1 08/15/2023    CALCIUM 9.5 08/15/2023    PROTENTOTREF 6.2 08/15/2023    ALBUMIN 4.4 08/15/2023    BILITOT 0.7 08/15/2023    AST 30 08/15/2023    ALT 45 (H) 08/15/2023     Lab Results   Component Value Date    WBC 5.98 08/15/2023    HGB 14.9 08/15/2023    HCT 44.4 08/15/2023    MCV 95.3 08/15/2023     08/15/2023             Procedures               Assessment:      Diagnosis Plan   1. Sick sinus syndrome  Normal functioning dual-chamber permanent pacemaker with 74% right atrial pacing, less than 1% RV pacing, normal lead parameters and 9.8 years battery life remaining      2. Presence of cardiac pacemaker    This patient's Cardiac Implanted Electronic Device was manually interrogated and reprogrammed during the patient encounter today.  Iterative programming changes were manually made to determine the sensing threshold, pacing threshold, lead impedance as well as underlying cardiac rhythm.  These programming changes were not limited to but included some or all of the following when appropriate: pacing mode, programmed AV delays, blanking periods, and refractory periods.  Data obtained as a result of these manual programing changes informed the patient's CIED permanent programming.        3. Essential hypertension  Currently not managed.  When I saw him last he was on amlodipine 5 mg daily and was well-managed.  I read his most recent primary care note which also showed amlodipine 5 mg daily with  ideal hypertension management.  The patient's medicine list prior to editing showed 2 ARB's and discontinuation of amlodipine.  I can find no explanation for this and the patient has no knowledge of medication changes.  He is insistent that the medications listed above are his only medications.  I have represcribed amlodipine 5 mg daily and we will forward this to his primary care.      4. AV gunnar re-entry tachycardia  No known recurrence of AVNRT        Plan:     Stable cardiac status.  Continue current medications.   in 6 months, sooner as needed.  Thank you for allowing us to participate in the care of your patient.     Electronically signed by JORGE LUIS Nichols, 02/05/24, 4:41 PM EST.

## 2024-02-09 ENCOUNTER — TELEPHONE (OUTPATIENT)
Dept: INTERNAL MEDICINE | Facility: CLINIC | Age: 79
End: 2024-02-09
Payer: MEDICARE

## 2024-02-09 NOTE — TELEPHONE ENCOUNTER
Caller: ZAINAB LAO    Relationship:     Best call back number: [phone]     What is the best time to reach you: ANY    Who are you requesting to speak with (clinical staff, provider,  specific staff member): NURSE    Do you know the name of the person who called: ALEX    What was the call regarding: CALLING TO CHECK STATUS OF CANCER GENOMIC PAPERWORK SENT 1/7/24.  PLEASE COMPLETE AND FAX BACK [phone].  FORMS NEEDS SECOND PAGE SIGNED, NOTHING ELSE NEEDED.      Is it okay if the provider responds through [facility]: CALL IF NEEDED

## 2024-03-21 ENCOUNTER — OFFICE VISIT (OUTPATIENT)
Dept: INTERNAL MEDICINE | Facility: CLINIC | Age: 79
End: 2024-03-21
Payer: MEDICARE

## 2024-03-21 VITALS
OXYGEN SATURATION: 96 % | SYSTOLIC BLOOD PRESSURE: 151 MMHG | BODY MASS INDEX: 32.4 KG/M2 | HEART RATE: 60 BPM | TEMPERATURE: 97.5 F | DIASTOLIC BLOOD PRESSURE: 91 MMHG | HEIGHT: 69 IN | RESPIRATION RATE: 20 BRPM

## 2024-03-21 DIAGNOSIS — K43.9 ABDOMINAL WALL HERNIA: Primary | ICD-10-CM

## 2024-03-21 PROCEDURE — 99213 OFFICE O/P EST LOW 20 MIN: CPT | Performed by: STUDENT IN AN ORGANIZED HEALTH CARE EDUCATION/TRAINING PROGRAM

## 2024-03-21 PROCEDURE — 3077F SYST BP >= 140 MM HG: CPT | Performed by: STUDENT IN AN ORGANIZED HEALTH CARE EDUCATION/TRAINING PROGRAM

## 2024-03-21 PROCEDURE — 3080F DIAST BP >= 90 MM HG: CPT | Performed by: STUDENT IN AN ORGANIZED HEALTH CARE EDUCATION/TRAINING PROGRAM

## 2024-03-21 NOTE — PROGRESS NOTES
Office Note     Name: Saad Meier    : 1945     MRN: [id number]     Chief Complaint  Hernia (referall)    Subjective     History of Present Illness:  Saad Meier is a 78 y.o. male who presents today for an abdominal wall hernia. He notes that the hernia is out all the time, he uses an elastic wrap everyday to keep it contained. Denies pain, skin discoloration or changes in bowel movements. He denies havng to strain or life heavy objects at home      Family History:   Family History   Problem Relation Age of Onset    Lung cancer Mother     Osteoporosis Mother     Thyroid disease Mother         mother    Cancer Mother         Lung Cancer    Early death Mother         46 yrs.    Hearing loss Mother         mother    Vision loss Mother         mother    Heart disease Mother     Cancer Father         Prostate Cancer    Heart disease Father         Pacemaker    Vision loss Father     Heart disease Sister     Cancer Sister         Breast Cancer    Early death Sister          Around 40yrs.    Vision loss Brother     Heart disease Brother     Arrhythmia Brother     Heart failure Brother     Early death Maternal Grandmother         Took Mother off.    Heart disease Maternal Grandmother     Heart failure Maternal Grandmother        Social History:   Social History     Socioeconomic History    Marital status: Single   Tobacco Use    Smoking status: Former     Current packs/day: 0.00     Average packs/day: 1 pack/day for 10.0 years (10.0 ttl pk-yrs)     Types: Cigarettes     Start date: 1963     Quit date: 1973     Years since quittin.2    Smokeless tobacco: Never   Vaping Use    Vaping status: Never Used   Substance and Sexual Activity    Alcohol use: No    Drug use: No    Sexual activity: Not Currently     Partners: Female       Health Maintenance   Topic Date Due    TDAP/TD VACCINES (1 - Tdap) Never done    RSV Vaccine - Adults (1 - 1-dose 60+ series) Never done    ZOSTER VACCINE (1  "of 2) 11/08/2024 (Originally 9/28/1993)    LIPID PANEL  08/15/2024    ANNUAL WELLNESS VISIT  11/08/2024    BMI FOLLOWUP  11/08/2024    COLORECTAL CANCER SCREENING  01/02/2029    COVID-19 Vaccine  Completed    INFLUENZA VACCINE  Completed    Pneumococcal Vaccine 65+  Completed       Objective     Vital Signs  /91   Pulse 60   Temp 97.5 °F (36.4 °C)   Resp 20   Ht 175.3 cm (69\")   SpO2 96%   BMI 32.40 kg/m²   Estimated body mass index is 32.4 kg/m² as calculated from the following:    Height as of this encounter: 175.3 cm (69\").    Weight as of 2/5/24: 99.5 kg (219 lb 6.4 oz).        Physical Exam  Vitals and nursing note reviewed.   HENT:      Head: Normocephalic and atraumatic.   Abdominal:      General: Bowel sounds are normal. There is no distension.      Palpations: Abdomen is soft.      Tenderness: There is no abdominal tenderness. There is no guarding or rebound.      Hernia: A hernia (anterior abdominal wall) is present.   Neurological:      General: No focal deficit present.      Mental Status: He is alert. Mental status is at baseline.          Procedures     Assessment and Plan     Diagnoses and all orders for this visit:    1. Abdominal wall hernia (Primary)  Assessment & Plan:  Avoid straining and lifting heavy objects  Continue using elastic wrap on abdominal area    Orders:  -     Ambulatory Referral to General Surgery         Counseling was given to patient for the following topics: instructions for management, risks and benefits of treatment options, and importance of treatment compliance.    Follow Up  Return in about 2 months (around 5/21/2024) for with pcp.    MD MELVIN Alan [facility]  [address] [place] [address] [address]  [facility] [id number] [phone]  "

## 2024-04-04 NOTE — PROGRESS NOTES
Patient: Saad Meier    YOB: 1945    Date: 04/08/2024    Primary Care Provider: Tee Fiore DO    Chief Complaint   Patient presents with    Hernia     Abdominal wall        SUBJECTIVE:    History of present illness:  I saw the patient in the office today as a consultation for evaluation and treatment of an abdominal mass. Patient complains of an abdominal mass that is present and palpable at all times.     He states this has been present over the past several months, worse with heavy lifting, is bulging when he rises up from a lying position.    The following portions of the patient's history were reviewed and updated as appropriate: allergies, current medications, past family history, past medical history, past social history, past surgical history and problem list.    Review of Systems   Constitutional:  Negative for chills, fever and unexpected weight change.   HENT:  Negative for trouble swallowing and voice change.    Eyes:  Negative for visual disturbance.   Respiratory:  Negative for apnea, cough, chest tightness, shortness of breath and wheezing.    Cardiovascular:  Negative for chest pain, palpitations and leg swelling.   Gastrointestinal:  Negative for abdominal distention, abdominal pain, anal bleeding, blood in stool, constipation, diarrhea, nausea, rectal pain and vomiting.   Endocrine: Negative for cold intolerance and heat intolerance.   Genitourinary:  Negative for difficulty urinating, dysuria, flank pain, scrotal swelling and testicular pain.   Musculoskeletal:  Negative for back pain, gait problem and joint swelling.   Skin:  Negative for color change, rash and wound.   Neurological:  Negative for dizziness, syncope, speech difficulty, weakness, numbness and headaches.   Hematological:  Negative for adenopathy. Does not bruise/bleed easily.   Psychiatric/Behavioral:  Negative for confusion. The patient is not nervous/anxious.        History:  Past Medical History:    Diagnosis Date    Allergic 25yrs.    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    Cancer 07/2012    colon    Cardiac arrhythmia     Chronic diastolic congestive heart failure     Colon polyp     Coronary artery disease involving native coronary artery of native heart without angina pectoris 03/06/2017    Diverticulosis     Essential hypertension 03/14/2018    History of blood transfusion     HL (hearing loss)     Infection of kidney     Iron deficiency     Obesity     Poor historian     Primary central sleep apnea Use CPAP    Sleep apnea     Stenosis of right carotid artery     Visual impairment !(97    Reading Glasses       Past Surgical History:   Procedure Laterality Date    CARDIAC ABLATION  03/2012    CARDIAC CATHETERIZATION  2004    CARDIAC ELECTROPHYSIOLOGY PROCEDURE N/A 8/10/2022    Procedure: PPM generator change - dual;  Surgeon: Philip Castillo DO;  Location:  [facility];  Service: Cardiology;  Laterality: N/A;    CARDIAC PACEMAKER PLACEMENT  2004    COLON SURGERY  2012    COLONOSCOPY  07/2015    COLONOSCOPY N/A 01/02/2019    Procedure: COLONOSCOPY W/ HOT BIOPSY POLYPECTOMY X3;  Surgeon: Jacky Walker MD;  Location: [facility] ENDOSCOPY;  Service: Gastroenterology    CYSTOSCOPY TRANSURETHRAL RESECTION OF PROSTATE N/A 10/03/2018    Procedure: TRANSURETHRAL RESECTION OF PROSTATE;  Surgeon: Bryce Santo MD;  Location: [facility] OR;  Service: Urology    ENDOSCOPY      HEMORRHOIDECTOMY  1970    HERNIA REPAIR      X 5    INGUINAL HERNIA REPAIR Bilateral     INGUINAL HERNIA REPAIR Right 01/22/2019    Procedure: INGUINAL HERNIA REPAIR;  Surgeon: Jacky Walker MD;  Location: [facility] OR;  Service: General    LYMPH NODE BIOPSY  07/02/2012    large intestine lymph nodes    OTHER SURGICAL HISTORY      ppm generaotr change 08/10/2022 per Dr. Castillo    TRANSURETHRAL RESECTION OF BLADDER  TUMOR N/A 10/03/2018    Procedure: CYSTOSCOPY TRANSURETHRAL RESECTION OF BLADDER TUMOR, COUDE CATHETER PLACEMENT;  Surgeon: Bryce Santo MD;  Location: [facility];  Service: Urology       Family History   Problem Relation Age of Onset    Lung cancer Mother     Osteoporosis Mother     Thyroid disease Mother         mother    Cancer Mother         Lung Cancer    Early death Mother         46 yrs.    Hearing loss Mother         mother    Vision loss Mother         mother    Heart disease Mother     Cancer Father         Prostate Cancer    Heart disease Father         Pacemaker    Vision loss Father     Heart disease Sister     Cancer Sister         Breast Cancer    Early death Sister          Around 40yrs.    Vision loss Brother     Heart disease Brother     Arrhythmia Brother     Heart failure Brother     Early death Maternal Grandmother         Took Mother off.    Heart disease Maternal Grandmother     Heart failure Maternal Grandmother        Social History     Tobacco Use    Smoking status: Former     Current packs/day: 0.00     Average packs/day: 1 pack/day for 10.0 years (10.0 ttl pk-yrs)     Types: Cigarettes     Start date: 1963     Quit date: 1973     Years since quittin.3    Smokeless tobacco: Never   Vaping Use    Vaping status: Never Used   Substance Use Topics    Alcohol use: No    Drug use: No       Allergies:  Allergies   Allergen Reactions    Atorvastatin Myalgia    Ciprofloxacin Diarrhea    Pravastatin Myalgia       Medications:    Current Outpatient Medications:     amLODIPine (NORVASC) 5 MG tablet, Take 1 tablet by mouth Daily., Disp: 90 tablet, Rfl: 3    azelastine (ASTELIN) 0.1 % nasal spray, 1 spray into the nostril(s) as directed by provider 2 (Two) Times a Day As Needed for Rhinitis or Allergies. Use in each nostril as directed, Disp: 3 each, Rfl: 3    baclofen (LIORESAL) 10 MG tablet, Take 1 tablet by mouth At Night As Needed for Muscle Spasms., Disp: 90 tablet, Rfl:  "1    famotidine (PEPCID) 20 MG tablet, TAKE 1 TABLET BY MOUTH AT NIGHT AS NEEDED FOR HEART BURN, Disp: 90 tablet, Rfl: 1    rosuvastatin (CRESTOR) 10 MG tablet, Take 1 tablet by mouth Daily., Disp: , Rfl:     temazepam (RESTORIL) 15 MG capsule, Take 2 capsules by mouth At Night As Needed for Sleep., Disp: 60 capsule, Rfl: 2    lidocaine (LIDODERM) 5 %, Place 1 patch on the skin as directed by provider Daily. Remove & Discard patch within 12 hours or as directed by MD (Patient not taking: Reported on 3/21/2024), Disp: 6 each, Rfl: 0    OBJECTIVE:    Vital Signs:   Vitals:    04/08/24 1057   BP: 142/78   Pulse: 84   Temp: 98 °F (36.7 °C)   SpO2: 95%   Weight: 103 kg (226 lb)   Height: 175.3 cm (69.02\")       Physical Exam:   General Appearance:    Alert, cooperative, in no acute distress   Head:    Normocephalic, without obvious abnormality, atraumatic   Eyes:            Lids and lashes normal, conjunctivae and sclerae normal, no   icterus, no pallor, corneas clear, PERRLA   Ears:    Ears appear intact with no abnormalities noted   Throat:   No oral lesions, no thrush, oral mucosa moist   Neck:   No adenopathy, supple, trachea midline, no thyromegaly, no   carotid bruit, no JVD   Lungs:     Clear to auscultation,respirations regular, even and                  unlabored    Heart:    Regular rhythm and normal rate, normal S1 and S2, no            murmur   Abdomen:     no masses, no organomegaly, soft non-tender, non-distended, no guarding, there is evidence of a large reducible incisional hernia in the midline   Extremities:   Moves all extremities well, no edema, no cyanosis, no             redness   Pulses:   Pulses palpable and equal bilaterally   Skin:   No bleeding, bruising or rash   Lymph nodes:   No palpable adenopathy   Neurologic:   Cranial nerves 2 - 12 grossly intact, sensation intact        Results Review:   I reviewed the patient's new clinical results.  I reviewed the patient's new imaging results and " agree with the interpretation.  I reviewed the patient's other test results and agree with the interpretation    Review of Systems was reviewed and confirmed as accurate as documented by the MA.    ASSESSMENT/PLAN:    1. Incarcerated hernia        I had a detailed and extensive discussion with the patient in the office he really needs to have a CT scan of the abdomen and pelvis prior to any contemplation of surgical intervention.  His advanced age makes this difficult, the size of the hernia defect also makes this difficult.  I will see the patient back in the office.  I discussed the patients findings and my recommendations with patient.    Electronically signed by Jacky Walker MD  04/09/24

## 2024-04-08 ENCOUNTER — OFFICE VISIT (OUTPATIENT)
Dept: SURGERY | Facility: CLINIC | Age: 79
End: 2024-04-08
Payer: MEDICARE

## 2024-04-08 VITALS
SYSTOLIC BLOOD PRESSURE: 142 MMHG | DIASTOLIC BLOOD PRESSURE: 78 MMHG | TEMPERATURE: 98 F | OXYGEN SATURATION: 95 % | HEART RATE: 84 BPM | WEIGHT: 226 LBS | BODY MASS INDEX: 33.47 KG/M2 | HEIGHT: 69 IN

## 2024-04-08 DIAGNOSIS — K46.0 INCARCERATED HERNIA: Primary | ICD-10-CM

## 2024-04-08 DIAGNOSIS — K43.9 VENTRAL HERNIA WITHOUT OBSTRUCTION OR GANGRENE: Primary | ICD-10-CM

## 2024-04-08 PROCEDURE — 1159F MED LIST DOCD IN RCRD: CPT | Performed by: SURGERY

## 2024-04-08 PROCEDURE — 3077F SYST BP >= 140 MM HG: CPT | Performed by: SURGERY

## 2024-04-08 PROCEDURE — 99214 OFFICE O/P EST MOD 30 MIN: CPT | Performed by: SURGERY

## 2024-04-08 PROCEDURE — 1160F RVW MEDS BY RX/DR IN RCRD: CPT | Performed by: SURGERY

## 2024-04-08 PROCEDURE — 3078F DIAST BP <80 MM HG: CPT | Performed by: SURGERY

## 2024-04-17 ENCOUNTER — TELEPHONE (OUTPATIENT)
Dept: SURGERY | Facility: CLINIC | Age: 79
End: 2024-04-17
Payer: MEDICARE

## 2024-04-17 NOTE — TELEPHONE ENCOUNTER
Patient sister called in and verified she was on verbal release, and gave her appt info and she will try to get message to patient.

## 2024-04-17 NOTE — TELEPHONE ENCOUNTER
Called  patient to tell him that his appointment with Dr Walker has been changed to 05/09/24  @ 1:30 PM no answer,voicemail full. Called emergency contact no answer left message to have patient call office

## 2024-05-07 ENCOUNTER — HOSPITAL ENCOUNTER (OUTPATIENT)
Dept: CT IMAGING | Facility: HOSPITAL | Age: 79
Discharge: HOME | End: 2024-05-07
Admitting: SURGERY
Payer: MEDICARE

## 2024-05-07 DIAGNOSIS — K43.9 VENTRAL HERNIA WITHOUT OBSTRUCTION OR GANGRENE: ICD-10-CM

## 2024-05-07 PROCEDURE — 74177 CT ABD & PELVIS W/CONTRAST: CPT

## 2024-05-07 PROCEDURE — 25510000001 IOPAMIDOL 61 % SOLUTION: Performed by: SURGERY

## 2024-05-07 RX ADMIN — IOPAMIDOL 100 ML: 612 INJECTION, SOLUTION INTRAVENOUS at 11:24

## 2024-05-08 ENCOUNTER — OFFICE VISIT (OUTPATIENT)
Dept: INTERNAL MEDICINE | Facility: CLINIC | Age: 79
End: 2024-05-08
Payer: MEDICARE

## 2024-05-08 ENCOUNTER — HOME HEALTH ADMISSION (OUTPATIENT)
Dept: HOME HEALTH SERVICES | Facility: HOME HEALTHCARE | Age: 79
End: 2024-05-08
Payer: COMMERCIAL

## 2024-05-08 ENCOUNTER — REFERRAL TRIAGE (OUTPATIENT)
Dept: CASE MANAGEMENT | Facility: OTHER | Age: 79
End: 2024-05-08
Payer: MEDICARE

## 2024-05-08 VITALS
DIASTOLIC BLOOD PRESSURE: 79 MMHG | TEMPERATURE: 97.5 F | BODY MASS INDEX: 33.03 KG/M2 | RESPIRATION RATE: 16 BRPM | HEIGHT: 69 IN | HEART RATE: 61 BPM | OXYGEN SATURATION: 98 % | WEIGHT: 223 LBS | SYSTOLIC BLOOD PRESSURE: 138 MMHG

## 2024-05-08 DIAGNOSIS — I50.32 CHRONIC DIASTOLIC CONGESTIVE HEART FAILURE: ICD-10-CM

## 2024-05-08 DIAGNOSIS — E11.65 UNCONTROLLED DIABETES MELLITUS WITH HYPERGLYCEMIA, WITHOUT LONG-TERM CURRENT USE OF INSULIN: Primary | ICD-10-CM

## 2024-05-08 DIAGNOSIS — R73.03 BORDERLINE DIABETES: ICD-10-CM

## 2024-05-08 DIAGNOSIS — E78.2 MIXED HYPERLIPIDEMIA: ICD-10-CM

## 2024-05-08 DIAGNOSIS — E11.9 TYPE 2 DIABETES MELLITUS WITHOUT COMPLICATION, WITHOUT LONG-TERM CURRENT USE OF INSULIN: Primary | ICD-10-CM

## 2024-05-08 LAB
EXPIRATION DATE: ABNORMAL
HBA1C MFR BLD: 8 % (ref 4.5–5.7)
Lab: ABNORMAL

## 2024-05-08 PROCEDURE — 1126F AMNT PAIN NOTED NONE PRSNT: CPT | Performed by: INTERNAL MEDICINE

## 2024-05-08 PROCEDURE — 83036 HEMOGLOBIN GLYCOSYLATED A1C: CPT | Performed by: INTERNAL MEDICINE

## 2024-05-08 PROCEDURE — 1160F RVW MEDS BY RX/DR IN RCRD: CPT | Performed by: INTERNAL MEDICINE

## 2024-05-08 PROCEDURE — 3078F DIAST BP <80 MM HG: CPT | Performed by: INTERNAL MEDICINE

## 2024-05-08 PROCEDURE — 3052F HG A1C>EQUAL 8.0%<EQUAL 9.0%: CPT | Performed by: INTERNAL MEDICINE

## 2024-05-08 PROCEDURE — G2211 COMPLEX E/M VISIT ADD ON: HCPCS | Performed by: INTERNAL MEDICINE

## 2024-05-08 PROCEDURE — 1159F MED LIST DOCD IN RCRD: CPT | Performed by: INTERNAL MEDICINE

## 2024-05-08 PROCEDURE — 99214 OFFICE O/P EST MOD 30 MIN: CPT | Performed by: INTERNAL MEDICINE

## 2024-05-08 PROCEDURE — 3075F SYST BP GE 130 - 139MM HG: CPT | Performed by: INTERNAL MEDICINE

## 2024-05-08 RX ORDER — PRAVASTATIN SODIUM 10 MG
10 TABLET ORAL DAILY
Qty: 90 TABLET | Refills: 1 | Status: SHIPPED | OUTPATIENT
Start: 2024-05-08

## 2024-05-09 ENCOUNTER — OFFICE VISIT (OUTPATIENT)
Dept: SURGERY | Facility: CLINIC | Age: 79
End: 2024-05-09
Payer: MEDICARE

## 2024-05-09 VITALS
DIASTOLIC BLOOD PRESSURE: 76 MMHG | TEMPERATURE: 99.3 F | HEART RATE: 87 BPM | WEIGHT: 223 LBS | BODY MASS INDEX: 33.03 KG/M2 | HEIGHT: 69 IN | SYSTOLIC BLOOD PRESSURE: 140 MMHG | OXYGEN SATURATION: 97 %

## 2024-05-09 DIAGNOSIS — K43.0 INCISIONAL HERNIA WITH OBSTRUCTION BUT NO GANGRENE: Primary | ICD-10-CM

## 2024-05-09 PROCEDURE — 3077F SYST BP >= 140 MM HG: CPT | Performed by: SURGERY

## 2024-05-09 PROCEDURE — 1160F RVW MEDS BY RX/DR IN RCRD: CPT | Performed by: SURGERY

## 2024-05-09 PROCEDURE — 1159F MED LIST DOCD IN RCRD: CPT | Performed by: SURGERY

## 2024-05-09 PROCEDURE — 3078F DIAST BP <80 MM HG: CPT | Performed by: SURGERY

## 2024-05-09 PROCEDURE — 99213 OFFICE O/P EST LOW 20 MIN: CPT | Performed by: SURGERY

## 2024-05-10 ENCOUNTER — HOME CARE VISIT (OUTPATIENT)
Dept: HOME HEALTH SERVICES | Facility: HOME HEALTHCARE | Age: 79
End: 2024-05-10
Payer: MEDICARE

## 2024-05-10 VITALS
TEMPERATURE: 98 F | DIASTOLIC BLOOD PRESSURE: 82 MMHG | SYSTOLIC BLOOD PRESSURE: 124 MMHG | HEART RATE: 57 BPM | OXYGEN SATURATION: 95 % | RESPIRATION RATE: 19 BRPM

## 2024-05-10 PROCEDURE — G0299 HHS/HOSPICE OF RN EA 15 MIN: HCPCS

## 2024-05-15 ENCOUNTER — PATIENT OUTREACH (OUTPATIENT)
Dept: CASE MANAGEMENT | Facility: OTHER | Age: 79
End: 2024-05-15
Payer: MEDICARE

## 2024-05-15 DIAGNOSIS — I50.32 CHRONIC DIASTOLIC CONGESTIVE HEART FAILURE: ICD-10-CM

## 2024-05-15 DIAGNOSIS — E11.65 UNCONTROLLED DIABETES MELLITUS WITH HYPERGLYCEMIA, WITHOUT LONG-TERM CURRENT USE OF INSULIN: Primary | ICD-10-CM

## 2024-05-15 NOTE — OUTREACH NOTE
"AMBULATORY CASE MANAGEMENT NOTE    Names and Relationships of Patient/Support Persons: Caller: Saad Meier Est \"Yoshi\"; Relationship: Self -     Patient Outreach    AMB CM outreach with Patient. Patient had an office visit with [facility] on 05/08/2024 with Dr. Fiore. Medication changes at discharge include the addition of Metformin  mg, Pravastatin 10 mg and/or discontinuation of Rosuvastatin Calcium 10 mg.     AVS, education,labs and recommended follow up reviewed. Patient reports that he has not started the Metformin. He thought the prescription was a refill for something he previously had. Reinforced that Metformin is a new medication and to be taken with food twice a day. Patient V/U and stated that he would start tomorrow and place the medication on his stove so that he did not forget them with meals.    Reinforced contacting [facility] CM or office for any questions or concerns. Patient requested that CM provide contact information by mail. He was agreeable to receiving diet education by mail. Next outreach scheduled.    Care Coordination    Care coordination with STEFANIA OLVERA. Referral placed to STEFANIA  for help with home care and available resources as applicable.    Education Documentation  Healthy Food Choices, taught by Shantell Estes, RN at 5/15/2024  2:47 PM.  Learner: Patient  Readiness: Acceptance  Method: Explanation  Response: Verbalizes Understanding, Needs Reinforcement    Carbohydrate-Containing Foods, taught by Shantell Estes, RN at 5/15/2024  2:47 PM.  Learner: Patient  Readiness: Acceptance  Method: Explanation  Response: Verbalizes Understanding, Needs Reinforcement    A1C Goal, taught by Shantell Estes RN at 5/15/2024  2:47 PM.  Learner: Patient  Readiness: Acceptance  Method: Explanation  Response: Verbalizes Understanding, Needs Reinforcement    Frequency of Testing, taught by Shantell Estes, RN at 5/15/2024  2:47 PM.  Learner: Patient  Readiness: Acceptance  Method: " Explanation  Response: Verbalizes Understanding, Needs Reinforcement        Shantell DELGADO  Ambulatory Case Management    5/15/2024, 14:45 EDT

## 2024-05-20 ENCOUNTER — HOME CARE VISIT (OUTPATIENT)
Dept: HOME HEALTH SERVICES | Facility: HOME HEALTHCARE | Age: 79
End: 2024-05-20
Payer: COMMERCIAL

## 2024-05-21 ENCOUNTER — PATIENT OUTREACH (OUTPATIENT)
Dept: CASE MANAGEMENT | Facility: OTHER | Age: 79
End: 2024-05-21
Payer: MEDICARE

## 2024-05-21 DIAGNOSIS — E11.65 UNCONTROLLED DIABETES MELLITUS WITH HYPERGLYCEMIA, WITHOUT LONG-TERM CURRENT USE OF INSULIN: Primary | ICD-10-CM

## 2024-05-21 DIAGNOSIS — I50.32 CHRONIC DIASTOLIC CONGESTIVE HEART FAILURE: ICD-10-CM

## 2024-05-21 NOTE — OUTREACH NOTE
"AMBULATORY CASE MANAGEMENT NOTE    Names and Relationships of Patient/Support Persons: Caller: Saad Meier Est \"Yoshi\"; Relationship: Self -     Send Education  AMB CM mailed the following education from [employer]; Building a Balanced Meal along with business cards and information on chronic care management.    Shantell DELGADO  Ambulatory Case Management    5/21/2024, 15:18 EDT  "

## 2024-05-22 DIAGNOSIS — M62.838 MUSCLE SPASMS OF NECK: ICD-10-CM

## 2024-05-22 RX ORDER — BACLOFEN 10 MG/1
10 TABLET ORAL NIGHTLY PRN
Qty: 90 TABLET | Refills: 0 | OUTPATIENT
Start: 2024-05-22

## 2024-05-23 ENCOUNTER — HOME CARE VISIT (OUTPATIENT)
Dept: HOME HEALTH SERVICES | Facility: HOME HEALTHCARE | Age: 79
End: 2024-05-23
Payer: MEDICARE

## 2024-05-23 VITALS
TEMPERATURE: 98.1 F | OXYGEN SATURATION: 94 % | RESPIRATION RATE: 16 BRPM | HEART RATE: 90 BPM | SYSTOLIC BLOOD PRESSURE: 150 MMHG | DIASTOLIC BLOOD PRESSURE: 80 MMHG

## 2024-05-23 PROCEDURE — G0300 HHS/HOSPICE OF LPN EA 15 MIN: HCPCS

## 2024-05-28 ENCOUNTER — TELEPHONE (OUTPATIENT)
Dept: INTERNAL MEDICINE | Facility: CLINIC | Age: 79
End: 2024-05-28
Payer: MEDICARE

## 2024-05-28 RX ORDER — BACLOFEN 10 MG/1
10 TABLET ORAL NIGHTLY
Qty: 90 TABLET | Refills: 0 | Status: SHIPPED | OUTPATIENT
Start: 2024-05-28

## 2024-05-28 NOTE — HOME HEALTH
Routine Visit Note: LPN    Skill/education provided: Instructed patient on disease process of CHF and DM. Instructed on energy conservation. Instructed patient on daily weight and to report a  weight gain of 2 or nre pounds a day or 5 pounds a week. Instsucted patient on a diabetic diet. Instructed on medication management.    Patient/caregiver response: Patient can benefit from ongoing education on medication management and diseaseprecess amanagement.     Plan for next visit: Continue wth POC to include T/I on disease process of CHF and DM. T/I on medication management. T/I diet r/t DM and CHF    Other pertinent info:  Please bring a mediplanner for the patient durin the next an visit.

## 2024-05-28 NOTE — TELEPHONE ENCOUNTER
"Caller: Saad Meier Est \"Oyshi\"    Relationship: Self    Best call back number: [phone]     Requested Prescriptions:   BACLOFEN 10MG        Pharmacy where request should be sent: [facility] [place] [facility] [id number] - [place], [place] - [address] [facility] - [phone]  - [phone] FX     Last office visit with prescribing clinician: 5/8/2024   Last telemedicine visit with prescribing clinician: Visit date not found   Next office visit with prescribing clinician: 8/9/2024     Additional details provided by patient: PATIENT IS OUT OF TOWN AND NEEDING A REFIL.     Does the patient have less than a 3 day supply:  [x] Yes  [] No    Would you like a call back once the refill request has been completed: [x] Yes [] No    If the office needs to give you a call back, can they leave a voicemail: [x] Yes [] No    Belinda Shah Rep   05/28/24 08:36 EDT       "

## 2024-05-31 ENCOUNTER — HOME CARE VISIT (OUTPATIENT)
Dept: HOME HEALTH SERVICES | Facility: HOME HEALTHCARE | Age: 79
End: 2024-05-31
Payer: COMMERCIAL

## 2024-06-04 ENCOUNTER — TELEPHONE (OUTPATIENT)
Dept: CASE MANAGEMENT | Facility: OTHER | Age: 79
End: 2024-06-04
Payer: MEDICARE

## 2024-06-04 DIAGNOSIS — E11.65 UNCONTROLLED DIABETES MELLITUS WITH HYPERGLYCEMIA, WITHOUT LONG-TERM CURRENT USE OF INSULIN: Primary | ICD-10-CM

## 2024-06-04 DIAGNOSIS — I50.32 CHRONIC DIASTOLIC CONGESTIVE HEART FAILURE: ICD-10-CM

## 2024-06-04 NOTE — TELEPHONE ENCOUNTER
STEFANIA CM outreach with Patient; left a voicemail requesting a return call to MARIBEL Stinson with Chronic Care Management at [phone].

## 2024-06-07 ENCOUNTER — HOME CARE VISIT (OUTPATIENT)
Dept: HOME HEALTH SERVICES | Facility: HOME HEALTHCARE | Age: 79
End: 2024-06-07
Payer: COMMERCIAL

## 2024-06-12 ENCOUNTER — HOME CARE VISIT (OUTPATIENT)
Dept: HOME HEALTH SERVICES | Facility: HOME HEALTHCARE | Age: 79
End: 2024-06-12
Payer: MEDICARE

## 2024-06-13 ENCOUNTER — TELEPHONE (OUTPATIENT)
Dept: CASE MANAGEMENT | Facility: OTHER | Age: 79
End: 2024-06-13
Payer: MEDICARE

## 2024-06-13 NOTE — TELEPHONE ENCOUNTER
Covering [facility] attempted to outreach patient. Not available. Direct line to covering [facility] [phone].

## 2024-06-18 ENCOUNTER — HOME CARE VISIT (OUTPATIENT)
Dept: HOME HEALTH SERVICES | Facility: HOME HEALTHCARE | Age: 79
End: 2024-06-18
Payer: MEDICARE

## 2024-06-19 ENCOUNTER — TELEPHONE (OUTPATIENT)
Dept: CASE MANAGEMENT | Facility: OTHER | Age: 79
End: 2024-06-19
Payer: MEDICARE

## 2024-06-19 DIAGNOSIS — E11.65 UNCONTROLLED DIABETES MELLITUS WITH HYPERGLYCEMIA, WITHOUT LONG-TERM CURRENT USE OF INSULIN: Primary | ICD-10-CM

## 2024-06-19 DIAGNOSIS — I50.32 CHRONIC DIASTOLIC CONGESTIVE HEART FAILURE: ICD-10-CM

## 2024-06-19 NOTE — TELEPHONE ENCOUNTER
STEFANIA CM outreach with Patient; left a voicemail requesting a return call to MARIBEL Stinson with Chronic Care Management at [phone].

## 2024-06-27 ENCOUNTER — HOME CARE VISIT (OUTPATIENT)
Dept: HOME HEALTH SERVICES | Facility: HOME HEALTHCARE | Age: 79
End: 2024-06-27
Payer: COMMERCIAL

## 2024-06-27 PROCEDURE — G0300 HHS/HOSPICE OF LPN EA 15 MIN: HCPCS

## 2024-07-03 ENCOUNTER — HOME CARE VISIT (OUTPATIENT)
Dept: HOME HEALTH SERVICES | Facility: HOME HEALTHCARE | Age: 79
End: 2024-07-03
Payer: COMMERCIAL

## 2024-07-03 ENCOUNTER — TELEPHONE (OUTPATIENT)
Dept: INTERNAL MEDICINE | Facility: CLINIC | Age: 79
End: 2024-07-03
Payer: MEDICARE

## 2024-07-03 NOTE — Clinical Note
Discharge W/O Visit Note:     Patient discharged to home/self care from home health services on 7/3/24 without visit. Patient has missed his last three SNV. 7/3/24 visit was scheduled with patient via telephone. SN arrived at scheduled time. Went to back door as instructed by patient. No answer at door. Attempted to contact patient via telephone. No answer. Left VM with no return call. Attempted to contact all numbers and emergency contacts listed in patient's chart and was unsuccessful. VM left with no return call. Patient has missed his previous two visit as well. One of which was scheduled with patient and he was in agreement to SNV. On arrival to patient's home he did not answer the door. Unable to make contact with patient via telephone with VM left and no return call. Due to reasons stated above, SN unable to assess patient's current level of function, mental status, health conditions, and medication compliance. Assessment based on last known level of functioning. Dr. Fiore's office notified patient will be d/c'd from  services as of this date due to non-compliance with  SNVs.

## 2024-07-03 NOTE — TELEPHONE ENCOUNTER
BRITTANY IS CALLING TO SAY LIANA CONFIRMED AN APPT TIME AND WHEN SHE GETS THERE HE DOESN'T ANSWER HIS DOOR NOR PHONE.  SHE TRIED HIS EMERGENCY CONTACT WHO DIDN'T RESPOND.  SHE IS DISCHARGING HIM FROM THE AGENCY.

## 2024-07-10 VITALS
OXYGEN SATURATION: 94 % | TEMPERATURE: 97.9 F | RESPIRATION RATE: 18 BRPM | SYSTOLIC BLOOD PRESSURE: 138 MMHG | HEART RATE: 90 BPM | DIASTOLIC BLOOD PRESSURE: 78 MMHG

## 2024-07-10 NOTE — CASE COMMUNICATION
Patient missed a  HH Visit Type: SN visit from [facility] on DATE:5/20/24.     Reason: HH Missed Visit Reason: Nurse called patient but no response. Nurse drove to patient's residence and called again but no response. Nurse left a note on the patient's door and left.      For your records only.   Per CMS Guidance, MD must be notified of missed/cancelled visits; therefore the prescribed frequency was not met.

## 2024-07-10 NOTE — CASE COMMUNICATION
Patient missed a HH Visit Type SN visit from [facility] on DATE:5/31/24.     Reason: HH Missed Visit Reason: Called patient bu no response. Drove to patient's residence but nobody at home..       For your records only.   Per CMS Guidance, MD must be notified of missed/cancelled visits; therefore the prescribed frequency was not met.

## 2024-07-10 NOTE — HOME HEALTH
Routine Visit Note: LPN    Skill/education provided: Instructed patient on disease process of CHF and DM. Instructed on energy conservation. Instructed patient on daily weight and to report a  weight gain of 2 or nre pounds a day or 5 pounds a week. Instsucted patient on a diabetic diet. Instructed on medication management.    Patient/caregiver response: Patient can benefit from ongoing education on medication management and diseaseprecess amanagement.     Plan for next visit: Continue wth POC to include T/I on disease process of CHF and DM. T/I on medication management. T/I diet r/t DM and CHF    Other pertinent info: N/A

## 2024-07-18 ENCOUNTER — PATIENT OUTREACH (OUTPATIENT)
Dept: CASE MANAGEMENT | Facility: OTHER | Age: 79
End: 2024-07-18
Payer: MEDICARE

## 2024-07-18 DIAGNOSIS — E11.65 UNCONTROLLED DIABETES MELLITUS WITH HYPERGLYCEMIA, WITHOUT LONG-TERM CURRENT USE OF INSULIN: Primary | ICD-10-CM

## 2024-07-18 NOTE — OUTREACH NOTE
"AMBULATORY CASE MANAGEMENT NOTE    Names and Relationships of Patient/Support Persons: Contact: Saad Meier Est \"Yoshi\"; Relationship: Self -     Patient Outreach    AMB CM outreach with Patient. Patient states that he is doing well. Denies any knowledge of having missed visits with home health. Denies any current needs or concerns. States that he takes his medication twice a day and eats several times a day. Reviewed upcoming appointments and encouraged contacting the office for any needs or concerns. HRCM: Closed, Patient goals achieved.    Shantell DELGADO  Ambulatory Case Management    7/18/2024, 14:30 EDT  "

## 2024-09-25 ENCOUNTER — TELEPHONE (OUTPATIENT)
Dept: CARDIOLOGY | Facility: CLINIC | Age: 79
End: 2024-09-25
Payer: MEDICARE

## 2024-10-04 ENCOUNTER — TELEPHONE (OUTPATIENT)
Dept: CARDIOLOGY | Facility: CLINIC | Age: 79
End: 2024-10-04
Payer: MEDICARE

## 2024-10-04 NOTE — TELEPHONE ENCOUNTER
Per pt's sister, pt has moved & is receiving cardiac care elsewhere-see note dated 9/25/2024.     Per chart review, pt has an upcoming appointment with Dr. Minesh Arce @ [facility] on 10/30/2024. I called Dr. Arce's office & spoke with a a staff member. A message will be forwarded to a nurse to call our device clinic about transferring pt's remote cardiac monitoring.     Abbott/SJM dual chamber pacemaker.

## 2024-10-24 ENCOUNTER — EXTERNAL PBMM DATA (OUTPATIENT)
Dept: PHARMACY | Facility: OTHER | Age: 79
End: 2024-10-24
Payer: MEDICARE

## 2024-11-20 ENCOUNTER — EXTERNAL PBMM DATA (OUTPATIENT)
Dept: PHARMACY | Facility: OTHER | Age: 79
End: 2024-11-20
Payer: MEDICARE

## 2024-11-23 ENCOUNTER — POP HEALTH PHARMACY (OUTPATIENT)
Dept: PHARMACY | Facility: OTHER | Age: 79
End: 2024-11-23
Payer: MEDICARE

## 2024-12-06 ENCOUNTER — POP HEALTH PHARMACY (OUTPATIENT)
Dept: PHARMACY | Facility: OTHER | Age: 79
End: 2024-12-06
Payer: MEDICARE

## 2024-12-15 NOTE — PROGRESS NOTES
Pt dc'd and returned to the [facility] this evening. Pt alert and oriented, VSS. Pt transferred via [facility]. Report endorsed to receiving ERUM Casanova.   QUICK REFERENCE INFORMATION:  The ABCs of the Annual Wellness Visit    Subsequent Medicare Wellness Visit    Chief Complaint   Patient presents with    Medicare Wellness-subsequent        Subjective   History of Present Illness    Saad Meier is a 78 y.o. male who presents for a Subsequent Wellness Visit. In addition, we addressed the following health issues:    Goes to gym daily from M to F. May have had vaccines at [facility] but is not exactly sure.    Hypertension has been stable on Norvasc.  Insomnia controlled on Restoril.    HEALTH RISK ASSESSMENT    1945    Recent Hospitalizations:  No hospitalization(s) within the last year..        Current Medical Providers:  Patient Care Team:  Tee Fiore DO as PCP - General (Internal Medicine)  Atilio Wall MD as Consulting Physician (Cardiology)  Luis Hollins MD as Consulting Physician (Urology)  Liddle, Susan E., MD as Consulting Physician (Hematology and Oncology)  Gloria Garcia MD as Consulting Physician (General Surgery)  Elvie Alvarez MD as Consulting Physician (General Surgery)  Jacky Walker MD as Consulting Physician (General Surgery)  Neel Zimmer MD as Consulting Physician (Pulmonary Disease)  Philip Castillo DO as Consulting Physician (Cardiology)        Smoking Status:  Social History     Tobacco Use   Smoking Status Former    Packs/day: 1.00    Years: 10.00    Additional pack years: 0.00    Total pack years: 10.00    Types: Cigarettes    Start date: 1963    Quit date: 1973    Years since quittin.8   Smokeless Tobacco Never       Alcohol Consumption:  Social History     Substance and Sexual Activity   Alcohol Use No       Depression Screen:       2023    11:00 AM   PHQ-2/PHQ-9 Depression Screening   Little Interest or Pleasure in Doing Things 0-->not at all   Feeling Down, Depressed or Hopeless 0-->not at all   PHQ-9: Brief Depression Severity Measure Score 0       Health Habits and  Functional and Cognitive Screenin/8/2023    11:00 AM   Functional & Cognitive Status   Do you have difficulty preparing food and eating? No   Do you have difficulty bathing yourself, getting dressed or grooming yourself? No   Do you have difficulty using the toilet? No   Do you have difficulty moving around from place to place? No   Do you have trouble with steps or getting out of a bed or a chair? No   Current Diet Other   Dental Exam Unknown   Eye Exam Unknown   Exercise (times per week) 5 times per week   Current Exercises Include Treadmill        Exercise Comment goes to a fitness center   Do you need help using the phone?  No   Are you deaf or do you have serious difficulty hearing?  Yes   Do you need help to go to places out of walking distance? No   Do you need help shopping? No   Do you need help preparing meals?  No   Do you need help with housework?  No   Do you need help with laundry? No   Do you need help taking your medications? No   Do you need help managing money? No   Do you ever drive or ride in a car without wearing a seat belt? No   Have you felt unusual stress, anger or loneliness in the last month? No   Who do you live with? Alone   If you need help, do you have trouble finding someone available to you? Yes   Have you been bothered in the last four weeks by sexual problems? No   Do you have difficulty concentrating, remembering or making decisions? No           Does the patient have evidence of cognitive impairment? No    Aspirin use counseling: Does not need ASA (and currently is not on it)      Recent Lab Results:  CMP:  Lab Results   Component Value Date    BUN 22 08/15/2023    CREATININE 1.14 08/15/2023    EGFRIFNONA 74 2022    EGFRIFAFRI 90 2022    BCR 19.3 08/15/2023     08/15/2023    K 4.4 08/15/2023    CO2 25.1 08/15/2023    CALCIUM 9.5 08/15/2023    PROTENTOTREF 6.2 08/15/2023    ALBUMIN 4.4 08/15/2023    LABGLOBREF 1.8 08/15/2023    LABIL2 2.4 08/15/2023     BILITOT 0.7 08/15/2023    ALKPHOS 55 08/15/2023    AST 30 08/15/2023    ALT 45 (H) 08/15/2023     Lipid Panel:  Lab Results   Component Value Date    TRIG 98 08/15/2023    HDL 40 08/15/2023    VLDL 18 08/15/2023     HbA1c:  Lab Results   Component Value Date    HGBA1C 6.6 08/09/2023       Visual Acuity:  No results found.    Age-appropriate Screening Schedule:  Refer to the list below for future screening recommendations based on patient's age, sex and/or medical conditions. Orders for these recommended tests are listed in the plan section. The patient has been provided with a written plan.    Health Maintenance   Topic Date Due    TDAP/TD VACCINES (1 - Tdap) Never done    ANNUAL WELLNESS VISIT  08/26/2023    ZOSTER VACCINE (1 of 2) 11/08/2024 (Originally 9/28/1993)    LIPID PANEL  08/15/2024    BMI FOLLOWUP  11/08/2024    COLORECTAL CANCER SCREENING  01/02/2029    COVID-19 Vaccine  Completed    INFLUENZA VACCINE  Completed    Pneumococcal Vaccine 65+  Completed          The following portions of the patient's history were reviewed and updated as appropriate: allergies, current medications, past family history, past medical history, past social history, past surgical history, and problem list.    Outpatient Medications Prior to Visit   Medication Sig Dispense Refill    amLODIPine (Norvasc) 5 MG tablet Take 1 tablet by mouth Daily. 30 tablet 4    azelastine (ASTELIN) 0.1 % nasal spray 1 spray into the nostril(s) as directed by provider 2 (Two) Times a Day As Needed for Rhinitis or Allergies. Use in each nostril as directed 3 each 3    baclofen (LIORESAL) 10 MG tablet Take 1 tablet by mouth At Night As Needed for Muscle Spasms. 90 tablet 1    famotidine (PEPCID) 20 MG tablet Take 1 tablet by mouth Daily. 90 tablet 0    furosemide (LASIX) 40 MG tablet TAKE 1 TABLET BY MOUTH ONCE DAILY AS NEEDED FOR  SWELLING 90 tablet 0    melatonin 3 MG tablet Take 1 tablet by mouth Every Night. At 7 PM. 90 tablet 2    meloxicam  (Mobic) 7.5 MG tablet Take 1 tablet by mouth Daily. 90 tablet 0    potassium chloride (K-DUR,KLOR-CON) 20 MEQ CR tablet Take 1 tablet by mouth Daily. 90 tablet 0    rOPINIRole (REQUIP) 0.5 MG tablet Take 1 tablet by mouth every night at bedtime. 90 tablet 2    temazepam (RESTORIL) 15 MG capsule Take 2 capsules by mouth At Night As Needed for Sleep. 60 capsule 2     No facility-administered medications prior to visit.       Patient Active Problem List   Diagnosis    Arthritis    Chronic diastolic congestive heart failure    Acid reflux    History of Juvenile rheumatic fever    Insomnia    Sick sinus syndrome    Coronary artery disease involving native coronary artery of native heart without angina pectoris    AV gunnar re-entry tachycardia    Bilateral renal masses    Essential hypertension    Umbilical hernia without obstruction or gangrene    Dyslipidemia    Stenosis of right carotid artery    Peripheral vascular disease of lower extremity    History of colon cancer    Statin intolerance    Presence of cardiac pacemaker    Lipoma of shoulder       Advance Care Planning:  ACP discussion was held with the patient during this visit. Patient has an advance directive in EMR which is still valid.     Identification of Risk Factors:  Risk factors include: Advance Directive Discussion  Chronic Pain .    Review of Systems   Constitutional:  Negative for chills, fatigue and fever.   HENT:  Negative for congestion, ear pain, rhinorrhea, sinus pressure and sore throat.    Eyes:  Negative for visual disturbance.   Respiratory:  Negative for cough, chest tightness, shortness of breath and wheezing.    Cardiovascular:  Negative for chest pain, palpitations and leg swelling.   Gastrointestinal:  Negative for abdominal pain, blood in stool, constipation, diarrhea, nausea and vomiting.   Endocrine: Negative for polydipsia and polyuria.   Genitourinary:  Negative for dysuria and hematuria.   Musculoskeletal:  Negative for arthralgias  and back pain.   Skin:  Negative for rash.   Neurological:  Negative for dizziness, light-headedness, numbness and headaches.   Psychiatric/Behavioral:  Negative for dysphoric mood and sleep disturbance. The patient is not nervous/anxious.        Compared to one year ago, the patient feels his physical health is the same.  Compared to one year ago, the patient feels his mental health is the same.    Objective     Physical Exam  Vitals and nursing note reviewed.   Constitutional:       Appearance: Normal appearance. He is well-developed.   HENT:      Head: Normocephalic and atraumatic.      Right Ear: Tympanic membrane and external ear normal.      Left Ear: Tympanic membrane and external ear normal.      Nose: Nose normal. No congestion.      Mouth/Throat:      Mouth: Mucous membranes are moist.      Pharynx: No oropharyngeal exudate.   Eyes:      General: No scleral icterus.        Right eye: No discharge.      Pupils: Pupils are equal, round, and reactive to light.   Neck:      Thyroid: No thyromegaly.      Vascular: No JVD.   Cardiovascular:      Rate and Rhythm: Normal rate and regular rhythm.      Heart sounds: Normal heart sounds. No murmur heard.     No friction rub.   Pulmonary:      Effort: Pulmonary effort is normal. No respiratory distress.      Breath sounds: Normal breath sounds. No stridor. No wheezing.   Abdominal:      General: Bowel sounds are normal. There is no distension.      Palpations: Abdomen is soft.      Tenderness: There is no abdominal tenderness. There is no guarding.   Genitourinary:     Comments: Deferred  Musculoskeletal:         General: No tenderness. Normal range of motion.      Cervical back: Normal range of motion and neck supple.   Lymphadenopathy:      Cervical: No cervical adenopathy.   Skin:     General: Skin is warm and dry.      Findings: No rash.   Neurological:      Mental Status: He is alert and oriented to person, place, and time. Mental status is at baseline.       "Cranial Nerves: No cranial nerve deficit.      Comments: Memory deficits evident in routine conversation   Psychiatric:         Mood and Affect: Mood normal.         Behavior: Behavior normal.         Thought Content: Thought content normal.         Vitals:    11/08/23 1100   BP: 108/70   BP Location: Left arm   Patient Position: Sitting   Pulse: 61   Temp: 99.3 °F (37.4 °C)   TempSrc: Temporal   SpO2: 98%   Weight: 100 kg (221 lb 6.4 oz)   Height: 175.3 cm (69\")   PainSc: 0-No pain       BMI is >= 30 and <35. (Class 1 Obesity). The following options were offered after discussion;: weight loss educational material (shared in after visit summary), exercise counseling/recommendations, and nutrition counseling/recommendations      Assessment & Plan   Patient Self-Management and Personalized Health Advice  The patient has been provided with information about: exercise and weight management and preventive services including:   Annual Wellness Visit (AWV).    Visit Diagnoses:    ICD-10-CM ICD-9-CM   1. Gastroesophageal reflux disease without esophagitis  K21.9 530.81   2. Borderline diabetes  R73.03 790.29   3. Chronic diastolic congestive heart failure  I50.32 428.32     428.0   4. Bilateral hearing loss, unspecified hearing loss type  H91.93 389.9   5. Sick sinus syndrome  I49.5 427.81   6. Essential hypertension  I10 401.9   7. Need for Tdap vaccination  Z23 V06.1       Discussion Summary:  Patient is a 78 y.o. male who presents for a Subsequent Wellness Visit.    1. Preventive Health Maintenance  - Baseline labs ordered per above.  - Vaccines reviewed and updated-tetanus vaccine advised.  Patient may obtain at pharmacy.  - Preventive health measures were discussed including: healthy diet with increase in fruits and vegetables, regular exercise at least 3 times a week, safe sex practices, avoidance of drugs, tobacco, and alcohol, and regular seatbelt use.    2.  GERD  - stable on PPi    3.  Diastolic congestive heart " failure/SSS  - Stable at this time.  Continue current cardiac regimen.  -Continue following up with cardiology as scheduled.    4.  Hypertension  - Blood pressure is well controlled.    5.  Presbycusis/hearing loss  - Patient continues to use hearing aids    No orders of the defined types were placed in this encounter.      Outpatient Encounter Medications as of 2023   Medication Sig Dispense Refill    amLODIPine (Norvasc) 5 MG tablet Take 1 tablet by mouth Daily. 30 tablet 4    azelastine (ASTELIN) 0.1 % nasal spray 1 spray into the nostril(s) as directed by provider 2 (Two) Times a Day As Needed for Rhinitis or Allergies. Use in each nostril as directed 3 each 3    baclofen (LIORESAL) 10 MG tablet Take 1 tablet by mouth At Night As Needed for Muscle Spasms. 90 tablet 1    famotidine (PEPCID) 20 MG tablet Take 1 tablet by mouth Daily. 90 tablet 0    furosemide (LASIX) 40 MG tablet TAKE 1 TABLET BY MOUTH ONCE DAILY AS NEEDED FOR  SWELLING 90 tablet 0    melatonin 3 MG tablet Take 1 tablet by mouth Every Night. At 7 PM. 90 tablet 2    meloxicam (Mobic) 7.5 MG tablet Take 1 tablet by mouth Daily. 90 tablet 0    potassium chloride (K-DUR,KLOR-CON) 20 MEQ CR tablet Take 1 tablet by mouth Daily. 90 tablet 0    rOPINIRole (REQUIP) 0.5 MG tablet Take 1 tablet by mouth every night at bedtime. 90 tablet 2    [] Tdap (Boostrix) 5-2.5-18.5 LF-MCG/0.5 injection Inject 0.5 mL into the appropriate muscle as directed by prescriber 1 (One) Time for 1 dose. 0.5 mL 0    temazepam (RESTORIL) 15 MG capsule Take 2 capsules by mouth At Night As Needed for Sleep. 60 capsule 2     No facility-administered encounter medications on file as of 2023.       Reviewed use of high risk medication in the elderly: yes  Reviewed for potential of harmful drug interactions in the elderly: yes    Follow Up:  Return in about 6 months (around 2024) for Next scheduled follow up.     An After Visit Summary and PPPS with all of these  plans were given to the patient.

## (undated) DEVICE — GLV SURG SENSICARE W/ALOE PF LF 8.5 STRL

## (undated) DEVICE — SOL IRR NACL 0.9PCT 1000ML

## (undated) DEVICE — DRSNG WND GZ PAD BORDERED LF 4X5IN STRL

## (undated) DEVICE — SOL IRRG H2O BG 3000ML STRL

## (undated) DEVICE — LIMB HOLDER, WRIST/ANKLE: Brand: DEROYAL

## (undated) DEVICE — ST EXT IV SMARTSITE 2VLV SP M LL 5ML IV1

## (undated) DEVICE — BIPOLAR CUTTING LOOP

## (undated) DEVICE — MEDI-VAC YANKAUER SUCTION HANDLE W/BULBOUS TIP: Brand: CARDINAL HEALTH

## (undated) DEVICE — ST ADMIN IV W/3 Y 15DRP 12IN

## (undated) DEVICE — TBG FLUID WARM ST SNGL

## (undated) DEVICE — TUBING, SUCTION, 1/4" X 10', STRAIGHT: Brand: MEDLINE

## (undated) DEVICE — FRCP BIOP RADLJAW4 HOT 2.2X240 BX40

## (undated) DEVICE — SET PRIMARY GRVTY 10DP MALE LL 104IN

## (undated) DEVICE — DRN PENRS 3/4X18IN LTX

## (undated) DEVICE — CATH URETRL AP 18F

## (undated) DEVICE — Device

## (undated) DEVICE — ST INF PRI SMRTSTE 20DRP 2VLV 24ML 117

## (undated) DEVICE — ELECTRD LP CUT 24/26F YEL

## (undated) DEVICE — ELECTRD RETRN/GRND MEGADYNE SGL/PLT W/CORD 9FT DISP

## (undated) DEVICE — RICH CYSTO: Brand: MEDLINE INDUSTRIES, INC.

## (undated) DEVICE — SUT PROLN 0 MO6 30IN 8418H

## (undated) DEVICE — DRSNG TELFA PAD NONADH STR 1S 3X8IN

## (undated) DEVICE — RICH MAJOR PROCEDURE: Brand: MEDLINE INDUSTRIES, INC.

## (undated) DEVICE — ADULT, W/LG. BACK PAD, RADIOTRANSPARENT ELEMENT AND LEAD WIRE: Brand: DEFIBRILLATION ELECTRODES

## (undated) DEVICE — ADHS LIQ MASTISOL 2/3ML

## (undated) DEVICE — KT ORCA VLV SXN AIR/H2O W/SEAL 1P/U STRL

## (undated) DEVICE — SPNG GZ WOVN 4X4IN 12PLY 10/BX STRL

## (undated) DEVICE — SOL IRR NACL 0.9PCT 3000ML

## (undated) DEVICE — CAUTERY TIP POLISHER: Brand: DEVON

## (undated) DEVICE — PLASMABLADE PS210-030S 3.0S LOCK: Brand: PLASMABLADE™

## (undated) DEVICE — CANN NASL CO2 DIVIDED A/

## (undated) DEVICE — DRSNG SURESITE123 4X4.8IN

## (undated) DEVICE — SUT VICRYL 3/0 CT1 27IN J258H

## (undated) DEVICE — PAD GRND REM POLYHESIVE A/ DISP

## (undated) DEVICE — STRIP,CLOSURE,WOUND,MEDI-STRIP,1/2X4: Brand: MEDLINE

## (undated) DEVICE — LEX ELECTRO PHYSIOLOGY: Brand: MEDLINE INDUSTRIES, INC.

## (undated) DEVICE — ST IRR CYSTO W/SPK 77IN LF

## (undated) DEVICE — SYR LUERLOK 30CC

## (undated) DEVICE — JELLY,LUBE,STERILE,FLIP TOP,TUBE,2-OZ: Brand: MEDLINE

## (undated) DEVICE — VIOLET BRAIDED (POLYGLACTIN 910), SYNTHETIC ABSORBABLE SUTURE: Brand: COATED VICRYL

## (undated) DEVICE — IRRIGATOR TOOMEY 70CC

## (undated) DEVICE — NDL HYPO ECLPS SFTY 22G 1 1/2IN

## (undated) DEVICE — ENDOGATOR AUXILIARY WATER JET CONNECTOR: Brand: ENDOGATOR

## (undated) DEVICE — UNDYED BRAIDED (POLYGLACTIN 910), SYNTHETIC ABSORBABLE SUTURE: Brand: COATED VICRYL

## (undated) DEVICE — SUT SILK 2/0 SH 30IN K833H

## (undated) DEVICE — CUFF SCD HEMOFORCE SEQ CALF STD MD

## (undated) DEVICE — GLV SURG SENSICARE LT W/ALOE PF LF 7 STRL

## (undated) DEVICE — SYR LUERLOK 20CC

## (undated) DEVICE — SOL NACL 0.9PCT 1000ML